# Patient Record
Sex: MALE | Race: WHITE | NOT HISPANIC OR LATINO | Employment: OTHER | URBAN - METROPOLITAN AREA
[De-identification: names, ages, dates, MRNs, and addresses within clinical notes are randomized per-mention and may not be internally consistent; named-entity substitution may affect disease eponyms.]

---

## 2021-12-20 ENCOUNTER — HOSPITAL ENCOUNTER (INPATIENT)
Facility: HOSPITAL | Age: 81
LOS: 1 days | Discharge: ANOTHER HOSPITAL | DRG: 208 | End: 2021-12-21
Attending: EMERGENCY MEDICINE | Admitting: INTERNAL MEDICINE
Payer: COMMERCIAL

## 2021-12-20 ENCOUNTER — APPOINTMENT (OUTPATIENT)
Dept: RADIOLOGY | Facility: HOSPITAL | Age: 81
DRG: 208 | End: 2021-12-20
Attending: EMERGENCY MEDICINE
Payer: COMMERCIAL

## 2021-12-20 ENCOUNTER — APPOINTMENT (OUTPATIENT)
Dept: CT IMAGING | Facility: HOSPITAL | Age: 81
DRG: 208 | End: 2021-12-20
Attending: EMERGENCY MEDICINE
Payer: COMMERCIAL

## 2021-12-20 DIAGNOSIS — J12.82 PNEUMONIA DUE TO 2019 NOVEL CORONAVIRUS: ICD-10-CM

## 2021-12-20 DIAGNOSIS — E87.0 HYPERNATREMIA: ICD-10-CM

## 2021-12-20 DIAGNOSIS — I48.91 ATRIAL FIBRILLATION WITH RVR (H): ICD-10-CM

## 2021-12-20 DIAGNOSIS — U07.1 PNEUMONIA DUE TO 2019 NOVEL CORONAVIRUS: ICD-10-CM

## 2021-12-20 DIAGNOSIS — J96.01 ACUTE RESPIRATORY FAILURE WITH HYPOXIA (H): ICD-10-CM

## 2021-12-20 PROBLEM — R09.02 HYPOXIA: Status: ACTIVE | Noted: 2021-12-20

## 2021-12-20 LAB
ABO/RH(D): NORMAL
ALBUMIN SERPL-MCNC: 2.3 G/DL (ref 3.5–5)
ALBUMIN SERPL-MCNC: 3.1 G/DL (ref 3.5–5)
ALP SERPL-CCNC: 100 U/L (ref 45–120)
ALP SERPL-CCNC: 73 U/L (ref 45–120)
ALT SERPL W P-5'-P-CCNC: 12 U/L (ref 0–45)
ALT SERPL W P-5'-P-CCNC: 18 U/L (ref 0–45)
ANION GAP SERPL CALCULATED.3IONS-SCNC: 12 MMOL/L (ref 5–18)
ANION GAP SERPL CALCULATED.3IONS-SCNC: 17 MMOL/L (ref 5–18)
APTT PPP: 35 SECONDS (ref 22–38)
AST SERPL W P-5'-P-CCNC: 18 U/L (ref 0–40)
AST SERPL W P-5'-P-CCNC: 25 U/L (ref 0–40)
BASE EXCESS BLDA CALC-SCNC: 1.3 MMOL/L
BASOPHILS # BLD AUTO: 0.1 10E3/UL (ref 0–0.2)
BASOPHILS NFR BLD AUTO: 0 %
BILIRUB SERPL-MCNC: 1.9 MG/DL (ref 0–1)
BILIRUB SERPL-MCNC: 2.3 MG/DL (ref 0–1)
BNP SERPL-MCNC: 29 PG/ML (ref 0–84)
BUN SERPL-MCNC: 56 MG/DL (ref 8–28)
BUN SERPL-MCNC: 65 MG/DL (ref 8–28)
C REACTIVE PROTEIN LHE: 11.4 MG/DL (ref 0–0.8)
C REACTIVE PROTEIN LHE: 17.9 MG/DL (ref 0–0.8)
CALCIUM SERPL-MCNC: 7.3 MG/DL (ref 8.5–10.5)
CALCIUM SERPL-MCNC: 9.3 MG/DL (ref 8.5–10.5)
CHLORIDE BLD-SCNC: 115 MMOL/L (ref 98–107)
CHLORIDE BLD-SCNC: 123 MMOL/L (ref 98–107)
CO2 SERPL-SCNC: 25 MMOL/L (ref 22–31)
CO2 SERPL-SCNC: 26 MMOL/L (ref 22–31)
COHGB MFR BLD: >100 % (ref 95–96)
CREAT SERPL-MCNC: 0.92 MG/DL (ref 0.7–1.3)
CREAT SERPL-MCNC: 1.18 MG/DL (ref 0.7–1.3)
D DIMER PPP FEU-MCNC: >20 UG/ML FEU (ref 0–0.5)
D DIMER PPP FEU-MCNC: >20 UG/ML FEU (ref 0–0.5)
EOSINOPHIL # BLD AUTO: 0 10E3/UL (ref 0–0.7)
EOSINOPHIL NFR BLD AUTO: 0 %
ERYTHROCYTE [DISTWIDTH] IN BLOOD BY AUTOMATED COUNT: 12.8 % (ref 10–15)
FIBRINOGEN PPP-MCNC: 330 MG/DL (ref 170–490)
GFR SERPL CREATININE-BSD FRML MDRD: 58 ML/MIN/1.73M2
GFR SERPL CREATININE-BSD FRML MDRD: 79 ML/MIN/1.73M2
GLUCOSE BLD-MCNC: 145 MG/DL (ref 70–125)
GLUCOSE BLD-MCNC: 148 MG/DL (ref 70–125)
HCO3 BLD-SCNC: ABNORMAL MMOL/L
HCT VFR BLD AUTO: 47.6 % (ref 40–53)
HGB BLD-MCNC: 15.9 G/DL (ref 13.3–17.7)
IMM GRANULOCYTES # BLD: 0.2 10E3/UL
IMM GRANULOCYTES NFR BLD: 1 %
INR PPP: 1.41 (ref 0.85–1.15)
INR PPP: 1.64 (ref 0.85–1.15)
LACTATE SERPL-SCNC: 2.7 MMOL/L (ref 0.7–2)
LDH SERPL L TO P-CCNC: 326 U/L (ref 125–220)
LYMPHOCYTES # BLD AUTO: 0.5 10E3/UL (ref 0.8–5.3)
LYMPHOCYTES NFR BLD AUTO: 3 %
MAGNESIUM SERPL-MCNC: 2.5 MG/DL (ref 1.8–2.6)
MAGNESIUM SERPL-MCNC: 3 MG/DL (ref 1.8–2.6)
MCH RBC QN AUTO: 31.4 PG (ref 26.5–33)
MCHC RBC AUTO-ENTMCNC: 33.4 G/DL (ref 31.5–36.5)
MCV RBC AUTO: 94 FL (ref 78–100)
MONOCYTES # BLD AUTO: 1 10E3/UL (ref 0–1.3)
MONOCYTES NFR BLD AUTO: 7 %
NEUTROPHILS # BLD AUTO: 14.3 10E3/UL (ref 1.6–8.3)
NEUTROPHILS NFR BLD AUTO: 89 %
NRBC # BLD AUTO: 0 10E3/UL
NRBC BLD AUTO-RTO: 0 /100
OXYHGB MFR BLD: >98.5 % (ref 95–96)
PCO2 BLD: 44 MM HG (ref 35–45)
PH BLD: 7.39 [PH] (ref 7.37–7.44)
PLAT MORPH BLD: NORMAL
PLATELET # BLD AUTO: 252 10E3/UL (ref 150–450)
PO2 BLD: 295 MM HG (ref 75–85)
POTASSIUM BLD-SCNC: 2.9 MMOL/L (ref 3.5–5)
POTASSIUM BLD-SCNC: 3.1 MMOL/L (ref 3.5–5)
PROCALCITONIN SERPL-MCNC: 0.1 NG/ML (ref 0–0.49)
PROT SERPL-MCNC: 5.2 G/DL (ref 6–8)
PROT SERPL-MCNC: 7.3 G/DL (ref 6–8)
RBC # BLD AUTO: 5.07 10E6/UL (ref 4.4–5.9)
RBC MORPH BLD: NORMAL
SARS-COV-2 RNA RESP QL NAA+PROBE: POSITIVE
SODIUM SERPL-SCNC: 158 MMOL/L (ref 136–145)
SODIUM SERPL-SCNC: 160 MMOL/L (ref 136–145)
SPECIMEN EXPIRATION DATE: NORMAL
TEMPERATURE: 37 DEGREES C
TROPONIN I SERPL-MCNC: 0.02 NG/ML (ref 0–0.29)
TROPONIN I SERPL-MCNC: 0.02 NG/ML (ref 0–0.29)
WBC # BLD AUTO: 15.9 10E3/UL (ref 4–11)

## 2021-12-20 PROCEDURE — 84145 PROCALCITONIN (PCT): CPT | Performed by: INTERNAL MEDICINE

## 2021-12-20 PROCEDURE — 83615 LACTATE (LD) (LDH) ENZYME: CPT | Performed by: INTERNAL MEDICINE

## 2021-12-20 PROCEDURE — 83735 ASSAY OF MAGNESIUM: CPT | Performed by: INTERNAL MEDICINE

## 2021-12-20 PROCEDURE — 36415 COLL VENOUS BLD VENIPUNCTURE: CPT | Performed by: EMERGENCY MEDICINE

## 2021-12-20 PROCEDURE — 82805 BLOOD GASES W/O2 SATURATION: CPT | Performed by: INTERNAL MEDICINE

## 2021-12-20 PROCEDURE — 84450 TRANSFERASE (AST) (SGOT): CPT | Performed by: INTERNAL MEDICINE

## 2021-12-20 PROCEDURE — 85610 PROTHROMBIN TIME: CPT | Performed by: EMERGENCY MEDICINE

## 2021-12-20 PROCEDURE — 87635 SARS-COV-2 COVID-19 AMP PRB: CPT | Performed by: EMERGENCY MEDICINE

## 2021-12-20 PROCEDURE — 85025 COMPLETE CBC W/AUTO DIFF WBC: CPT | Performed by: EMERGENCY MEDICINE

## 2021-12-20 PROCEDURE — 250N000009 HC RX 250: Performed by: INTERNAL MEDICINE

## 2021-12-20 PROCEDURE — 86141 C-REACTIVE PROTEIN HS: CPT | Performed by: INTERNAL MEDICINE

## 2021-12-20 PROCEDURE — XW033H5 INTRODUCTION OF TOCILIZUMAB INTO PERIPHERAL VEIN, PERCUTANEOUS APPROACH, NEW TECHNOLOGY GROUP 5: ICD-10-PCS | Performed by: EMERGENCY MEDICINE

## 2021-12-20 PROCEDURE — 36569 INSJ PICC 5 YR+ W/O IMAGING: CPT

## 2021-12-20 PROCEDURE — 272N000452 HC KIT SHRLOCK 5FR POWER PICC TRIPLE LUMEN

## 2021-12-20 PROCEDURE — 120N000001 HC R&B MED SURG/OB

## 2021-12-20 PROCEDURE — 83605 ASSAY OF LACTIC ACID: CPT | Performed by: EMERGENCY MEDICINE

## 2021-12-20 PROCEDURE — 258N000003 HC RX IP 258 OP 636: Performed by: EMERGENCY MEDICINE

## 2021-12-20 PROCEDURE — 85610 PROTHROMBIN TIME: CPT | Performed by: INTERNAL MEDICINE

## 2021-12-20 PROCEDURE — 85379 FIBRIN DEGRADATION QUANT: CPT | Performed by: INTERNAL MEDICINE

## 2021-12-20 PROCEDURE — 83880 ASSAY OF NATRIURETIC PEPTIDE: CPT | Performed by: EMERGENCY MEDICINE

## 2021-12-20 PROCEDURE — 99291 CRITICAL CARE FIRST HOUR: CPT | Mod: 25

## 2021-12-20 PROCEDURE — 31500 INSERT EMERGENCY AIRWAY: CPT

## 2021-12-20 PROCEDURE — 5A1935Z RESPIRATORY VENTILATION, LESS THAN 24 CONSECUTIVE HOURS: ICD-10-PCS | Performed by: EMERGENCY MEDICINE

## 2021-12-20 PROCEDURE — 96365 THER/PROPH/DIAG IV INF INIT: CPT | Mod: 59

## 2021-12-20 PROCEDURE — 84484 ASSAY OF TROPONIN QUANT: CPT | Performed by: EMERGENCY MEDICINE

## 2021-12-20 PROCEDURE — 93005 ELECTROCARDIOGRAM TRACING: CPT | Performed by: EMERGENCY MEDICINE

## 2021-12-20 PROCEDURE — 96376 TX/PRO/DX INJ SAME DRUG ADON: CPT | Mod: 59

## 2021-12-20 PROCEDURE — 96366 THER/PROPH/DIAG IV INF ADDON: CPT | Mod: 59

## 2021-12-20 PROCEDURE — XW033E5 INTRODUCTION OF REMDESIVIR ANTI-INFECTIVE INTO PERIPHERAL VEIN, PERCUTANEOUS APPROACH, NEW TECHNOLOGY GROUP 5: ICD-10-PCS | Performed by: EMERGENCY MEDICINE

## 2021-12-20 PROCEDURE — 86900 BLOOD TYPING SEROLOGIC ABO: CPT | Performed by: INTERNAL MEDICINE

## 2021-12-20 PROCEDURE — 99291 CRITICAL CARE FIRST HOUR: CPT | Performed by: INTERNAL MEDICINE

## 2021-12-20 PROCEDURE — 36600 WITHDRAWAL OF ARTERIAL BLOOD: CPT

## 2021-12-20 PROCEDURE — 87040 BLOOD CULTURE FOR BACTERIA: CPT | Performed by: INTERNAL MEDICINE

## 2021-12-20 PROCEDURE — 999N000157 HC STATISTIC RCP TIME EA 10 MIN

## 2021-12-20 PROCEDURE — 71045 X-RAY EXAM CHEST 1 VIEW: CPT

## 2021-12-20 PROCEDURE — 250N000011 HC RX IP 250 OP 636: Performed by: INTERNAL MEDICINE

## 2021-12-20 PROCEDURE — 36415 COLL VENOUS BLD VENIPUNCTURE: CPT | Performed by: INTERNAL MEDICINE

## 2021-12-20 PROCEDURE — 99292 CRITICAL CARE ADDL 30 MIN: CPT

## 2021-12-20 PROCEDURE — 258N000003 HC RX IP 258 OP 636: Performed by: INTERNAL MEDICINE

## 2021-12-20 PROCEDURE — 71275 CT ANGIOGRAPHY CHEST: CPT

## 2021-12-20 PROCEDURE — 84484 ASSAY OF TROPONIN QUANT: CPT | Performed by: INTERNAL MEDICINE

## 2021-12-20 PROCEDURE — 250N000009 HC RX 250: Performed by: EMERGENCY MEDICINE

## 2021-12-20 PROCEDURE — 82040 ASSAY OF SERUM ALBUMIN: CPT | Performed by: EMERGENCY MEDICINE

## 2021-12-20 PROCEDURE — 96368 THER/DIAG CONCURRENT INF: CPT | Mod: 59

## 2021-12-20 PROCEDURE — 51702 INSERT TEMP BLADDER CATH: CPT

## 2021-12-20 PROCEDURE — 250N000011 HC RX IP 250 OP 636: Performed by: EMERGENCY MEDICINE

## 2021-12-20 PROCEDURE — 85384 FIBRINOGEN ACTIVITY: CPT | Performed by: INTERNAL MEDICINE

## 2021-12-20 PROCEDURE — 94002 VENT MGMT INPAT INIT DAY: CPT

## 2021-12-20 PROCEDURE — 96367 TX/PROPH/DG ADDL SEQ IV INF: CPT | Mod: 59

## 2021-12-20 PROCEDURE — 85730 THROMBOPLASTIN TIME PARTIAL: CPT | Performed by: INTERNAL MEDICINE

## 2021-12-20 PROCEDURE — 96361 HYDRATE IV INFUSION ADD-ON: CPT | Mod: 59

## 2021-12-20 PROCEDURE — C9803 HOPD COVID-19 SPEC COLLECT: HCPCS

## 2021-12-20 PROCEDURE — 96375 TX/PRO/DX INJ NEW DRUG ADDON: CPT | Mod: 59

## 2021-12-20 PROCEDURE — 84155 ASSAY OF PROTEIN SERUM: CPT | Performed by: INTERNAL MEDICINE

## 2021-12-20 PROCEDURE — 83735 ASSAY OF MAGNESIUM: CPT | Performed by: EMERGENCY MEDICINE

## 2021-12-20 PROCEDURE — 99292 CRITICAL CARE ADDL 30 MIN: CPT | Performed by: INTERNAL MEDICINE

## 2021-12-20 RX ORDER — POTASSIUM CHLORIDE 7.45 MG/ML
10 INJECTION INTRAVENOUS ONCE
Status: COMPLETED | OUTPATIENT
Start: 2021-12-20 | End: 2021-12-21

## 2021-12-20 RX ORDER — DEXAMETHASONE SODIUM PHOSPHATE 4 MG/ML
6 INJECTION, SOLUTION INTRA-ARTICULAR; INTRALESIONAL; INTRAMUSCULAR; INTRAVENOUS; SOFT TISSUE EVERY 24 HOURS
Status: DISCONTINUED | OUTPATIENT
Start: 2021-12-21 | End: 2021-12-21 | Stop reason: HOSPADM

## 2021-12-20 RX ORDER — NOREPINEPHRINE BITARTRATE 0.02 MG/ML
.01-.6 INJECTION, SOLUTION INTRAVENOUS CONTINUOUS
Status: DISCONTINUED | OUTPATIENT
Start: 2021-12-20 | End: 2021-12-21 | Stop reason: HOSPADM

## 2021-12-20 RX ORDER — CHLORHEXIDINE GLUCONATE ORAL RINSE 1.2 MG/ML
15 SOLUTION DENTAL EVERY 12 HOURS
Status: DISCONTINUED | OUTPATIENT
Start: 2021-12-20 | End: 2021-12-21 | Stop reason: HOSPADM

## 2021-12-20 RX ORDER — ALBUTEROL SULFATE 90 UG/1
2 AEROSOL, METERED RESPIRATORY (INHALATION) 4 TIMES DAILY
Status: DISCONTINUED | OUTPATIENT
Start: 2021-12-20 | End: 2021-12-21 | Stop reason: HOSPADM

## 2021-12-20 RX ORDER — PROPOFOL 10 MG/ML
5-75 INJECTION, EMULSION INTRAVENOUS CONTINUOUS
Status: DISCONTINUED | OUTPATIENT
Start: 2021-12-20 | End: 2021-12-21

## 2021-12-20 RX ORDER — PIPERACILLIN SODIUM, TAZOBACTAM SODIUM 3; .375 G/15ML; G/15ML
3.38 INJECTION, POWDER, LYOPHILIZED, FOR SOLUTION INTRAVENOUS EVERY 8 HOURS
Status: DISCONTINUED | OUTPATIENT
Start: 2021-12-21 | End: 2021-12-21 | Stop reason: HOSPADM

## 2021-12-20 RX ORDER — LIDOCAINE 40 MG/G
CREAM TOPICAL
Status: DISCONTINUED | OUTPATIENT
Start: 2021-12-20 | End: 2021-12-21 | Stop reason: HOSPADM

## 2021-12-20 RX ORDER — IOPAMIDOL 755 MG/ML
100 INJECTION, SOLUTION INTRAVASCULAR ONCE
Status: COMPLETED | OUTPATIENT
Start: 2021-12-21 | End: 2021-12-21

## 2021-12-20 RX ORDER — PIPERACILLIN SODIUM, TAZOBACTAM SODIUM 3; .375 G/15ML; G/15ML
3.38 INJECTION, POWDER, LYOPHILIZED, FOR SOLUTION INTRAVENOUS ONCE
Status: COMPLETED | OUTPATIENT
Start: 2021-12-20 | End: 2021-12-20

## 2021-12-20 RX ORDER — FENTANYL CITRATE 50 UG/ML
100 INJECTION, SOLUTION INTRAMUSCULAR; INTRAVENOUS ONCE
Status: COMPLETED | OUTPATIENT
Start: 2021-12-20 | End: 2021-12-20

## 2021-12-20 RX ORDER — DEXAMETHASONE SODIUM PHOSPHATE 10 MG/ML
6 INJECTION, SOLUTION INTRAMUSCULAR; INTRAVENOUS ONCE
Status: COMPLETED | OUTPATIENT
Start: 2021-12-20 | End: 2021-12-20

## 2021-12-20 RX ORDER — DEXTROSE MONOHYDRATE 50 MG/ML
INJECTION, SOLUTION INTRAVENOUS CONTINUOUS
Status: DISCONTINUED | OUTPATIENT
Start: 2021-12-20 | End: 2021-12-21

## 2021-12-20 RX ORDER — METOPROLOL TARTRATE 1 MG/ML
5 INJECTION, SOLUTION INTRAVENOUS ONCE
Status: COMPLETED | OUTPATIENT
Start: 2021-12-20 | End: 2021-12-20

## 2021-12-20 RX ORDER — ETOMIDATE 2 MG/ML
30 INJECTION INTRAVENOUS ONCE
Status: COMPLETED | OUTPATIENT
Start: 2021-12-20 | End: 2021-12-20

## 2021-12-20 RX ORDER — PIPERACILLIN SODIUM, TAZOBACTAM SODIUM 3; .375 G/15ML; G/15ML
3.38 INJECTION, POWDER, LYOPHILIZED, FOR SOLUTION INTRAVENOUS EVERY 8 HOURS
Status: DISCONTINUED | OUTPATIENT
Start: 2021-12-20 | End: 2021-12-20 | Stop reason: DRUGHIGH

## 2021-12-20 RX ADMIN — PIPERACILLIN SODIUM AND TAZOBACTAM SODIUM 3.38 G: 3; .375 INJECTION, POWDER, LYOPHILIZED, FOR SOLUTION INTRAVENOUS at 21:18

## 2021-12-20 RX ADMIN — TOCILIZUMAB 472 MG: 20 INJECTION, SOLUTION, CONCENTRATE INTRAVENOUS at 22:22

## 2021-12-20 RX ADMIN — ETOMIDATE 30 MG: 2 INJECTION INTRAVENOUS at 20:16

## 2021-12-20 RX ADMIN — Medication 50 MCG/HR: at 22:04

## 2021-12-20 RX ADMIN — SODIUM CHLORIDE 1000 ML: 9 INJECTION, SOLUTION INTRAVENOUS at 17:51

## 2021-12-20 RX ADMIN — REMDESIVIR 200 MG: 100 INJECTION, POWDER, LYOPHILIZED, FOR SOLUTION INTRAVENOUS at 21:09

## 2021-12-20 RX ADMIN — ENOXAPARIN SODIUM 30 MG: 30 INJECTION SUBCUTANEOUS at 21:39

## 2021-12-20 RX ADMIN — DEXAMETHASONE SODIUM PHOSPHATE 6 MG: 10 INJECTION, SOLUTION INTRAMUSCULAR; INTRAVENOUS at 21:08

## 2021-12-20 RX ADMIN — MIDAZOLAM HYDROCHLORIDE 5 MG: 1 INJECTION, SOLUTION INTRAMUSCULAR; INTRAVENOUS at 20:46

## 2021-12-20 RX ADMIN — MIDAZOLAM HYDROCHLORIDE 0.5 MG: 1 INJECTION, SOLUTION INTRAMUSCULAR; INTRAVENOUS at 22:09

## 2021-12-20 RX ADMIN — SUCCINYLCHOLINE CHLORIDE 120 MG: 20 INJECTION, SOLUTION INTRAMUSCULAR; INTRAVENOUS at 20:17

## 2021-12-20 RX ADMIN — LIDOCAINE HYDROCHLORIDE 2 ML: 10 INJECTION, SOLUTION EPIDURAL; INFILTRATION; INTRACAUDAL; PERINEURAL at 20:45

## 2021-12-20 RX ADMIN — POTASSIUM CHLORIDE 10 MEQ: 7.46 INJECTION, SOLUTION INTRAVENOUS at 22:52

## 2021-12-20 RX ADMIN — FAMOTIDINE 20 MG: 10 INJECTION INTRAVENOUS at 21:09

## 2021-12-20 RX ADMIN — SODIUM CHLORIDE 1000 ML: 9 INJECTION, SOLUTION INTRAVENOUS at 20:48

## 2021-12-20 RX ADMIN — METOPROLOL TARTRATE 5 MG: 5 INJECTION INTRAVENOUS at 21:08

## 2021-12-20 RX ADMIN — DEXTROSE MONOHYDRATE: 50 INJECTION, SOLUTION INTRAVENOUS at 21:19

## 2021-12-20 RX ADMIN — MIDAZOLAM HYDROCHLORIDE 0.5 MG: 1 INJECTION, SOLUTION INTRAMUSCULAR; INTRAVENOUS at 22:36

## 2021-12-20 RX ADMIN — MIDAZOLAM HYDROCHLORIDE 1 MG: 1 INJECTION, SOLUTION INTRAMUSCULAR; INTRAVENOUS at 21:38

## 2021-12-20 RX ADMIN — MIDAZOLAM HYDROCHLORIDE 0.5 MG: 1 INJECTION, SOLUTION INTRAMUSCULAR; INTRAVENOUS at 22:30

## 2021-12-20 RX ADMIN — FENTANYL CITRATE 100 MCG: 50 INJECTION INTRAMUSCULAR; INTRAVENOUS at 20:45

## 2021-12-20 RX ADMIN — SODIUM CHLORIDE 50 ML: 9 INJECTION, SOLUTION INTRAVENOUS at 22:03

## 2021-12-20 ASSESSMENT — ACTIVITIES OF DAILY LIVING (ADL)
ADLS_ACUITY_SCORE: 12
DEPENDENT_IADLS:: INDEPENDENT
ADLS_ACUITY_SCORE: 12

## 2021-12-20 ASSESSMENT — MIFFLIN-ST. JEOR: SCORE: 1374.43

## 2021-12-20 NOTE — ED TRIAGE NOTES
Patient arrived via [facility] for evaluation of low O2 sats. Patient c/o bodyaches, chills, fever. Per EMS, patient lives with his son who also had possible covid symptoms. Neither were tested. EMS stated patient's O2 sats were 81% on RA. With nonrebreather mask at12L oxygen, patient's sats improve to 96%. EMS reported other VS stable and glucose: 154.

## 2021-12-20 NOTE — ED PROVIDER NOTES
EMERGENCY DEPARTMENT ENCOUNTER      NAME: Ezequiel Randhawa  AGE: 81 year old male  YOB: 1940  MRN: [id number]  EVALUATION DATE & TIME: 12/20/2021  4:52 PM    PCP: No primary care provider on file.    ED PROVIDER: Kalina Caraballo M.D.      Chief Complaint   Patient presents with     Low O2 sats/poss covid     FINAL IMPRESSION:  1. Pneumonia due to 2019 novel coronavirus    2. Hypernatremia    3. Acute respiratory failure with hypoxia (H)    4. Atrial fibrillation with RVR (H)        ED COURSE & MEDICAL DECISION MAKING:    Pertinent Labs & Imaging studies reviewed. (See chart for details)  ED Course as of 12/20/21 2119   Mon Dec 20, 2021   1711 Patient is a 81-year-old male who comes in today with low oxygen saturations and body aches and chills.  He had a poor appetite for a few days and and looks very dry.  He denies any heart or lung problems but because of his difficulty breathing he was difficult to understand and get a good history from him.  He is requiring 12 L on oxygen mask.  He is not caught up on his Covid vaccine.  He has a son at home with similar symptoms.  I am highly suspicious for Covid pneumonia.  He is tachycardic.  He will need some fluids.  Will need blood work and a chest x-ray and a Covid swab and likely admission to the hospital.  I discussed this with him and he is in agreement with the plan.   1915 Patient came back Covid positive so I ordered dexamethasone for him.  I think he is very dehydrated.   1916 Patient's chest x-ray shows pneumonia on the left side.  Certainly this would fit with his picture.  I'm going to go ahead and order CTA imaging to further evaluate and will work on getting him admitted to the hospital.  Lactic acid came back elevated at 2.7.  This seems mostly like Covid with dehydration.  We can recheck the lactate after some fluids and see if it looks better.   1940 I spoke with Dr. Brush with the hospitalist service.  She was in agreement with  admission to the hospital.  She want the patient put on ICU status so I have ordered that.  We do not have an ICU bed so he will board down in the emergency department.   1953 I was called into the patient's room.  He is the satting and now is on BiPAP.  He is likely going to require intubation.  We will need to have a PICC line placed.   2031 Patient ended up requiring intubation.  He was intubated with a 7-1/2 tube and went very smoothly.  He will have a PICC line placed.  He is not needing pressors at this time.  Dr. Brush was able to speak to family and he is a no CPR but otherwise would want resuscitation and pressors and intubation.   2032 Patient's D-dimer is greater than 20.  I am suspecting this is related to the Covid process.  Other labs have been ordered including a fibrinogen.   2040 Patient will be placed on a fentanyl and Versed drip.   2056 Patient flipped into atrial fibrillation with RVR.  I have ordered some metoprolol for him.  He is on a fentanyl and Versed drip.        5:03 PM I met with the patient, obtained history, performed an initial exam, and discussed options and plan for diagnostics and treatment here in the ED. Discussed plan for admission - patient agreeable. PPE worn: N95, eye protection, gloves, hair cover, gown.  7:36 PM Spoke with Dr. Brush, hospitalist, who accepts patient for admission.  7:43 PM Rechecked patient. Updated on results and plan.  7:58 PM Rechecked patient. Performed intubation.  8:44 PM Rechecked patient.    At the conclusion of the encounter I discussed  the results of all of the tests and the disposition with patient. All questions were answered. The patient acknowledged understanding and was involved in the decision making regarding the overall care plan.      45 minutes of critical care time     MEDICATIONS GIVEN IN THE EMERGENCY:  Medications   remdesivir 200 mg in sodium chloride 0.9 % 250 mL intermittent infusion (200 mg Intravenous New Bag 12/20/21  2109)     Followed by   0.9% sodium chloride BOLUS (has no administration in time range)   remdesivir 100 mg in sodium chloride 0.9 % 250 mL intermittent infusion (has no administration in time range)     And   0.9% sodium chloride BOLUS (has no administration in time range)   lidocaine 1 % 0.1-5 mL (has no administration in time range)   lidocaine (LMX4) cream (has no administration in time range)   sodium chloride (PF) 0.9% PF flush 10-40 mL (has no administration in time range)   tocilizumab (ACTEMRA) 472 mg in sodium chloride 0.9 % 123.6 mL infusion (has no administration in time range)   dexamethasone (DECADRON) injection 6 mg (has no administration in time range)   albuterol (PROVENTIL HFA/VENTOLIN HFA) inhaler (has no administration in time range)   norepinephrine (LEVOPHED) 4 mg in  mL infusion PREMIX (has no administration in time range)   enoxaparin ANTICOAGULANT (LOVENOX) injection 30 mg (has no administration in time range)   famotidine (PEPCID) injection 20 mg (20 mg Intravenous Given 12/20/21 2109)   fentaNYL (SUBLIMAZE) infusion (has no administration in time range)   midazolam (VERSED) injection 1 mg (has no administration in time range)     Followed by   midazolam (VERSED) injection 0.5 mg (has no administration in time range)   dextrose 5% infusion (has no administration in time range)   piperacillin-tazobactam (ZOSYN) 3.375 g vial to attach to  mL bag (3.375 g Intravenous New Bag 12/20/21 2118)     Followed by   piperacillin-tazobactam (ZOSYN) 3.375 g vial to attach to  mL bag (has no administration in time range)   0.9% sodium chloride BOLUS (0 mLs Intravenous Stopped 12/20/21 2048)   0.9% sodium chloride BOLUS (1,000 mLs Intravenous New Bag 12/20/21 2048)   dexamethasone PF (DECADRON) injection 6 mg (6 mg Intravenous Given 12/20/21 2108)   fentaNYL (PF) (SUBLIMAZE) injection 100 mcg (100 mcg Intravenous Given 12/20/21 2045)   midazolam (VERSED) injection 5 mg (5 mg Intravenous  Given 12/20/21 2046)   metoprolol (LOPRESSOR) injection 5 mg (5 mg Intravenous Given 12/20/21 2108)     =================================================================    HPI    Triage Note: Patient arrived via [facility] for evaluation of low O2 sats. Patient c/o bodyaches, chills, fever. Per EMS, patient lives with his son who also had possible covid symptoms. Neither were tested. EMS stated patient's O2 sats were 81% on RA. With nonrebreather mask at12L oxygen, patient's sats improve to 96%. EMS reported other VS stable and glucose: 154.     Patient information was obtained from: Patient and RN    Use of : N/A      Ezequiel Randhawa is a 81 year old male who presents for evaluation of Covid concern. Patient reports 2 days of shortness of breath, body aches, fever, chills, and some chest pain. Reports he has not been able to eat or drink well. Patient reports he lives with his son who has also been sick. Neither are vaccinated against Covid. He denies any known cardiac or pulmonary problems.    Per RN, patient had O2 saturation of 81% at home. Presents with non-rebreather at 12L with improvement of O2 saturation to 96%.      REVIEW OF SYSTEMS   Except as stated in the HPI all other systems reviewed and are negative.    PAST MEDICAL HISTORY:  Patient denies any significant past medical history although history was somewhat limited due to patient's critical condition.    PAST SURGICAL HISTORY:  History reviewed. No pertinent surgical history.    CURRENT MEDICATIONS:      Current Facility-Administered Medications:      remdesivir 200 mg in sodium chloride 0.9 % 250 mL intermittent infusion, 200 mg, Intravenous, Once, Last Rate: 500 mL/hr at 12/20/21 2109, 200 mg at 12/20/21 2109 **FOLLOWED BY** 0.9% sodium chloride BOLUS, 50 mL, Intravenous, Once, Kalina Caraballo MD     [START ON 12/21/2021] remdesivir 100 mg in sodium chloride 0.9 % 250 mL intermittent infusion, 100 mg, Intravenous, Q24H **AND** [START  ON 12/21/2021] 0.9% sodium chloride BOLUS, 50 mL, Intravenous, Q24H, Kalina Caraballo MD     albuterol (PROVENTIL HFA/VENTOLIN HFA) inhaler, 2 puff, Inhalation, 4x Daily, Jesi Brush MD     [START ON 12/21/2021] dexamethasone (DECADRON) injection 6 mg, 6 mg, Intravenous, Q24H, Jesi Brush MD     dextrose 5% infusion, , Intravenous, Continuous, Jesi Brush MD     enoxaparin ANTICOAGULANT (LOVENOX) injection 30 mg, 0.5 mg/kg, Subcutaneous, BID, Jesi Brush MD     famotidine (PEPCID) injection 20 mg, 20 mg, Intravenous, Q12H, Jesi Brush MD, 20 mg at 12/20/21 2109     fentaNYL (SUBLIMAZE) infusion,  mcg/hr, Intravenous, Continuous, Kalina Caraballo MD     lidocaine (LMX4) cream, , Topical, Q1H PRN, Kalina Caraballo MD     lidocaine 1 % 0.1-5 mL, 0.1-5 mL, Other, Q1H PRN, Kalina Caraballo MD     midazolam (VERSED) injection 1 mg, 1 mg, Intravenous, Once within 24 hrs **FOLLOWED BY** midazolam (VERSED) injection 0.5 mg, 0.5 mg, Intravenous, Q4 Min PRN, Kalina Caraballo MD     norepinephrine (LEVOPHED) 4 mg in  mL infusion PREMIX, 0.01-0.6 mcg/kg/min, Intravenous, Continuous, Jesi Brush MD     piperacillin-tazobactam (ZOSYN) 3.375 g vial to attach to  mL bag, 3.375 g, Intravenous, Once, 3.375 g at 12/20/21 2118 **FOLLOWED BY** [START ON 12/21/2021] piperacillin-tazobactam (ZOSYN) 3.375 g vial to attach to  mL bag, 3.375 g, Intravenous, Q8H, Jesi Brush MD     sodium chloride (PF) 0.9% PF flush 10-40 mL, 10-40 mL, Intracatheter, Once PRN, Kalina Caraballo MD     tocilizumab (ACTEMRA) 472 mg in sodium chloride 0.9 % 123.6 mL infusion, 8 mg/kg, Intravenous, Once, Jesi Brush MD  No current outpatient medications on file.    ALLERGIES:  No Known Allergies    FAMILY HISTORY:  History reviewed. No pertinent family history.    SOCIAL HISTORY:   Social History     Socioeconomic History      "Marital status:      Spouse name: Not on file     Number of children: Not on file     Years of education: Not on file     Highest education level: Not on file   Occupational History     Not on file   Tobacco Use     Smoking status: Not on file     Smokeless tobacco: Not on file   Substance and Sexual Activity     Alcohol use: Not on file     Drug use: Not on file     Sexual activity: Not on file   Other Topics Concern     Not on file   Social History Narrative     Not on file     Social Determinants of Health     Financial Resource Strain: Not on file   Food Insecurity: Not on file   Transportation Needs: Not on file   Physical Activity: Not on file   Stress: Not on file   Social Connections: Not on file   Intimate Partner Violence: Not on file   Housing Stability: Not on file       PHYSICAL EXAM    VITAL SIGNS: /68   Pulse 99   Temp 98.7  F (37.1  C) (Axillary)   Resp (!) 37   Ht 1.88 m (6' 2\")   Wt 59 kg (130 lb)   SpO2 94%   BMI 16.69 kg/m     GENERAL: Awake, Alert, answering some questions, Looks frail.  HEENT: Normal cephalic, Atraumatic, bilateral external ears normal, No scleral icterus, Very dry mucous membranes and lips.  NECK: No obvious swelling or abnormality, No stridor  PULMONARY: Decreased breath sounds bilaterally, mild respiratory distress.   CARDIOVASCULAR: Tachycardic. Regular rhythm, Distal pulses present and normal.  ABDOMINAL: Soft, Nondistended, Nontender, No flank tenderness, No palpable masses  BACK: No bruising or tenderness.  EXTREMITIES: Moves all extremities spontaneously, warm, no edema, No major deformities  NEURO: No facial droop, normal motor function, Normal speech   PSYCH: Normal mood and affect  SKIN: No rashes on visualized skin, dry, warm    LAB:  All pertinent labs reviewed and interpreted.  Results for orders placed or performed during the hospital encounter of 12/20/21   XR Chest Port 1 View    Impression    IMPRESSION:     The cardiac silhouette is normal " in size. Vascular pedicle width is normal.    There is hazy airspace opacity involving multiple segments of the left lung at and inferior to the left hilum. The right lung is well-expanded and clear. Overall the distribution is suggestive of infection/inflammation. Follow-up chest radiographs in 4-6   weeks are suggested for reassessment.    No pleural effusion or pneumothorax is present.    NOTE: ABNORMAL REPORT    THE DICTATION ABOVE DESCRIBES AN ABNORMALITY FOR WHICH FOLLOW-UP IS NEEDED.    Result Value Ref Range    INR 1.41 (H) 0.85 - 1.15   Comprehensive metabolic panel   Result Value Ref Range    Sodium 158 (HH) 136 - 145 mmol/L    Potassium 3.1 (L) 3.5 - 5.0 mmol/L    Chloride 115 (H) 98 - 107 mmol/L    Carbon Dioxide (CO2) 26 22 - 31 mmol/L    Anion Gap 17 5 - 18 mmol/L    Urea Nitrogen 65 (H) 8 - 28 mg/dL    Creatinine 1.18 0.70 - 1.30 mg/dL    Calcium 9.3 8.5 - 10.5 mg/dL    Glucose 145 (H) 70 - 125 mg/dL    Alkaline Phosphatase 100 45 - 120 U/L    AST 25 0 - 40 U/L    ALT 18 0 - 45 U/L    Protein Total 7.3 6.0 - 8.0 g/dL    Albumin 3.1 (L) 3.5 - 5.0 g/dL    Bilirubin Total 2.3 (H) 0.0 - 1.0 mg/dL    GFR Estimate 58 (L) >60 mL/min/1.73m2   Lactic acid whole blood   Result Value Ref Range    Lactic Acid 2.7 (H) 0.7 - 2.0 mmol/L   Result Value Ref Range    Troponin I 0.02 0.00 - 0.29 ng/mL   Result Value Ref Range    Magnesium 3.0 (H) 1.8 - 2.6 mg/dL   B-Type Natriuretic Peptide ([facility] Only)   Result Value Ref Range    BNP 29 0 - 84 pg/mL   Symptomatic; Yes; 12/17/2021 COVID-19 Virus (Coronavirus) by PCR Nasopharyngeal    Specimen: Nasopharyngeal; Swab   Result Value Ref Range    SARS CoV2 PCR Positive (A) Negative   CBC with platelets and differential   Result Value Ref Range    WBC Count 15.9 (H) 4.0 - 11.0 10e3/uL    RBC Count 5.07 4.40 - 5.90 10e6/uL    Hemoglobin 15.9 13.3 - 17.7 g/dL    Hematocrit 47.6 40.0 - 53.0 %    MCV 94 78 - 100 fL    MCH 31.4 26.5 - 33.0 pg    MCHC 33.4 31.5 - 36.5 g/dL    RDW  12.8 10.0 - 15.0 %    Platelet Count 252 150 - 450 10e3/uL    % Neutrophils 89 %    % Lymphocytes 3 %    % Monocytes 7 %    % Eosinophils 0 %    % Basophils 0 %    % Immature Granulocytes 1 %    NRBCs per 100 WBC 0 <1 /100    Absolute Neutrophils 14.3 (H) 1.6 - 8.3 10e3/uL    Absolute Lymphocytes 0.5 (L) 0.8 - 5.3 10e3/uL    Absolute Monocytes 1.0 0.0 - 1.3 10e3/uL    Absolute Eosinophils 0.0 0.0 - 0.7 10e3/uL    Absolute Basophils 0.1 0.0 - 0.2 10e3/uL    Absolute Immature Granulocytes 0.2 <=0.4 10e3/uL    Absolute NRBCs 0.0 10e3/uL   RBC and Platelet Morphology   Result Value Ref Range    Platelet Assessment  Automated Count Confirmed. Platelet morphology is normal.     Automated Count Confirmed. Platelet morphology is normal.    RBC Morphology Confirmed RBC Indices    D dimer quantitative   Result Value Ref Range    D-Dimer Quantitative >20.00 (HH) 0.00 - 0.50 ug/mL FEU   CRP inflammation   Result Value Ref Range    CRP 17.9 (H) 0.0-<0.8 mg/dL       RADIOLOGY:  XR Chest Port 1 View   Final Result   IMPRESSION:       The cardiac silhouette is normal in size. Vascular pedicle width is normal.      There is hazy airspace opacity involving multiple segments of the left lung at and inferior to the left hilum. The right lung is well-expanded and clear. Overall the distribution is suggestive of infection/inflammation. Follow-up chest radiographs in 4-6    weeks are suggested for reassessment.      No pleural effusion or pneumothorax is present.      NOTE: ABNORMAL REPORT      THE DICTATION ABOVE DESCRIBES AN ABNORMALITY FOR WHICH FOLLOW-UP IS NEEDED.       CT Chest Pulmonary Embolism w Contrast    (Results Pending)   US Lower Extremity Venous Duplex Bilateral    (Results Pending)     EKG  Date and time: December 20, 2021 at 1711  Rate: 109 bpm  Rhythm: Sinus tach per  VT interval: 152 ms  QRS interval: 80 ms  QT/QTc: 340/457 ms  ST changes or T wave changes: No acute ST or T wave abnormality  Change from prior ECG: No  prior to compare to  I have independently reviewed and interpreted this EKG.     Date and time: December 20, 2021 at 2051  Rate: 151 bpm  Rhythm: Atrial fibrillation with RVR  QRS interval: 80 ms  QT/QTc: 272/431 ms  ST changes or T wave changes: No acute ST or T wave abnormality  Change from prior ECG: Atrial fibrillation with RVR is new from previous EKG.  I have independently reviewed and interpreted this EKG.       PROCEDURES:   PROCEDURE: Rapid Sequence Intubation   INDICATIONS: Respiratory Failure   PROCEDURE PROVIDER: Dr Kalina Caraballo   CONSENT: Risks, benefits and alternatives were discussed with and Verbal consent was obtained from Patient.   PROCEDURE SPECIFIC CHECKLIST COMPLETED: Yes   TIME OUT: Universal protocol was followed. TIME OUT conducted just prior to starting procedure confirmed patient identity, site/side, procedure, patient position, and availability of correct equipment. Yes   MEDICATIONS: Etomidate, 30 mg, IV   TUBE DETAILS: 7.5 tube, at 25 cm at the teeth   EQUIPMENT USED: Mac 3 (curved)   POST-INTUBATION ASSESSMENT/NOTE: Difficulty of intubation:  Easy, straightforward and Slightly difficult    Post-intubation pulmonary exam:  equal and absent over the epigastrium    ET Tube placement was confirmed with:  auscultation with good, equal bilateral breath sounds, absence of breath sounds over the epigastrium, fog in the tube, colorimetric CO2 detector (good color change) and pulse oximeter readings stable or improving    Lowest oxygen saturation was 90%    Monitoring consisted of:  heart rate, cardiac monitor, continuous pulse oximeter, frequent blood pressure checks, IV access, constant attendance by RN until patient is recovered and constant attendance by MD until patient is stable     COMPLICATIONS: Patient tolerated procedure well, without complication     Critical Care     Performed by: Dr Kalina Caraballo  Authorized by: Dr Kalina Caraballo  Total critical care time: 45 minutes  Critical  care was necessary to treat or prevent imminent or life-threatening deterioration of the following conditions:  Acute hypoxic respiratory failure related to Covid pneumonia, atrial fibrillation with RVR  Critical care was time spent personally by me on the following activities: development of treatment plan with patient or surrogate, discussions with consultants, examination of patient, evaluation of patient's response to treatment, obtaining history from patient or surrogate, ordering and performing treatments and interventions, ordering and review of laboratory studies, ordering and review of radiographic studies, re-evaluation of patient's condition and monitoring for potential decompensation.  Critical care time was exclusive of separately billable procedures and treating other patients.      I, Amador Ribeiro, am serving as a scribe to document services personally performed by Dr. Caraballo based on my observation and the provider's statements to me. I, Kalina Caraballo MD attest that Amador Ribeiro is acting in a scribe capacity, has observed my performance of the services and has documented them in accordance with my direction.    Kalina Caraballo M.D.  Emergency Medicine  [facility] EMERGENCY DEPARTMENT  [address] [place] [id number]  [phone]  Dept: [phone]     Kalina Caraballo MD  12/20/21 2126       Kalina Caraballo MD  12/20/21 213

## 2021-12-21 ENCOUNTER — APPOINTMENT (OUTPATIENT)
Dept: CARDIOLOGY | Facility: HOSPITAL | Age: 81
DRG: 208 | End: 2021-12-21
Attending: INTERNAL MEDICINE
Payer: COMMERCIAL

## 2021-12-21 ENCOUNTER — HOSPITAL ENCOUNTER (INPATIENT)
Facility: CLINIC | Age: 81
LOS: 17 days | Discharge: NURSING, REHAB OR LONG-TERM CARE | DRG: 208 | End: 2022-01-07
Attending: INTERNAL MEDICINE | Admitting: INTERNAL MEDICINE
Payer: COMMERCIAL

## 2021-12-21 ENCOUNTER — APPOINTMENT (OUTPATIENT)
Dept: GENERAL RADIOLOGY | Facility: CLINIC | Age: 81
DRG: 208 | End: 2021-12-21
Attending: INTERNAL MEDICINE
Payer: COMMERCIAL

## 2021-12-21 VITALS
WEIGHT: 130 LBS | DIASTOLIC BLOOD PRESSURE: 55 MMHG | TEMPERATURE: 98.6 F | SYSTOLIC BLOOD PRESSURE: 116 MMHG | RESPIRATION RATE: 20 BRPM | BODY MASS INDEX: 16.68 KG/M2 | HEIGHT: 74 IN | HEART RATE: 58 BPM | OXYGEN SATURATION: 99 %

## 2021-12-21 DIAGNOSIS — J12.82 PNEUMONIA DUE TO COVID-19 VIRUS: Primary | ICD-10-CM

## 2021-12-21 DIAGNOSIS — U07.1 PNEUMONIA DUE TO COVID-19 VIRUS: Primary | ICD-10-CM

## 2021-12-21 LAB
ABO/RH(D): NORMAL
ABO/RH(D): NORMAL
ALBUMIN SERPL-MCNC: 2 G/DL (ref 3.4–5)
ALBUMIN SERPL-MCNC: 2.1 G/DL (ref 3.5–5)
ALBUMIN SERPL-MCNC: 2.2 G/DL (ref 3.5–5)
ALP SERPL-CCNC: 71 U/L (ref 45–120)
ALP SERPL-CCNC: 73 U/L (ref 40–150)
ALP SERPL-CCNC: 74 U/L (ref 45–120)
ALT SERPL W P-5'-P-CCNC: 13 U/L (ref 0–45)
ALT SERPL W P-5'-P-CCNC: 14 U/L (ref 0–45)
ALT SERPL W P-5'-P-CCNC: 17 U/L (ref 0–70)
ANION GAP SERPL CALCULATED.3IONS-SCNC: 5 MMOL/L (ref 3–14)
ANION GAP SERPL CALCULATED.3IONS-SCNC: 8 MMOL/L (ref 5–18)
APTT PPP: 33 SECONDS (ref 22–38)
APTT PPP: 38 SECONDS (ref 22–38)
AST SERPL W P-5'-P-CCNC: 17 U/L (ref 0–40)
AST SERPL W P-5'-P-CCNC: 20 U/L (ref 0–45)
AST SERPL W P-5'-P-CCNC: 22 U/L (ref 0–40)
ATRIAL RATE - MUSE: 109 BPM
BASE EXCESS BLDA CALC-SCNC: 0.5 MMOL/L (ref -9–1.8)
BASE EXCESS BLDV CALC-SCNC: 0.9 MMOL/L (ref -7.7–1.9)
BASE EXCESS BLDV CALC-SCNC: 3.6 MMOL/L
BASOPHILS # BLD AUTO: 0 10E3/UL (ref 0–0.2)
BASOPHILS # BLD AUTO: 0 10E3/UL (ref 0–0.2)
BASOPHILS NFR BLD AUTO: 0 %
BASOPHILS NFR BLD AUTO: 0 %
BILIRUB DIRECT SERPL-MCNC: 0.3 MG/DL
BILIRUB DIRECT SERPL-MCNC: 0.5 MG/DL
BILIRUB SERPL-MCNC: 1 MG/DL (ref 0.2–1.3)
BILIRUB SERPL-MCNC: 1 MG/DL (ref 0–1)
BILIRUB SERPL-MCNC: 1.1 MG/DL (ref 0–1)
BUN SERPL-MCNC: 43 MG/DL (ref 8–28)
BUN SERPL-MCNC: 44 MG/DL (ref 7–30)
BURR CELLS BLD QL SMEAR: ABNORMAL
C REACTIVE PROTEIN LHE: 10.2 MG/DL (ref 0–0.8)
CALCIUM SERPL-MCNC: 7.3 MG/DL (ref 8.5–10.5)
CALCIUM SERPL-MCNC: 7.7 MG/DL (ref 8.5–10.1)
CHLORIDE BLD-SCNC: 118 MMOL/L (ref 98–107)
CHLORIDE BLD-SCNC: 122 MMOL/L (ref 94–109)
CO2 SERPL-SCNC: 24 MMOL/L (ref 22–31)
CO2 SERPL-SCNC: 27 MMOL/L (ref 20–32)
CREAT SERPL-MCNC: 0.79 MG/DL (ref 0.7–1.3)
CREAT SERPL-MCNC: 0.84 MG/DL (ref 0.66–1.25)
CRP SERPL-MCNC: 85 MG/L (ref 0–8)
D DIMER PPP FEU-MCNC: >20 UG/ML FEU (ref 0–0.5)
D DIMER PPP FEU-MCNC: >20 UG/ML FEU (ref 0–0.5)
DAT, ANTI-IGG, C3: NORMAL
DIASTOLIC BLOOD PRESSURE - MUSE: 63 MMHG
EOSINOPHIL # BLD AUTO: 0 10E3/UL (ref 0–0.7)
EOSINOPHIL # BLD AUTO: 0 10E3/UL (ref 0–0.7)
EOSINOPHIL NFR BLD AUTO: 0 %
EOSINOPHIL NFR BLD AUTO: 0 %
ERYTHROCYTE [DISTWIDTH] IN BLOOD BY AUTOMATED COUNT: 13.1 % (ref 10–15)
ERYTHROCYTE [DISTWIDTH] IN BLOOD BY AUTOMATED COUNT: 13.1 % (ref 10–15)
ERYTHROCYTE [DISTWIDTH] IN BLOOD BY AUTOMATED COUNT: 13.2 % (ref 10–15)
ERYTHROCYTE [DISTWIDTH] IN BLOOD BY AUTOMATED COUNT: 13.3 % (ref 10–15)
FIBRINOGEN PPP-MCNC: 266 MG/DL (ref 170–490)
FIBRINOGEN PPP-MCNC: 290 MG/DL (ref 170–490)
GFR SERPL CREATININE-BSD FRML MDRD: 85 ML/MIN/1.73M2
GFR SERPL CREATININE-BSD FRML MDRD: 89 ML/MIN/1.73M2
GLUCOSE BLD-MCNC: 154 MG/DL (ref 70–99)
GLUCOSE BLD-MCNC: 295 MG/DL (ref 70–125)
GLUCOSE BLDC GLUCOMTR-MCNC: 103 MG/DL (ref 70–99)
GLUCOSE BLDC GLUCOMTR-MCNC: 130 MG/DL (ref 70–99)
GLUCOSE BLDC GLUCOMTR-MCNC: 138 MG/DL (ref 70–99)
GLUCOSE BLDC GLUCOMTR-MCNC: 147 MG/DL (ref 70–99)
GLUCOSE BLDC GLUCOMTR-MCNC: 156 MG/DL (ref 70–99)
GLUCOSE BLDC GLUCOMTR-MCNC: 174 MG/DL (ref 70–99)
GLUCOSE BLDC GLUCOMTR-MCNC: 77 MG/DL (ref 70–99)
GLUCOSE BLDC GLUCOMTR-MCNC: 78 MG/DL (ref 70–99)
GLUCOSE BLDC GLUCOMTR-MCNC: 81 MG/DL (ref 70–99)
GLUCOSE BLDC GLUCOMTR-MCNC: 84 MG/DL (ref 70–99)
GLUCOSE BLDC GLUCOMTR-MCNC: 95 MG/DL (ref 70–99)
HAPTOGLOB SERPL-MCNC: 109 MG/DL (ref 33–171)
HBA1C MFR BLD: 5.5 %
HCO3 BLD-SCNC: 26 MMOL/L (ref 21–28)
HCO3 BLDV-SCNC: 26 MMOL/L (ref 24–30)
HCO3 BLDV-SCNC: 27 MMOL/L (ref 21–28)
HCT VFR BLD AUTO: 34.4 % (ref 40–53)
HCT VFR BLD AUTO: 38 % (ref 40–53)
HCT VFR BLD AUTO: 38.3 % (ref 40–53)
HCT VFR BLD AUTO: 39.6 % (ref 40–53)
HGB BLD-MCNC: 10.9 G/DL (ref 13.3–17.7)
HGB BLD-MCNC: 12.3 G/DL (ref 13.3–17.7)
HGB BLD-MCNC: 12.3 G/DL (ref 13.3–17.7)
HGB BLD-MCNC: 12.8 G/DL (ref 13.3–17.7)
IMM GRANULOCYTES # BLD: 0.1 10E3/UL
IMM GRANULOCYTES # BLD: 0.2 10E3/UL
IMM GRANULOCYTES NFR BLD: 1 %
IMM GRANULOCYTES NFR BLD: 1 %
INR PPP: 1.44 (ref 0.85–1.15)
INTERPRETATION ECG - MUSE: NORMAL
LACTATE SERPL-SCNC: 1.5 MMOL/L (ref 0.7–2)
LDH SERPL L TO P-CCNC: 287 U/L (ref 85–227)
LDH SERPL L TO P-CCNC: 302 U/L (ref 125–220)
LVEF ECHO: NORMAL
LYMPHOCYTES # BLD AUTO: 0.4 10E3/UL (ref 0.8–5.3)
LYMPHOCYTES # BLD AUTO: 0.8 10E3/UL (ref 0.8–5.3)
LYMPHOCYTES NFR BLD AUTO: 3 %
LYMPHOCYTES NFR BLD AUTO: 4 %
MCH RBC QN AUTO: 31.5 PG (ref 26.5–33)
MCH RBC QN AUTO: 31.6 PG (ref 26.5–33)
MCH RBC QN AUTO: 31.6 PG (ref 26.5–33)
MCH RBC QN AUTO: 31.7 PG (ref 26.5–33)
MCHC RBC AUTO-ENTMCNC: 31.7 G/DL (ref 31.5–36.5)
MCHC RBC AUTO-ENTMCNC: 32.1 G/DL (ref 31.5–36.5)
MCHC RBC AUTO-ENTMCNC: 32.3 G/DL (ref 31.5–36.5)
MCHC RBC AUTO-ENTMCNC: 32.4 G/DL (ref 31.5–36.5)
MCV RBC AUTO: 100 FL (ref 78–100)
MCV RBC AUTO: 98 FL (ref 78–100)
MONOCYTES # BLD AUTO: 0.6 10E3/UL (ref 0–1.3)
MONOCYTES # BLD AUTO: 0.9 10E3/UL (ref 0–1.3)
MONOCYTES NFR BLD AUTO: 5 %
MONOCYTES NFR BLD AUTO: 5 %
MRSA DNA SPEC QL NAA+PROBE: NEGATIVE
NEUTROPHILS # BLD AUTO: 10.8 10E3/UL (ref 1.6–8.3)
NEUTROPHILS # BLD AUTO: 15.5 10E3/UL (ref 1.6–8.3)
NEUTROPHILS NFR BLD AUTO: 90 %
NEUTROPHILS NFR BLD AUTO: 91 %
NRBC # BLD AUTO: 0 10E3/UL
NRBC # BLD AUTO: 0 10E3/UL
NRBC BLD AUTO-RTO: 0 /100
NRBC BLD AUTO-RTO: 0 /100
O2/TOTAL GAS SETTING VFR VENT: 40 %
O2/TOTAL GAS SETTING VFR VENT: 55 %
OXYHGB MFR BLD: 96 % (ref 92–100)
OXYHGB MFR BLDV: 51.7 % (ref 70–75)
OXYHGB MFR BLDV: 65 % (ref 70–75)
P AXIS - MUSE: 86 DEGREES
PATH REPORT.COMMENTS IMP SPEC: NORMAL
PATH REPORT.COMMENTS IMP SPEC: NORMAL
PATH REPORT.FINAL DX SPEC: NORMAL
PATH REPORT.MICROSCOPIC SPEC OTHER STN: NORMAL
PATH REPORT.RELEVANT HX SPEC: NORMAL
PCO2 BLD: 42 MM HG (ref 35–45)
PCO2 BLDV: 48 MM HG (ref 40–50)
PCO2 BLDV: 57 MM HG (ref 35–50)
PH BLD: 7.4 [PH] (ref 7.35–7.45)
PH BLDV: 7.32 [PH] (ref 7.35–7.45)
PH BLDV: 7.36 [PH] (ref 7.32–7.43)
PLAT MORPH BLD: ABNORMAL
PLATELET # BLD AUTO: 126 10E3/UL (ref 150–450)
PLATELET # BLD AUTO: 139 10E3/UL (ref 150–450)
PLATELET # BLD AUTO: 149 10E3/UL (ref 150–450)
PLATELET # BLD AUTO: 188 10E3/UL (ref 150–450)
PO2 BLD: 87 MM HG (ref 80–105)
PO2 BLDV: 31 MM HG (ref 25–47)
PO2 BLDV: 35 MM HG (ref 25–47)
POTASSIUM BLD-SCNC: 3.1 MMOL/L (ref 3.5–5)
POTASSIUM BLD-SCNC: 3.2 MMOL/L (ref 3.5–5)
POTASSIUM BLD-SCNC: 3.6 MMOL/L (ref 3.4–5.3)
POTASSIUM BLD-SCNC: 3.6 MMOL/L (ref 3.4–5.3)
POTASSIUM BLD-SCNC: 3.8 MMOL/L (ref 3.5–5)
PR INTERVAL - MUSE: 152 MS
PROCALCITONIN SERPL-MCNC: 0.09 NG/ML (ref 0–0.49)
PROT SERPL-MCNC: 5 G/DL (ref 6–8)
PROT SERPL-MCNC: 5.5 G/DL (ref 6.8–8.8)
PROT SERPL-MCNC: 5.7 G/DL (ref 6–8)
QRS DURATION - MUSE: 80 MS
QT - MUSE: 340 MS
QTC - MUSE: 457 MS
R AXIS - MUSE: -89 DEGREES
RADIOLOGIST FLAGS: ABNORMAL
RBC # BLD AUTO: 3.44 10E6/UL (ref 4.4–5.9)
RBC # BLD AUTO: 3.89 10E6/UL (ref 4.4–5.9)
RBC # BLD AUTO: 3.91 10E6/UL (ref 4.4–5.9)
RBC # BLD AUTO: 4.05 10E6/UL (ref 4.4–5.9)
RBC MORPH BLD: ABNORMAL
RETICS # AUTO: 0.04 10E6/UL (ref 0.01–0.11)
RETICS # AUTO: 0.05 10E6/UL (ref 0.01–0.11)
RETICS/RBC NFR AUTO: 1.3 % (ref 0.8–2.7)
RETICS/RBC NFR AUTO: 1.3 % (ref 0.8–2.7)
SA TARGET DNA: POSITIVE
SAO2 % BLDV: 52.4 % (ref 70–75)
SODIUM SERPL-SCNC: 150 MMOL/L (ref 136–145)
SODIUM SERPL-SCNC: 150 MMOL/L (ref 136–145)
SODIUM SERPL-SCNC: 153 MMOL/L (ref 133–144)
SODIUM SERPL-SCNC: 154 MMOL/L (ref 133–144)
SODIUM SERPL-SCNC: 154 MMOL/L (ref 136–145)
SPECIMEN EXPIRATION DATE: NORMAL
SYSTOLIC BLOOD PRESSURE - MUSE: 109 MMHG
T AXIS - MUSE: 75 DEGREES
TROPONIN I SERPL HS-MCNC: 12 NG/L
UFH PPP CHRO-ACNC: 0.31 IU/ML
UFH PPP CHRO-ACNC: 1.01 IU/ML
UFH PPP CHRO-ACNC: <0.1 IU/ML
VENTRICULAR RATE- MUSE: 109 BPM
WBC # BLD AUTO: 10.8 10E3/UL (ref 4–11)
WBC # BLD AUTO: 11.5 10E3/UL (ref 4–11)
WBC # BLD AUTO: 11.8 10E3/UL (ref 4–11)
WBC # BLD AUTO: 17.4 10E3/UL (ref 4–11)

## 2021-12-21 PROCEDURE — 258N000003 HC RX IP 258 OP 636: Performed by: STUDENT IN AN ORGANIZED HEALTH CARE EDUCATION/TRAINING PROGRAM

## 2021-12-21 PROCEDURE — 36592 COLLECT BLOOD FROM PICC: CPT | Performed by: STUDENT IN AN ORGANIZED HEALTH CARE EDUCATION/TRAINING PROGRAM

## 2021-12-21 PROCEDURE — 85379 FIBRIN DEGRADATION QUANT: CPT | Performed by: STUDENT IN AN ORGANIZED HEALTH CARE EDUCATION/TRAINING PROGRAM

## 2021-12-21 PROCEDURE — 85025 COMPLETE CBC W/AUTO DIFF WBC: CPT | Performed by: EMERGENCY MEDICINE

## 2021-12-21 PROCEDURE — 74018 RADEX ABDOMEN 1 VIEW: CPT | Mod: 26 | Performed by: RADIOLOGY

## 2021-12-21 PROCEDURE — 999N000208 ECHOCARDIOGRAM COMPLETE

## 2021-12-21 PROCEDURE — 250N000009 HC RX 250: Performed by: STUDENT IN AN ORGANIZED HEALTH CARE EDUCATION/TRAINING PROGRAM

## 2021-12-21 PROCEDURE — 84155 ASSAY OF PROTEIN SERUM: CPT | Performed by: STUDENT IN AN ORGANIZED HEALTH CARE EDUCATION/TRAINING PROGRAM

## 2021-12-21 PROCEDURE — 250N000011 HC RX IP 250 OP 636: Performed by: EMERGENCY MEDICINE

## 2021-12-21 PROCEDURE — 84145 PROCALCITONIN (PCT): CPT | Performed by: INTERNAL MEDICINE

## 2021-12-21 PROCEDURE — 82803 BLOOD GASES ANY COMBINATION: CPT | Performed by: STUDENT IN AN ORGANIZED HEALTH CARE EDUCATION/TRAINING PROGRAM

## 2021-12-21 PROCEDURE — 85025 COMPLETE CBC W/AUTO DIFF WBC: CPT | Performed by: INTERNAL MEDICINE

## 2021-12-21 PROCEDURE — 99223 1ST HOSP IP/OBS HIGH 75: CPT | Performed by: INTERNAL MEDICINE

## 2021-12-21 PROCEDURE — 85520 HEPARIN ASSAY: CPT | Performed by: INTERNAL MEDICINE

## 2021-12-21 PROCEDURE — 85384 FIBRINOGEN ACTIVITY: CPT | Performed by: STUDENT IN AN ORGANIZED HEALTH CARE EDUCATION/TRAINING PROGRAM

## 2021-12-21 PROCEDURE — 999N000157 HC STATISTIC RCP TIME EA 10 MIN

## 2021-12-21 PROCEDURE — 250N000013 HC RX MED GY IP 250 OP 250 PS 637: Performed by: STUDENT IN AN ORGANIZED HEALTH CARE EDUCATION/TRAINING PROGRAM

## 2021-12-21 PROCEDURE — 84484 ASSAY OF TROPONIN QUANT: CPT | Performed by: STUDENT IN AN ORGANIZED HEALTH CARE EDUCATION/TRAINING PROGRAM

## 2021-12-21 PROCEDURE — 93306 TTE W/DOPPLER COMPLETE: CPT | Mod: 26 | Performed by: INTERNAL MEDICINE

## 2021-12-21 PROCEDURE — 83605 ASSAY OF LACTIC ACID: CPT | Performed by: STUDENT IN AN ORGANIZED HEALTH CARE EDUCATION/TRAINING PROGRAM

## 2021-12-21 PROCEDURE — 94003 VENT MGMT INPAT SUBQ DAY: CPT

## 2021-12-21 PROCEDURE — 36415 COLL VENOUS BLD VENIPUNCTURE: CPT | Performed by: INTERNAL MEDICINE

## 2021-12-21 PROCEDURE — 82805 BLOOD GASES W/O2 SATURATION: CPT | Performed by: INTERNAL MEDICINE

## 2021-12-21 PROCEDURE — 36415 COLL VENOUS BLD VENIPUNCTURE: CPT | Performed by: ANESTHESIOLOGY

## 2021-12-21 PROCEDURE — 82040 ASSAY OF SERUM ALBUMIN: CPT | Performed by: INTERNAL MEDICINE

## 2021-12-21 PROCEDURE — 999N000065 XR ABDOMEN PORT 1 VIEWS

## 2021-12-21 PROCEDURE — 83615 LACTATE (LD) (LDH) ENZYME: CPT | Performed by: INTERNAL MEDICINE

## 2021-12-21 PROCEDURE — 250N000011 HC RX IP 250 OP 636: Performed by: STUDENT IN AN ORGANIZED HEALTH CARE EDUCATION/TRAINING PROGRAM

## 2021-12-21 PROCEDURE — 82805 BLOOD GASES W/O2 SATURATION: CPT | Performed by: STUDENT IN AN ORGANIZED HEALTH CARE EDUCATION/TRAINING PROGRAM

## 2021-12-21 PROCEDURE — 85027 COMPLETE CBC AUTOMATED: CPT | Performed by: STUDENT IN AN ORGANIZED HEALTH CARE EDUCATION/TRAINING PROGRAM

## 2021-12-21 PROCEDURE — 85610 PROTHROMBIN TIME: CPT | Performed by: STUDENT IN AN ORGANIZED HEALTH CARE EDUCATION/TRAINING PROGRAM

## 2021-12-21 PROCEDURE — 96367 TX/PROPH/DG ADDL SEQ IV INF: CPT | Mod: 59

## 2021-12-21 PROCEDURE — 84295 ASSAY OF SERUM SODIUM: CPT | Performed by: INTERNAL MEDICINE

## 2021-12-21 PROCEDURE — 250N000011 HC RX IP 250 OP 636

## 2021-12-21 PROCEDURE — 86141 C-REACTIVE PROTEIN HS: CPT | Performed by: INTERNAL MEDICINE

## 2021-12-21 PROCEDURE — 82247 BILIRUBIN TOTAL: CPT | Performed by: INTERNAL MEDICINE

## 2021-12-21 PROCEDURE — 250N000011 HC RX IP 250 OP 636: Performed by: INTERNAL MEDICINE

## 2021-12-21 PROCEDURE — 85730 THROMBOPLASTIN TIME PARTIAL: CPT | Performed by: STUDENT IN AN ORGANIZED HEALTH CARE EDUCATION/TRAINING PROGRAM

## 2021-12-21 PROCEDURE — 250N000009 HC RX 250: Performed by: EMERGENCY MEDICINE

## 2021-12-21 PROCEDURE — 99233 SBSQ HOSP IP/OBS HIGH 50: CPT | Performed by: INTERNAL MEDICINE

## 2021-12-21 PROCEDURE — 85045 AUTOMATED RETICULOCYTE COUNT: CPT | Performed by: INTERNAL MEDICINE

## 2021-12-21 PROCEDURE — 85027 COMPLETE CBC AUTOMATED: CPT | Performed by: INTERNAL MEDICINE

## 2021-12-21 PROCEDURE — 99291 CRITICAL CARE FIRST HOUR: CPT | Mod: GC | Performed by: INTERNAL MEDICINE

## 2021-12-21 PROCEDURE — 83010 ASSAY OF HAPTOGLOBIN QUANT: CPT | Performed by: INTERNAL MEDICINE

## 2021-12-21 PROCEDURE — 83036 HEMOGLOBIN GLYCOSYLATED A1C: CPT | Performed by: ANESTHESIOLOGY

## 2021-12-21 PROCEDURE — 93010 ELECTROCARDIOGRAM REPORT: CPT | Performed by: INTERNAL MEDICINE

## 2021-12-21 PROCEDURE — 86901 BLOOD TYPING SEROLOGIC RH(D): CPT | Performed by: EMERGENCY MEDICINE

## 2021-12-21 PROCEDURE — 84132 ASSAY OF SERUM POTASSIUM: CPT | Performed by: STUDENT IN AN ORGANIZED HEALTH CARE EDUCATION/TRAINING PROGRAM

## 2021-12-21 PROCEDURE — 96375 TX/PRO/DX INJ NEW DRUG ADDON: CPT | Mod: 59

## 2021-12-21 PROCEDURE — 250N000011 HC RX IP 250 OP 636: Performed by: ANESTHESIOLOGY

## 2021-12-21 PROCEDURE — 85730 THROMBOPLASTIN TIME PARTIAL: CPT | Performed by: EMERGENCY MEDICINE

## 2021-12-21 PROCEDURE — 86140 C-REACTIVE PROTEIN: CPT | Performed by: STUDENT IN AN ORGANIZED HEALTH CARE EDUCATION/TRAINING PROGRAM

## 2021-12-21 PROCEDURE — 86880 COOMBS TEST DIRECT: CPT | Performed by: INTERNAL MEDICINE

## 2021-12-21 PROCEDURE — C9113 INJ PANTOPRAZOLE SODIUM, VIA: HCPCS | Performed by: STUDENT IN AN ORGANIZED HEALTH CARE EDUCATION/TRAINING PROGRAM

## 2021-12-21 PROCEDURE — 96366 THER/PROPH/DIAG IV INF ADDON: CPT | Mod: 59

## 2021-12-21 PROCEDURE — 84450 TRANSFERASE (AST) (SGOT): CPT | Performed by: STUDENT IN AN ORGANIZED HEALTH CARE EDUCATION/TRAINING PROGRAM

## 2021-12-21 PROCEDURE — 84295 ASSAY OF SERUM SODIUM: CPT | Performed by: STUDENT IN AN ORGANIZED HEALTH CARE EDUCATION/TRAINING PROGRAM

## 2021-12-21 PROCEDURE — 5A1945Z RESPIRATORY VENTILATION, 24-96 CONSECUTIVE HOURS: ICD-10-PCS | Performed by: INTERNAL MEDICINE

## 2021-12-21 PROCEDURE — 87641 MR-STAPH DNA AMP PROBE: CPT | Performed by: STUDENT IN AN ORGANIZED HEALTH CARE EDUCATION/TRAINING PROGRAM

## 2021-12-21 PROCEDURE — 200N000002 HC R&B ICU UMMC

## 2021-12-21 PROCEDURE — 86901 BLOOD TYPING SEROLOGIC RH(D): CPT | Performed by: STUDENT IN AN ORGANIZED HEALTH CARE EDUCATION/TRAINING PROGRAM

## 2021-12-21 PROCEDURE — 82248 BILIRUBIN DIRECT: CPT | Performed by: INTERNAL MEDICINE

## 2021-12-21 PROCEDURE — 85384 FIBRINOGEN ACTIVITY: CPT | Performed by: INTERNAL MEDICINE

## 2021-12-21 PROCEDURE — 83615 LACTATE (LD) (LDH) ENZYME: CPT | Performed by: STUDENT IN AN ORGANIZED HEALTH CARE EDUCATION/TRAINING PROGRAM

## 2021-12-21 PROCEDURE — 71045 X-RAY EXAM CHEST 1 VIEW: CPT | Mod: 26 | Performed by: RADIOLOGY

## 2021-12-21 PROCEDURE — 258N000003 HC RX IP 258 OP 636: Performed by: EMERGENCY MEDICINE

## 2021-12-21 PROCEDURE — 85520 HEPARIN ASSAY: CPT | Performed by: EMERGENCY MEDICINE

## 2021-12-21 PROCEDURE — 250N000009 HC RX 250: Performed by: INTERNAL MEDICINE

## 2021-12-21 PROCEDURE — 85379 FIBRIN DEGRADATION QUANT: CPT | Performed by: INTERNAL MEDICINE

## 2021-12-21 PROCEDURE — 96376 TX/PRO/DX INJ SAME DRUG ADON: CPT | Mod: 59

## 2021-12-21 PROCEDURE — 999N000128 HC STATISTIC PERIPHERAL IV START W/O US GUIDANCE

## 2021-12-21 PROCEDURE — 84132 ASSAY OF SERUM POTASSIUM: CPT | Performed by: ANESTHESIOLOGY

## 2021-12-21 PROCEDURE — 94002 VENT MGMT INPAT INIT DAY: CPT

## 2021-12-21 PROCEDURE — 85060 BLOOD SMEAR INTERPRETATION: CPT | Performed by: PATHOLOGY

## 2021-12-21 PROCEDURE — 255N000002 HC RX 255 OP 636: Performed by: EMERGENCY MEDICINE

## 2021-12-21 PROCEDURE — 999N000065 XR CHEST PORT 1 VIEW

## 2021-12-21 PROCEDURE — 82810 BLOOD GASES O2 SAT ONLY: CPT | Performed by: INTERNAL MEDICINE

## 2021-12-21 PROCEDURE — 250N000009 HC RX 250: Performed by: ANESTHESIOLOGY

## 2021-12-21 PROCEDURE — 36415 COLL VENOUS BLD VENIPUNCTURE: CPT | Performed by: EMERGENCY MEDICINE

## 2021-12-21 PROCEDURE — 82565 ASSAY OF CREATININE: CPT | Performed by: INTERNAL MEDICINE

## 2021-12-21 PROCEDURE — 85520 HEPARIN ASSAY: CPT | Performed by: STUDENT IN AN ORGANIZED HEALTH CARE EDUCATION/TRAINING PROGRAM

## 2021-12-21 PROCEDURE — 258N000003 HC RX IP 258 OP 636: Performed by: INTERNAL MEDICINE

## 2021-12-21 PROCEDURE — 3E043XZ INTRODUCTION OF VASOPRESSOR INTO CENTRAL VEIN, PERCUTANEOUS APPROACH: ICD-10-PCS | Performed by: INTERNAL MEDICINE

## 2021-12-21 RX ORDER — ATROPINE SULFATE 0.1 MG/ML
INJECTION INTRAVENOUS
Status: COMPLETED
Start: 2021-12-21 | End: 2021-12-21

## 2021-12-21 RX ORDER — METOPROLOL TARTRATE 1 MG/ML
5 INJECTION, SOLUTION INTRAVENOUS EVERY 6 HOURS PRN
Status: DISCONTINUED | OUTPATIENT
Start: 2021-12-21 | End: 2021-12-21 | Stop reason: HOSPADM

## 2021-12-21 RX ORDER — NALOXONE HYDROCHLORIDE 0.4 MG/ML
0.4 INJECTION, SOLUTION INTRAMUSCULAR; INTRAVENOUS; SUBCUTANEOUS
Status: DISCONTINUED | OUTPATIENT
Start: 2021-12-21 | End: 2022-01-07 | Stop reason: HOSPADM

## 2021-12-21 RX ORDER — NALOXONE HYDROCHLORIDE 0.4 MG/ML
0.2 INJECTION, SOLUTION INTRAMUSCULAR; INTRAVENOUS; SUBCUTANEOUS
Status: DISCONTINUED | OUTPATIENT
Start: 2021-12-21 | End: 2022-01-07 | Stop reason: HOSPADM

## 2021-12-21 RX ORDER — DEXTROSE MONOHYDRATE 25 G/50ML
25-50 INJECTION, SOLUTION INTRAVENOUS
Status: DISCONTINUED | OUTPATIENT
Start: 2021-12-21 | End: 2021-12-21 | Stop reason: HOSPADM

## 2021-12-21 RX ORDER — PROPOFOL 10 MG/ML
5-75 INJECTION, EMULSION INTRAVENOUS CONTINUOUS
Status: DISCONTINUED | OUTPATIENT
Start: 2021-12-21 | End: 2021-12-24

## 2021-12-21 RX ORDER — QUETIAPINE FUMARATE 50 MG/1
50 TABLET, FILM COATED ORAL 3 TIMES DAILY
Status: DISCONTINUED | OUTPATIENT
Start: 2021-12-21 | End: 2021-12-25

## 2021-12-21 RX ORDER — HEPARIN SODIUM 10000 [USP'U]/100ML
0-5000 INJECTION, SOLUTION INTRAVENOUS CONTINUOUS
Status: DISCONTINUED | OUTPATIENT
Start: 2021-12-21 | End: 2021-12-21 | Stop reason: HOSPADM

## 2021-12-21 RX ORDER — NALOXONE HYDROCHLORIDE 0.4 MG/ML
0.2 INJECTION, SOLUTION INTRAMUSCULAR; INTRAVENOUS; SUBCUTANEOUS
Status: DISCONTINUED | OUTPATIENT
Start: 2021-12-21 | End: 2021-12-22

## 2021-12-21 RX ORDER — MIDAZOLAM HCL IN 0.9 % NACL/PF 1 MG/ML
1-8 PLASTIC BAG, INJECTION (ML) INTRAVENOUS CONTINUOUS
Status: DISCONTINUED | OUTPATIENT
Start: 2021-12-21 | End: 2021-12-21

## 2021-12-21 RX ORDER — HEPARIN SODIUM 10000 [USP'U]/100ML
0-5000 INJECTION, SOLUTION INTRAVENOUS CONTINUOUS
Status: DISPENSED | OUTPATIENT
Start: 2021-12-21 | End: 2021-12-22

## 2021-12-21 RX ORDER — DEXTROSE MONOHYDRATE 50 MG/ML
INJECTION, SOLUTION INTRAVENOUS CONTINUOUS
Status: DISCONTINUED | OUTPATIENT
Start: 2021-12-21 | End: 2021-12-21 | Stop reason: HOSPADM

## 2021-12-21 RX ORDER — NOREPINEPHRINE BITARTRATE 0.06 MG/ML
.01-.6 INJECTION, SOLUTION INTRAVENOUS CONTINUOUS
Status: DISCONTINUED | OUTPATIENT
Start: 2021-12-21 | End: 2021-12-25

## 2021-12-21 RX ORDER — POTASSIUM CHLORIDE 7.45 MG/ML
10 INJECTION INTRAVENOUS
Status: COMPLETED | OUTPATIENT
Start: 2021-12-21 | End: 2021-12-21

## 2021-12-21 RX ORDER — NALOXONE HYDROCHLORIDE 0.4 MG/ML
0.4 INJECTION, SOLUTION INTRAMUSCULAR; INTRAVENOUS; SUBCUTANEOUS
Status: DISCONTINUED | OUTPATIENT
Start: 2021-12-21 | End: 2021-12-22

## 2021-12-21 RX ORDER — AMOXICILLIN 250 MG
2 CAPSULE ORAL 2 TIMES DAILY PRN
Status: DISCONTINUED | OUTPATIENT
Start: 2021-12-21 | End: 2022-01-07 | Stop reason: HOSPADM

## 2021-12-21 RX ORDER — DEXAMETHASONE SODIUM PHOSPHATE 4 MG/ML
12 INJECTION, SOLUTION INTRA-ARTICULAR; INTRALESIONAL; INTRAMUSCULAR; INTRAVENOUS; SOFT TISSUE DAILY
Status: DISCONTINUED | OUTPATIENT
Start: 2021-12-21 | End: 2021-12-25

## 2021-12-21 RX ORDER — CEFTRIAXONE 2 G/1
2 INJECTION, POWDER, FOR SOLUTION INTRAMUSCULAR; INTRAVENOUS EVERY 24 HOURS
Status: DISCONTINUED | OUTPATIENT
Start: 2021-12-21 | End: 2021-12-25

## 2021-12-21 RX ORDER — POTASSIUM CHLORIDE 7.45 MG/ML
10 INJECTION INTRAVENOUS ONCE
Status: COMPLETED | OUTPATIENT
Start: 2021-12-21 | End: 2021-12-21

## 2021-12-21 RX ORDER — ALBUTEROL SULFATE 90 UG/1
6 AEROSOL, METERED RESPIRATORY (INHALATION) EVERY 4 HOURS PRN
Status: DISCONTINUED | OUTPATIENT
Start: 2021-12-21 | End: 2021-12-25

## 2021-12-21 RX ORDER — MIDAZOLAM HCL IN 0.9 % NACL/PF 1 MG/ML
1-8 PLASTIC BAG, INJECTION (ML) INTRAVENOUS CONTINUOUS
Status: DISCONTINUED | OUTPATIENT
Start: 2021-12-21 | End: 2021-12-21 | Stop reason: HOSPADM

## 2021-12-21 RX ORDER — NICOTINE POLACRILEX 4 MG
15-30 LOZENGE BUCCAL
Status: DISCONTINUED | OUTPATIENT
Start: 2021-12-21 | End: 2021-12-21 | Stop reason: HOSPADM

## 2021-12-21 RX ORDER — AMOXICILLIN 250 MG
1 CAPSULE ORAL 2 TIMES DAILY PRN
Status: DISCONTINUED | OUTPATIENT
Start: 2021-12-21 | End: 2022-01-07 | Stop reason: HOSPADM

## 2021-12-21 RX ORDER — HALOPERIDOL 5 MG/ML
1-2 INJECTION INTRAMUSCULAR EVERY 6 HOURS PRN
Status: DISCONTINUED | OUTPATIENT
Start: 2021-12-21 | End: 2021-12-22

## 2021-12-21 RX ORDER — DEXTROSE MONOHYDRATE 100 MG/ML
INJECTION, SOLUTION INTRAVENOUS CONTINUOUS PRN
Status: DISCONTINUED | OUTPATIENT
Start: 2021-12-21 | End: 2021-12-21 | Stop reason: HOSPADM

## 2021-12-21 RX ADMIN — HEPARIN SODIUM 1450 UNITS/HR: 1000 INJECTION INTRAVENOUS; SUBCUTANEOUS at 20:15

## 2021-12-21 RX ADMIN — PIPERACILLIN SODIUM AND TAZOBACTAM SODIUM 3.38 G: 3; .375 INJECTION, POWDER, LYOPHILIZED, FOR SOLUTION INTRAVENOUS at 12:35

## 2021-12-21 RX ADMIN — DOCUSATE SODIUM 50 MG AND SENNOSIDES 8.6 MG 1 TABLET: 8.6; 5 TABLET, FILM COATED ORAL at 20:34

## 2021-12-21 RX ADMIN — ATROPINE SULFATE 1 MG: 0.1 INJECTION PARENTERAL at 13:09

## 2021-12-21 RX ADMIN — DEXTROSE MONOHYDRATE: 50 INJECTION, SOLUTION INTRAVENOUS at 11:25

## 2021-12-21 RX ADMIN — CHLORHEXIDINE GLUCONATE 0.12% ORAL RINSE 15 ML: 1.2 LIQUID ORAL at 00:15

## 2021-12-21 RX ADMIN — DILTIAZEM HYDROCHLORIDE 5 MG/HR: 5 INJECTION INTRAVENOUS at 03:39

## 2021-12-21 RX ADMIN — PROPOFOL 10 MCG/KG/MIN: 10 INJECTION, EMULSION INTRAVENOUS at 10:08

## 2021-12-21 RX ADMIN — INSULIN HUMAN 3.5 UNITS/HR: 1 INJECTION, SOLUTION INTRAVENOUS at 08:51

## 2021-12-21 RX ADMIN — HEPARIN SODIUM 1050 UNITS/HR: 1000 INJECTION INTRAVENOUS; SUBCUTANEOUS at 00:45

## 2021-12-21 RX ADMIN — FENTANYL CITRATE 150 MCG/HR: 50 INJECTION INTRAVENOUS at 20:14

## 2021-12-21 RX ADMIN — CHLORHEXIDINE GLUCONATE 0.12% ORAL RINSE 15 ML: 1.2 LIQUID ORAL at 11:35

## 2021-12-21 RX ADMIN — PANTOPRAZOLE SODIUM 40 MG: 40 INJECTION, POWDER, FOR SOLUTION INTRAVENOUS at 10:24

## 2021-12-21 RX ADMIN — POTASSIUM CHLORIDE 10 MEQ: 7.46 INJECTION, SOLUTION INTRAVENOUS at 08:59

## 2021-12-21 RX ADMIN — CEFTRIAXONE SODIUM 2 G: 2 INJECTION, POWDER, FOR SOLUTION INTRAMUSCULAR; INTRAVENOUS at 16:33

## 2021-12-21 RX ADMIN — PERFLUTREN 2 ML: 6.52 INJECTION, SUSPENSION INTRAVENOUS at 09:37

## 2021-12-21 RX ADMIN — DEXAMETHASONE SODIUM PHOSPHATE 12 MG: 4 INJECTION, SOLUTION INTRA-ARTICULAR; INTRALESIONAL; INTRAMUSCULAR; INTRAVENOUS; SOFT TISSUE at 15:03

## 2021-12-21 RX ADMIN — AZITHROMYCIN MONOHYDRATE 500 MG: 500 INJECTION, POWDER, LYOPHILIZED, FOR SOLUTION INTRAVENOUS at 19:52

## 2021-12-21 RX ADMIN — Medication 0.03 MCG/KG/MIN: at 14:53

## 2021-12-21 RX ADMIN — INSULIN HUMAN 0 UNITS/HR: 1 INJECTION, SOLUTION INTRAVENOUS at 10:34

## 2021-12-21 RX ADMIN — IOPAMIDOL 100 ML: 755 INJECTION, SOLUTION INTRAVENOUS at 00:04

## 2021-12-21 RX ADMIN — MIDAZOLAM HYDROCHLORIDE 0.5 MG: 1 INJECTION, SOLUTION INTRAMUSCULAR; INTRAVENOUS at 05:05

## 2021-12-21 RX ADMIN — HEPARIN SODIUM 1050 UNITS/HR: 1000 INJECTION INTRAVENOUS; SUBCUTANEOUS at 15:02

## 2021-12-21 RX ADMIN — POTASSIUM CHLORIDE 10 MEQ: 7.46 INJECTION, SOLUTION INTRAVENOUS at 03:36

## 2021-12-21 RX ADMIN — FENTANYL CITRATE 100 MCG/HR: 50 INJECTION INTRAVENOUS at 14:52

## 2021-12-21 RX ADMIN — QUETIAPINE FUMARATE 50 MG: 50 TABLET ORAL at 20:34

## 2021-12-21 RX ADMIN — FAMOTIDINE 20 MG: 10 INJECTION INTRAVENOUS at 10:31

## 2021-12-21 RX ADMIN — POTASSIUM CHLORIDE 10 MEQ: 7.46 INJECTION, SOLUTION INTRAVENOUS at 09:45

## 2021-12-21 RX ADMIN — PROPOFOL 10 MCG/KG/MIN: 10 INJECTION, EMULSION INTRAVENOUS at 17:50

## 2021-12-21 RX ADMIN — PIPERACILLIN SODIUM AND TAZOBACTAM SODIUM 3.38 G: 3; .375 INJECTION, POWDER, LYOPHILIZED, FOR SOLUTION INTRAVENOUS at 05:38

## 2021-12-21 RX ADMIN — MIDAZOLAM HYDROCHLORIDE 0.5 MG: 1 INJECTION, SOLUTION INTRAMUSCULAR; INTRAVENOUS at 01:30

## 2021-12-21 ASSESSMENT — ACTIVITIES OF DAILY LIVING (ADL)
ADLS_ACUITY_SCORE: 12
ADLS_ACUITY_SCORE: 14
ADLS_ACUITY_SCORE: 21
ADLS_ACUITY_SCORE: 14
ADLS_ACUITY_SCORE: 9
ADLS_ACUITY_SCORE: 12
ADLS_ACUITY_SCORE: 14
ADLS_ACUITY_SCORE: 12
ADLS_ACUITY_SCORE: 14
ADLS_ACUITY_SCORE: 12
ADLS_ACUITY_SCORE: 12
ADLS_ACUITY_SCORE: 21
ADLS_ACUITY_SCORE: 12
ADLS_ACUITY_SCORE: 12
ADLS_ACUITY_SCORE: 21
ADLS_ACUITY_SCORE: 16
ADLS_ACUITY_SCORE: 12
ADLS_ACUITY_SCORE: 9
ADLS_ACUITY_SCORE: 21
ADLS_ACUITY_SCORE: 14
ADLS_ACUITY_SCORE: 12

## 2021-12-21 ASSESSMENT — MIFFLIN-ST. JEOR: SCORE: 1374.43

## 2021-12-21 NOTE — ED NOTES
Dr. Vanessa notified of hr: 43 and MAP less than 65 on transport teams monitor. VVO: give 1 mg Atropine

## 2021-12-21 NOTE — ED PROVIDER NOTES
1946 Pt transferred from room ED 22 to ED 03 due to low oxygenation with non re breather @ 15L  1950 Pt pronated and Bipap placed by RT.   2000 Pt was turned and prepared for intubation. Dr. Caraballo present, 2 RNs, 1 RT and 2 Techs.   2017 30mg Etomidate given   2018 120mg Succcinylcholine given   2020 ET tube inserted by Dr. Caraballo   RT at bedside managing vent settings. VSS.   Family updated by provider.

## 2021-12-21 NOTE — PROGRESS NOTES
Patient was intubated for hypoxic respiratory failure, by MD.  ETT size 7.5 placed 25 cm at the teeth/gums, there were 1 attempts to intubated patient. BS post intubation bilateral and AB.39, 44, 295 100 10. On AC: 18 500 100% 10. FIO2 and PEEP decreased per ABG/MD, and pt is maintaining saturations.    Ventilation Mode: CMV/AC  (Continuous Mandatory Ventilation/ Assist Control)  Rate Set (breaths/minute): 18 breaths/min  Tidal Volume Set (mL): 500 mL  PEEP (cm H2O): 5 cmH2O  Oxygen Concentration (%): 50 %  Resp: 23      Moses Quiroga, RT

## 2021-12-21 NOTE — ED NOTES
Patient was no longer maintaining his O2 sats on oxymask. Writer switched patient to non-rebreather mask at 15L and patient still not maintaining his sats above 80%. RT will place patient on bipap or high flow oxygen. Dr. Caraballo updated.

## 2021-12-21 NOTE — CONSULTS
RENAL CONSULT NOTE    REQUESTING PHYSICIAN: Dr. Brush    REASON FOR CONSULT: Hypernatremia, COVID-19    ASSESSMENT/PLAN:  Hypernatremia - severe hypernatremia, likely in the setting of poor oral intake, dehydration and volume depletion in a patient with COVID.  No other recent labs.  No history available history but nothing that would suggest DI (not on meds). Initial sodium 158, has corrected to 150 overnight, 8 pt correction.   Recs:  - would avoid further correction at this time, change to D5W + NS at 50cc/hr, patient leaving the ED as his noon sodium returned  - continue q4h sodiums at next facility  - can likely correct his sodium fully over the next few days    LORRAINE - seems likely volume depletion related and Cr better this morning    Acute Hypoxic Respiratory Failure - 2/2 COVID, intubated in the ED.  On 5 PEEP, 40% FiO2    COVID-19 - received Tocilizumab x1 and getting Dexamethasone 6mg IV every day plus remdesivir    BLL and EVIN acute segmental/subsegmental PE - hemodynamically stable, TTE with mild decrease RV dysfunction and normal RV size.  On high intensity heparin infusion.      ID - possible aspiration pneumonia, on empiric Zosyn    Afib - in afib but controlled ventricular rate.  Now on heparin.  On IV metoprolol    LV dysfunction - EF 35-40%, normal filling pressures.  Received NS boluses initially but remains euvolemic appearing.  Follow carefully    Hyperglycemia - A1C 5.5 but requiring insulin drip with steriods      HPI: Mr. Randhawa is a 81 year old gentleman with no known PMHx per chart review who presented to the [facility] ED yesterday with cough, SOB, chills, myalgias and poor oral intake.  Initially quite hypoxic and placed on non-rebreather but decompensated and needed bipap and then intubation.  Intubated in the ED.  Not able to find an ICU bed and boarded there overnight.  Also found to have PE on imaging last night and started on high intensity heparin drip.      Called by   Trudi overnight, asked for recommendations for managing severe hypernatremia.  Suggested d5w at 75cc/hr and following sodium q4hr.  Overnight, sodium corrected to 150 and just resulted at 150 again this afternoon.  Had stopped d5W this morning but glucose dropped and now leaving the ED for another facility.  Discussed trying NS with D5W at next facility.      Discussed with bedside RN prior to transfer.    REVIEW OF SYSTEMS: was not able to be obtained because of patient status    PMHx  No known PMHx based on chart review and patient unable to provide.  No current facility-administered medications on file prior to encounter.  No current outpatient medications on file prior to encounter.      No current outpatient medications on file.      ALLERGIES/SENSITIVITIES:  No Known Allergies    SocHx  Known smoker but no other details available    FamHx  Unable to obtain family history because of patient status      PHYSICAL EXAM:  Physical Exam   Temp: 98.6  F (37  C) Temp src: Oral BP: 116/55 Pulse: 58   Resp: 20 SpO2: 99 % O2 Device: Mechanical Ventilator Oxygen Delivery: 12 LPM  Vitals:    12/20/21 1642 12/21/21 0409   Weight: 59 kg (130 lb) 59 kg (130 lb)     Vital Signs with Ranges  Temp:  [97.9  F (36.6  C)-98.7  F (37.1  C)] 98.6  F (37  C)  Pulse:  [] 58  Resp:  [13-41] 20  BP: ()/(50-87) 116/55  FiO2 (%):  [40 %] 40 %  SpO2:  [93 %-100 %] 99 %  No intake/output data recorded.    @TMAXR(24)@    Patient Vitals for the past 72 hrs:   Weight   12/21/21 0409 59 kg (130 lb)   12/20/21 1642 59 kg (130 lb)       General - intubated and sedated  HEENT - ETT in place  Neck - no carotid bruits, no JVD  Respiratory - Lungs CTA coarse, mechanically ventilated  Cardiovascular - AP RRR with no rub or murmur.  No edema  Abdomen - soft, BS present  Extremities - no edema.  Low muscle mass  Integumentary - pale, no rashes  Neurologic - sedated  Psych: sedated  :  Marina in place    Laboratory:     Recent Labs   Lab  12/21/21  1229 12/21/21  0633 12/21/21 0224 12/20/21 1737   WBC 11.8* 11.5* 10.8 15.9*   RBC 3.44* 3.89* 3.91* 5.07   HGB 10.9* 12.3* 12.3* 15.9   HCT 34.4* 38.0* 38.3* 47.6   * 139* 149* 252       Basic Metabolic Panel:  Recent Labs   Lab 12/21/21  1254 12/21/21  1215 12/21/21  1210 12/21/21  1130 12/21/21  1104 12/21/21  1034 12/21/21  1003 12/21/21  0933 12/21/21 0633 12/21/21 0224 12/20/21 2200 12/20/21 1737   NA  --  150*  --   --   --   --   --   --  150* 154* 160* 158*   POTASSIUM  --  3.8  --   --   --   --   --   --  3.2* 3.1* 2.9* 3.1*   CHLORIDE  --   --   --   --   --   --   --   --  118*  --  123* 115*   CO2  --   --   --   --   --   --   --   --  24  --  25 26   BUN  --   --   --   --   --   --   --   --  43*  --  56* 65*   CR  --   --   --   --   --   --   --   --  0.79  --  0.92 1.18   GLC 78  --  84 77 81 95 130*   < > 295*  --  148* 145*   GREGORY  --   --   --   --   --   --   --   --  7.3*  --  7.3* 9.3    < > = values in this interval not displayed.       INR  Recent Labs   Lab 12/20/21 2200 12/20/21 1737   INR 1.64* 1.41*       Recent Labs   Lab Test 12/21/21  1215 12/21/21  0633 12/21/21 0224 12/20/21 2200   POTASSIUM 3.8 3.2*   < > 2.9*   CHLORIDE  --  118*  --  123*   BUN  --  43*  --  56*    < > = values in this interval not displayed.      Recent Labs   Lab Test 12/21/21  1215 12/21/21  0633   ALBUMIN 2.2* 2.1*   BILITOTAL 1.1* 1.0   ALT 14 13   AST 22 17       Personally reviewed today's laboratory studies      Thank you for involving us in the care of this patient. We will continue to follow along with you.      Trery Zaman  [facility]  [phone]

## 2021-12-21 NOTE — SIGNIFICANT EVENT
Patient experienced a roughly 6-second pause and has also had some new bradycardia into the high 50s since initiation of propofol earlier this morning.  Hospitalist discontinued diltiazem.  I discussed this with the telemetry ICU doc over the U and they recommended that we discontinue propofol and start midazolam infusion for sedation in its place and otherwise continue the current regimen.  Orders placed.    Mo Kendrick MD  [facility], PGY-3

## 2021-12-21 NOTE — DISCHARGE SUMMARY
[facility] MEDICINE  DISCHARGE SUMMARY     Primary Care Physician: No Ref-Primary, Physician  Admission Date: 12/20/2021   Discharge Provider: Curt Johns DO,  Discharge Date: 12/21/2021   Diet: None      Code Status: No CPR- Pre-arrest intubation OK   Activity: bed rest        Condition at Discharge: Critical     REASON FOR PRESENTATION(See Admission Note for Details)   Refer to h&p    PRINCIPAL & ACTIVE DISCHARGE DIAGNOSES     Active Problems:    Hypernatremia    Hypoxia    Pneumonia due to 2019 novel coronavirus      PENDING LABS     Unresulted Labs Ordered in the Past 30 Days of this Admission     Date and Time Order Name Status Description    12/21/2021 12:29 PM Bld morphology pathology review In process     12/21/2021 12:22 PM Haptoglobin In process     12/20/2021  8:29 PM Blood Culture Line, venous In process     12/20/2021  8:29 PM Blood Culture Line, Other In process             PROCEDURES ( this hospitalization only)          RECOMMENDATIONS TO OUTPATIENT PROVIDER FOR F/U VISIT           DISPOSITION     [facility]    SUMMARY OF HOSPITAL COURSE:    Refer to Progress note by me from this morning    Discharge Medications with Med changes:     Refer to MAR          Rationale for medication changes:              Consults       SOCIAL WORK IP CONSULT  VASCULAR ACCESS ADULT IP CONSULT  NEPHROLOGY IP CONSULT  NEPHROLOGY IP CONSULT  PHARMACY IP CONSULT  PHARMACY IP CONSULT    Immunizations given this encounter       There is no immunization history on file for this patient.        Anticoagulation Information          SIGNIFICANT IMAGING FINDINGS     Results for orders placed or performed during the hospital encounter of 12/20/21   XR Chest Port 1 View    Impression    IMPRESSION:     The cardiac silhouette is normal in size. Vascular pedicle width is normal.    There is hazy airspace opacity involving multiple segments of the left lung at and inferior to the left hilum. The  right lung is well-expanded and clear. Overall the distribution is suggestive of infection/inflammation. Follow-up chest radiographs in 4-6   weeks are suggested for reassessment.    No pleural effusion or pneumothorax is present.    NOTE: ABNORMAL REPORT    THE DICTATION ABOVE DESCRIBES AN ABNORMALITY FOR WHICH FOLLOW-UP IS NEEDED.    CT Chest Pulmonary Embolism w Contrast   Result Value Ref Range    Radiologist flags Pulmonary embolism (AA)     Impression    IMPRESSION:  1.  Acute segmental and subsegmental pulmonary emboli in the bilateral lower lobes and left upper lobe. No evidence of right heart strain.    2.  Consolidation and groundglass opacities in the bilateral lower lobes and groundglass opacities in the lingula, with associated bronchiectasis and multifocal mucoid impaction, suspicious for aspiration pneumonia.    3.  Moderate emphysema.      [Critical Result: Pulmonary embolism]    Finding was identified on 12/21/2021 12:18 AM.     Dr. Marcano was contacted by me on 12/21/2021 12:27 AM and verbalized understanding of the critical result.    Echocardiogram Complete   Result Value Ref Range    LVEF  35-40%        SIGNIFICANT LABORATORY FINDINGS         Discharge Orders     No discharge procedures on file.    Examination   Physical Exam   Temp:  [97.9  F (36.6  C)-98.7  F (37.1  C)] 97.9  F (36.6  C)  Pulse:  [] 64  Resp:  [13-41] 18  BP: ()/(49-87) 72/52  FiO2 (%):  [40 %-60 %] 40 %  SpO2:  [93 %-100 %] 98 %  Wt Readings from Last 1 Encounters:   12/21/21 58.5 kg (128 lb 15.5 oz)           Curt Johns DO, DO  [employer] [facility]    CC:No Ref-Primary, Physician

## 2021-12-21 NOTE — PROGRESS NOTES
Virtual opinion:  12/20/21    I was called by Dr. Brush from the Department of Medicine at [facility] about the patient (Ezequiel Randhawa, [id number] 12/20/2021) who I'm told has acute hypoxemic respiratory failure 2/2 COVID-19.     Dr. Brush called with the following specific question and concerns;  - acute hypoxemic respiratory failure 2/2 COVID-19 being intubated  - hypernatremia  - leukocytosis    My opinion as follows;  - initial vent settings: AC-VC Vt 330 RR 24 PEEP 12  - check ABG and adjust as needed for oxygenation/ventilation  - continue dexamethasone and remdesivir  - start baricitinib   - IVF resuscitation for Na 158  - start empiric pip-tazo    At the time of our discussion over the phone I was unable to see and personally evaluate the patient.  I was able to access to the patient s medical records/chart and reviewed the chart related to the specific question.     I made Dr. Brush aware that I cannot provide direct medical advice or consultation, but could provide a general opinion for his/her consideration as the in-person treating provider.      My conversation with Dr. Brush is not meant to replace or supersede the clinical judgement of the in-person treating provider.    Eduardo Hung MD  Pulmonary Disease and Critical Care Medicine  [facility]

## 2021-12-21 NOTE — ED NOTES
Respiratory Care Note    Ventilation Mode: CMV/AC  (Continuous Mandatory Ventilation/ Assist Control)  FiO2 (%): 40 %  Rate Set (breaths/minute): 18 breaths/min  Tidal Volume Set (mL): 500 mL  PEEP (cm H2O): 5 cmH2O  Oxygen Concentration (%): 40 %  Resp: 18    Pt Parameters:    PIP: 24  Pplat: 19  Secretions: clear thin      Ren Gallardo, RT

## 2021-12-21 NOTE — ED NOTES
Pt's son, Gregorio called and updated about plan of care and transport to [facility].

## 2021-12-21 NOTE — H&P
MEDICAL ICU H&P  12/21/2021    Date of Hospital Admission: 12/20/21  Date of ICU Admission: 12/21/21  Reason for Critical Care Admission: COVID ARDS  Date of Service (when I saw the patient): 12/21/2021    ASSESSMENT: Ezequiel Randhawa is a 81 year old male with PMH COPD, tobacco use, who was admitted to [facility] ICU on 12/21/21 from [facility] for COVID acute respiratory failure requiring intubation on 12/20/21.     PLAN:    Neuro:  # Pain and sedation  - Fentanyl   - Propofol  - Seroquel 50 mg TID  - Haldol 1-2 mg q6h prn   - RASS goal -3 to -4    Pulmonary:  # COVID Positive 12/20/21  # Acute Hypoxic Respiratory Failure 2/2 COVID 19   - Intubated on 12/20   - Follow ABGs  - Vent CMV 18/500/7/60%     # H/o COPD?   Suspected but no official diagnosis. H/o tobacco use. CT Chest demonstrated moderate emphysema.   - Albuterol PRN    Cardiovascular:  # Afib with RVR, now with regular rate   # LV hypokinesis  # Reduced EF (35-40%)   Echo performed 12/20 showed EF 35-40% with moderate global hypokinesia of the left ventricle, unclear if new without prior echocardiograms for comparison. Suspect 2/2 to CAD. Troponin negative x3. EKG on admission with sinus bradycardia so low suspicion for ACS.   - telemetry monitoring   - will need ischemic workup eventually     #Shock   #Lactic acidosis, resolved   Shock could be in setting of sepsis 2/2 COVID-19 and CAP. Hypovolemic initially at [facility] ED, but on bedside ultrasound here IVC appeared full without collapse, consistent more with volume overload. Could have cardiogenic component given LV dysfunction.   - Levophed, wean as able     GI/Nutrition:  Risk for malnutrition and dehydration   - Bowel regimen   - AXR to assess OG placement  - Dietician consult for initiation of TF     Renal/Fluids/Electrolytes:  # Hypernatremia   Dehydrated for the last week. As high as Na 160 at OSH, now stable at Na 150. Was on D5W gtt for correction.   - trend Na q6h   - free water flushes 150 q2h      Endocrine:  # No h/o of DM  - Hypoglycemic protocol per ICU     ID:  # COVID 19 Positive   - Dex 12 mg (12/20- )  - Remdesivir 12/20   - Toculizumab 12/20    #Potential superimposed pneumonia  CT Chest PE study (12/20) demonstrated consolidation of bilateral lower lobes concerning for aspiration pneumonia. Procalcitonin of 0.09.   - CTX/azithromycin (12/21- )   - Sputum culture  - MRSA nares   - urine strep pneumo, urine legionella   - 12/20 blood culture - pending   - s/p Zosyn 12/20     Hematology:    # Elevated D-Dimer   # Pulmonary Embolism dx 12/20 by CT  - High dose heparin gtt     Musculoskeletal:  PT/OT     Skin:  No acute issues     General Cares/Prophylaxis:    DVT Prophylaxis: Hep gtt   GI Prophylaxis: PPI  Restraints: None   Family Communication: Attempted to call son x 3 times, went straight to voicemail. Son, Gregorio, called back and update was provided.   Code Status: DNR, per H&P from [facility] discussion with son had been ok for pre-arrest intubation, pressors, ICU cares, but no CPR; confirmed with son over the phone.      Lines/tubes/drains:  - PIV  - PICC   - Marina  - OG  - ETT     Disposition:  - Medical ICU     Patient seen and findings/plan discussed with medical ICU staff, Dr. Perlman.     Elinor Seaman    -----------------------------------------------------------------------    HISTORY PRESENTING ILLNESS: Sydnee Nieves is a 81 year old male with history of COPD and tobacco use who presented to [facility] ED for evaluation of one week of cough, dyspnea, and body aches. Patient found to be covid positive. He was hypoxic on arrival to SpO2 81% with rapid decompensation requiring progression from nonrebreathing on 12L to intubation for acute hypoxic respiratory failure. Currently, he is on PEEP 5 and 40% FiO2. For covid treatment, patient has received tocilizumab and remdesivir in addition to dexamethasone 6mg daily. After intubation, patient developed afib with RVR, initially started on  a dilt gtt but later transitioned to IV metoprolol. A TTE performed 12/20 showed EF 35-40% with some hypokinesis of the LV. He had some episodes of bradycardia while on propofol therefore transitioned to fentanyl and versed for sedation. Patient receiving empiric zosyn for potential aspiration pneumonia after CT showed consolidations. A CT showed acute PE and patient initiated on high intensity heparin. Patient is non vaccinated against covid 19 and lives with his son who also had symptoms.     He was transferred to [facility] for ICU level care.     REVIEW OF SYSTEMS: Unable to obtain due to patient condition     PAST MEDICAL HISTORY:   Suspected COPD  Tobacco use disorder - quit months ago     SURGICAL HISTORY:  Past Surgical History:   Procedure Laterality Date     PICC TRIPLE LUMEN PLACEMENT  12/20/2021          SOCIAL HISTORY:  Social History     Socioeconomic History     Marital status:      Spouse name: Not on file     Number of children: Not on file     Years of education: Not on file     Highest education level: Not on file   Occupational History     Not on file   Tobacco Use     Smoking status: Not on file     Smokeless tobacco: Not on file   Substance and Sexual Activity     Alcohol use: Not on file     Drug use: Not on file     Sexual activity: Not on file   Other Topics Concern     Not on file   Social History Narrative     Not on file     Social Determinants of Health     Financial Resource Strain: Not on file   Food Insecurity: Not on file   Transportation Needs: Not on file   Physical Activity: Not on file   Stress: Not on file   Social Connections: Not on file   Intimate Partner Violence: Not on file   Housing Stability: Not on file     FAMILY HISTORY:   No family history on file.  ALLERGIES:   No Known Allergies  MEDICATIONS:  [COMPLETED] 0.9% sodium chloride BOLUS  [COMPLETED] 0.9% sodium chloride BOLUS  [COMPLETED] remdesivir 200 mg in sodium chloride 0.9 % 250 mL intermittent infusion    Followed by  [COMPLETED] 0.9% sodium chloride BOLUS  [COMPLETED] atropine 1 MG/10ML injection  [COMPLETED] dexamethasone PF (DECADRON) injection 6 mg  [COMPLETED] etomidate (AMIDATE) injection 30 mg  [COMPLETED] fentaNYL (PF) (SUBLIMAZE) injection 100 mcg  [COMPLETED] heparin ANTICOAGULANT Loading dose for HIGH INTENSITY TREATMENT * Give BEFORE starting heparin infusion  [COMPLETED] iopamidol (ISOVUE-370) solution 100 mL  [COMPLETED] metoprolol (LOPRESSOR) injection 5 mg  [COMPLETED] midazolam (VERSED) injection 1 mg  [COMPLETED] midazolam (VERSED) injection 5 mg  [COMPLETED] perflutren lipid microsphere (DEFINITY) injection SUSP 2 mL  [COMPLETED] piperacillin-tazobactam (ZOSYN) 3.375 g vial to attach to  mL bag  [COMPLETED] potassium chloride 10 mEq in 100 mL sterile water intermittent infusion (premix)  [COMPLETED] potassium chloride 10 mEq in 100 mL sterile water intermittent infusion (premix)  [COMPLETED] potassium chloride 10 mEq in 100 mL sterile water intermittent infusion (premix)  [COMPLETED] succinylcholine (ANECTINE) injection 120 mg  [COMPLETED] tocilizumab (ACTEMRA) 472 mg in sodium chloride 0.9 % 123.6 mL infusion    No current outpatient medications on file prior to encounter.      PHYSICAL EXAMINATION:  Temp:  [97.9  F (36.6  C)-98.6  F (37  C)] 97.9  F (36.6  C)  Pulse:  [] 46  Resp:  [13-29] 18  BP: ()/(47-87) 96/61  FiO2 (%):  [40 %-60 %] 40 %  SpO2:  [93 %-100 %] 98 %  General: Sedated, cachetic appearings  HEENT: ETT in place   Neuro: Not following commands or responding to voice   Pulm/Resp: Coarse bilateral lung sounds, no wheezes  CV: RRR, no m/r/g   Abdomen: Soft, non-distended, non-tender. Hypoactive bowel sounds.   : Marina catheter in place, draining dark urine.   Incisions/Skin: Severely flaky skin across his feet with unkempt toenails     LABS: Reviewed.   Arterial Blood Gases   Recent Labs   Lab 12/21/21  1539 12/20/21  2101   PH 7.40 7.39   PCO2 42 44   PO2 87 295*    HCO3 26  --      Complete Blood Count   Recent Labs   Lab 12/21/21  1519 12/21/21  1229 12/21/21  0633 12/21/21  0224   WBC 17.4* 11.8* 11.5* 10.8   HGB 12.8* 10.9* 12.3* 12.3*    126* 139* 149*     Basic Metabolic Panel  Recent Labs   Lab 12/21/21  1809 12/21/21  1519 12/21/21  1432 12/21/21  1254 12/21/21  1215 12/21/21  0933 12/21/21  0633 12/21/21  0224 12/20/21 2200 12/20/21  1737   NA  --  154*  --   --  150*  --  150* 154* 160* 158*   POTASSIUM  --  3.6  --   --  3.8  --  3.2* 3.1* 2.9* 3.1*   CHLORIDE  --  122*  --   --   --   --  118*  --  123* 115*   CO2  --  27  --   --   --   --  24  --  25 26   BUN  --  44*  --   --   --   --  43*  --  56* 65*   CR  --  0.84  --   --   --   --  0.79  --  0.92 1.18   * 154* 103* 78  --    < > 295*  --  148* 145*    < > = values in this interval not displayed.     Liver Function Tests  Recent Labs   Lab 12/21/21  1519 12/21/21  1215 12/21/21  0633 12/20/21 2200 12/20/21  1737   AST 20 22 17 18 25   ALT 17 14 13 12 18   ALKPHOS 73 71 74 73 100   BILITOTAL 1.0 1.1* 1.0 1.9* 2.3*   ALBUMIN 2.0* 2.2* 2.1* 2.3* 3.1*   INR 1.44*  --   --  1.64* 1.41*     Coagulation Profile  Recent Labs   Lab 12/21/21  1519 12/21/21  0037 12/20/21 2200 12/20/21  1737   INR 1.44*  --  1.64* 1.41*   PTT 33 38 35  --        IMAGING:  Recent Results (from the past 24 hour(s))   CT Chest Pulmonary Embolism w Contrast   Result Value    Radiologist flags Pulmonary embolism (AA)    Narrative    EXAM: CT CHEST PULMONARY EMBOLISM W CONTRAST  LOCATION: [facility]  DATE/TIME: 12/20/2021 11:51 PM    INDICATION: Shortness of breath  COMPARISON: None.  TECHNIQUE: CT chest pulmonary angiogram during arterial phase injection of IV contrast. Multiplanar reformats and MIP reconstructions were performed. Dose reduction techniques were used.   CONTRAST: ISOVUE 370 100ML    FINDINGS:  ANGIOGRAM CHEST: There are acute segmental and subsegmental pulmonary emboli in the  bilateral lower lobes left upper lobe. Thoracic aorta is negative for dissection. No CT evidence of right heart strain.    LUNGS AND PLEURA: Moderate upper lung predominant emphysema. There are consolidative and groundglass opacities in the bilateral lower lobes, left greater than right, and groundglass opacities in the lingula. No pleural effusions. Mild bronchiectasis in   the bilateral lower lobes and multifocal mucoid impaction.    MEDIASTINUM/AXILLAE: No lymphadenopathy. No pericardial effusion. Right PICC with tip in the low SVC. Endotracheal tube tip 5.8 cm above the garret.    CORONARY ARTERY CALCIFICATION: Cannot evaluate.    UPPER ABDOMEN: Normal.    MUSCULOSKELETAL: No concerning bone lesions.      Impression    IMPRESSION:  1.  Acute segmental and subsegmental pulmonary emboli in the bilateral lower lobes and left upper lobe. No evidence of right heart strain.    2.  Consolidation and groundglass opacities in the bilateral lower lobes and groundglass opacities in the lingula, with associated bronchiectasis and multifocal mucoid impaction, suspicious for aspiration pneumonia.    3.  Moderate emphysema.      [Critical Result: Pulmonary embolism]    Finding was identified on 2021 12:18 AM.     Dr. Marcano was contacted by me on 2021 12:27 AM and verbalized understanding of the critical result.    Echocardiogram Complete   Result Value    LVEF  35-40%    Narrative    [id number]  [id number]  [id number]  [id number]^EMILIA^[address] [address]     Name: JULIO ARCE  MRN: [id number]  : 1940  Study Date: 2021 08:48 AM  Age: 81 yrs  Gender: Male  Patient Location: [facility]  Reason For Study: Atrial Fibrillation  Ordering Physician: POP NIETO  Performed By: STACY     BSA: 1.8 m2  Height: 74 in  Weight: 130 lb  HR: 80  BP: 122/59 mmHg  ______________________________________________________________________________  Procedure  Complete  Portable Echo Adult. Definity (NDC #77186-181) given intravenously. 2  ml  lot # [id number].  ______________________________________________________________________________  Interpretation Summary     There is moderate global hypokinesia of the left ventricle.  The visual ejection fraction is 35-40%.  Normal atrila size.  No significant valve disease.  ______________________________________________________________________________  Left Ventricle  The left ventricle is normal in size. There is normal left ventricular wall  thickness. Diastolic Doppler findings (E/E' ratio and/or other parameters)  suggest left ventricular filling pressures are normal. The visual ejection  fraction is 35-40%. There is moderate global hypokinesia of the left  ventricle.     Right Ventricle  The right ventricle is normal size. Mildly decreased right ventricular  systolic function.     Atria  Normal left atrial size. Right atrial size is normal. Intact atrial septum.     Mitral Valve  Mitral valve leaflets appear normal. There is trace mitral regurgitation.     Tricuspid Valve  Tricuspid valve leaflets appear normal. There is trace tricuspid  regurgitation. Right ventricular systolic pressure could not be approximated  due to inadequate tricuspid regurgitation.     Aortic Valve  The aortic valve is trileaflet with aortic valve sclerosis. No aortic  regurgitation is present.     Pulmonic Valve  Normal pulmonic valve. There is trace pulmonic valvular regurgitation.     Vessels  The aorta root is normal. Normal size ascending aorta. IVC diameter >2.1 cm  collapsing <50% with sniff suggests a high RA pressure estimated at 15 mmHg or  greater.     Pericardium  There is no pericardial effusion.     ______________________________________________________________________________  MMode/2D Measurements & Calculations  IVSd: 0.81 cm  LVIDd: 3.8 cm  LVIDs: 3.3 cm  LVPWd: 0.84 cm  FS: 13.2 %     LV mass(C)d: 88.8 grams  LV mass(C)dI: 49.0 grams/m2  Ao root  diam: 3.6 cm  LA dimension: 2.2 cm  asc Aorta Diam: 3.7 cm  LA/Ao: 0.62  LVOT diam: 2.0 cm  LVOT area: 3.2 cm2  RWT: 0.45     Doppler Measurements & Calculations  MV E max tae: 53.2 cm/sec  MV dec slope: 385.3 cm/sec2  MV dec time: 0.15 sec  Ao V2 max: 102.0 cm/sec  Ao max P.0 mmHg  Ao V2 mean: 69.9 cm/sec  Ao mean P.1 mmHg  Ao V2 VTI: 17.0 cm  ULIS(I,D): 1.9 cm2  LUIS(V,D): 2.1 cm2  LV V1 max P.8 mmHg  LV V1 max: 67.5 cm/sec  LV V1 VTI: 10.0 cm  SV(LVOT): 31.8 ml  SI(LVOT): 17.6 ml/m2  PA V2 max: 65.2 cm/sec  PA max P.7 mmHg  PA acc time: 0.11 sec  TR max tae: 171.4 cm/sec  TR max P.8 mmHg  AV Tae Ratio (DI): 0.66  LUIS Index (cm2/m2): 1.0     E/E' av.9  Lateral E/e': 7.6  Medial E/e': 6.3     ______________________________________________________________________________  Report approved by: Yasmani Boss 2021 09:57 AM         XR Chest Port 1 View    Narrative    EXAM: XR CHEST PORT 1 VIEW  2021 3:31 PM     HISTORY:  73 yo male with COVID pneumonia, transferred after  intubation       Additional HISTORY: History of COPD and A. fib with RVR    COMPARISON:  None    TECHNIQUE: Single view of the chest    FINDINGS:   Portable AP view of the chest performed at 45 degrees. The  endotracheal tube tip is within the mid thoracic trachea and  terminates 6.5 cm from the garret. The gastric tube tip projects over  the GE junction with the sidehole projecting over the lower thoracic  esophagus. Right upper extremity PICC line tip terminates in the mid  SVC.    Trachea is midline. Normal-appearing cardiomediastinal silhouette. No  pleural effusion or pneumothorax. Emphysematous changes of the lung  parenchyma. Patchy interstitial and airspace opacities, left greater  than right. Visualized upper abdomen is unremarkable. No acute osseous  abnormalities.      Impression    IMPRESSION:   1. Patchy interstitial and airspace opacities, left greater than  right, appearance compatible with  COVID  pneumonia.  2. Gastric tube tip and sidehole project over the GE junction and  lower thoracic esophagus, respectively. Recommend advancement at least  10 cm.  3. Emphysematous changes of the lung parenchyma.     I have personally reviewed the examination and initial interpretation  and I agree with the findings.    RASHAAD HILL MD         SYSTEM ID:  [id number]   XR Abdomen Port 1 View    Narrative    Exam: XR ABDOMEN PORT 1 VIEWS, 12/21/2021 3:31 PM    Indication: 80 yo male, intubated, assess ET tube placement    Comparison: None    Findings:   Supine AP radiograph of the abdomen. Enteric tube is subdiaphragmatic  with tip projecting over the gastric lumen and sidehole just distal to  the gastroesophageal junction. Left basilar atelectasis. No portal  venous gas. No acute osseous abnormalities.    Impression    Impression: Enteric tube is subdiaphragmatic with tip projecting over  the gastric lumen and sidehole near the level of the diaphragmatic  hiatus, consider advancement by approximately 5 cm.    I have personally reviewed the examination and initial interpretation  and I agree with the findings.    YNES HUNT MD         SYSTEM ID:  [id number]   XR Abdomen Port 1 View    Narrative    Exam: XR ABDOMEN PORT 1 VIEWS, 12/21/2021 6:11 PM    Indication: OG tube repositioned, COVID patient    Comparison: Abdominal x-ray 12/21/2021, chest x-ray 12/21/2021    Findings:   Portable semiupright AP radiograph of the abdomen. The tip of the  NG/OG tube projects over the stomach and the sidehole projects near  the GE junction, only slightly advanced compared to prior. The  visualized bowel gas pattern is nonobstructive. No visualized  pneumatosis or portal venous gas. Left basilar opacities are better  evaluated on same-day chest x-ray. Partially visualized catheter tip  projects over the mid SVC.      Impression    Impression:   1. The tip of the NG/OG tube projects over the stomach and the  sidehole projects near  the GE junction, only slightly advanced  compared to prior. Recommend continued advancing.  2. The visualized bowel gas pattern is nonobstructive.    I have personally reviewed the examination and initial interpretation  and I agree with the findings.    EMILIO SCOTT MD         SYSTEM ID:  [id number]

## 2021-12-21 NOTE — ED PROVIDER NOTES
"ED SIGNOUT  Date/Time:12/21/2021 5:49 AM    Patient signed out to me by my colleague, Dr. Marcano.  Please see their note for complete history and physical. Plan to follow up on bed status.    The creation of this record is based on the scribe s observations of the work being performed by Dr. eH and the provider s statements to them. It was created on their behalf by Sean Broderick a trained medical scribe. This document has been checked and approved by the attending provider.      REMAINING ED WORKUP:    Vitals:  /57   Pulse 93   Temp 98.7  F (37.1  C) (Axillary)   Resp 18   Ht 1.88 m (6' 2\")   Wt 59 kg (130 lb)   SpO2 99%   BMI 16.69 kg/m        Pertinent labs results reviewed   Results for orders placed or performed during the hospital encounter of 12/20/21   XR Chest Port 1 View    Impression    IMPRESSION:     The cardiac silhouette is normal in size. Vascular pedicle width is normal.    There is hazy airspace opacity involving multiple segments of the left lung at and inferior to the left hilum. The right lung is well-expanded and clear. Overall the distribution is suggestive of infection/inflammation. Follow-up chest radiographs in 4-6   weeks are suggested for reassessment.    No pleural effusion or pneumothorax is present.    NOTE: ABNORMAL REPORT    THE DICTATION ABOVE DESCRIBES AN ABNORMALITY FOR WHICH FOLLOW-UP IS NEEDED.    CT Chest Pulmonary Embolism w Contrast   Result Value Ref Range    Radiologist flags Pulmonary embolism (AA)     Impression    IMPRESSION:  1.  Acute segmental and subsegmental pulmonary emboli in the bilateral lower lobes and left upper lobe. No evidence of right heart strain.    2.  Consolidation and groundglass opacities in the bilateral lower lobes and groundglass opacities in the lingula, with associated bronchiectasis and multifocal mucoid impaction, suspicious for aspiration pneumonia.    3.  Moderate emphysema.      [Critical Result: Pulmonary " embolism]    Finding was identified on 12/21/2021 12:18 AM.     Dr. Marcano was contacted by me on 12/21/2021 12:27 AM and verbalized understanding of the critical result.    Result Value Ref Range    INR 1.41 (H) 0.85 - 1.15   Comprehensive metabolic panel   Result Value Ref Range    Sodium 158 (HH) 136 - 145 mmol/L    Potassium 3.1 (L) 3.5 - 5.0 mmol/L    Chloride 115 (H) 98 - 107 mmol/L    Carbon Dioxide (CO2) 26 22 - 31 mmol/L    Anion Gap 17 5 - 18 mmol/L    Urea Nitrogen 65 (H) 8 - 28 mg/dL    Creatinine 1.18 0.70 - 1.30 mg/dL    Calcium 9.3 8.5 - 10.5 mg/dL    Glucose 145 (H) 70 - 125 mg/dL    Alkaline Phosphatase 100 45 - 120 U/L    AST 25 0 - 40 U/L    ALT 18 0 - 45 U/L    Protein Total 7.3 6.0 - 8.0 g/dL    Albumin 3.1 (L) 3.5 - 5.0 g/dL    Bilirubin Total 2.3 (H) 0.0 - 1.0 mg/dL    GFR Estimate 58 (L) >60 mL/min/1.73m2   Lactic acid whole blood   Result Value Ref Range    Lactic Acid 2.7 (H) 0.7 - 2.0 mmol/L   Result Value Ref Range    Troponin I 0.02 0.00 - 0.29 ng/mL   Result Value Ref Range    Magnesium 3.0 (H) 1.8 - 2.6 mg/dL   B-Type Natriuretic Peptide ([facility] Only)   Result Value Ref Range    BNP 29 0 - 84 pg/mL   Symptomatic; Yes; 12/17/2021 COVID-19 Virus (Coronavirus) by PCR Nasopharyngeal    Specimen: Nasopharyngeal; Swab   Result Value Ref Range    SARS CoV2 PCR Positive (A) Negative   CBC with platelets and differential   Result Value Ref Range    WBC Count 15.9 (H) 4.0 - 11.0 10e3/uL    RBC Count 5.07 4.40 - 5.90 10e6/uL    Hemoglobin 15.9 13.3 - 17.7 g/dL    Hematocrit 47.6 40.0 - 53.0 %    MCV 94 78 - 100 fL    MCH 31.4 26.5 - 33.0 pg    MCHC 33.4 31.5 - 36.5 g/dL    RDW 12.8 10.0 - 15.0 %    Platelet Count 252 150 - 450 10e3/uL    % Neutrophils 89 %    % Lymphocytes 3 %    % Monocytes 7 %    % Eosinophils 0 %    % Basophils 0 %    % Immature Granulocytes 1 %    NRBCs per 100 WBC 0 <1 /100    Absolute Neutrophils 14.3 (H) 1.6 - 8.3 10e3/uL    Absolute Lymphocytes 0.5 (L) 0.8 - 5.3 10e3/uL     Absolute Monocytes 1.0 0.0 - 1.3 10e3/uL    Absolute Eosinophils 0.0 0.0 - 0.7 10e3/uL    Absolute Basophils 0.1 0.0 - 0.2 10e3/uL    Absolute Immature Granulocytes 0.2 <=0.4 10e3/uL    Absolute NRBCs 0.0 10e3/uL   RBC and Platelet Morphology   Result Value Ref Range    Platelet Assessment  Automated Count Confirmed. Platelet morphology is normal.     Automated Count Confirmed. Platelet morphology is normal.    RBC Morphology Confirmed RBC Indices    D dimer quantitative   Result Value Ref Range    D-Dimer Quantitative >20.00 (HH) 0.00 - 0.50 ug/mL FEU   Result Value Ref Range    Procalcitonin 0.10 0.00 - 0.49 ng/mL   CRP inflammation   Result Value Ref Range    CRP 17.9 (H) 0.0-<0.8 mg/dL   Comprehensive metabolic panel   Result Value Ref Range    Sodium 160 (HH) 136 - 145 mmol/L    Potassium 2.9 (L) 3.5 - 5.0 mmol/L    Chloride 123 (H) 98 - 107 mmol/L    Carbon Dioxide (CO2) 25 22 - 31 mmol/L    Anion Gap 12 5 - 18 mmol/L    Urea Nitrogen 56 (H) 8 - 28 mg/dL    Creatinine 0.92 0.70 - 1.30 mg/dL    Calcium 7.3 (L) 8.5 - 10.5 mg/dL    Glucose 148 (H) 70 - 125 mg/dL    Alkaline Phosphatase 73 45 - 120 U/L    AST 18 0 - 40 U/L    ALT 12 0 - 45 U/L    Protein Total 5.2 (L) 6.0 - 8.0 g/dL    Albumin 2.3 (L) 3.5 - 5.0 g/dL    Bilirubin Total 1.9 (H) 0.0 - 1.0 mg/dL    GFR Estimate 79 >60 mL/min/1.73m2   Result Value Ref Range    Lactate Dehydrogenase 326 (H) 125 - 220 U/L   Result Value Ref Range    Troponin I 0.02 0.00 - 0.29 ng/mL   Result Value Ref Range    INR 1.64 (H) 0.85 - 1.15   Partial thromboplastin time   Result Value Ref Range    aPTT 35 22 - 38 Seconds   Fibrinogen activity   Result Value Ref Range    Fibrinogen Activity 330 170 - 490 mg/dL   D dimer quantitative   Result Value Ref Range    D-Dimer Quantitative >20.00 (HH) 0.00 - 0.50 ug/mL FEU   CRP inflammation   Result Value Ref Range    CRP 11.4 (H) 0.0-<0.8 mg/dL   Result Value Ref Range    Magnesium 2.5 1.8 - 2.6 mg/dL   Blood gas arterial   Result  Value Ref Range    pH Arterial 7.39 7.37 - 7.44    pCO2 Arterial 44 35 - 45 mm Hg    pO2 Arterial 295 (H) 75 - 85 mm Hg    Bicarbonate Arterial      O2 Sat, Arterial >100.0 (H) 95.0 - 96.0 %    Oxyhemoglobin >98.5 (H) 95.0 - 96.0 %    Base Excess/Deficit (+/-) 1.3   mmol/L    Sample Stabilized Temperature 37.0 degrees C   Result Value Ref Range    Potassium 3.1 (L) 3.5 - 5.0 mmol/L   Result Value Ref Range    aPTT 38 22 - 38 Seconds   CBC with platelets   Result Value Ref Range    WBC Count 10.8 4.0 - 11.0 10e3/uL    RBC Count 3.91 (L) 4.40 - 5.90 10e6/uL    Hemoglobin 12.3 (L) 13.3 - 17.7 g/dL    Hematocrit 38.3 (L) 40.0 - 53.0 %    MCV 98 78 - 100 fL    MCH 31.5 26.5 - 33.0 pg    MCHC 32.1 31.5 - 36.5 g/dL    RDW 13.1 10.0 - 15.0 %    Platelet Count 149 (L) 150 - 450 10e3/uL   Result Value Ref Range    Sodium 154 (HH) 136 - 145 mmol/L   ABO and Rh   Result Value Ref Range    ABO/RH(D) A POS     SPECIMEN EXPIRATION DATE [id number]    ABO and Rh   Result Value Ref Range    ABO/RH(D) A POS     SPECIMEN EXPIRATION DATE [id number]        Pertinent imaging reviewed   Please see official radiology report.  CT Chest Pulmonary Embolism w Contrast   Final Result   Abnormal   IMPRESSION:   1.  Acute segmental and subsegmental pulmonary emboli in the bilateral lower lobes and left upper lobe. No evidence of right heart strain.      2.  Consolidation and groundglass opacities in the bilateral lower lobes and groundglass opacities in the lingula, with associated bronchiectasis and multifocal mucoid impaction, suspicious for aspiration pneumonia.      3.  Moderate emphysema.         [Critical Result: Pulmonary embolism]      Finding was identified on 12/21/2021 12:18 AM.       Dr. Marcano was contacted by me on 12/21/2021 12:27 AM and verbalized understanding of the critical result.       XR Chest Port 1 View   Final Result   IMPRESSION:       The cardiac silhouette is normal in size. Vascular pedicle width is normal.       There is hazy airspace opacity involving multiple segments of the left lung at and inferior to the left hilum. The right lung is well-expanded and clear. Overall the distribution is suggestive of infection/inflammation. Follow-up chest radiographs in 4-6    weeks are suggested for reassessment.      No pleural effusion or pneumothorax is present.      NOTE: ABNORMAL REPORT      THE DICTATION ABOVE DESCRIBES AN ABNORMALITY FOR WHICH FOLLOW-UP IS NEEDED.       US Lower Extremity Venous Duplex Bilateral    (Results Pending)   Echocardiogram Complete    (Results Pending)        Interventions  Medications   remdesivir 100 mg in sodium chloride 0.9 % 250 mL intermittent infusion (has no administration in time range)     And   0.9% sodium chloride BOLUS (has no administration in time range)   lidocaine 1 % 0.1-5 mL (2 mLs Other Given 12/20/21 2045)   lidocaine (LMX4) cream (has no administration in time range)   sodium chloride (PF) 0.9% PF flush 10-40 mL (has no administration in time range)   dexamethasone (DECADRON) injection 6 mg (has no administration in time range)   albuterol (PROVENTIL HFA/VENTOLIN HFA) inhaler (0 puffs Inhalation Hold 12/20/21 2118)   norepinephrine (LEVOPHED) 4 mg in  mL infusion PREMIX (0 mcg/kg/min × 59 kg Intravenous Hold 12/21/21 0327)   enoxaparin ANTICOAGULANT (LOVENOX) injection 30 mg (30 mg Subcutaneous Given 12/20/21 2139)   famotidine (PEPCID) injection 20 mg (20 mg Intravenous Given 12/20/21 2109)   fentaNYL (SUBLIMAZE) infusion (150 mcg/hr Intravenous Rate/Dose Verify 12/21/21 0344)   midazolam (VERSED) injection 1 mg (1 mg Intravenous Given 12/20/21 2138)     Followed by   midazolam (VERSED) injection 0.5 mg (0.5 mg Intravenous Given 12/21/21 0505)   dextrose 5% infusion ( Intravenous Rate/Dose Verify 12/21/21 0411)   piperacillin-tazobactam (ZOSYN) 3.375 g vial to attach to  mL bag (0 g Intravenous Stopped 12/20/21 2216)     Followed by   piperacillin-tazobactam  (ZOSYN) 3.375 g vial to attach to  mL bag (3.375 g Intravenous Given 12/21/21 0538)   sodium chloride (PF) 0.9% PF flush 10-20 mL (has no administration in time range)   sodium chloride (PF) 0.9% PF flush 10-40 mL (0 mLs Intracatheter Hold 12/20/21 2217)   sodium chloride (PF) 0.9% PF flush 10-40 mL (has no administration in time range)   pantoprazole (PROTONIX) 2 mg/mL suspension 40 mg (has no administration in time range)     Or   pantoprazole (PROTONIX) IV push injection 40 mg (has no administration in time range)   chlorhexidine (PERIDEX) 0.12 % solution 15 mL (15 mLs Mouth/Throat Given 12/21/21 0015)   propofol (DIPRIVAN) infusion (0 mcg/kg/min × 59 kg Intravenous Hold 12/21/21 0328)   heparin infusion 25,000 units in D5W 250 mL ANTICOAGULANT (1,050 Units/hr Intravenous Rate/Dose Verify 12/21/21 0344)   diltiazem (CARDIZEM) 125 mg in sodium chloride 0.9 % 125 mL infusion (10 mg/hr Intravenous Rate/Dose Change 12/21/21 0411)   0.9% sodium chloride BOLUS (0 mLs Intravenous Stopped 12/20/21 2048)   0.9% sodium chloride BOLUS (0 mLs Intravenous Stopped 12/20/21 2119)   dexamethasone PF (DECADRON) injection 6 mg (6 mg Intravenous Given 12/20/21 2108)   remdesivir 200 mg in sodium chloride 0.9 % 250 mL intermittent infusion (0 mg Intravenous Stopped 12/20/21 2215)     Followed by   0.9% sodium chloride BOLUS (0 mLs Intravenous Stopped 12/20/21 2328)   tocilizumab (ACTEMRA) 472 mg in sodium chloride 0.9 % 123.6 mL infusion (0 mg Intravenous Stopped 12/20/21 2329)   fentaNYL (PF) (SUBLIMAZE) injection 100 mcg (100 mcg Intravenous Given 12/20/21 2045)   midazolam (VERSED) injection 5 mg (5 mg Intravenous Given 12/20/21 2046)   metoprolol (LOPRESSOR) injection 5 mg (5 mg Intravenous Given 12/20/21 2108)   potassium chloride 10 mEq in 100 mL sterile water intermittent infusion (premix) (0 mEq Intravenous Stopped 12/21/21 0038)   etomidate (AMIDATE) injection 30 mg (30 mg Intravenous Given 12/20/21 2016)    succinylcholine (ANECTINE) injection 120 mg (120 mg Intravenous Given 12/20/21 2017)   iopamidol (ISOVUE-370) solution 100 mL (100 mLs Intravenous Given 12/21/21 0004)   heparin ANTICOAGULANT Loading dose for HIGH INTENSITY TREATMENT * Give BEFORE starting heparin infusion (4,700 Units Intravenous Given 12/21/21 0044)   potassium chloride 10 mEq in 100 mL sterile water intermittent infusion (premix) (0 mEq Intravenous Stopped 12/21/21 0434)        ED Course/MDM:  5:30 AM Signout accepted from Dr. Marcano.  Prior records were reviewed.  Diagnostics from this visit are reviewed.    ED Course as of 12/21/21 0550   Mon Dec 20, 2021   1711 Patient is a 81-year-old male who comes in today with low oxygen saturations and body aches and chills.  He had a poor appetite for a few days and and looks very dry.  He denies any heart or lung problems but because of his difficulty breathing he was difficult to understand and get a good history from him.  He is requiring 12 L on oxygen mask.  He is not caught up on his Covid vaccine.  He has a son at home with similar symptoms.  I am highly suspicious for Covid pneumonia.  He is tachycardic.  He will need some fluids.  Will need blood work and a chest x-ray and a Covid swab and likely admission to the hospital.  I discussed this with him and he is in agreement with the plan.   1915 Patient came back Covid positive so I ordered dexamethasone for him.  I think he is very dehydrated.   1916 Patient's chest x-ray shows pneumonia on the left side.  Certainly this would fit with his picture.  I'm going to go ahead and order CTA imaging to further evaluate and will work on getting him admitted to the hospital.  Lactic acid came back elevated at 2.7.  This seems mostly like Covid with dehydration.  We can recheck the lactate after some fluids and see if it looks better.   1940 I spoke with Dr. Brush with the hospitalist service.  She was in agreement with admission to the hospital.  She  want the patient put on ICU status so I have ordered that.  We do not have an ICU bed so he will board down in the emergency department.   1953 I was called into the patient's room.  He is the satting and now is on BiPAP.  He is likely going to require intubation.  We will need to have a PICC line placed.   2031 Patient ended up requiring intubation.  He was intubated with a 7-1/2 tube and went very smoothly.  He will have a PICC line placed.  He is not needing pressors at this time.  Dr. Brush was able to speak to family and he is a no CPR but otherwise would want resuscitation and pressors and intubation.   2032 Patient's D-dimer is greater than 20.  I am suspecting this is related to the Covid process.  Other labs have been ordered including a fibrinogen.   2040 Patient will be placed on a fentanyl and Versed drip.   2056 Patient flipped into atrial fibrillation with RVR.  I have ordered some metoprolol for him.  He is on a fentanyl and Versed drip.       A bed became available at [facility]. I discussed the patient and current treatment plan with the accepting intensivist at the [facility], Dr Lam. He was accepted for transfer to the [facility]. I attempted to contact the patient's son but unable to get through. Left a message to call back to discuss transfer of patient and room location.    1. Pneumonia due to 2019 novel coronavirus    2. Hypernatremia    3. Acute respiratory failure with hypoxia (H)    4. Atrial fibrillation with RVR (H)          Dr. Antonia He  [facility] Emergency Department   December 21, 2021            Antonia He MD  12/21/21 0719

## 2021-12-21 NOTE — LETTER
Health Information Management Services               Recipient:  Damián Garay        Sender:  VANESSA Cook, [facility]. Phone: [phone], Pager: [phone]        Date: January 6, 2022  Patient Name:  Ezequiel Randhawa  Routing Message:    Please assess WM for TCU, ideally at [facility], or any other facility as close to [place] as possible. If there are nursing questions please call [phone] to speak with his bedside nurse.         The documents accompanying this notice contain confidential information belonging to the sender.  This information is intended only for the use of the individual or entity named above.  The authorized recipient of this information is prohibited from disclosing this information to any other party and is required to destroy the information after its stated need has been fulfilled, unless otherwise required by state law.      If you are not the intended recipient, you are hereby notified that any disclosure, copy, distribution or action taken in reliance on the contents of these documents is strictly prohibited.  If you have received this document in error, please return it by fax to [phone] with a note on the cover sheet explaining why you are returning it (e.g. not your patient, not your provider, etc.).  If you need further assistance, please call [facility] Centralized Transcription at [phone].  Documents may also be returned by mail to [place], , [facility] [address] [place], [place] [address].

## 2021-12-21 NOTE — ED NOTES
Tele tracing shows 7 sec. Pause,  12 ekg performed, Dr. Johns text paged for further lab/other orders.

## 2021-12-21 NOTE — LETTER
Health Information Management Services               Recipient:  Michel johnson Genny        Sender:  VANESSA Cook, [facility]. Phone: [phone], Pager: [phone]        Date: January 6, 2022  Patient Name:  Ezequiel Randhawa  Routing Message:    Please assess WM for TCU at [facility] ideally, otherwise as close to [place] as possible. If there are nursing questions please call [phone] to speak with his bedside nurse.         The documents accompanying this notice contain confidential information belonging to the sender.  This information is intended only for the use of the individual or entity named above.  The authorized recipient of this information is prohibited from disclosing this information to any other party and is required to destroy the information after its stated need has been fulfilled, unless otherwise required by state law.      If you are not the intended recipient, you are hereby notified that any disclosure, copy, distribution or action taken in reliance on the contents of these documents is strictly prohibited.  If you have received this document in error, please return it by fax to [phone] with a note on the cover sheet explaining why you are returning it (e.g. not your patient, not your provider, etc.).  If you need further assistance, please call [facility] Centralized Transcription at [phone].  Documents may also be returned by mail to [employer], , [facility] [address] [place], [place] [address].

## 2021-12-21 NOTE — CONSULTS
"Care Management Initial Consult    General Information  Assessment completed with: Children, Gregorio son via phone  Type of CM/SW Visit: Initial Assessment    Primary Care Provider verified and updated as needed: Yes   Readmission within the last 30 days: no previous admission in last 30 days      Reason for Consult: discharge planning  Advance Care Planning: Advance Care Planning Reviewed: other (comment) (\"does not have one\")          Communication Assessment  Patient's communication style: spoken language (English or Bilingual)             Cognitive  Cognitive/Neuro/Behavioral: WDL                      Living Environment:   People in home: child(ar), adult (son)  Gregorio  Current living Arrangements: house (\"a few steps to get inside and then 1 level house inside\")      Able to return to prior arrangements: yes       Family/Social Support:  Care provided by: self  Provides care for: no one     Children (\"son\")          Description of Support System: Supportive,Involved    Support Assessment: Adequate family and caregiver support,Adequate social supports,Patient communicates needs well met    Current Resources:   Patient receiving home care services: No     Community Resources: None  Equipment currently used at home: walker, rolling  Supplies currently used at home:  (\"no home oximeter\")    Employment/Financial:  Employment Status: retired     Employment/ Comments: \"no  benefits\"  Financial Concerns:     Referral to Financial Counselor: No       Lifestyle & Psychosocial Needs:  Social Determinants of Health     Tobacco Use: Not on file   Alcohol Use: Not on file   Financial Resource Strain: Not on file   Food Insecurity: Not on file   Transportation Needs: Not on file   Physical Activity: Not on file   Stress: Not on file   Social Connections: Not on file   Intimate Partner Violence: Not on file   Depression: Not on file   Housing Stability: Not on file       Functional Status:  Prior to admission patient " "needed assistance:   Dependent ADLs:: Ambulation-walker  Dependent IADLs:: Independent  Assesssment of Functional Status: Not at baseline with ADL Functioning,Not at baseline with mobility,Not at  functional baseline (\"because of shortness of breath\")    Mental Health Status:          Chemical Dependency Status:                Values/Beliefs:  Spiritual, Cultural Beliefs, Religion Practices, Values that affect care:                 Additional Information:  Ezequiel lives in a house with his adult son Gregorio. He is independent with ADLs at baseline and still drives.    Unknown discharge needs at this time. If home at discharge he may need an oximeter and oxygen.    Son to transport at discharge.    Son states, \"I have also been sick, but I am starting to do better\".    Gregorio son [phone].    Bibi Ramires RN      "

## 2021-12-21 NOTE — H&P
[facility]    Hospitalist History and Physical--Admit ICU       Date of Admission:  12/20/2021  Assessment & Plan   Ezequiel Randhawa is a 81 year old male with suspected copd, long time smoker quit months ago only, who was admitted to [facility] on 12/20/2021 for evaluation of  cough and dyspnea and found to be COVID-19 positive. He decompensated quickly in ER and went from 12 liters -->BiPAP -->intubation in ER    COVID-19 Diagnosis Details:  Onset of COVID symptoms 1 week   Date of positive test results 12/20/21   Research study No     # Confirmed COVID-19 infection    # Acute Hypoxic Respiratory Failure secondary to COVID-19 infection  # Viral Pneumonia secondary to COVID-19 infection  - Initial chest x-ray showed multifocal airspace disease. Oxygen needs have been much worse.   - Admit to medical floor with continuous pulse oximetry, COVID-19 special precautions, minimized lab draws, and care consolidation  - Oxygen: continue current support with was on 12 liters at first in ER, then bipap and now ER MD intubating him; titrate to keep SpO2 between 90-96%  - Labs: standard [facility] admission labs ordered (CBC with diff, CMP, retic count, LDH, troponin, BNP, CK, INR/PT, PTT, D-dimer, fibrinogen, antithrombin, ferritin, CRP, IL-6) done on admission and reviewed by me. Will trend daily until patient reaches clinical stability.  - Imaging: chest CT with contrast ordered  - Breathing treatments: albuterol inhaler four times a day scheduled and PRN; avoid nebulizers in favor of MDIs   - IV fluids: ordered at a rate of 100 mL/hr; hydrate cautiously to avoid worsening respiratory status with volume overload.  - Antibiotics: indicated due to concern of bacterial superinfection; zosyn ordered   - Treatments: Dexamethasone 6 mg x 10 days or until hospital discharge, started on 12/20/21, Remdesivir x 5 days or until hospital discharge, started on 12/20/21 and Toculizumab started  on 12/20/21 after ID consult--per ICU -tele not ID  - Research study protocol: no  - DVT Prophylaxis: At high risk of thrombotic complications due to COVID-19 disease (last DDimer displayed below).          - LOW INTENSITY dosing: Lovenox 0.5 mg/kg two times a day  - Discharge Anticoagulation: At discharge consider one of the following for 30 days and until the patient has returned to normal mobility:        - Apixaban (Eliquis) 2.5 mg two times a day        - Rivaroxaban (Xarelto) 10 mg once daily    No results found for: DD      2.Acute hypoxic resp failure  -severe covid  -failed NC, BiPAP now getting intubated  -Vent settings per ER MD and tele ICU-- consult appreciated  -check ABG  -picc     3.Possible PNA  -check procal  -check blood cx  -cover Zosyn iv    4.Severe hypernatremia--severe hypovolemia  - on admit, has had NS boluses  -extreme dehydration, not taking much po days  -need to correct NA slowly  -Will check NA q 4 hours  -Renal consult  -IVF D5 W now 75 ml/hour, check NA q 4 hours, and goal over next 24 hours is 150  -renal appreciated and call renal if NA tonight gets <150    5.High dimer>20  -will try to get doppler legs  -CTA ordered but resp issues now  -Lovenox for now 0.5 mg /kg q 12 hours    6.GI prevent- pepcid    7.severe malnutrition  -suspect prior to covid    8.long time smoker  -just quit months ago  -suspect copd    9.Code-I called son and had long discussion with him on his likely poor prognosis  -son wants vent, pressors, icu cares, but No CPR    I once again told son very poor prognosis -talked to him on the phone tonight--son Gregorio lives with him    I greatly appreciate the coordination of care with the multiple ER MD's, tele ICU MD, renal MD, and nursing staff      Addendum at 2221  -After intubation, he had Afib RVR--got metoprolol by ER MD  -Will order echo  -I called son again at 2220 to update about him being vent now , afib, and still poor prognosis--Gregorio did thank  me for the update      Please not that I admitted Mr. Varela with ICU orders, ordered pressors , coordinated with tele ICU, renal, ER MD's, as well as RN in ER. I updated son multiple times. The total time to care for  was 90 minutes       Disposition Plan   Expected discharge: > 7 days, recommended to unclear once off vent and stable O2.       The patient's care was discussed with the Bedside Nurse, Patient, Patient's Family and tele ICU MD, renal Consultant.    Jesi Brush MD  [facility]  Securely message with the Vocera Web Console (learn more here)  Text page via [facility] Paging/Directory    ______________________________________________________________________    Chief Complaint   sob    Info--I had to call son to get info    History of Present Illness     Ezequiel Randhawa is a 81 year old male with suspected copd, long time smoker quit months ago only, who was admitted to [facility] on 12/20/2021 for evaluation of  cough and dyspnea and found to be COVID-19 positive. He decompensated quickly in ER and went from 12 liters -->BiPAP -->intubation in ER MD getting ready    I did see the patient in the ER and the ER team had to place him on BiPAP. He was awake but did not speak many words  I called son for hx    Per son, Gregorio who lives with him, he started with cough and chills a week ago. He is unvaccinated.Son too had symptoms but son did not get tested.  Tobi did not eat as much or drink as much    Per son Tobi stopped smoking a few months ago  Per son Tobi does not take meds on a regular bases              Review of Systems    Review of systems not obtained due to patient factors - resp failure , BiPAP and ER getting ready for intubation    Past Medical History    Smoker many years    Past Surgical History   S/p Foot surgery  S/p eye surgery    Social History   I have reviewed this patient's social history and updated it with pertinent information  if needed.  Social History     Tobacco Use     Smoking status: None     Smokeless tobacco: None   Substance Use Topics     Alcohol use: None     Drug use: None   stopped smoking months ago, did smoke for years    Family History   Son cad  Child  pancreatic cancer    Prior to Admission Medications   None     Allergies   No Known Allergies    Physical Exam   Vital Signs: Temp: 98.7  F (37.1  C) Temp src: Axillary BP: 126/68 Pulse: 99   Resp: (!) 37 SpO2: 94 % O2 Device: Oxymask Oxygen Delivery: 12 LPM  Weight: 130 lbs 0 oz    Constitutional: severe distress, appears older than stated age, toxic and cyanotic  Eyes: sclera clear  ENT: extreme temporal wasting, normocepalic, without obvious abnormality  Respiratory: tachypneic, Severe respiratory distress, decreased air exchange, Moderate retraction and diminished breath sounds throughout lungs  Cardiovascular: Normal apical impulse, regular rate and rhythm, normal S1 and S2, no S3 or S4, and no murmur noted  GI: hypoactive bowel sounds, soft, non-distended and non-tender  Skin: no bruising or bleeding  Musculoskeletal: no lower extremity pitting edema present  Neurologic: Mental Status Exam:  Level of Alertness:   somnolent  Neuropsychiatric: General: poor eye contact    Data   Data reviewed today: I reviewed all medications, new labs and imaging results over the last 24 hours. I personally reviewed no images or EKG's today.    Results for orders placed or performed during the hospital encounter of 21   XR Chest Port 1 View     Status: None    Narrative    EXAM: XR CHEST PORT 1 VIEW  LOCATION: [facility]  DATE/TIME: 2021 5:10 PM    INDICATION: Shortness of breath  COMPARISON: None.      Impression    IMPRESSION:     The cardiac silhouette is normal in size. Vascular pedicle width is normal.    There is hazy airspace opacity involving multiple segments of the left lung at and inferior to the left hilum. The right lung is  well-expanded and clear. Overall the distribution is suggestive of infection/inflammation. Follow-up chest radiographs in 4-6   weeks are suggested for reassessment.    No pleural effusion or pneumothorax is present.    NOTE: ABNORMAL REPORT    THE DICTATION ABOVE DESCRIBES AN ABNORMALITY FOR WHICH FOLLOW-UP IS NEEDED.    INR     Status: Abnormal   Result Value Ref Range    INR 1.41 (H) 0.85 - 1.15   Comprehensive metabolic panel     Status: Abnormal   Result Value Ref Range    Sodium 158 (HH) 136 - 145 mmol/L    Potassium 3.1 (L) 3.5 - 5.0 mmol/L    Chloride 115 (H) 98 - 107 mmol/L    Carbon Dioxide (CO2) 26 22 - 31 mmol/L    Anion Gap 17 5 - 18 mmol/L    Urea Nitrogen 65 (H) 8 - 28 mg/dL    Creatinine 1.18 0.70 - 1.30 mg/dL    Calcium 9.3 8.5 - 10.5 mg/dL    Glucose 145 (H) 70 - 125 mg/dL    Alkaline Phosphatase 100 45 - 120 U/L    AST 25 0 - 40 U/L    ALT 18 0 - 45 U/L    Protein Total 7.3 6.0 - 8.0 g/dL    Albumin 3.1 (L) 3.5 - 5.0 g/dL    Bilirubin Total 2.3 (H) 0.0 - 1.0 mg/dL    GFR Estimate 58 (L) >60 mL/min/1.73m2   Lactic acid whole blood     Status: Abnormal   Result Value Ref Range    Lactic Acid 2.7 (H) 0.7 - 2.0 mmol/L   Troponin I     Status: Normal   Result Value Ref Range    Troponin I 0.02 0.00 - 0.29 ng/mL   Magnesium     Status: Abnormal   Result Value Ref Range    Magnesium 3.0 (H) 1.8 - 2.6 mg/dL   B-Type Natriuretic Peptide ([facility] Only)     Status: Normal   Result Value Ref Range    BNP 29 0 - 84 pg/mL   Symptomatic; Yes; 12/17/2021 COVID-19 Virus (Coronavirus) by PCR Nasopharyngeal     Status: Abnormal    Specimen: Nasopharyngeal; Swab   Result Value Ref Range    SARS CoV2 PCR Positive (A) Negative    Narrative    Testing was performed using the rosy  SARS-CoV-2 & Influenza A/B Assay on the rosy  Tati  System.  This test should be ordered for the detection of SARS-COV-2 in individuals who meet SARS-CoV-2 clinical and/or epidemiological criteria. Test performance is unknown in asymptomatic  patients.  This test is for in vitro diagnostic use under the FDA EUA for laboratories certified under CLIA to perform moderate and/or high complexity testing. This test has not been FDA cleared or approved.  A negative test does not rule out the presence of PCR inhibitors in the specimen or target RNA in concentration below the limit of detection for the assay. The possibility of a false negative should be considered if the patient's recent exposure or clinical presentation suggests COVID-19.  [facility] are certified under the Clinical Laboratory Improvement Amendments of 1988 (CLIA-88) as qualified to perform moderate and/or high complexity laboratory testing.   CBC with platelets and differential     Status: Abnormal   Result Value Ref Range    WBC Count 15.9 (H) 4.0 - 11.0 10e3/uL    RBC Count 5.07 4.40 - 5.90 10e6/uL    Hemoglobin 15.9 13.3 - 17.7 g/dL    Hematocrit 47.6 40.0 - 53.0 %    MCV 94 78 - 100 fL    MCH 31.4 26.5 - 33.0 pg    MCHC 33.4 31.5 - 36.5 g/dL    RDW 12.8 10.0 - 15.0 %    Platelet Count 252 150 - 450 10e3/uL    % Neutrophils 89 %    % Lymphocytes 3 %    % Monocytes 7 %    % Eosinophils 0 %    % Basophils 0 %    % Immature Granulocytes 1 %    NRBCs per 100 WBC 0 <1 /100    Absolute Neutrophils 14.3 (H) 1.6 - 8.3 10e3/uL    Absolute Lymphocytes 0.5 (L) 0.8 - 5.3 10e3/uL    Absolute Monocytes 1.0 0.0 - 1.3 10e3/uL    Absolute Eosinophils 0.0 0.0 - 0.7 10e3/uL    Absolute Basophils 0.1 0.0 - 0.2 10e3/uL    Absolute Immature Granulocytes 0.2 <=0.4 10e3/uL    Absolute NRBCs 0.0 10e3/uL   RBC and Platelet Morphology     Status: None   Result Value Ref Range    Platelet Assessment  Automated Count Confirmed. Platelet morphology is normal.     Automated Count Confirmed. Platelet morphology is normal.    RBC Morphology Confirmed RBC Indices    D dimer quantitative     Status: Abnormal   Result Value Ref Range    D-Dimer Quantitative >20.00 (HH) 0.00 - 0.50 ug/mL FEU    Narrative     "This D-dimer assay is intended for use in conjunction with a clinical pretest probability assessment model to exclude pulmonary embolism (PE) and deep venous thrombosis (DVT) in outpatients suspected of PE or DVT. The cut-off value is 0.50 ug/mL FEU.   CRP inflammation     Status: Abnormal   Result Value Ref Range    CRP 17.9 (H) 0.0-<0.8 mg/dL   Social Work/ Care Management IP Consult     Status: None ()    Narrative    Bibi Ramires RN     12/20/2021  7:58 PM  Care Management Initial Consult    General Information  Assessment completed with: Children, Gregorio son via phone  Type of CM/SW Visit: Initial Assessment    Primary Care Provider verified and updated as needed: Yes   Readmission within the last 30 days: no previous admission in   last 30 days      Reason for Consult: discharge planning  Advance Care Planning: Advance Care Planning Reviewed: other   (comment) (\"does not have one\")          Communication Assessment  Patient's communication style: spoken language (English or   Bilingual)             Cognitive  Cognitive/Neuro/Behavioral: WDL                      Living Environment:   People in home: child(ar), adult (son)  Gregorio  Current living Arrangements: house (\"a few steps to get inside   and then 1 level house inside\")      Able to return to prior arrangements: yes       Family/Social Support:  Care provided by: self  Provides care for: no one     Children (\"son\")          Description of Support System: Supportive,Involved    Support Assessment: Adequate family and caregiver   support,Adequate social supports,Patient communicates needs well   met    Current Resources:   Patient receiving home care services: No     Community Resources: None  Equipment currently used at home: walker, rolling  Supplies currently used at home:  (\"no home oximeter\")    Employment/Financial:  Employment Status: retired     Employment/ Comments: \"no  benefits\"  Financial Concerns:     Referral to Financial " "Counselor: No       Lifestyle & Psychosocial Needs:  Social Determinants of Health     Tobacco Use: Not on file   Alcohol Use: Not on file   Financial Resource Strain: Not on file   Food Insecurity: Not on file   Transportation Needs: Not on file   Physical Activity: Not on file   Stress: Not on file   Social Connections: Not on file   Intimate Partner Violence: Not on file   Depression: Not on file   Housing Stability: Not on file       Functional Status:  Prior to admission patient needed assistance:   Dependent ADLs:: Ambulation-walker  Dependent IADLs:: Independent  Assesssment of Functional Status: Not at baseline with ADL   Functioning,Not at baseline with mobility,Not at  functional   baseline (\"because of shortness of breath\")    Mental Health Status:          Chemical Dependency Status:                Values/Beliefs:  Spiritual, Cultural Beliefs, Jain Practices, Values that   affect care:                 Additional Information:  Ezequiel lives in a house with his adult son Gregorio. He is   independent with ADLs at baseline and still drives.    Unknown discharge needs at this time. If home at discharge he may   need an oximeter and oxygen.    Son to transport at discharge.    Son states, \"I have also been sick, but I am starting to do   better\".    Gregorio son [phone].    Bibi Ramires RN       CBC with platelets differential     Status: Abnormal    Narrative    The following orders were created for panel order CBC with platelets differential.  Procedure                               Abnormality         Status                     ---------                               -----------         ------                     CBC with platelets and d...[[id number]]  Abnormal            Final result               RBC and Platelet Morphology[[id number]]                      Final result                 Please view results for these tests on the individual orders.   CBC with Platelets & Differential *Canceled*     " Status: None ()    Narrative    The following orders were created for panel order CBC with Platelets & Differential.  Procedure                               Abnormality         Status                     ---------                               -----------         ------                       Please view results for these tests on the individual orders.     XR Chest Port 1 View    Result Date: 12/20/2021  EXAM: XR CHEST PORT 1 VIEW LOCATION: [facility] DATE/TIME: 12/20/2021 5:10 PM INDICATION: Shortness of breath COMPARISON: None.     IMPRESSION: The cardiac silhouette is normal in size. Vascular pedicle width is normal. There is hazy airspace opacity involving multiple segments of the left lung at and inferior to the left hilum. The right lung is well-expanded and clear. Overall the distribution is suggestive of infection/inflammation. Follow-up chest radiographs in 4-6  weeks are suggested for reassessment. No pleural effusion or pneumothorax is present. NOTE: ABNORMAL REPORT THE DICTATION ABOVE DESCRIBES AN ABNORMALITY FOR WHICH FOLLOW-UP IS NEEDED.

## 2021-12-21 NOTE — ED PROVIDER NOTES
"ED SIGNOUT  Date/Time:12/20/2021 9:57 PM    Patient signed out to me by my colleague, Dr. Caraballo.  Please see their note for complete history and physical. Patient is an ICU boarder in the ED. Plan to follow up on labs and imaging.      The creation of this record is based on the scribe s observations of the work being performed by Mo Marcano and the provider s statements to them. It was created on their behalf by Bianca Toney, a trained medical scribe. This document has been checked and approved by the attending provider.      REMAINING ED WORKUP:    Vitals:  /59   Pulse 101   Temp 98.7  F (37.1  C) (Axillary)   Resp 18   Ht 1.88 m (6' 2\")   Wt 59 kg (130 lb)   SpO2 99%   BMI 16.69 kg/m      Pertinent labs results reviewed   Results for orders placed or performed during the hospital encounter of 12/20/21   XR Chest Port 1 View    Impression    IMPRESSION:     The cardiac silhouette is normal in size. Vascular pedicle width is normal.    There is hazy airspace opacity involving multiple segments of the left lung at and inferior to the left hilum. The right lung is well-expanded and clear. Overall the distribution is suggestive of infection/inflammation. Follow-up chest radiographs in 4-6   weeks are suggested for reassessment.    No pleural effusion or pneumothorax is present.    NOTE: ABNORMAL REPORT    THE DICTATION ABOVE DESCRIBES AN ABNORMALITY FOR WHICH FOLLOW-UP IS NEEDED.    CT Chest Pulmonary Embolism w Contrast   Result Value Ref Range    Radiologist flags Pulmonary embolism (AA)     Impression    IMPRESSION:  1.  Acute segmental and subsegmental pulmonary emboli in the bilateral lower lobes and left upper lobe. No evidence of right heart strain.    2.  Consolidation and groundglass opacities in the bilateral lower lobes and groundglass opacities in the lingula, with associated bronchiectasis and multifocal mucoid impaction, suspicious for aspiration pneumonia.    3.  Moderate " emphysema.      [Critical Result: Pulmonary embolism]    Finding was identified on 12/21/2021 12:18 AM.     Dr. Marcano was contacted by me on 12/21/2021 12:27 AM and verbalized understanding of the critical result.    Result Value Ref Range    INR 1.41 (H) 0.85 - 1.15   Comprehensive metabolic panel   Result Value Ref Range    Sodium 158 (HH) 136 - 145 mmol/L    Potassium 3.1 (L) 3.5 - 5.0 mmol/L    Chloride 115 (H) 98 - 107 mmol/L    Carbon Dioxide (CO2) 26 22 - 31 mmol/L    Anion Gap 17 5 - 18 mmol/L    Urea Nitrogen 65 (H) 8 - 28 mg/dL    Creatinine 1.18 0.70 - 1.30 mg/dL    Calcium 9.3 8.5 - 10.5 mg/dL    Glucose 145 (H) 70 - 125 mg/dL    Alkaline Phosphatase 100 45 - 120 U/L    AST 25 0 - 40 U/L    ALT 18 0 - 45 U/L    Protein Total 7.3 6.0 - 8.0 g/dL    Albumin 3.1 (L) 3.5 - 5.0 g/dL    Bilirubin Total 2.3 (H) 0.0 - 1.0 mg/dL    GFR Estimate 58 (L) >60 mL/min/1.73m2   Lactic acid whole blood   Result Value Ref Range    Lactic Acid 2.7 (H) 0.7 - 2.0 mmol/L   Result Value Ref Range    Troponin I 0.02 0.00 - 0.29 ng/mL   Result Value Ref Range    Magnesium 3.0 (H) 1.8 - 2.6 mg/dL   B-Type Natriuretic Peptide ([facility] Only)   Result Value Ref Range    BNP 29 0 - 84 pg/mL   Symptomatic; Yes; 12/17/2021 COVID-19 Virus (Coronavirus) by PCR Nasopharyngeal    Specimen: Nasopharyngeal; Swab   Result Value Ref Range    SARS CoV2 PCR Positive (A) Negative   CBC with platelets and differential   Result Value Ref Range    WBC Count 15.9 (H) 4.0 - 11.0 10e3/uL    RBC Count 5.07 4.40 - 5.90 10e6/uL    Hemoglobin 15.9 13.3 - 17.7 g/dL    Hematocrit 47.6 40.0 - 53.0 %    MCV 94 78 - 100 fL    MCH 31.4 26.5 - 33.0 pg    MCHC 33.4 31.5 - 36.5 g/dL    RDW 12.8 10.0 - 15.0 %    Platelet Count 252 150 - 450 10e3/uL    % Neutrophils 89 %    % Lymphocytes 3 %    % Monocytes 7 %    % Eosinophils 0 %    % Basophils 0 %    % Immature Granulocytes 1 %    NRBCs per 100 WBC 0 <1 /100    Absolute Neutrophils 14.3 (H) 1.6 - 8.3 10e3/uL     Absolute Lymphocytes 0.5 (L) 0.8 - 5.3 10e3/uL    Absolute Monocytes 1.0 0.0 - 1.3 10e3/uL    Absolute Eosinophils 0.0 0.0 - 0.7 10e3/uL    Absolute Basophils 0.1 0.0 - 0.2 10e3/uL    Absolute Immature Granulocytes 0.2 <=0.4 10e3/uL    Absolute NRBCs 0.0 10e3/uL   RBC and Platelet Morphology   Result Value Ref Range    Platelet Assessment  Automated Count Confirmed. Platelet morphology is normal.     Automated Count Confirmed. Platelet morphology is normal.    RBC Morphology Confirmed RBC Indices    D dimer quantitative   Result Value Ref Range    D-Dimer Quantitative >20.00 (HH) 0.00 - 0.50 ug/mL FEU   Result Value Ref Range    Procalcitonin 0.10 0.00 - 0.49 ng/mL   CRP inflammation   Result Value Ref Range    CRP 17.9 (H) 0.0-<0.8 mg/dL   Comprehensive metabolic panel   Result Value Ref Range    Sodium 160 (HH) 136 - 145 mmol/L    Potassium 2.9 (L) 3.5 - 5.0 mmol/L    Chloride 123 (H) 98 - 107 mmol/L    Carbon Dioxide (CO2) 25 22 - 31 mmol/L    Anion Gap 12 5 - 18 mmol/L    Urea Nitrogen 56 (H) 8 - 28 mg/dL    Creatinine 0.92 0.70 - 1.30 mg/dL    Calcium 7.3 (L) 8.5 - 10.5 mg/dL    Glucose 148 (H) 70 - 125 mg/dL    Alkaline Phosphatase 73 45 - 120 U/L    AST 18 0 - 40 U/L    ALT 12 0 - 45 U/L    Protein Total 5.2 (L) 6.0 - 8.0 g/dL    Albumin 2.3 (L) 3.5 - 5.0 g/dL    Bilirubin Total 1.9 (H) 0.0 - 1.0 mg/dL    GFR Estimate 79 >60 mL/min/1.73m2   Result Value Ref Range    Lactate Dehydrogenase 326 (H) 125 - 220 U/L   Result Value Ref Range    Troponin I 0.02 0.00 - 0.29 ng/mL   Result Value Ref Range    INR 1.64 (H) 0.85 - 1.15   Partial thromboplastin time   Result Value Ref Range    aPTT 35 22 - 38 Seconds   Fibrinogen activity   Result Value Ref Range    Fibrinogen Activity 330 170 - 490 mg/dL   D dimer quantitative   Result Value Ref Range    D-Dimer Quantitative >20.00 (HH) 0.00 - 0.50 ug/mL FEU   CRP inflammation   Result Value Ref Range    CRP 11.4 (H) 0.0-<0.8 mg/dL   Result Value Ref Range    Magnesium 2.5  1.8 - 2.6 mg/dL   Blood gas arterial   Result Value Ref Range    pH Arterial 7.39 7.37 - 7.44    pCO2 Arterial 44 35 - 45 mm Hg    pO2 Arterial 295 (H) 75 - 85 mm Hg    Bicarbonate Arterial      O2 Sat, Arterial >100.0 (H) 95.0 - 96.0 %    Oxyhemoglobin >98.5 (H) 95.0 - 96.0 %    Base Excess/Deficit (+/-) 1.3   mmol/L    Sample Stabilized Temperature 37.0 degrees C   Result Value Ref Range    Potassium 3.1 (L) 3.5 - 5.0 mmol/L   Result Value Ref Range    aPTT 38 22 - 38 Seconds   CBC with platelets   Result Value Ref Range    WBC Count 10.8 4.0 - 11.0 10e3/uL    RBC Count 3.91 (L) 4.40 - 5.90 10e6/uL    Hemoglobin 12.3 (L) 13.3 - 17.7 g/dL    Hematocrit 38.3 (L) 40.0 - 53.0 %    MCV 98 78 - 100 fL    MCH 31.5 26.5 - 33.0 pg    MCHC 32.1 31.5 - 36.5 g/dL    RDW 13.1 10.0 - 15.0 %    Platelet Count 149 (L) 150 - 450 10e3/uL   Result Value Ref Range    Sodium 154 (HH) 136 - 145 mmol/L   ABO and Rh   Result Value Ref Range    ABO/RH(D) A POS     SPECIMEN EXPIRATION DATE [id number]    ABO and Rh   Result Value Ref Range    ABO/RH(D) A POS     SPECIMEN EXPIRATION DATE [id number]        Pertinent imaging reviewed   Please see official radiology report.  CT Chest Pulmonary Embolism w Contrast   Final Result   Abnormal   IMPRESSION:   1.  Acute segmental and subsegmental pulmonary emboli in the bilateral lower lobes and left upper lobe. No evidence of right heart strain.      2.  Consolidation and groundglass opacities in the bilateral lower lobes and groundglass opacities in the lingula, with associated bronchiectasis and multifocal mucoid impaction, suspicious for aspiration pneumonia.      3.  Moderate emphysema.         [Critical Result: Pulmonary embolism]      Finding was identified on 12/21/2021 12:18 AM.       Dr. Marcano was contacted by me on 12/21/2021 12:27 AM and verbalized understanding of the critical result.       XR Chest Port 1 View   Final Result   IMPRESSION:       The cardiac silhouette is normal in  size. Vascular pedicle width is normal.      There is hazy airspace opacity involving multiple segments of the left lung at and inferior to the left hilum. The right lung is well-expanded and clear. Overall the distribution is suggestive of infection/inflammation. Follow-up chest radiographs in 4-6    weeks are suggested for reassessment.      No pleural effusion or pneumothorax is present.      NOTE: ABNORMAL REPORT      THE DICTATION ABOVE DESCRIBES AN ABNORMALITY FOR WHICH FOLLOW-UP IS NEEDED.       US Lower Extremity Venous Duplex Bilateral    (Results Pending)   Echocardiogram Complete    (Results Pending)        Interventions  Medications   remdesivir 100 mg in sodium chloride 0.9 % 250 mL intermittent infusion (has no administration in time range)     And   0.9% sodium chloride BOLUS (has no administration in time range)   lidocaine 1 % 0.1-5 mL (2 mLs Other Given 12/20/21 2045)   lidocaine (LMX4) cream (has no administration in time range)   sodium chloride (PF) 0.9% PF flush 10-40 mL (has no administration in time range)   dexamethasone (DECADRON) injection 6 mg (has no administration in time range)   albuterol (PROVENTIL HFA/VENTOLIN HFA) inhaler (0 puffs Inhalation Hold 12/20/21 2118)   norepinephrine (LEVOPHED) 4 mg in  mL infusion PREMIX (0 mcg/kg/min × 59 kg Intravenous Hold 12/21/21 0327)   enoxaparin ANTICOAGULANT (LOVENOX) injection 30 mg (30 mg Subcutaneous Given 12/20/21 2139)   famotidine (PEPCID) injection 20 mg (20 mg Intravenous Given 12/20/21 2109)   fentaNYL (SUBLIMAZE) infusion (150 mcg/hr Intravenous Rate/Dose Verify 12/21/21 0344)   midazolam (VERSED) injection 1 mg (1 mg Intravenous Given 12/20/21 2138)     Followed by   midazolam (VERSED) injection 0.5 mg (0.5 mg Intravenous Given 12/21/21 0130)   dextrose 5% infusion ( Intravenous Rate/Dose Verify 12/21/21 0411)   piperacillin-tazobactam (ZOSYN) 3.375 g vial to attach to  mL bag (0 g Intravenous Stopped 12/20/21 2216)      Followed by   piperacillin-tazobactam (ZOSYN) 3.375 g vial to attach to  mL bag (has no administration in time range)   sodium chloride (PF) 0.9% PF flush 10-20 mL (has no administration in time range)   sodium chloride (PF) 0.9% PF flush 10-40 mL (0 mLs Intracatheter Hold 12/20/21 2217)   sodium chloride (PF) 0.9% PF flush 10-40 mL (has no administration in time range)   pantoprazole (PROTONIX) 2 mg/mL suspension 40 mg (has no administration in time range)     Or   pantoprazole (PROTONIX) IV push injection 40 mg (has no administration in time range)   chlorhexidine (PERIDEX) 0.12 % solution 15 mL (15 mLs Mouth/Throat Given 12/21/21 0015)   propofol (DIPRIVAN) infusion (0 mcg/kg/min × 59 kg Intravenous Hold 12/21/21 0328)   heparin infusion 25,000 units in D5W 250 mL ANTICOAGULANT (1,050 Units/hr Intravenous Rate/Dose Verify 12/21/21 0344)   diltiazem (CARDIZEM) 125 mg in sodium chloride 0.9 % 125 mL infusion (10 mg/hr Intravenous Rate/Dose Change 12/21/21 0411)   0.9% sodium chloride BOLUS (0 mLs Intravenous Stopped 12/20/21 2048)   0.9% sodium chloride BOLUS (0 mLs Intravenous Stopped 12/20/21 2119)   dexamethasone PF (DECADRON) injection 6 mg (6 mg Intravenous Given 12/20/21 2108)   remdesivir 200 mg in sodium chloride 0.9 % 250 mL intermittent infusion (0 mg Intravenous Stopped 12/20/21 2215)     Followed by   0.9% sodium chloride BOLUS (0 mLs Intravenous Stopped 12/20/21 2328)   tocilizumab (ACTEMRA) 472 mg in sodium chloride 0.9 % 123.6 mL infusion (0 mg Intravenous Stopped 12/20/21 2329)   fentaNYL (PF) (SUBLIMAZE) injection 100 mcg (100 mcg Intravenous Given 12/20/21 2045)   midazolam (VERSED) injection 5 mg (5 mg Intravenous Given 12/20/21 2046)   metoprolol (LOPRESSOR) injection 5 mg (5 mg Intravenous Given 12/20/21 2108)   potassium chloride 10 mEq in 100 mL sterile water intermittent infusion (premix) (0 mEq Intravenous Stopped 12/21/21 0038)   etomidate (AMIDATE) injection 30 mg (30 mg Intravenous  Given 12/20/21 2016)   succinylcholine (ANECTINE) injection 120 mg (120 mg Intravenous Given 12/20/21 2017)   iopamidol (ISOVUE-370) solution 100 mL (100 mLs Intravenous Given 12/21/21 0004)   heparin ANTICOAGULANT Loading dose for HIGH INTENSITY TREATMENT * Give BEFORE starting heparin infusion (4,700 Units Intravenous Given 12/21/21 0044)   potassium chloride 10 mEq in 100 mL sterile water intermittent infusion (premix) (0 mEq Intravenous Stopped 12/21/21 0434)        ED Course/MDM:  9:57 PM Signout accepted from Dr. Caraballo.  Prior records were reviewed.  Diagnostics from this visit are reviewed.  10:58 PM I checked on the patient.   11:02 PM Discussed labs with nursing. Will keep weaning vent as possible.   12:27 AM Radiologist called to report CTA results.   12:32 AM I spoke with hospitalist, Dr. Cano. They would like the patient on a heparin drip for his pulmonary emboli.   3:30 AM patient's repeat sodium down to 154 and improving with the D5W.  Additionally potassium improved but still low at 3.1 will continue repletion with another 10 mEq of potassium IV.  Patient is well still in atrial fibrillation and heart rate in the 130s to 140s but blood pressure stable we will start diltiazem drip to better control heart rate.  5:00 AM Patient signed out to overnight MD, Dr. He for ultimate disposition once ICU bed is found.      Patient intubated and ventilated for Covid pneumonia.  Patient also having severe hypernatremia and concern for aspiration pneumonia.  Zosyn was given and pending CT scan for elevated D-dimer and concern for pulmonary embolism.  Patient has been seen by hospitalist service and ICU order started along with help from teleintensivist.  Plan for ICU admission however at this time is boarding in the ER and SOC looking for an ICU bed.  At this time we will continue care in the ER and continue D5W for hypernatremia and recheck every 4 hours.  As well potassium will be rechecked and plan for  repletion as needed.  Follow-up CTA revealed multiple small pulmonary embolism along with extensive Covid pneumonia.  Possible aspiration as well in the lower quadrants and patient already on broad-spectrum antibiotics.  Will start patient on heparin after discussion with hospitalist and continue ICU care here in the ER.  We will plan for recheck of sodium and potassium as well patient started on diltiazem drip for better rate control of atrial fibrillation.  With diltiazem drip heart rate improved.  Blood pressure was maintained and will continue monitoring for repeat labs of sodium and potassium in 4 hours and possible discontinuation of D5W if sodium is under 150.  As well further potassium repletion may be needed.  Patient was signed out pending placement and continued management while boarding in the ER.  ED Course as of 12/21/21 0451   Mon Dec 20, 2021   1711 Patient is a 81-year-old male who comes in today with low oxygen saturations and body aches and chills.  He had a poor appetite for a few days and and looks very dry.  He denies any heart or lung problems but because of his difficulty breathing he was difficult to understand and get a good history from him.  He is requiring 12 L on oxygen mask.  He is not caught up on his Covid vaccine.  He has a son at home with similar symptoms.  I am highly suspicious for Covid pneumonia.  He is tachycardic.  He will need some fluids.  Will need blood work and a chest x-ray and a Covid swab and likely admission to the hospital.  I discussed this with him and he is in agreement with the plan.   1915 Patient came back Covid positive so I ordered dexamethasone for him.  I think he is very dehydrated.   1916 Patient's chest x-ray shows pneumonia on the left side.  Certainly this would fit with his picture.  I'm going to go ahead and order CTA imaging to further evaluate and will work on getting him admitted to the hospital.  Lactic acid came back elevated at 2.7.  This  seems mostly like Covid with dehydration.  We can recheck the lactate after some fluids and see if it looks better.   1940 I spoke with Dr. Brush with the hospitalist service.  She was in agreement with admission to the hospital.  She want the patient put on ICU status so I have ordered that.  We do not have an ICU bed so he will board down in the emergency department.   1953 I was called into the patient's room.  He is the satting and now is on BiPAP.  He is likely going to require intubation.  We will need to have a PICC line placed.   2031 Patient ended up requiring intubation.  He was intubated with a 7-1/2 tube and went very smoothly.  He will have a PICC line placed.  He is not needing pressors at this time.  Dr. Brush was able to speak to family and he is a no CPR but otherwise would want resuscitation and pressors and intubation.   2032 Patient's D-dimer is greater than 20.  I am suspecting this is related to the Covid process.  Other labs have been ordered including a fibrinogen.   2040 Patient will be placed on a fentanyl and Versed drip.   2056 Patient flipped into atrial fibrillation with RVR.  I have ordered some metoprolol for him.  He is on a fentanyl and Versed drip.       1. Pneumonia due to 2019 novel coronavirus    2. Hypernatremia    3. Acute respiratory failure with hypoxia (H)    4. Atrial fibrillation with RVR (H)      Dr. Mo Marcano  [facility] Emergency Department          Mo Marcano MD  12/21/21 1144

## 2021-12-21 NOTE — ED NOTES
Pt remains sedated with Fentanyl gtt currently at 150mcg/hr. Vent setting managed by RT. O2 sats %  Have not had to initiate Propofol gtt yet. SBP stable, Levophed gtt also on hold. Pt had been in Afib RVR, rates sustaining 130's. MD updated. Initiated Diltiazem gtt, currently at 10mg/hr with HR now 80-90's. Heparin bolus and gtt started for PE, gtt currently at 1050 units/hr.  D5 infusing at 75ml/hr. Na serums every 4 Hrs. K replaced x2.

## 2021-12-21 NOTE — PROGRESS NOTES
A/P    81 y.o. male with limited PMHx available in Epic.  H/O tobacco abuse.  Patient lives with son.  Presented to [facility] ED on 12/20/21 via EMS due to cough, SOB, chills, myalgias and poor oral intake for 1 week.  Patient noted to have O2 sat of 81% at home.  Upon arrival in ED was on a non-rebreather at 12lpm and sPO2 96%.  Decompensated quickly requiring BiPAP followed by intubation and mechanical ventilation. Active issues:    NEURO:  -sedated and ventilated  -Fentanyl gtt 150mcg/hr. Propofol gtt 10mcg/kg/min.    PULM:  1. Acute hypoxic respiratory failure due to COVID-19 viral pneumonia:  Unvaccinated.  Sx onset ~ 1 week ago.  PCR positive 12/20/21.  CT Chest (12/20) showed BLL & EVIN acute segmental/subsegmental PE, consolidation and GGO BLL, lingula, bronchiectasis and multifocal mucoid impaction, moderate emphysema.  CRP 17.9->11.4-->10.2. D-Dimer >20 x3. BC x2 (12/20) NGTD.  -Intubated 12/20/21.  Current vent settings VC-AC FiO2 40%--LK20-bisc 5.   --goal sPO2 88-96%  -s/p Tocilizumab x1  -Dexamethasone 6mg IV every day  -Remdesivir  -special precautions  -albuterol MDI  -check portable CXR to verify ETT/OGT placement    2. BLL & EVIN acute segmental/subsegmental PE:  Hemodynamically stable.  -TTE mild decrease RV systolic function w/ normal RV size.  -Venous duplex US ordered and not yet performed  -Heparin high intensity continuous infusion per protocol.  -supplemental O2  -discontinue Lovenox 30mg subcu q12 while on therapeutic IV heparin continuous infusion    3. Possible acute aspiration pneumonia:  CT chest showed consolidation and GGO BLL, lingula, with associated  bronchiectasis and multifocal mucoid impaction suggestive of aspiration.  WBC 15.9->10.8->11.5.  Procalcitonin normal.   -Empiric Zosyn  -HOB 30 degrees    4. Radiographic moderate emphysema, long-standing tobacco abuse: no formal PFT's to confirm COPD.  No current active bronchospasm  -on IV Dexamethasone  -albuterol  MDI    CV:  1. Atrial fibrillation of unkown duration:  Controlled ventricular rate.  Hemodynamically stable.  TTE (12/20/21) showed EF 35-40%, normal LV filling pressures, moderate global hypokinesis LV, normal atrial size, no significant valve disease  -discontinue Diltiazem continuous infusion in the setting of LV dysfunction  -may need slight higher heart rate to compensate for current illness and maintain C.O.  -IV Metoprolol 5mg q6h prn for now  -IV Heparin continuous infusion as per Pulm section.    2.  LV dysfunction:  Hypovolemic on presentation.  Troponin x 2 negative.  TTE (12/20/21) showed EF 35-40%, normal LV filling pressures, moderate global hypokinesis LV, normal atrial size, no significant valve disease.  BNP normal.  TTE showed IVC diameter >2.1cm collapsing <50% with sniff suggestive of RAP ~15mmHg.    -monitor volume status very closely.  Received NS 1L x 2 boluses by ED provider for Na of 160 on admission down to 150 this morning. Currently, receiving D5W at 50ml/hr x 4hrs.      RENAL:  1.  LORRAINE:  2/2 pre-renal/hypovolemia etiology. BUN/Cr on admission 65/1.18.  Improved to 43/0.79, respectively.  -maintain burris for strict I/O    2. Acute severe hypernatremia: 2/2 volume depletion on admission.  Na 160 on admission-->150 this morning.  S/p 2L NS IV bolus, and currently on D5W at 50ml/hr x4hrs (slow bolus given LV dysfunction and ~RAP of 15mmHg).    -serial sodium  -strict I/O    3.  Acute hypokalemia.  Mg 3.  -monitor and replace per protocol.    GI:  -PPI IV every day  -OGT.  XRAY pending for verification of correct placement prior to use.  -NPO    HEME:    1.  BLL & EVIN acute segmental/subsegmental PE:  -Heparin infusion     2. Normocytic anemia, mild acute thrombocytopenia, Hyperbilirubinemia:  No evidence of ABL anemia  -check hemolysis labs  -cbc daily    3. Leukocytosis: improving.  Reactive to acute infection and steroids.  -cbc daily    ID:  1. Acute hypoxic respiratory failure due to  COVID-19 viral pneumonia:  -refer to pulm section  -BC's x2 (12/20) NGTD    2. Possible aspiration pneumonia:  -zosyn  -refer to pulm section    ENDO:  1. Hyperglycemia: 2/2 acute illness and steroids. A1C 5.5%  -currently on Insulin gtt at 3.5ml/hr per ICU protocol.  -accuchecks per ICU protocol    LINES:  -Right triple lumen power PICC (12/20)  -Left PIV (12/20)  -ETT, OGT (12/20)  -Marina (12/20)    Drips:  -Fentanyl 150mcg/hr  -Propofol 10mcg/kg/min, 3.5ml/h  -Insulin gtt 3.5ml/h  -Heparin infusion high intensity per protocol -currently at 850ml/hr  -D5W 50ml/hr x 4 hrs    Code status: Pre-arrest intubation OK. No CPR.  -recommend palliative care consult    Patient planning to transfer to [facility] ICU soon.          S:  Afebrile currently.  Events overnight noted.    O:  Temp: 98.6  F (37  C) Temp src: Oral BP: 134/59 Pulse: 85   Resp: 18 SpO2: 95 % O2 Device: Mechanical Ventilator Oxygen Delivery: 12 LPM  gen sedated  cv irreg, irreg, normal rate, no edema, no JVD  Lungs brackles bases, no wheezing, intubated  abd bs+, soft and ND  Neuro sedated    Lab/imaging reviewed. Tele reviewed a. Fib rate 80-90's.

## 2021-12-21 NOTE — PROCEDURES
"PICC Line Insertion Procedure Note  Pt. Name: Ezequiel Randhawa  MRN:        [id number]    Procedure: Insertion of a  triple Lumen  5 fr  Bard SOLO (valved) Power PICC, Lot number [id number]    Indications: Medications    Contraindications : none    Procedure Details   Patient identified with 2 identifiers and \"Time Out\" conducted.  .     Central line insertion bundle followed: hand hygeine performed prior to procedure, site cleansed with cholraprep, hat, mask, sterile gloves,sterile gown worn, patient draped with maximum barrier head to toe drape, sterile field maintained.    The vein was assessed and found to be compressible and of adequate size. 2 ml 1% Lidocaine administered sq to the insertion site. A 5 Fr PICC was inserted into the brachial vein of the right arm with ultrasound guidance. 1 attempt(s) required to access vein.   Catheter threaded without difficulty. Good blood return noted.    Modified Seldinger Technique used for insertion.    The 8 sharps that are included in the PICC insertion kit were accounted for and disposed of in the sharps container prior to breakdown of the sterile field.    Catheter secured with Statlock, biopatch and Tegaderm dressing applied.    Findings:  Total catheter length  38 cm, with 1 cm exposed. Mid upper arm circumference is 24 cm. Catheter was flushed with 30 cc NS. Patient  tolerated procedure well.    Tip placement verified by 3CG Tip Positioning System. Tip placement in the SVC.    CLABSI prevention brochure left at bedside.    Patient's primary RN notified PICC is ready for use.    Comments:            "

## 2021-12-22 ENCOUNTER — APPOINTMENT (OUTPATIENT)
Dept: CARDIOLOGY | Facility: CLINIC | Age: 81
DRG: 208 | End: 2021-12-22
Attending: NURSE PRACTITIONER
Payer: COMMERCIAL

## 2021-12-22 ENCOUNTER — APPOINTMENT (OUTPATIENT)
Dept: GENERAL RADIOLOGY | Facility: CLINIC | Age: 81
DRG: 208 | End: 2021-12-22
Attending: INTERNAL MEDICINE
Payer: COMMERCIAL

## 2021-12-22 ENCOUNTER — APPOINTMENT (OUTPATIENT)
Dept: GENERAL RADIOLOGY | Facility: CLINIC | Age: 81
DRG: 208 | End: 2021-12-22
Attending: NURSE PRACTITIONER
Payer: COMMERCIAL

## 2021-12-22 LAB
ANION GAP SERPL CALCULATED.3IONS-SCNC: 7 MMOL/L (ref 3–14)
ATRIAL RATE - MUSE: 59 BPM
BASE EXCESS BLDA CALC-SCNC: 1.2 MMOL/L (ref -9–1.8)
BASE EXCESS BLDV CALC-SCNC: 0.4 MMOL/L (ref -7.7–1.9)
BASE EXCESS BLDV CALC-SCNC: 2.6 MMOL/L (ref -7.7–1.9)
BUN SERPL-MCNC: 42 MG/DL (ref 7–30)
CA-I BLD-MCNC: 4.4 MG/DL (ref 4.4–5.2)
CALCIUM SERPL-MCNC: 7.5 MG/DL (ref 8.5–10.1)
CHLORIDE BLD-SCNC: 118 MMOL/L (ref 94–109)
CO2 SERPL-SCNC: 22 MMOL/L (ref 20–32)
CREAT SERPL-MCNC: 0.87 MG/DL (ref 0.66–1.25)
CRP SERPL-MCNC: 53 MG/L (ref 0–8)
D DIMER PPP FEU-MCNC: 14.09 UG/ML FEU (ref 0–0.5)
DIASTOLIC BLOOD PRESSURE - MUSE: 57 MMHG
ERYTHROCYTE [DISTWIDTH] IN BLOOD BY AUTOMATED COUNT: 13.2 % (ref 10–15)
FIBRINOGEN PPP-MCNC: 223 MG/DL (ref 170–490)
GFR SERPL CREATININE-BSD FRML MDRD: 88 ML/MIN/1.73M2
GLUCOSE BLD-MCNC: 184 MG/DL (ref 70–99)
GLUCOSE BLDC GLUCOMTR-MCNC: 141 MG/DL (ref 70–99)
GLUCOSE BLDC GLUCOMTR-MCNC: 146 MG/DL (ref 70–99)
GLUCOSE BLDC GLUCOMTR-MCNC: 171 MG/DL (ref 70–99)
GLUCOSE BLDC GLUCOMTR-MCNC: 192 MG/DL (ref 70–99)
HCO3 BLD-SCNC: 25 MMOL/L (ref 21–28)
HCO3 BLDV-SCNC: 25 MMOL/L (ref 21–28)
HCO3 BLDV-SCNC: 27 MMOL/L (ref 21–28)
HCT VFR BLD AUTO: 36.6 % (ref 40–53)
HGB BLD-MCNC: 12 G/DL (ref 13.3–17.7)
INTERPRETATION ECG - MUSE: NORMAL
L PNEUMO1 AG UR QL IA: NEGATIVE
LVEF ECHO: NORMAL
MAGNESIUM SERPL-MCNC: 2.6 MG/DL (ref 1.6–2.3)
MCH RBC QN AUTO: 31.7 PG (ref 26.5–33)
MCHC RBC AUTO-ENTMCNC: 32.8 G/DL (ref 31.5–36.5)
MCV RBC AUTO: 97 FL (ref 78–100)
O2/TOTAL GAS SETTING VFR VENT: 30 %
O2/TOTAL GAS SETTING VFR VENT: 30 %
O2/TOTAL GAS SETTING VFR VENT: 40 %
OXYHGB MFR BLDV: 74 % (ref 70–75)
OXYHGB MFR BLDV: 76 % (ref 70–75)
P AXIS - MUSE: 74 DEGREES
PCO2 BLD: 37 MM HG (ref 35–45)
PCO2 BLDV: 39 MM HG (ref 40–50)
PCO2 BLDV: 41 MM HG (ref 40–50)
PH BLD: 7.44 [PH] (ref 7.35–7.45)
PH BLDV: 7.41 [PH] (ref 7.32–7.43)
PH BLDV: 7.43 [PH] (ref 7.32–7.43)
PHOSPHATE SERPL-MCNC: 2.8 MG/DL (ref 2.5–4.5)
PHOSPHATE SERPL-MCNC: 2.8 MG/DL (ref 2.5–4.5)
PLATELET # BLD AUTO: 152 10E3/UL (ref 150–450)
PO2 BLD: 66 MM HG (ref 80–105)
PO2 BLDV: 38 MM HG (ref 25–47)
PO2 BLDV: 40 MM HG (ref 25–47)
POTASSIUM BLD-SCNC: 3.5 MMOL/L (ref 3.4–5.3)
POTASSIUM BLD-SCNC: 3.6 MMOL/L (ref 3.4–5.3)
PR INTERVAL - MUSE: 138 MS
QRS DURATION - MUSE: 74 MS
QT - MUSE: 418 MS
QTC - MUSE: 413 MS
R AXIS - MUSE: 89 DEGREES
RBC # BLD AUTO: 3.79 10E6/UL (ref 4.4–5.9)
S PNEUM AG SPEC QL: NEGATIVE
SODIUM SERPL-SCNC: 147 MMOL/L (ref 133–144)
SODIUM SERPL-SCNC: 148 MMOL/L (ref 133–144)
SODIUM SERPL-SCNC: 150 MMOL/L (ref 133–144)
SYSTOLIC BLOOD PRESSURE - MUSE: 128 MMHG
T AXIS - MUSE: 79 DEGREES
UFH PPP CHRO-ACNC: 0.44 IU/ML
VENTRICULAR RATE- MUSE: 59 BPM
WBC # BLD AUTO: 16.6 10E3/UL (ref 4–11)

## 2021-12-22 PROCEDURE — 84295 ASSAY OF SERUM SODIUM: CPT | Performed by: NURSE PRACTITIONER

## 2021-12-22 PROCEDURE — 93325 DOPPLER ECHO COLOR FLOW MAPG: CPT

## 2021-12-22 PROCEDURE — 250N000013 HC RX MED GY IP 250 OP 250 PS 637: Performed by: STUDENT IN AN ORGANIZED HEALTH CARE EDUCATION/TRAINING PROGRAM

## 2021-12-22 PROCEDURE — 250N000013 HC RX MED GY IP 250 OP 250 PS 637: Performed by: NURSE PRACTITIONER

## 2021-12-22 PROCEDURE — 85384 FIBRINOGEN ACTIVITY: CPT | Performed by: INTERNAL MEDICINE

## 2021-12-22 PROCEDURE — 250N000011 HC RX IP 250 OP 636: Performed by: INTERNAL MEDICINE

## 2021-12-22 PROCEDURE — C9113 INJ PANTOPRAZOLE SODIUM, VIA: HCPCS | Performed by: STUDENT IN AN ORGANIZED HEALTH CARE EDUCATION/TRAINING PROGRAM

## 2021-12-22 PROCEDURE — 87899 AGENT NOS ASSAY W/OPTIC: CPT | Performed by: STUDENT IN AN ORGANIZED HEALTH CARE EDUCATION/TRAINING PROGRAM

## 2021-12-22 PROCEDURE — 85027 COMPLETE CBC AUTOMATED: CPT | Performed by: INTERNAL MEDICINE

## 2021-12-22 PROCEDURE — 250N000009 HC RX 250: Performed by: NURSE PRACTITIONER

## 2021-12-22 PROCEDURE — 36592 COLLECT BLOOD FROM PICC: CPT | Performed by: INTERNAL MEDICINE

## 2021-12-22 PROCEDURE — 250N000011 HC RX IP 250 OP 636: Performed by: STUDENT IN AN ORGANIZED HEALTH CARE EDUCATION/TRAINING PROGRAM

## 2021-12-22 PROCEDURE — 93325 DOPPLER ECHO COLOR FLOW MAPG: CPT | Mod: 26 | Performed by: INTERNAL MEDICINE

## 2021-12-22 PROCEDURE — 99291 CRITICAL CARE FIRST HOUR: CPT | Mod: GC | Performed by: INTERNAL MEDICINE

## 2021-12-22 PROCEDURE — 82803 BLOOD GASES ANY COMBINATION: CPT | Performed by: STUDENT IN AN ORGANIZED HEALTH CARE EDUCATION/TRAINING PROGRAM

## 2021-12-22 PROCEDURE — 74018 RADEX ABDOMEN 1 VIEW: CPT | Mod: 26 | Performed by: RADIOLOGY

## 2021-12-22 PROCEDURE — 200N000002 HC R&B ICU UMMC

## 2021-12-22 PROCEDURE — 999N000157 HC STATISTIC RCP TIME EA 10 MIN

## 2021-12-22 PROCEDURE — 250N000011 HC RX IP 250 OP 636: Performed by: NURSE PRACTITIONER

## 2021-12-22 PROCEDURE — 84100 ASSAY OF PHOSPHORUS: CPT | Performed by: STUDENT IN AN ORGANIZED HEALTH CARE EDUCATION/TRAINING PROGRAM

## 2021-12-22 PROCEDURE — 93321 DOPPLER ECHO F-UP/LMTD STD: CPT | Mod: 26 | Performed by: INTERNAL MEDICINE

## 2021-12-22 PROCEDURE — 93308 TTE F-UP OR LMTD: CPT

## 2021-12-22 PROCEDURE — 999N000065 XR ABDOMEN PORT 1 VIEWS

## 2021-12-22 PROCEDURE — 80048 BASIC METABOLIC PNL TOTAL CA: CPT | Performed by: INTERNAL MEDICINE

## 2021-12-22 PROCEDURE — 84132 ASSAY OF SERUM POTASSIUM: CPT | Performed by: STUDENT IN AN ORGANIZED HEALTH CARE EDUCATION/TRAINING PROGRAM

## 2021-12-22 PROCEDURE — 94003 VENT MGMT INPAT SUBQ DAY: CPT

## 2021-12-22 PROCEDURE — 82805 BLOOD GASES W/O2 SATURATION: CPT | Performed by: INTERNAL MEDICINE

## 2021-12-22 PROCEDURE — 258N000003 HC RX IP 258 OP 636: Performed by: NURSE PRACTITIONER

## 2021-12-22 PROCEDURE — 82330 ASSAY OF CALCIUM: CPT | Performed by: INTERNAL MEDICINE

## 2021-12-22 PROCEDURE — 83735 ASSAY OF MAGNESIUM: CPT | Performed by: STUDENT IN AN ORGANIZED HEALTH CARE EDUCATION/TRAINING PROGRAM

## 2021-12-22 PROCEDURE — 74018 RADEX ABDOMEN 1 VIEW: CPT

## 2021-12-22 PROCEDURE — 250N000012 HC RX MED GY IP 250 OP 636 PS 637: Performed by: NURSE PRACTITIONER

## 2021-12-22 PROCEDURE — 85379 FIBRIN DEGRADATION QUANT: CPT | Performed by: INTERNAL MEDICINE

## 2021-12-22 PROCEDURE — 86140 C-REACTIVE PROTEIN: CPT | Performed by: INTERNAL MEDICINE

## 2021-12-22 PROCEDURE — 85520 HEPARIN ASSAY: CPT | Performed by: INTERNAL MEDICINE

## 2021-12-22 PROCEDURE — 84295 ASSAY OF SERUM SODIUM: CPT | Performed by: STUDENT IN AN ORGANIZED HEALTH CARE EDUCATION/TRAINING PROGRAM

## 2021-12-22 PROCEDURE — 93308 TTE F-UP OR LMTD: CPT | Mod: 26 | Performed by: INTERNAL MEDICINE

## 2021-12-22 RX ORDER — AMINO AC/PROTEIN HYDR/WHEY PRO 10G-100/30
2 LIQUID (ML) ORAL 2 TIMES DAILY
Status: DISCONTINUED | OUTPATIENT
Start: 2021-12-22 | End: 2021-12-28

## 2021-12-22 RX ORDER — DEXTROSE MONOHYDRATE 25 G/50ML
25-50 INJECTION, SOLUTION INTRAVENOUS
Status: DISCONTINUED | OUTPATIENT
Start: 2021-12-22 | End: 2022-01-07 | Stop reason: HOSPADM

## 2021-12-22 RX ORDER — DEXMEDETOMIDINE HYDROCHLORIDE 4 UG/ML
.1-1.2 INJECTION, SOLUTION INTRAVENOUS CONTINUOUS
Status: DISCONTINUED | OUTPATIENT
Start: 2021-12-22 | End: 2021-12-23

## 2021-12-22 RX ORDER — POTASSIUM CHLORIDE 7.45 MG/ML
10 INJECTION INTRAVENOUS ONCE
Status: COMPLETED | OUTPATIENT
Start: 2021-12-22 | End: 2021-12-22

## 2021-12-22 RX ORDER — FUROSEMIDE 10 MG/ML
10 INJECTION INTRAMUSCULAR; INTRAVENOUS ONCE
Status: COMPLETED | OUTPATIENT
Start: 2021-12-22 | End: 2021-12-22

## 2021-12-22 RX ORDER — AMOXICILLIN 250 MG
2 CAPSULE ORAL 2 TIMES DAILY
Status: DISCONTINUED | OUTPATIENT
Start: 2021-12-22 | End: 2021-12-28

## 2021-12-22 RX ORDER — NICOTINE POLACRILEX 4 MG
15-30 LOZENGE BUCCAL
Status: DISCONTINUED | OUTPATIENT
Start: 2021-12-22 | End: 2022-01-07 | Stop reason: HOSPADM

## 2021-12-22 RX ORDER — DEXTROSE MONOHYDRATE 100 MG/ML
INJECTION, SOLUTION INTRAVENOUS CONTINUOUS PRN
Status: DISCONTINUED | OUTPATIENT
Start: 2021-12-22 | End: 2022-01-07 | Stop reason: HOSPADM

## 2021-12-22 RX ORDER — POLYETHYLENE GLYCOL 3350 17 G/17G
17 POWDER, FOR SOLUTION ORAL 2 TIMES DAILY
Status: DISCONTINUED | OUTPATIENT
Start: 2021-12-22 | End: 2021-12-28

## 2021-12-22 RX ORDER — GUAIFENESIN 600 MG/1
15 TABLET, EXTENDED RELEASE ORAL DAILY
Status: DISCONTINUED | OUTPATIENT
Start: 2021-12-22 | End: 2021-12-28

## 2021-12-22 RX ORDER — BISACODYL 10 MG
10 SUPPOSITORY, RECTAL RECTAL DAILY PRN
Status: DISCONTINUED | OUTPATIENT
Start: 2021-12-22 | End: 2022-01-07 | Stop reason: HOSPADM

## 2021-12-22 RX ADMIN — Medication 50 MCG: at 19:49

## 2021-12-22 RX ADMIN — POTASSIUM CHLORIDE 10 MEQ: 7.46 INJECTION, SOLUTION INTRAVENOUS at 10:39

## 2021-12-22 RX ADMIN — PROPOFOL 25 MCG/KG/MIN: 10 INJECTION, EMULSION INTRAVENOUS at 09:39

## 2021-12-22 RX ADMIN — MULTIVIT AND MINERALS-FERROUS GLUCONATE 9 MG IRON/15 ML ORAL LIQUID 15 ML: at 10:39

## 2021-12-22 RX ADMIN — DEXMEDETOMIDINE HYDROCHLORIDE 1 MCG/KG/HR: 400 INJECTION, SOLUTION INTRAVENOUS at 22:22

## 2021-12-22 RX ADMIN — DOCUSATE SODIUM 50 MG AND SENNOSIDES 8.6 MG 1 TABLET: 8.6; 5 TABLET, FILM COATED ORAL at 19:34

## 2021-12-22 RX ADMIN — FENTANYL CITRATE 100 MCG/HR: 50 INJECTION INTRAVENOUS at 11:32

## 2021-12-22 RX ADMIN — INSULIN ASPART 2 UNITS: 100 INJECTION, SOLUTION INTRAVENOUS; SUBCUTANEOUS at 23:53

## 2021-12-22 RX ADMIN — QUETIAPINE FUMARATE 50 MG: 50 TABLET ORAL at 19:34

## 2021-12-22 RX ADMIN — DOCUSATE SODIUM 50 MG AND SENNOSIDES 8.6 MG 2 TABLET: 8.6; 5 TABLET, FILM COATED ORAL at 14:54

## 2021-12-22 RX ADMIN — CEFTRIAXONE SODIUM 2 G: 2 INJECTION, POWDER, FOR SOLUTION INTRAMUSCULAR; INTRAVENOUS at 16:16

## 2021-12-22 RX ADMIN — Medication 2 PACKET: at 14:54

## 2021-12-22 RX ADMIN — AZITHROMYCIN MONOHYDRATE 500 MG: 500 INJECTION, POWDER, LYOPHILIZED, FOR SOLUTION INTRAVENOUS at 19:34

## 2021-12-22 RX ADMIN — PANTOPRAZOLE SODIUM 40 MG: 40 INJECTION, POWDER, FOR SOLUTION INTRAVENOUS at 08:11

## 2021-12-22 RX ADMIN — POLYETHYLENE GLYCOL 3350 17 G: 17 POWDER, FOR SOLUTION ORAL at 19:34

## 2021-12-22 RX ADMIN — DEXAMETHASONE SODIUM PHOSPHATE 12 MG: 4 INJECTION, SOLUTION INTRA-ARTICULAR; INTRALESIONAL; INTRAMUSCULAR; INTRAVENOUS; SOFT TISSUE at 14:54

## 2021-12-22 RX ADMIN — ENOXAPARIN SODIUM 60 MG: 60 INJECTION SUBCUTANEOUS at 12:11

## 2021-12-22 RX ADMIN — Medication 25 MCG: at 20:52

## 2021-12-22 RX ADMIN — INSULIN ASPART 1 UNITS: 100 INJECTION, SOLUTION INTRAVENOUS; SUBCUTANEOUS at 19:34

## 2021-12-22 RX ADMIN — INSULIN ASPART 1 UNITS: 100 INJECTION, SOLUTION INTRAVENOUS; SUBCUTANEOUS at 16:16

## 2021-12-22 RX ADMIN — Medication 2 PACKET: at 19:34

## 2021-12-22 RX ADMIN — QUETIAPINE FUMARATE 50 MG: 50 TABLET ORAL at 08:11

## 2021-12-22 RX ADMIN — ENOXAPARIN SODIUM 60 MG: 60 INJECTION SUBCUTANEOUS at 23:52

## 2021-12-22 RX ADMIN — SODIUM CHLORIDE, POTASSIUM CHLORIDE, SODIUM LACTATE AND CALCIUM CHLORIDE 500 ML: 600; 310; 30; 20 INJECTION, SOLUTION INTRAVENOUS at 12:10

## 2021-12-22 RX ADMIN — DEXMEDETOMIDINE HYDROCHLORIDE 0.5 MCG/KG/HR: 400 INJECTION, SOLUTION INTRAVENOUS at 12:27

## 2021-12-22 RX ADMIN — QUETIAPINE FUMARATE 50 MG: 50 TABLET ORAL at 14:54

## 2021-12-22 RX ADMIN — POLYETHYLENE GLYCOL 3350 17 G: 17 POWDER, FOR SOLUTION ORAL at 14:54

## 2021-12-22 RX ADMIN — THIAMINE HCL TAB 100 MG 100 MG: 100 TAB at 10:39

## 2021-12-22 RX ADMIN — FUROSEMIDE 10 MG: 10 INJECTION, SOLUTION INTRAVENOUS at 15:12

## 2021-12-22 ASSESSMENT — ACTIVITIES OF DAILY LIVING (ADL)
ADLS_ACUITY_SCORE: 21
ADLS_ACUITY_SCORE: 21
ADLS_ACUITY_SCORE: 23
ADLS_ACUITY_SCORE: 21
ADLS_ACUITY_SCORE: 21
ADLS_ACUITY_SCORE: 23
ADLS_ACUITY_SCORE: 23
ADLS_ACUITY_SCORE: 21
ADLS_ACUITY_SCORE: 23
ADLS_ACUITY_SCORE: 21
ADLS_ACUITY_SCORE: 21
ADLS_ACUITY_SCORE: 23
ADLS_ACUITY_SCORE: 23
ADLS_ACUITY_SCORE: 21
ADLS_ACUITY_SCORE: 23
ADLS_ACUITY_SCORE: 21

## 2021-12-22 ASSESSMENT — MIFFLIN-ST. JEOR: SCORE: 1388.75

## 2021-12-22 NOTE — ED PROVIDER NOTES
[facility]  Had episode of sinus arrest.  Nursing obtain EKG 12/21/2021 at noon.    Sinus bradycardia rate 59.  No notable ST or T wave abnormalities.  QRS 74 .  Sinus rhythm has replaced A. fib from previous EKG on 12/20/2021.    MD Otf Denton Zabrina N, MD  12/22/21 0966

## 2021-12-22 NOTE — PLAN OF CARE
Problem: Adult Inpatient Plan of Care  Goal: Readiness for Transition of Care  Outcome: No Change     ICU End of Shift Summary. See flowsheets for vital signs and detailed assessment.    Changes this shift: Prop 10 mcg/kg/min, Fentanyl 150 mcg/hr ~ RASS -3. Arousable to voice. Doesn't follow commands. Agitated, withdraws and resists cares.  Afebrile. Initially sinus khurram; maintaining HR 44-45 for 1.5x hours; rates improved with wakening patient for cares.  Additionally CO/CI improved overnight~ currently 4.5/2.7 respectively. Levo gtt @ 0.08 mcg/kg/min meeting MAP goal >65. CMV 40/500/18/7. No secretions/Unable to catch sputum sample. OG to [facility] only~ decreased 100mL/2hrs 2/2 Na+ 147 from 160 (32 hours ago). BGLs normoglycemic. No BM. Pt very dry/ Bladder scan @ 00:00 for 140mL. Did not void until 02:00. Legionella resulted negative. Bilateral feet appear to be molting~ markedly dusky, flaky/peeling, with poor circulation/DP pulses. Feet wrapped with moisturizer, kerlix and socks. HepXa therapeutic; gtt infusing @ 1450u/hr.     Plan: Wean vent as able. Trend Na+.

## 2021-12-22 NOTE — PROGRESS NOTES
Admitted/transferred from: [facility] ED  Reason for admission/transfer: requires ICU care, xfer d/t bed availability  Patient status upon admission/transfer: Intubated, sedated with fentanyl gtt during xfer. Heparin gtt infusing as well.  Interventions: Settled into room, mitts applied, sedation and pressor ordered based on patient status. Labs drawn.  Plan: ABG to be done to assess need for further interventions. Await lab results to guide POC. Titrate pressors and FiO2 as patient status requires.  2 RN skin assessment: completed by Sravani ESQUIVEL RN and Amy LANCASTER RN  Result of skin assessment and interventions/actions: No areas of injury found- blanchable redness to elbows and coccyx. Mepilex applied to coccyx and offloaded with pillows. Skin extremely dry and flaky.  Height, weight, drug calc weight: Done  Patient belongings (see Flowsheet - Adult Profile for details): Two patient belongings bags arrived with patient  MDRO education (if applicable): NA

## 2021-12-22 NOTE — PROGRESS NOTES
MEDICAL ICU PROGRESS NOTE  12/22/2021      Date of Service (when I saw the patient): 12/22/2021    ASSESSMENT:  Ezequiel Randhawa is a 81 year old male with PMH COPD, tobacco use, who was admitted to [facility] ICU on 12/21/21 from [facility] for COVID acute respiratory failure requiring intubation on 12/20/21.     CHANGES and MAJOR THINGS TODAY:   - Weaned sedation, RASS goal 0 to -1  - weaned norepinephrine   - Decreased ventilatory support to Fi of 30%, PEEP 5  - PST in afternoon  - add scheduled bowel regimen  - add precedex considering possible near extubation  - add sliding scale insulin   - replace OG, initiate tube feeds   - PRN albuterol  - LR 500ml for oliguria  - add medium sliding scale insulin  - Stop heparin -> lovenox   - stop heparin  - repeat echo        PLAN:      Neuro:  # Pain and sedation  - Fentanyl   - Propofol  - Seroquel 50 mg TID  - RASS goal 0 to -1  - add precedex in setting of possible upcoming extubation  PRN fentanyl     Pulmonary:  # COVID Positive 12/20/21  # Acute Hypoxic Respiratory Failure 2/2 COVID 19   - Intubated on 12/20   - Follow ABGs  - Vent CMV 18/500/8/40%   -Decrease PEEP to 5, Fi 30%     # H/o COPD?   Suspected but no official diagnosis. H/o tobacco use. CT Chest demonstrated moderate emphysema.   - Albuterol PRN     Cardiovascular:  # Afib with RVR, now with regular rate   # LV hypokinesis  # Reduced EF (35-40%)   Echo performed 12/20 showed EF 35-40% with moderate global hypokinesia of the left ventricle, unclear if new without prior echocardiograms for comparison. Suspect 2/2 to CAD. Troponin negative x3. EKG on admission with sinus bradycardia so low suspicion for ACS. Possibly in the setting of acute illness and tachycardia  - telemetry monitoring   - will need ischemic workup eventually    - will get repeat TTE bc unknown what his rhythm was when his echo showed decreased EF.   - No clinical signs of hypervolemia, but pending the results of updated echo, will be  restrictive with fluids     #Shock   #Lactic acidosis, resolved   Shock could be in setting of sepsis 2/2 COVID-19 and CAP. Hypovolemic initially at [facility] ED, but on bedside ultrasound here IVC appeared full without collapse, consistent more with volume overload. Could have cardiogenic component given LV dysfunction.   - Levophed, weaned off today      GI/Nutrition:  Risk for malnutrition and dehydration   Moderate malnutrition in setting of acute illness  - Bowel regimen   - CXR to assess OG placement  - Dietician consult for initiation of TF  - Advance tube feeds as tolerated      Renal/Fluids/Electrolytes:  # Hypernatremia   Dehydrated for the last week. As high as Na 160 at OSH, now stable at Na 150. Was on D5W gtt for correction.   - trend Na q6h - Decreased to BID with sodium normalizing  - free water flushes 150 q2h - Decreased to 30 q 4 given 300 ml of free water deficit    # Oliguria  Cr 0.87, no baseline cr identified.  Patient with burris catheter with minimal urine output. Urine yellow, clear. Will give trial bolus of LR. If no improvement in output, will try IV furosemide 10mg. Consider additional fluids vs renal US if no improvement and pending TTE.        Endocrine:  # No h/o of DM  # Risk of steroid-induced hyperglycemia  - Hypoglycemic protocol per ICU   - Medium dose SSI     ID:  # COVID 19 Positive   - Dex 12 mg (12/20- )  - Remdesivir 12/20   - Toculizumab 12/20     #Community acquired vs aspiration pneumonia  CT Chest PE study (12/20) demonstrated consolidation of bilateral lower lobes concerning for aspiration pneumonia. Procalcitonin of 0.09.   - CTX/azithromycin (12/21- )   - Sputum culture  - MRSA nares   - urine strep pneumo, urine legionella negative  - 12/20 blood culture - pending   - s/p Zosyn 12/20      Hematology:    # Elevated D-Dimer   # Pulmonary Embolism dx 12/20 by CT  - High dose heparin gtt switched to lovenox 1 mg/kg BID     Musculoskeletal:  PT/OT      Skin:  No acute  issues      General Cares/Prophylaxis:    DVT Prophylaxis: Therapeutic lovenox 1 mg/kg BID  GI Prophylaxis: PPI  Restraints: None   Family Communication: Attempted to call son x 2 times, went straight to voicemail.   Code Status: DNR, per H&P from [place]'s discussion with son had been ok for pre-arrest intubation, pressors, ICU cares, but no CPR; confirmed with son over the phone.       Lines/tubes/drains:  - PIV  - PICC   - Marina  - OG  - ETT      Disposition:  - Medical ICU      Patient seen and findings/plan discussed with medical ICU staff, Dr. Mccloud.   Leslie Goldberg   Anesthesia, CA-1    ====================================  INTERVAL HISTORY:   Patient was admitted overnight. No acute events.    OBJECTIVE:   1. VITAL SIGNS:   Temp:  [97.9  F (36.6  C)-98.5  F (36.9  C)] 98.2  F (36.8  C)  Pulse:  [] 60  Resp:  [13-23] 18  BP: ()/(47-72) 105/69  FiO2 (%):  [40 %-60 %] 40 %  SpO2:  [92 %-100 %] 99 %  Ventilation Mode: CMV/AC  (Continuous Mandatory Ventilation/ Assist Control)  FiO2 (%): 40 %  Rate Set (breaths/minute): 18 breaths/min  Tidal Volume Set (mL): 500 mL  PEEP (cm H2O): 8 cmH2O  Oxygen Concentration (%): 40 %  Resp: 18    2. INTAKE/ OUTPUT:   I/O last 3 completed shifts:  In: 1697.74 [I.V.:847.74; NG/GT:850]  Out: 865 [Urine:865]    3. PHYSICAL EXAMINATION:  General: Sedated, cachetic appearing  HEENT: ETT in place   Neuro: opens eyes to voice. Nods head   Pulm/Resp: Coarse bilateral lung sounds, no wheezes  CV: RRR, no m/r/g, no peripheral edema  Abdomen: Soft, non-distended, non-tender. Hypoactive bowel sounds.   : Marina catheter in place, draining clear yellow urine.   Incisions/Skin: Severely flaky skin across his feet with unkempt toenails     4. LABS:   Arterial Blood Gases   Recent Labs   Lab 12/21/21  1539 12/20/21  2101   PH 7.40 7.39   PCO2 42 44   PO2 87 295*   HCO3 26  --      Complete Blood Count   Recent Labs   Lab 12/22/21  0404 12/21/21  1519 12/21/21  1229  21  0633   WBC 16.6* 17.4* 11.8* 11.5*   HGB 12.0* 12.8* 10.9* 12.3*    188 126* 139*     Basic Metabolic Panel  Recent Labs   Lab 21  0404 21  2349 21  2221 21  1809 21  1807 21  1519 21  1254 21  1215 21  0933 21  0633 21  0224 21   *  --  153*  --   --   --  154*  --  150*  --  150*   < > 160*   POTASSIUM 3.5  --   --   --   --  3.6 3.6  --  3.8  --  3.2*   < > 2.9*   CHLORIDE 118*  --   --   --   --   --  122*  --   --   --  118*  --  123*   CO2 22  --   --   --   --   --  27  --   --   --  24  --  25   BUN 42*  --   --   --   --   --  44*  --   --   --  43*  --  56*   CR 0.87  --   --   --   --   --  0.84  --   --   --  0.79  --  0.92   * 174*  --  156* 138*  --  154*   < >  --    < > 295*  --  148*    < > = values in this interval not displayed.     Liver Function Tests  Recent Labs   Lab 21  1519 21  1215 21  0633 21  1737   AST 20 22 17 18 25   ALT 17 14 13 12 18   ALKPHOS 73 71 74 73 100   BILITOTAL 1.0 1.1* 1.0 1.9* 2.3*   ALBUMIN 2.0* 2.2* 2.1* 2.3* 3.1*   INR 1.44*  --   --  1.64* 1.41*     Coagulation Profile  Recent Labs   Lab 21  1519 21  0037 21  1737   INR 1.44*  --  1.64* 1.41*   PTT 33 38 35  --        5. RADIOLOGY:   Recent Results (from the past 24 hour(s))   Echocardiogram Complete   Result Value    LVEF  35-40%    Narrative    [id number]  [id number]  [id number]  [id number]^EMILIA^POP[place] [address]     Name: JULIO ARCE  MRN: [id number]  : 1940  Study Date: 2021 08:48 AM  Age: 81 yrs  Gender: Male  Patient Location: [facility]  Reason For Study: Atrial Fibrillation  Ordering Physician: POP NIETO  Performed By: KEN     BSA: 1.8 m2  Height: 74 in  Weight: 130 lb  HR: 80  BP: 122/59  mmHg  ______________________________________________________________________________  Procedure  Complete Portable Echo Adult. Definity (NDC #72512-675) given intravenously. 2  ml  lot # [id number].  ______________________________________________________________________________  Interpretation Summary     There is moderate global hypokinesia of the left ventricle.  The visual ejection fraction is 35-40%.  Normal atrila size.  No significant valve disease.  ______________________________________________________________________________  Left Ventricle  The left ventricle is normal in size. There is normal left ventricular wall  thickness. Diastolic Doppler findings (E/E' ratio and/or other parameters)  suggest left ventricular filling pressures are normal. The visual ejection  fraction is 35-40%. There is moderate global hypokinesia of the left  ventricle.     Right Ventricle  The right ventricle is normal size. Mildly decreased right ventricular  systolic function.     Atria  Normal left atrial size. Right atrial size is normal. Intact atrial septum.     Mitral Valve  Mitral valve leaflets appear normal. There is trace mitral regurgitation.     Tricuspid Valve  Tricuspid valve leaflets appear normal. There is trace tricuspid  regurgitation. Right ventricular systolic pressure could not be approximated  due to inadequate tricuspid regurgitation.     Aortic Valve  The aortic valve is trileaflet with aortic valve sclerosis. No aortic  regurgitation is present.     Pulmonic Valve  Normal pulmonic valve. There is trace pulmonic valvular regurgitation.     Vessels  The aorta root is normal. Normal size ascending aorta. IVC diameter >2.1 cm  collapsing <50% with sniff suggests a high RA pressure estimated at 15 mmHg or  greater.     Pericardium  There is no pericardial effusion.     ______________________________________________________________________________  MMode/2D Measurements & Calculations  IVSd: 0.81 cm  LVIDd: 3.8  cm  LVIDs: 3.3 cm  LVPWd: 0.84 cm  FS: 13.2 %     LV mass(C)d: 88.8 grams  LV mass(C)dI: 49.0 grams/m2  Ao root diam: 3.6 cm  LA dimension: 2.2 cm  asc Aorta Diam: 3.7 cm  LA/Ao: 0.62  LVOT diam: 2.0 cm  LVOT area: 3.2 cm2  RWT: 0.45     Doppler Measurements & Calculations  MV E max tae: 53.2 cm/sec  MV dec slope: 385.3 cm/sec2  MV dec time: 0.15 sec  Ao V2 max: 102.0 cm/sec  Ao max P.0 mmHg  Ao V2 mean: 69.9 cm/sec  Ao mean P.1 mmHg  Ao V2 VTI: 17.0 cm  LUIS(I,D): 1.9 cm2  LUIS(V,D): 2.1 cm2  LV V1 max P.8 mmHg  LV V1 max: 67.5 cm/sec  LV V1 VTI: 10.0 cm  SV(LVOT): 31.8 ml  SI(LVOT): 17.6 ml/m2  PA V2 max: 65.2 cm/sec  PA max P.7 mmHg  PA acc time: 0.11 sec  TR max tae: 171.4 cm/sec  TR max P.8 mmHg  AV Tae Ratio (DI): 0.66  LUIS Index (cm2/m2): 1.0     E/E' av.9  Lateral E/e': 7.6  Medial E/e': 6.3     ______________________________________________________________________________  Report approved by: Yasmani Boss 2021 09:57 AM         XR Chest Port 1 View    Narrative    EXAM: XR CHEST PORT 1 VIEW  2021 3:31 PM     HISTORY:  73 yo male with COVID pneumonia, transferred after  intubation       Additional HISTORY: History of COPD and A. fib with RVR    COMPARISON:  None    TECHNIQUE: Single view of the chest    FINDINGS:   Portable AP view of the chest performed at 45 degrees. The  endotracheal tube tip is within the mid thoracic trachea and  terminates 6.5 cm from the garret. The gastric tube tip projects over  the GE junction with the sidehole projecting over the lower thoracic  esophagus. Right upper extremity PICC line tip terminates in the mid  SVC.    Trachea is midline. Normal-appearing cardiomediastinal silhouette. No  pleural effusion or pneumothorax. Emphysematous changes of the lung  parenchyma. Patchy interstitial and airspace opacities, left greater  than right. Visualized upper abdomen is unremarkable. No acute osseous  abnormalities.      Impression     IMPRESSION:   1. Patchy interstitial and airspace opacities, left greater than  right, appearance compatible with  COVID pneumonia.  2. Gastric tube tip and sidehole project over the GE junction and  lower thoracic esophagus, respectively. Recommend advancement at least  10 cm.  3. Emphysematous changes of the lung parenchyma.     I have personally reviewed the examination and initial interpretation  and I agree with the findings.    RASHAAD HILL MD         SYSTEM ID:  [id number]   XR Abdomen Port 1 View    Narrative    Exam: XR ABDOMEN PORT 1 VIEWS, 12/21/2021 3:31 PM    Indication: 82 yo male, intubated, assess ET tube placement    Comparison: None    Findings:   Supine AP radiograph of the abdomen. Enteric tube is subdiaphragmatic  with tip projecting over the gastric lumen and sidehole just distal to  the gastroesophageal junction. Left basilar atelectasis. No portal  venous gas. No acute osseous abnormalities.    Impression    Impression: Enteric tube is subdiaphragmatic with tip projecting over  the gastric lumen and sidehole near the level of the diaphragmatic  hiatus, consider advancement by approximately 5 cm.    I have personally reviewed the examination and initial interpretation  and I agree with the findings.    YNES HUNT MD         SYSTEM ID:  [id number]   XR Abdomen Port 1 View    Narrative    Exam: XR ABDOMEN PORT 1 VIEWS, 12/21/2021 6:11 PM    Indication: OG tube repositioned, COVID patient    Comparison: Abdominal x-ray 12/21/2021, chest x-ray 12/21/2021    Findings:   Portable semiupright AP radiograph of the abdomen. The tip of the  NG/OG tube projects over the stomach and the sidehole projects near  the GE junction, only slightly advanced compared to prior. The  visualized bowel gas pattern is nonobstructive. No visualized  pneumatosis or portal venous gas. Left basilar opacities are better  evaluated on same-day chest x-ray. Partially visualized catheter tip  projects over the mid  SVC.      Impression    Impression:   1. The tip of the NG/OG tube projects over the stomach and the  sidehole projects near the GE junction, only slightly advanced  compared to prior. Recommend continued advancing.  2. The visualized bowel gas pattern is nonobstructive.    I have personally reviewed the examination and initial interpretation  and I agree with the findings.    EMILIO SCOTT MD         SYSTEM ID:  [id number]

## 2021-12-22 NOTE — PLAN OF CARE
Admitted/transferred from: [facility] ED  Reason for admission/transfer: Closer monitoring  2 RN skin assessment: completed by Braxton  Result of skin assessment and interventions/actions: Intact. Sacral mepilex placed.   Height, weight, drug calc weight: Done  Patient belongings (see Flowsheet)  MDRO education added to care plan: Yes  ?

## 2021-12-22 NOTE — PROGRESS NOTES
"CLINICAL NUTRITION SERVICES - ASSESSMENT NOTE     Nutrition Prescription    Recommendations:  Verify OGT sidehole positioning before starting TF.    Malnutrition Status:    At least moderate malnutrition in the context of acute on chronic illness    Interventions by Registered Dietitian (RD):  - Osmolite 1.5 @ 10 ml/hr via OGT. Pending electrolytes, adv by 10 ml q8h to eventual goal rate of Osmolite 1.5 @ 50 ml/hr   - Prosource (2 pkts BID) via OGT to meet high protein needs.   - Certavite daily and thiamine 100 mg x3 days for high refeeding risk.    GOAL TF provisions: Osmolite 1.5 Yuriy @ 50ml/hr (1200ml/day) + Prosource (2 pkts BID) will provide: 1960 kcals (33 kcal/kg) 119 g protein (2 g/kg), 914 ml free H20, 244 g CHO, and 0 g fiber daily. This meets 100% estimated nutrition needs.     Future Recommendations:  RD to adjust TF pending propofol provisions        REASON FOR ASSESSMENT  Ezequiel Randhawa is a 81 year old male assessed by dietitian for Provider Order - RD to Assess and Order TF. Pt also has malnutrition screening tool score >2 for unsure weight loss PTA. Pertinent PMH includes COPD and tobacco use (quit months ago).     NUTRITION HISTORY  Unable to obtain nutrition hx from patient. Based on chart review, pt admitted to OSH with one week hx of cough, dyspnea and body aches. Presumably not eating at baseline during this time period. Pt has been NPO since admit (<24 hrs)     CURRENT NUTRITION ORDERS  Diet: NPO (intubated)  FWF: 150 ml q2h    LABS  Labs reviewed   Na 147, K+ 3.5, Cr 0.87, euglycemia     MEDICATIONS  Medications reviewed  Propofol      ANTHROPOMETRICS  Height: 188 cm (6' 2\")  Most Recent Weight: 60.4 kg (133 lb 2.5 oz)    IBW: 86.4 kg  BMI: Underweight BMI <18.5  Weight History: Admitted at 58.5 kg on 12/21 -- will use for dosing weight below.   Wt Readings from Last 10 Encounters:   12/22/21 60.4 kg (133 lb 2.5 oz)   12/21/21 59 kg (130 lb)       Dosing Weight: 59 kg (actual on 12/21) "     ASSESSED NUTRITION NEEDS  Estimated Energy Needs: 1800 - 2050 kcals/day (30 - 35 kcals/kg)  Justification: Underweight, will aim for low-end range while vented   Estimated Protein Needs: 85 - 120+ grams protein/day (1.5 - 2 grams of pro/kg)  Justification: Increased needs, repletion   Estimated Fluid Needs: (1 mL/kcal)   Justification: Maintenance    PHYSICAL FINDINGS  See malnutrition section below.  OGT tip projects over stomach, sidehole projects over GE jxn on AXR (12/21).     MALNUTRITION  % Intake: Unable to assess, lack of hx. Presumably, <75% needs over the past week per chart review (moderate)   % Weight Loss: Unable to assess, lack of hx.   Subcutaneous Fat Loss: Unable to assess, COVID+. Presumably, at least mild loss given underweight BMI.   Muscle Loss: Unable to assess, COVID+.  Presumably, at least mild loss given underweight BMI.   Fluid Accumulation/Edema: None noted per chart   Malnutrition Diagnosis: At least moderate malnutrition in the context of acute on chronic illness    NUTRITION DIAGNOSIS  Inadequate protein-energy intake related to NPO (intubated) without enteral nutrition support as evidenced by currently meeting 0% estimated nutrition needs.       INTERVENTIONS  See interventions at top of progress note.     Goals  Total avg nutritional intake to meet a minimum of 30 kcal/kg and 1.5 g PRO/kg daily (per dosing wt 59 kg).     Monitoring/Evaluation  Progress toward goals will be monitored and evaluated per protocol.  Mishel Elder, RD, LD   [facility] Pager: [phone]

## 2021-12-22 NOTE — PHARMACY-ADMISSION MEDICATION HISTORY
Admission Medication History Completed by Pharmacy    See [facility] Admission Navigator for allergy information, preferred outpatient pharmacy, prior to admission medications and immunization status.     Medication History Sources:     Dispense history (no fills found in last year)    Chart Review (seems to have minimal health care exposure)    Changes made to PTA medication list (reason):    Added: None    Deleted: None    Changed: None    Additional Information:    Does not appear patient takes any medications on a prescription basis based on fill history and chart review. Per son, patient does not take any medications regularly    Due to patient's intubated status, unable to confirm whether patient takes any over-the-counter medications.    Prior to Admission medications    Not on File       Date completed: 12/22/21    Medication history completed by: Christina Perkins RPH

## 2021-12-23 LAB
ANION GAP SERPL CALCULATED.3IONS-SCNC: 3 MMOL/L (ref 3–14)
ANION GAP SERPL CALCULATED.3IONS-SCNC: 4 MMOL/L (ref 3–14)
ATRIAL RATE - MUSE: 65 BPM
BASE EXCESS BLDV CALC-SCNC: 4.6 MMOL/L (ref -7.7–1.9)
BUN SERPL-MCNC: 44 MG/DL (ref 7–30)
BUN SERPL-MCNC: 45 MG/DL (ref 7–30)
CALCIUM SERPL-MCNC: 7.2 MG/DL (ref 8.5–10.1)
CALCIUM SERPL-MCNC: 7.9 MG/DL (ref 8.5–10.1)
CHLORIDE BLD-SCNC: 118 MMOL/L (ref 94–109)
CHLORIDE BLD-SCNC: 119 MMOL/L (ref 94–109)
CO2 SERPL-SCNC: 26 MMOL/L (ref 20–32)
CO2 SERPL-SCNC: 27 MMOL/L (ref 20–32)
CREAT SERPL-MCNC: 0.67 MG/DL (ref 0.66–1.25)
CREAT SERPL-MCNC: 0.77 MG/DL (ref 0.66–1.25)
CRP SERPL-MCNC: 24 MG/L (ref 0–8)
D DIMER PPP FEU-MCNC: 6.8 UG/ML FEU (ref 0–0.5)
DIASTOLIC BLOOD PRESSURE - MUSE: NORMAL MMHG
ERYTHROCYTE [DISTWIDTH] IN BLOOD BY AUTOMATED COUNT: 12.9 % (ref 10–15)
FIBRINOGEN PPP-MCNC: 198 MG/DL (ref 170–490)
GFR SERPL CREATININE-BSD FRML MDRD: >90 ML/MIN/1.73M2
GFR SERPL CREATININE-BSD FRML MDRD: >90 ML/MIN/1.73M2
GLUCOSE BLD-MCNC: 176 MG/DL (ref 70–99)
GLUCOSE BLD-MCNC: 208 MG/DL (ref 70–99)
GLUCOSE BLDC GLUCOMTR-MCNC: 149 MG/DL (ref 70–99)
GLUCOSE BLDC GLUCOMTR-MCNC: 155 MG/DL (ref 70–99)
GLUCOSE BLDC GLUCOMTR-MCNC: 157 MG/DL (ref 70–99)
GLUCOSE BLDC GLUCOMTR-MCNC: 158 MG/DL (ref 70–99)
GLUCOSE BLDC GLUCOMTR-MCNC: 190 MG/DL (ref 70–99)
GLUCOSE BLDC GLUCOMTR-MCNC: 201 MG/DL (ref 70–99)
HCO3 BLDV-SCNC: 28 MMOL/L (ref 21–28)
HCT VFR BLD AUTO: 38.1 % (ref 40–53)
HGB BLD-MCNC: 12.8 G/DL (ref 13.3–17.7)
INTERPRETATION ECG - MUSE: NORMAL
MAGNESIUM SERPL-MCNC: 2.7 MG/DL (ref 1.6–2.3)
MCH RBC QN AUTO: 32.1 PG (ref 26.5–33)
MCHC RBC AUTO-ENTMCNC: 33.6 G/DL (ref 31.5–36.5)
MCV RBC AUTO: 96 FL (ref 78–100)
NT-PROBNP SERPL-MCNC: 453 PG/ML (ref 0–1800)
O2/TOTAL GAS SETTING VFR VENT: 40 %
OXYHGB MFR BLDV: 64 % (ref 70–75)
P AXIS - MUSE: 86 DEGREES
PCO2 BLDV: 38 MM HG (ref 40–50)
PH BLDV: 7.48 [PH] (ref 7.32–7.43)
PHOSPHATE SERPL-MCNC: 3.2 MG/DL (ref 2.5–4.5)
PLATELET # BLD AUTO: 128 10E3/UL (ref 150–450)
PO2 BLDV: 33 MM HG (ref 25–47)
POTASSIUM BLD-SCNC: 3.6 MMOL/L (ref 3.4–5.3)
POTASSIUM BLD-SCNC: 3.6 MMOL/L (ref 3.4–5.3)
PR INTERVAL - MUSE: 124 MS
QRS DURATION - MUSE: 78 MS
QT - MUSE: 452 MS
QTC - MUSE: 470 MS
R AXIS - MUSE: -14 DEGREES
RBC # BLD AUTO: 3.99 10E6/UL (ref 4.4–5.9)
SODIUM SERPL-SCNC: 147 MMOL/L (ref 133–144)
SODIUM SERPL-SCNC: 150 MMOL/L (ref 133–144)
SYSTOLIC BLOOD PRESSURE - MUSE: NORMAL MMHG
T AXIS - MUSE: 46 DEGREES
UFH PPP CHRO-ACNC: 0.54 IU/ML
VENTRICULAR RATE- MUSE: 65 BPM
WBC # BLD AUTO: 11.8 10E3/UL (ref 4–11)

## 2021-12-23 PROCEDURE — 82310 ASSAY OF CALCIUM: CPT | Performed by: INTERNAL MEDICINE

## 2021-12-23 PROCEDURE — 258N000003 HC RX IP 258 OP 636: Performed by: NURSE PRACTITIONER

## 2021-12-23 PROCEDURE — 82805 BLOOD GASES W/O2 SATURATION: CPT | Performed by: INTERNAL MEDICINE

## 2021-12-23 PROCEDURE — 999N000157 HC STATISTIC RCP TIME EA 10 MIN

## 2021-12-23 PROCEDURE — 250N000013 HC RX MED GY IP 250 OP 250 PS 637: Performed by: INTERNAL MEDICINE

## 2021-12-23 PROCEDURE — 99291 CRITICAL CARE FIRST HOUR: CPT | Mod: GC | Performed by: SURGERY

## 2021-12-23 PROCEDURE — 86022 PLATELET ANTIBODIES: CPT | Performed by: STUDENT IN AN ORGANIZED HEALTH CARE EDUCATION/TRAINING PROGRAM

## 2021-12-23 PROCEDURE — 84100 ASSAY OF PHOSPHORUS: CPT | Performed by: INTERNAL MEDICINE

## 2021-12-23 PROCEDURE — 250N000011 HC RX IP 250 OP 636: Performed by: NURSE PRACTITIONER

## 2021-12-23 PROCEDURE — 93010 ELECTROCARDIOGRAM REPORT: CPT | Performed by: INTERNAL MEDICINE

## 2021-12-23 PROCEDURE — 250N000013 HC RX MED GY IP 250 OP 250 PS 637: Performed by: STUDENT IN AN ORGANIZED HEALTH CARE EDUCATION/TRAINING PROGRAM

## 2021-12-23 PROCEDURE — 86140 C-REACTIVE PROTEIN: CPT | Performed by: INTERNAL MEDICINE

## 2021-12-23 PROCEDURE — 85520 HEPARIN ASSAY: CPT | Performed by: INTERNAL MEDICINE

## 2021-12-23 PROCEDURE — 250N000009 HC RX 250: Performed by: NURSE PRACTITIONER

## 2021-12-23 PROCEDURE — 93005 ELECTROCARDIOGRAM TRACING: CPT

## 2021-12-23 PROCEDURE — 200N000002 HC R&B ICU UMMC

## 2021-12-23 PROCEDURE — 80048 BASIC METABOLIC PNL TOTAL CA: CPT | Performed by: NURSE PRACTITIONER

## 2021-12-23 PROCEDURE — 94003 VENT MGMT INPAT SUBQ DAY: CPT

## 2021-12-23 PROCEDURE — 85379 FIBRIN DEGRADATION QUANT: CPT | Performed by: INTERNAL MEDICINE

## 2021-12-23 PROCEDURE — 250N000011 HC RX IP 250 OP 636: Performed by: INTERNAL MEDICINE

## 2021-12-23 PROCEDURE — 250N000011 HC RX IP 250 OP 636: Performed by: STUDENT IN AN ORGANIZED HEALTH CARE EDUCATION/TRAINING PROGRAM

## 2021-12-23 PROCEDURE — 36592 COLLECT BLOOD FROM PICC: CPT | Performed by: INTERNAL MEDICINE

## 2021-12-23 PROCEDURE — 83880 ASSAY OF NATRIURETIC PEPTIDE: CPT | Performed by: NURSE PRACTITIONER

## 2021-12-23 PROCEDURE — 83735 ASSAY OF MAGNESIUM: CPT | Performed by: INTERNAL MEDICINE

## 2021-12-23 PROCEDURE — 85384 FIBRINOGEN ACTIVITY: CPT | Performed by: INTERNAL MEDICINE

## 2021-12-23 PROCEDURE — 99221 1ST HOSP IP/OBS SF/LOW 40: CPT | Mod: GC | Performed by: STUDENT IN AN ORGANIZED HEALTH CARE EDUCATION/TRAINING PROGRAM

## 2021-12-23 PROCEDURE — 250N000013 HC RX MED GY IP 250 OP 250 PS 637: Performed by: NURSE PRACTITIONER

## 2021-12-23 PROCEDURE — 85027 COMPLETE CBC AUTOMATED: CPT | Performed by: INTERNAL MEDICINE

## 2021-12-23 PROCEDURE — C9113 INJ PANTOPRAZOLE SODIUM, VIA: HCPCS | Performed by: STUDENT IN AN ORGANIZED HEALTH CARE EDUCATION/TRAINING PROGRAM

## 2021-12-23 RX ORDER — POTASSIUM CHLORIDE 20MEQ/15ML
10 LIQUID (ML) ORAL ONCE
Status: COMPLETED | OUTPATIENT
Start: 2021-12-23 | End: 2021-12-23

## 2021-12-23 RX ORDER — OXYCODONE HYDROCHLORIDE 5 MG/1
5 TABLET ORAL EVERY 4 HOURS PRN
Status: DISCONTINUED | OUTPATIENT
Start: 2021-12-23 | End: 2021-12-25

## 2021-12-23 RX ORDER — POTASSIUM CHLORIDE 7.45 MG/ML
10 INJECTION INTRAVENOUS ONCE
Status: COMPLETED | OUTPATIENT
Start: 2021-12-23 | End: 2021-12-23

## 2021-12-23 RX ORDER — FUROSEMIDE 10 MG/ML
20 INJECTION INTRAMUSCULAR; INTRAVENOUS ONCE
Status: COMPLETED | OUTPATIENT
Start: 2021-12-23 | End: 2021-12-23

## 2021-12-23 RX ADMIN — DEXMEDETOMIDINE HYDROCHLORIDE 1.2 MCG/KG/HR: 400 INJECTION, SOLUTION INTRAVENOUS at 04:58

## 2021-12-23 RX ADMIN — INSULIN ASPART 2 UNITS: 100 INJECTION, SOLUTION INTRAVENOUS; SUBCUTANEOUS at 20:19

## 2021-12-23 RX ADMIN — POTASSIUM CHLORIDE 10 MEQ: 40 SOLUTION ORAL at 06:44

## 2021-12-23 RX ADMIN — QUETIAPINE FUMARATE 50 MG: 50 TABLET ORAL at 08:44

## 2021-12-23 RX ADMIN — THIAMINE HCL TAB 100 MG 100 MG: 100 TAB at 08:44

## 2021-12-23 RX ADMIN — INSULIN ASPART 2 UNITS: 100 INJECTION, SOLUTION INTRAVENOUS; SUBCUTANEOUS at 16:12

## 2021-12-23 RX ADMIN — POLYETHYLENE GLYCOL 3350 17 G: 17 POWDER, FOR SOLUTION ORAL at 08:44

## 2021-12-23 RX ADMIN — POTASSIUM CHLORIDE 10 MEQ: 7.46 INJECTION, SOLUTION INTRAVENOUS at 18:06

## 2021-12-23 RX ADMIN — FENTANYL CITRATE 25 MCG/HR: 50 INJECTION INTRAVENOUS at 21:20

## 2021-12-23 RX ADMIN — INSULIN ASPART 1 UNITS: 100 INJECTION, SOLUTION INTRAVENOUS; SUBCUTANEOUS at 04:14

## 2021-12-23 RX ADMIN — DEXAMETHASONE SODIUM PHOSPHATE 12 MG: 4 INJECTION, SOLUTION INTRA-ARTICULAR; INTRALESIONAL; INTRAMUSCULAR; INTRAVENOUS; SOFT TISSUE at 14:10

## 2021-12-23 RX ADMIN — PANTOPRAZOLE SODIUM 40 MG: 40 INJECTION, POWDER, FOR SOLUTION INTRAVENOUS at 08:44

## 2021-12-23 RX ADMIN — Medication 2 PACKET: at 20:20

## 2021-12-23 RX ADMIN — AZITHROMYCIN MONOHYDRATE 500 MG: 500 INJECTION, POWDER, LYOPHILIZED, FOR SOLUTION INTRAVENOUS at 20:20

## 2021-12-23 RX ADMIN — Medication 2 PACKET: at 08:45

## 2021-12-23 RX ADMIN — CEFTRIAXONE SODIUM 2 G: 2 INJECTION, POWDER, FOR SOLUTION INTRAMUSCULAR; INTRAVENOUS at 16:06

## 2021-12-23 RX ADMIN — INSULIN ASPART 1 UNITS: 100 INJECTION, SOLUTION INTRAVENOUS; SUBCUTANEOUS at 08:44

## 2021-12-23 RX ADMIN — QUETIAPINE FUMARATE 50 MG: 50 TABLET ORAL at 14:10

## 2021-12-23 RX ADMIN — FUROSEMIDE 20 MG: 10 INJECTION, SOLUTION INTRAVENOUS at 17:04

## 2021-12-23 RX ADMIN — INSULIN ASPART 1 UNITS: 100 INJECTION, SOLUTION INTRAVENOUS; SUBCUTANEOUS at 23:38

## 2021-12-23 RX ADMIN — PROPOFOL 20 MCG/KG/MIN: 10 INJECTION, EMULSION INTRAVENOUS at 16:11

## 2021-12-23 RX ADMIN — PROPOFOL 30 MCG/KG/MIN: 10 INJECTION, EMULSION INTRAVENOUS at 23:04

## 2021-12-23 RX ADMIN — QUETIAPINE FUMARATE 50 MG: 50 TABLET ORAL at 20:20

## 2021-12-23 RX ADMIN — Medication 25 MCG: at 20:30

## 2021-12-23 RX ADMIN — INSULIN ASPART 1 UNITS: 100 INJECTION, SOLUTION INTRAVENOUS; SUBCUTANEOUS at 13:01

## 2021-12-23 RX ADMIN — MULTIVIT AND MINERALS-FERROUS GLUCONATE 9 MG IRON/15 ML ORAL LIQUID 15 ML: at 08:44

## 2021-12-23 RX ADMIN — DOCUSATE SODIUM 50 MG AND SENNOSIDES 8.6 MG 2 TABLET: 8.6; 5 TABLET, FILM COATED ORAL at 08:44

## 2021-12-23 ASSESSMENT — ACTIVITIES OF DAILY LIVING (ADL)
ADLS_ACUITY_SCORE: 11
ADLS_ACUITY_SCORE: 11
ADLS_ACUITY_SCORE: 23
ADLS_ACUITY_SCORE: 23
ADLS_ACUITY_SCORE: 11
ADLS_ACUITY_SCORE: 21
ADLS_ACUITY_SCORE: 11
ADLS_ACUITY_SCORE: 21
ADLS_ACUITY_SCORE: 21
ADLS_ACUITY_SCORE: 23
ADLS_ACUITY_SCORE: 11
ADLS_ACUITY_SCORE: 21
ADLS_ACUITY_SCORE: 13
ADLS_ACUITY_SCORE: 23
ADLS_ACUITY_SCORE: 23
ADLS_ACUITY_SCORE: 11
ADLS_ACUITY_SCORE: 23
ADLS_ACUITY_SCORE: 11
ADLS_ACUITY_SCORE: 11
ADLS_ACUITY_SCORE: 23
ADLS_ACUITY_SCORE: 11
ADLS_ACUITY_SCORE: 23
ADLS_ACUITY_SCORE: 23
ADLS_ACUITY_SCORE: 15
DEPENDENT_IADLS:: INDEPENDENT

## 2021-12-23 NOTE — PROGRESS NOTES
Tried patient on cpap/ps trial of 7/+5 for the third time today. Rt coordinated with the RN once sedation meds were weaned off.  patient went apneic and had a low RR 4-5 with high spont tidal volumes, with a low minute ventilation. Will continue to monitor.

## 2021-12-23 NOTE — PLAN OF CARE
Problem: Adult Inpatient Plan of Care  Goal: Optimal Comfort and Wellbeing  Outcome: No Change     ICU End of Shift Summary. See flowsheets for vital signs and detailed assessment.    Changes this shift: Titrated sedation all night 2/2 labile/escalating RASS scores (+3), specifically with any cares. Otherwise RASS -3 at rest. Arousable to voice. Doesn't follow commands. Agitated, withdraws and resists cares.  Majority of NOC, pt on 1.2 Precedex , 10 Prop, 75 Fentanyl. However did have fleeting episode of profound bradycardia in mid-30s around 05:00. Consequently, Precedex gtt turned off and Fentanyl gtt titrated to 100, Propofol 20.  Otherwise afebrile.  No secretions. Titrating TF, now running 30mL/hr, with FWF 200mL/4 hours 2/2 Na+ 150. No BM. Voided 450mL devorah UOP overnight.     Plan: Wean vent as able. Trend Na+.

## 2021-12-23 NOTE — CONSULTS
Cardiology Inpatient Consultation  December 23, 2021    Reason for Consult:  A cardiology consult was requested by the MICU service to provide clinical guidance regarding HFrEF.    Assessment and Recommendation:  Mr Randhawa is an unvaccinated pt with COVID 19 ARDS. He was noted have an EF of 35-40% with no known prior cardiac history. Currently not in ACS based on negative troponin and EKG without ischemic changes. Furthermore, his EF has actually slightly improved which is not seen in depressed EF from an ischemic insult. His HFrEFcan be due to critical illness with COVID and will need to recheck his EF once he recovers from COVID to see if his EF normalizes or remains low. If it remains low, at that point we can work him up for ischemic/non-ischemic causes of cardiomyopathy which can be done on a non-urgent basis with a coronary CTA to delineate coronary anatomy. Agree with volume assessment of primary team that pt is not volume overloaded and diuresis is not necessarily required at this point. Regarding pAfib, pt is currently in NSR. His CHADSVASC is atleast 3 for age and heart failure. However he is on anticoagulation for PE and will be on it for at least 3 months by which time we will have a good idea about his Afib burden hopefully since he is on tele currently. Meanwhile, if pt goes back into afib with RVR with rates sustained >130s, can consider IV Metoprolol if pt is not on pressors. If pt is requiring pressors and is in sustained RVR >130s which is thought to be contributing to his decreased blood pressure, can consider Amiodarone. Please avoid diltiazem since pt has a low EF and this can exacerbate heart failure.    #HFrEF Unknown Cause  #pAfib  -No acute work up for HFrEF required at this point. Possibility that this is all related to acute critical illness. Recommend repeating TTE once pt has recovered from COVID. If EF is still low, will consider coronary CTA to assess coronary anatomy  -Holding  off on GDMT currently due to put being critically ill on and norepinephrine. Can initiate beta blocker or ACEI/ARB once pt has recovered  -Currently in NSR. Recommend avoiding diltiazem as you are doing. If pt goes into RVR with sustained rates >130, can either try Metoprolol pushes IV and start PO metoprolol tartrate IF pt is not on pressors and maintaining his blood pressure. If he is on pressors, can consider amiodarone.   -Currently anticoagulated for PE     Plan of care to be discussed with Dr. Cornell.      Thank you for consulting the cardiovascular services at the [facility]. Please do not hesitate to call us with any questions.     Rhett Beckwith MD  Cardiology Fellow  Pager: [phone]        HPI:   Ezequiel Randhawa is a 81 year old male with a PMH of COPD and tobacco use disorder who was admitted to [facility] on 12/20 with COVID-19 ARDS requiring intubation and ICU admission. He is not vaccinated. He was treated with Tocilizumab, Remdesivir and dexamethasone. He had Afib with RVR and was treated with a diltiazem drip which was changed to IV Metoprolol when he was found to have an EF of 35-40%. He also experienced a 6-second pause with bradycardia in the 50s when propofol was initiated. Propofol was switched to midazolam.    At the time of interview, the patient denies chest pain, dyspnea at rest or with exertion, orthopnea, PND, palpitations, lightheadedness, or syncope.     Review of Systems:    Complete review of systems was performed and negative except per HPI.    PMH:  No past medical history on file.  Active Problems:  Patient Active Problem List    Diagnosis Date Noted     Pneumonia due to COVID-19 virus 12/21/2021     Priority: Medium     Hypernatremia 12/20/2021     Priority: Medium     Hypoxia 12/20/2021     Priority: Medium     Pneumonia due to 2019 novel coronavirus 12/20/2021     Priority: Medium     Social History:  Social History     Tobacco Use     Smoking  status: Not on file     Smokeless tobacco: Not on file   Substance Use Topics     Alcohol use: Not on file     Drug use: Not on file     Family History:  No family history on file.    Medications:    azithromycin  500 mg Intravenous Q24H     cefTRIAXone  2 g Intravenous Q24H     dexamethasone  12 mg Intravenous Daily     enoxaparin ANTICOAGULANT  60 mg Subcutaneous Q12H     insulin aspart  1-6 Units Subcutaneous Q4H     multivitamins w/minerals  15 mL Per Feeding Tube Daily     pantoprazole  40 mg Per Feeding Tube QAM AC    Or     pantoprazole (PROTONIX) IV  40 mg Intravenous QAM AC     polyethylene glycol  17 g Oral BID     protein modular  2 packet Per Feeding Tube BID 09 12     QUEtiapine  50 mg Oral TID     senna-docusate  2 tablet Oral BID     thiamine  100 mg Oral Daily         dexmedetomidine Stopped (12/23/21 0538)     dextrose       fentaNYL 100 mcg/hr (12/23/21 0700)     norepinephrine Stopped (12/23/21 0836)     propofol (DIPRIVAN) infusion 20 mcg/kg/min (12/23/21 0700)       Physical Exam:  Temp:  [98.2  F (36.8  C)-98.5  F (36.9  C)] 98.2  F (36.8  C)  Pulse:  [37-78] 47  Resp:  [18] 18  BP: ()/(46-78) 91/58  FiO2 (%):  [30 %-40 %] 40 %  SpO2:  [94 %-100 %] 99 %    Intake/Output Summary (Last 24 hours) at 12/23/2021 0842  Last data filed at 12/23/2021 0800  Gross per 24 hour   Intake 2999.75 ml   Output 1290 ml   Net 1709.75 ml     GEN: Intubated, sedated  HEENT: no icterus  CV: RRR, normal s1/s2, no murmurs/rubs/s3/s4, no heave. JVP up to mid neck in setting of ventilated pt.   CHEST: Mechanical breath sounds bilaterally  ABD: soft, non-tender, normal active bowel sounds  EXTR:No clubbing, cyanosis or edema.   NEURO: Sedated      Diagnostics:  All labs and imaging were reviewed, of note:    CMP  Recent Labs   Lab 12/23/21  0408 12/23/21  0407 12/22/21  2351 12/22/21  1932 12/22/21  1615 12/22/21  1614 12/22/21  0936 12/22/21  0917 12/22/21  0404 12/21/21  1809 12/21/21  1807 12/21/21  1519  12/21/21  1254 12/21/21  1215 12/21/21  0933 12/21/21  0633 12/21/21  0224 12/20/21  2200 12/20/21  1737   *  --   --   --   --  150*  --  148* 147*   < >  --  154*  --  150*  --  150*   < > 160* 158*   POTASSIUM 3.6  --   --   --   --   --   --  3.6 3.5  --  3.6 3.6  --  3.8  --  3.2*   < > 2.9* 3.1*   CHLORIDE 119*  --   --   --   --   --   --   --  118*  --   --  122*  --   --   --  118*  --  123* 115*   CO2 27  --   --   --   --   --   --   --  22  --   --  27  --   --   --  24  --  25 26   ANIONGAP 4  --   --   --   --   --   --   --  7  --   --  5  --   --   --  8  --  12 17   * 158* 192* 146*   < >  --    < >  --  184*   < >  --  154*   < >  --    < > 295*  --  148* 145*   BUN 44*  --   --   --   --   --   --   --  42*  --   --  44*  --   --   --  43*  --  56* 65*   CR 0.77  --   --   --   --   --   --   --  0.87  --   --  0.84  --   --   --  0.79  --  0.92 1.18   GFRESTIMATED >90  --   --   --   --   --   --   --  88  --   --  89  --   --   --  85  --  79 58*   GREGORY 7.9*  --   --   --   --   --   --   --  7.5*  --   --  7.7*  --   --   --  7.3*  --  7.3* 9.3   MAG 2.7*  --   --   --   --   --   --  2.6*  --   --   --   --   --   --   --   --   --  2.5 3.0*   PHOS 3.2  --   --   --   --   --   --  2.8 2.8  --   --   --   --   --   --   --   --   --   --    PROTTOTAL  --   --   --   --   --   --   --   --   --   --   --  5.5*  --  5.7*  --  5.0*  --  5.2* 7.3   ALBUMIN  --   --   --   --   --   --   --   --   --   --   --  2.0*  --  2.2*  --  2.1*  --  2.3* 3.1*   BILITOTAL  --   --   --   --   --   --   --   --   --   --   --  1.0  --  1.1*  --  1.0  --  1.9* 2.3*   ALKPHOS  --   --   --   --   --   --   --   --   --   --   --  73  --  71  --  74  --  73 100   AST  --   --   --   --   --   --   --   --   --   --   --  20  --  22  --  17  --  18 25   ALT  --   --   --   --   --   --   --   --   --   --   --  17  --  14  --  13  --  12 18    < > = values in this interval not displayed.      CBC  Recent Labs   Lab 12/23/21  0408 12/22/21  0404 12/21/21  1519 12/21/21  1229   WBC 11.8* 16.6* 17.4* 11.8*   RBC 3.99* 3.79* 4.05* 3.44*   HGB 12.8* 12.0* 12.8* 10.9*   HCT 38.1* 36.6* 39.6* 34.4*   MCV 96 97 98 100   MCH 32.1 31.7 31.6 31.7   MCHC 33.6 32.8 32.3 31.7   RDW 12.9 13.2 13.2 13.3   * 152 188 126*     INR  Recent Labs   Lab 12/21/21  1519 12/20/21  2200 12/20/21  1737   INR 1.44* 1.64* 1.41*     Arterial Blood Gas  Recent Labs   Lab 12/23/21  0409 12/22/21  2119 12/22/21  0918 12/22/21  0404 12/21/21  2221 12/21/21  1539 12/20/21  2101   PH  --  7.44  --   --   --  7.40 7.39   PCO2  --  37  --   --   --  42 44   PO2  --  66*  --   --   --  87 295*   HCO3  --  25  --   --   --  26  --    O2PER 40 30 30 40   < > 55  --     < > = values in this interval not displayed.       No results found for: TROPI, TROPONIN, TROPR, TROPN    EKG:    Transthoracic echocardiogram:     Nuclear stress test:
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
## 2021-12-23 NOTE — PLAN OF CARE
Major Shift Events: Titrated sedation to try PST. RASS -1/-2, failed PST - immediately apneic. Prop restarted, RASS -3. CMV 30%/18/500/5. Levo off this afternoon, MAP > 65. Still easily agitated with cares. TF started at 1500 @ 10. Increase by 10 q8h to goal of 50 mL/hr. 150 mL q4h FWF. Bowel regimen started, no BM. Low UOP, 500mL bolus LR w no response. 10mg IV lasix w adequate response. Bladder scanned and flushed burris.   Plan: Titrate sedation and PST when able. Continue w current POC.  For vital signs and complete assessments, please see documentation flowsheets.

## 2021-12-23 NOTE — PROGRESS NOTES
MEDICAL ICU PROGRESS NOTE  12/23/2021      Date of Service (when I saw the patient): 12/23/2021    ASSESSMENT:  Ezequiel Randhawa is a 81 year old male with PMH COPD, tobacco use, who was admitted to [facility] ICU on 12/21/21 from [facility] for COVID acute respiratory failure requiring intubation on 12/20/21.     CHANGES and MAJOR THINGS TODAY:   - Continued sedation wean, RASS goal 0 to -1  - Requiring boluses of sedation with cares  - weaned norepinephrine - resumed with bolused sedation  - maintained ventilatory support to Fi of 30%, PEEP 5  - PST x3 - initially apneic, may have higher baseline CO2 vs residual sedation/opioid respiratory depression.       PLAN:      Neuro:  # Pain and sedation  - Fentanyl - wean preferentially   - Propofol  - Seroquel 50 mg TID  - RASS goal 0 to -1  - add precedex in setting of possible upcoming extubation  PRN fentanyl     Pulmonary:  # COVID Positive 12/20/21  # Acute Hypoxic Respiratory Failure 2/2 COVID 19   - Intubated on 12/20   - Follow ABGs  - Vent CMV 18/500/5/40%   - Plateau pressure 18-20     # H/o COPD?   Suspected but no official diagnosis. H/o tobacco use. CT Chest demonstrated moderate emphysema.   - Albuterol PRN     Cardiovascular:  # Afib with RVR, now with regular rate   # LV hypokinesis  # Presumed new onset heart failure with Reduced EF (45-50%)   Echo performed 12/20 showed EF 35-40% with moderate global hypokinesia of the left ventricle, unclear if new without prior echocardiograms for comparison. Suspect 2/2 to stress cardiomyopathy. Troponin negative x3. EKG on admission with sinus bradycardia so low suspicion for ACS. Possibly in the setting of acute illness and tachycardia.  - telemetry monitoring   - will need ischemic workup eventually    - will get repeat TTE in sinus with small improvement in EF.   - Cards consult, recs appreciated   - recommend repeating echo when recovered from acute illness. If EF remains reduced, re-consult Cardiology.      #Shock    #Lactic acidosis, resolved   Shock could be in setting of sepsis 2/2 COVID-19 and CAP. Hypovolemic initially at [facility] ED, but on bedside ultrasound here IVC appeared full without collapse, consistent more with volume overload. Could have cardiogenic component given LV dysfunction.   - Levophed PRN     GI/Nutrition:  Risk for malnutrition and dehydration   Moderate malnutrition in setting of acute illness  - Bowel regimen   - CXR to assess OG placement  - Dietician consult for initiation of TF  - Advance tube feeds as tolerated      Renal/Fluids/Electrolytes:  # Hypernatremia, improving   Dehydrated for the last week. As high as Na 160 at OSH, now stable at Na 150. Was on D5W gtt for correction.   - trend Na q6h - Decreased to BID with sodium normalizing  - free water flushes increased to 200 q 4 hour    # Oliguria  Cr 0.72, no baseline cr identified.  Patient with burris catheter with minimal urine output. Urine yellow, clear. Not responsive to fluid bolus. Responsive to lasix challenge. Consider additional diuresis vs renal US if no improvement     Endocrine:  # No h/o of DM  # Risk of steroid-induced hyperglycemia  - Hypoglycemic protocol per ICU   - Medium dose SSI     ID:  # COVID 19 Positive   - Dex 12 mg (12/20- )  - Remdesivir 12/20   - Toculizumab 12/20     #Community acquired vs aspiration pneumonia  CT Chest PE study (12/20) demonstrated consolidation of bilateral lower lobes concerning for aspiration pneumonia. Procalcitonin of 0.09.   - CTX x1 week/azithromycin x3 days  (12/21- )   - Sputum culture  - MRSA nares   - urine strep pneumo, urine legionella negative  - 12/20 blood culture - pending   - s/p Zosyn 12/20      Hematology:    # Elevated D-Dimer   # Pulmonary Embolism dx 12/20 by CT  # Concern for HIT   # Moderate thrombocytopenia  - High dose heparin gtt switched to lovenox 1 mg/kg BID   - platelet counts decreasing >50% since admission. 4Ts HIT score 5 points - intermediate risk for HIT.  Will get HIT screen with reflex send out test. Resume argatroban if screen is positive. Otherwise, resume enoxaparin.     Musculoskeletal:  PT/OT      Skin:  No acute issues      General Cares/Prophylaxis:    DVT Prophylaxis: Therapeutic lovenox 1 mg/kg BID  GI Prophylaxis: PPI  Restraints: None   Family Communication: Updated son on 12/23  Code Status: DNR, per H&P from Jose's discussion with son had been ok for pre-arrest intubation, pressors, ICU cares, but no CPR; confirmed with son over the phone.       Lines/tubes/drains:  - PIV  - PICC   - Marina  - OG  - ETT      Disposition:  - Medical ICU      Patient seen and findings/plan discussed with medical ICU staff, Dr. Argueta.   Leslie Goldberg   Anesthesia, CA-1    ====================================  INTERVAL HISTORY:   Patient agitated with cares per RN. Otherwise, no acute events overnight.     OBJECTIVE:   1. VITAL SIGNS:   Temp:  [97.4  F (36.3  C)-98.5  F (36.9  C)] 98.2  F (36.8  C)  Pulse:  [37-78] 47  Resp:  [18] 18  BP: ()/(46-78) 91/58  FiO2 (%):  [30 %-40 %] 40 %  SpO2:  [94 %-100 %] 99 %  Ventilation Mode: CMV/AC  (Continuous Mandatory Ventilation/ Assist Control)  FiO2 (%): 40 %  Rate Set (breaths/minute): 18 breaths/min  Tidal Volume Set (mL): 500 mL  PEEP (cm H2O): 5 cmH2O  Oxygen Concentration (%): 40 %  Resp: 18    2. INTAKE/ OUTPUT:   I/O last 3 completed shifts:  In: 2993.9 [I.V.:908.9; NG/GT:1335; IV Piggyback:500]  Out: 1240 [Urine:1240]    3. PHYSICAL EXAMINATION:  General: Sedated, cachetic appearing  HEENT: ETT in place   Neuro: opens eyes to voice. Nods head   Pulm/Resp: Coarse bilateral lung sounds, no wheezes  CV: RRR, no m/r/g, no peripheral edema  Abdomen: Soft, non-distended, non-tender. Hypoactive bowel sounds.   : Marina catheter in place, draining clear yellow urine.   Incisions/Skin: Severely flaky skin    4. LABS:   Arterial Blood Gases   Recent Labs   Lab 12/22/21  2119 12/21/21  1539 12/20/21  2101   PH 7.44 7.40  7.39   PCO2 37 42 44   PO2 66* 87 295*   HCO3 25 26  --      Complete Blood Count   Recent Labs   Lab 12/23/21  0408 12/22/21  0404 12/21/21  1519 12/21/21  1229   WBC 11.8* 16.6* 17.4* 11.8*   HGB 12.8* 12.0* 12.8* 10.9*   * 152 188 126*     Basic Metabolic Panel  Recent Labs   Lab 12/23/21  0408 12/23/21  0407 12/22/21  2351 12/22/21  1932 12/22/21  1615 12/22/21  1614 12/22/21  0936 12/22/21  0917 12/22/21  0404 12/21/21  1809 12/21/21  1807 12/21/21  1519 12/21/21  0933 12/21/21  0633   *  --   --   --   --  150*  --  148* 147*   < >  --  154*   < > 150*   POTASSIUM 3.6  --   --   --   --   --   --  3.6 3.5  --  3.6 3.6   < > 3.2*   CHLORIDE 119*  --   --   --   --   --   --   --  118*  --   --  122*  --  118*   CO2 27  --   --   --   --   --   --   --  22  --   --  27  --  24   BUN 44*  --   --   --   --   --   --   --  42*  --   --  44*  --  43*   CR 0.77  --   --   --   --   --   --   --  0.87  --   --  0.84  --  0.79   * 158* 192* 146*   < >  --    < >  --  184*   < >  --  154*   < > 295*    < > = values in this interval not displayed.     Liver Function Tests  Recent Labs   Lab 12/21/21  1519 12/21/21  1215 12/21/21  0633 12/20/21  2200 12/20/21  1737   AST 20 22 17 18 25   ALT 17 14 13 12 18   ALKPHOS 73 71 74 73 100   BILITOTAL 1.0 1.1* 1.0 1.9* 2.3*   ALBUMIN 2.0* 2.2* 2.1* 2.3* 3.1*   INR 1.44*  --   --  1.64* 1.41*     Coagulation Profile  Recent Labs   Lab 12/21/21  1519 12/21/21  0037 12/20/21  2200 12/20/21  1737   INR 1.44*  --  1.64* 1.41*   PTT 33 38 35  --        5. RADIOLOGY:   Recent Results (from the past 24 hour(s))   XR Abdomen Port 1 View    Narrative    EXAMINATION:  XR ABDOMEN PORT 1 VIEWS, XR ABDOMEN PORT 1 VIEWS  12/22/2021 1:13 PM     COMPARISON: Chest radiograph 12/ 21/21..    HISTORY: OG repositioned.    TECHNIQUE: Frontal view of the abdomen-2 radiographs.    FINDINGS: Enteric tube side-port projects over the stomach. No  abnormally dilated loops of bowel. No  pneumatosis or portal venous  gas. Mixed interstitial and airspace opacities in the left lung  unchanged from prior chest radiograph.      Impression    IMPRESSION: Enteric tube tip and side-port are in the stomach.    ALONDRA ZAVALA MD         SYSTEM ID:  [id number]   XR Abdomen Port 1 View    Narrative    EXAMINATION:  XR ABDOMEN PORT 1 VIEWS, XR ABDOMEN PORT 1 VIEWS  2021 1:13 PM     COMPARISON: Chest radiograph ..    HISTORY: OG repositioned.    TECHNIQUE: Frontal view of the abdomen-2 radiographs.    FINDINGS: Enteric tube side-port projects over the stomach. No  abnormally dilated loops of bowel. No pneumatosis or portal venous  gas. Mixed interstitial and airspace opacities in the left lung  unchanged from prior chest radiograph.      Impression    IMPRESSION: Enteric tube tip and side-port are in the stomach.    ALONDRA ZAVALA MD         SYSTEM ID:  [id number]   Echo Limited   Result Value    LVEF  45-50%    Narrative    [id number]  [id number]  [id number]  [id number]^OLGA^SIERRA     [facility],[place]  Echocardiography Laboratory  [address] [place] [id number]     Name: JULIO ARCE  MRN: [id number]  : 1940  Study Date: 2021 02:00 PM  Age: 81 yrs  Gender: Male  [place] [facility] [place] Study: Heart Failure  Ordering Physician: OLGA DE LA ROSA  Referring Physician: POP NIETO  Performed By: Christina Leach     BSA: 1.8 m2  Height: 74 in  Weight: 133 lb  ______________________________________________________________________________  Procedure  Limited Portable Echo Adult.  ______________________________________________________________________________  Interpretation Summary  Left ventricular size is normal.  The visual ejection fraction is 45-50%.  Right ventricular function, chamber size, wall motion, and thickness are  normal.  No pericardial effusion is  present.  ______________________________________________________________________________  Left Ventricle  Left ventricular size is normal. The visual ejection fraction is 45-50%.     Right Ventricle  Right ventricular function, chamber size, wall motion, and thickness are  normal.     Pericardium  No pericardial effusion is present.     ______________________________________________________________________________  Report approved by: Yasmani Wilson 12/22/2021 03:36 PM     ______________________________________________________________________________

## 2021-12-24 ENCOUNTER — APPOINTMENT (OUTPATIENT)
Dept: GENERAL RADIOLOGY | Facility: CLINIC | Age: 81
DRG: 208 | End: 2021-12-24
Attending: INTERNAL MEDICINE
Payer: COMMERCIAL

## 2021-12-24 LAB
ANION GAP SERPL CALCULATED.3IONS-SCNC: 4 MMOL/L (ref 3–14)
ANION GAP SERPL CALCULATED.3IONS-SCNC: 6 MMOL/L (ref 3–14)
BASE EXCESS BLDV CALC-SCNC: 5.7 MMOL/L (ref -7.7–1.9)
BASE EXCESS BLDV CALC-SCNC: 8.2 MMOL/L (ref -7.7–1.9)
BUN SERPL-MCNC: 28 MG/DL (ref 7–30)
BUN SERPL-MCNC: 36 MG/DL (ref 7–30)
CALCIUM SERPL-MCNC: 6.4 MG/DL (ref 8.5–10.1)
CALCIUM SERPL-MCNC: 7.5 MG/DL (ref 8.5–10.1)
CHLORIDE BLD-SCNC: 113 MMOL/L (ref 94–109)
CHLORIDE BLD-SCNC: 116 MMOL/L (ref 94–109)
CO2 SERPL-SCNC: 26 MMOL/L (ref 20–32)
CO2 SERPL-SCNC: 28 MMOL/L (ref 20–32)
CREAT SERPL-MCNC: 0.53 MG/DL (ref 0.66–1.25)
CREAT SERPL-MCNC: 0.61 MG/DL (ref 0.66–1.25)
CRP SERPL-MCNC: 12 MG/L (ref 0–8)
D DIMER PPP FEU-MCNC: 3.2 UG/ML FEU (ref 0–0.5)
ERYTHROCYTE [DISTWIDTH] IN BLOOD BY AUTOMATED COUNT: 12.9 % (ref 10–15)
FIBRINOGEN PPP-MCNC: 167 MG/DL (ref 170–490)
GFR SERPL CREATININE-BSD FRML MDRD: >90 ML/MIN/1.73M2
GFR SERPL CREATININE-BSD FRML MDRD: >90 ML/MIN/1.73M2
GLUCOSE BLD-MCNC: 120 MG/DL (ref 70–99)
GLUCOSE BLD-MCNC: 208 MG/DL (ref 70–99)
GLUCOSE BLDC GLUCOMTR-MCNC: 111 MG/DL (ref 70–99)
GLUCOSE BLDC GLUCOMTR-MCNC: 147 MG/DL (ref 70–99)
GLUCOSE BLDC GLUCOMTR-MCNC: 149 MG/DL (ref 70–99)
GLUCOSE BLDC GLUCOMTR-MCNC: 173 MG/DL (ref 70–99)
GLUCOSE BLDC GLUCOMTR-MCNC: 177 MG/DL (ref 70–99)
HCO3 BLDV-SCNC: 31 MMOL/L (ref 21–28)
HCO3 BLDV-SCNC: 33 MMOL/L (ref 21–28)
HCT VFR BLD AUTO: 34.5 % (ref 40–53)
HGB BLD-MCNC: 11.9 G/DL (ref 13.3–17.7)
HOLD SPECIMEN HIT: NORMAL
MCH RBC QN AUTO: 32.9 PG (ref 26.5–33)
MCHC RBC AUTO-ENTMCNC: 34.5 G/DL (ref 31.5–36.5)
MCV RBC AUTO: 95 FL (ref 78–100)
O2/TOTAL GAS SETTING VFR VENT: 30 %
O2/TOTAL GAS SETTING VFR VENT: 40 %
OXYHGB MFR BLDV: 69 % (ref 70–75)
PCO2 BLDV: 46 MM HG (ref 40–50)
PCO2 BLDV: 47 MM HG (ref 40–50)
PF4 HEPARIN CMPLX AB SER QL: NEGATIVE
PH BLDV: 7.43 [PH] (ref 7.32–7.43)
PH BLDV: 7.46 [PH] (ref 7.32–7.43)
PHOSPHATE SERPL-MCNC: 2.8 MG/DL (ref 2.5–4.5)
PLATELET # BLD AUTO: 135 10E3/UL (ref 150–450)
PO2 BLDV: 36 MM HG (ref 25–47)
PO2 BLDV: 38 MM HG (ref 25–47)
POTASSIUM BLD-SCNC: 3.2 MMOL/L (ref 3.4–5.3)
POTASSIUM BLD-SCNC: 3.7 MMOL/L (ref 3.4–5.3)
POTASSIUM BLD-SCNC: 3.8 MMOL/L (ref 3.4–5.3)
POTASSIUM BLD-SCNC: 4.6 MMOL/L (ref 3.4–5.3)
POTASSIUM BLD-SCNC: 5.8 MMOL/L (ref 3.4–5.3)
RBC # BLD AUTO: 3.62 10E6/UL (ref 4.4–5.9)
SODIUM SERPL-SCNC: 145 MMOL/L (ref 133–144)
SODIUM SERPL-SCNC: 148 MMOL/L (ref 133–144)
WBC # BLD AUTO: 14.2 10E3/UL (ref 4–11)

## 2021-12-24 PROCEDURE — 82803 BLOOD GASES ANY COMBINATION: CPT | Performed by: STUDENT IN AN ORGANIZED HEALTH CARE EDUCATION/TRAINING PROGRAM

## 2021-12-24 PROCEDURE — 99291 CRITICAL CARE FIRST HOUR: CPT | Mod: GC | Performed by: SURGERY

## 2021-12-24 PROCEDURE — 74018 RADEX ABDOMEN 1 VIEW: CPT

## 2021-12-24 PROCEDURE — C9113 INJ PANTOPRAZOLE SODIUM, VIA: HCPCS | Performed by: STUDENT IN AN ORGANIZED HEALTH CARE EDUCATION/TRAINING PROGRAM

## 2021-12-24 PROCEDURE — 86140 C-REACTIVE PROTEIN: CPT | Performed by: INTERNAL MEDICINE

## 2021-12-24 PROCEDURE — 36592 COLLECT BLOOD FROM PICC: CPT | Performed by: INTERNAL MEDICINE

## 2021-12-24 PROCEDURE — 250N000011 HC RX IP 250 OP 636: Performed by: STUDENT IN AN ORGANIZED HEALTH CARE EDUCATION/TRAINING PROGRAM

## 2021-12-24 PROCEDURE — 85379 FIBRIN DEGRADATION QUANT: CPT | Performed by: INTERNAL MEDICINE

## 2021-12-24 PROCEDURE — 94003 VENT MGMT INPAT SUBQ DAY: CPT

## 2021-12-24 PROCEDURE — 74018 RADEX ABDOMEN 1 VIEW: CPT | Mod: 26 | Performed by: STUDENT IN AN ORGANIZED HEALTH CARE EDUCATION/TRAINING PROGRAM

## 2021-12-24 PROCEDURE — 250N000009 HC RX 250: Performed by: STUDENT IN AN ORGANIZED HEALTH CARE EDUCATION/TRAINING PROGRAM

## 2021-12-24 PROCEDURE — 250N000011 HC RX IP 250 OP 636: Performed by: NURSE PRACTITIONER

## 2021-12-24 PROCEDURE — 85027 COMPLETE CBC AUTOMATED: CPT | Performed by: INTERNAL MEDICINE

## 2021-12-24 PROCEDURE — 84132 ASSAY OF SERUM POTASSIUM: CPT | Performed by: INTERNAL MEDICINE

## 2021-12-24 PROCEDURE — 999N000065 XR ABDOMEN PORT 1 VIEWS

## 2021-12-24 PROCEDURE — 200N000002 HC R&B ICU UMMC

## 2021-12-24 PROCEDURE — 250N000011 HC RX IP 250 OP 636: Performed by: INTERNAL MEDICINE

## 2021-12-24 PROCEDURE — 82805 BLOOD GASES W/O2 SATURATION: CPT | Performed by: INTERNAL MEDICINE

## 2021-12-24 PROCEDURE — 85384 FIBRINOGEN ACTIVITY: CPT | Performed by: INTERNAL MEDICINE

## 2021-12-24 PROCEDURE — 84132 ASSAY OF SERUM POTASSIUM: CPT | Performed by: STUDENT IN AN ORGANIZED HEALTH CARE EDUCATION/TRAINING PROGRAM

## 2021-12-24 PROCEDURE — 250N000013 HC RX MED GY IP 250 OP 250 PS 637: Performed by: NURSE PRACTITIONER

## 2021-12-24 PROCEDURE — 250N000013 HC RX MED GY IP 250 OP 250 PS 637: Performed by: STUDENT IN AN ORGANIZED HEALTH CARE EDUCATION/TRAINING PROGRAM

## 2021-12-24 PROCEDURE — 999N000157 HC STATISTIC RCP TIME EA 10 MIN

## 2021-12-24 PROCEDURE — 82310 ASSAY OF CALCIUM: CPT | Performed by: STUDENT IN AN ORGANIZED HEALTH CARE EDUCATION/TRAINING PROGRAM

## 2021-12-24 PROCEDURE — 84100 ASSAY OF PHOSPHORUS: CPT | Performed by: STUDENT IN AN ORGANIZED HEALTH CARE EDUCATION/TRAINING PROGRAM

## 2021-12-24 RX ORDER — FUROSEMIDE 10 MG/ML
20 INJECTION INTRAMUSCULAR; INTRAVENOUS EVERY 6 HOURS
Status: COMPLETED | OUTPATIENT
Start: 2021-12-24 | End: 2021-12-24

## 2021-12-24 RX ORDER — FENTANYL CITRATE 50 UG/ML
25-50 INJECTION, SOLUTION INTRAMUSCULAR; INTRAVENOUS
Status: DISCONTINUED | OUTPATIENT
Start: 2021-12-24 | End: 2021-12-24

## 2021-12-24 RX ORDER — FENTANYL CITRATE 50 UG/ML
25 INJECTION, SOLUTION INTRAMUSCULAR; INTRAVENOUS
Status: DISCONTINUED | OUTPATIENT
Start: 2021-12-24 | End: 2021-12-25

## 2021-12-24 RX ORDER — POTASSIUM CHLORIDE 7.45 MG/ML
10 INJECTION INTRAVENOUS ONCE
Status: COMPLETED | OUTPATIENT
Start: 2021-12-24 | End: 2021-12-24

## 2021-12-24 RX ORDER — DEXMEDETOMIDINE HYDROCHLORIDE 4 UG/ML
.1-1.2 INJECTION, SOLUTION INTRAVENOUS CONTINUOUS
Status: DISCONTINUED | OUTPATIENT
Start: 2021-12-24 | End: 2021-12-24

## 2021-12-24 RX ORDER — POTASSIUM CHLORIDE 29.8 MG/ML
20 INJECTION INTRAVENOUS ONCE
Status: COMPLETED | OUTPATIENT
Start: 2021-12-24 | End: 2021-12-24

## 2021-12-24 RX ADMIN — FUROSEMIDE 20 MG: 10 INJECTION, SOLUTION INTRAVENOUS at 07:56

## 2021-12-24 RX ADMIN — Medication 0.01 MCG/KG/MIN: at 16:17

## 2021-12-24 RX ADMIN — THIAMINE HCL TAB 100 MG 100 MG: 100 TAB at 07:56

## 2021-12-24 RX ADMIN — ENOXAPARIN SODIUM 60 MG: 60 INJECTION SUBCUTANEOUS at 20:18

## 2021-12-24 RX ADMIN — ENOXAPARIN SODIUM 60 MG: 60 INJECTION SUBCUTANEOUS at 07:55

## 2021-12-24 RX ADMIN — INSULIN ASPART 1 UNITS: 100 INJECTION, SOLUTION INTRAVENOUS; SUBCUTANEOUS at 07:56

## 2021-12-24 RX ADMIN — INSULIN ASPART 1 UNITS: 100 INJECTION, SOLUTION INTRAVENOUS; SUBCUTANEOUS at 20:16

## 2021-12-24 RX ADMIN — CEFTRIAXONE SODIUM 2 G: 2 INJECTION, POWDER, FOR SOLUTION INTRAMUSCULAR; INTRAVENOUS at 15:51

## 2021-12-24 RX ADMIN — PANTOPRAZOLE SODIUM 40 MG: 40 INJECTION, POWDER, FOR SOLUTION INTRAVENOUS at 07:55

## 2021-12-24 RX ADMIN — Medication 2 PACKET: at 07:57

## 2021-12-24 RX ADMIN — INSULIN ASPART 1 UNITS: 100 INJECTION, SOLUTION INTRAVENOUS; SUBCUTANEOUS at 11:30

## 2021-12-24 RX ADMIN — QUETIAPINE FUMARATE 50 MG: 50 TABLET ORAL at 20:57

## 2021-12-24 RX ADMIN — QUETIAPINE FUMARATE 50 MG: 50 TABLET ORAL at 07:55

## 2021-12-24 RX ADMIN — MULTIVIT AND MINERALS-FERROUS GLUCONATE 9 MG IRON/15 ML ORAL LIQUID 15 ML: at 07:56

## 2021-12-24 RX ADMIN — DEXAMETHASONE SODIUM PHOSPHATE 12 MG: 4 INJECTION, SOLUTION INTRA-ARTICULAR; INTRALESIONAL; INTRAMUSCULAR; INTRAVENOUS; SOFT TISSUE at 14:22

## 2021-12-24 RX ADMIN — DOCUSATE SODIUM 50 MG AND SENNOSIDES 8.6 MG 2 TABLET: 8.6; 5 TABLET, FILM COATED ORAL at 20:57

## 2021-12-24 RX ADMIN — POTASSIUM CHLORIDE 10 MEQ: 7.46 INJECTION, SOLUTION INTRAVENOUS at 05:38

## 2021-12-24 RX ADMIN — INSULIN ASPART 1 UNITS: 100 INJECTION, SOLUTION INTRAVENOUS; SUBCUTANEOUS at 04:17

## 2021-12-24 RX ADMIN — POTASSIUM CHLORIDE 20 MEQ: 29.8 INJECTION, SOLUTION INTRAVENOUS at 17:33

## 2021-12-24 RX ADMIN — FUROSEMIDE 20 MG: 10 INJECTION, SOLUTION INTRAVENOUS at 14:24

## 2021-12-24 ASSESSMENT — ACTIVITIES OF DAILY LIVING (ADL)
ADLS_ACUITY_SCORE: 15
ADLS_ACUITY_SCORE: 19
ADLS_ACUITY_SCORE: 15
ADLS_ACUITY_SCORE: 19
ADLS_ACUITY_SCORE: 15
ADLS_ACUITY_SCORE: 19
ADLS_ACUITY_SCORE: 15
ADLS_ACUITY_SCORE: 19
ADLS_ACUITY_SCORE: 15
ADLS_ACUITY_SCORE: 19
ADLS_ACUITY_SCORE: 15
ADLS_ACUITY_SCORE: 19
ADLS_ACUITY_SCORE: 19
ADLS_ACUITY_SCORE: 15
ADLS_ACUITY_SCORE: 19
ADLS_ACUITY_SCORE: 19
ADLS_ACUITY_SCORE: 15
ADLS_ACUITY_SCORE: 15
ADLS_ACUITY_SCORE: 19
ADLS_ACUITY_SCORE: 15

## 2021-12-24 ASSESSMENT — MIFFLIN-ST. JEOR: SCORE: 1409.75

## 2021-12-24 NOTE — PROGRESS NOTES
MEDICAL ICU PROGRESS NOTE  12/24/2021      Date of Service (when I saw the patient): 12/24/2021    ASSESSMENT:  Ezequiel Randhawa is a 81 year old male with PMH COPD, tobacco use, who was admitted to [facility] ICU on 12/21/21 from [facility] for COVID acute respiratory failure requiring intubation on 12/20/21.     CHANGES and MAJOR THINGS TODAY:   - Remains on a small dose of norepinephrine this morning   - maintained ventilatory support to Fi of 30%, PEEP 5  - Sedation off, precedex added for deonte-extubation  -  PST this AM - probable extubation later this morning  - Lovenox restarted this morning    PLAN:      Neuro:  # Pain and sedation  - Fentanyl off  - Propofol off  - Seroquel 50 mg TID  - RASS goal 0 to -1  - add precedex in setting of deonte-extubation  PRN fentanyl/oxy     Pulmonary:  # COVID Positive 12/20/21  # Acute Hypoxic Respiratory Failure 2/2 COVID 19   # Pulmonary embolism 12/20  - Intubated on 12/20   - Follow ABGs  - Vent CMV 18/500/5/30%   - Plateau pressure 18-20   - probable extubation to NC/simple face mask this morning when more awake and following pressure support trial.     # H/o COPD?   Suspected but no official diagnosis. H/o tobacco use. CT Chest demonstrated moderate emphysema.   - Albuterol PRN     Cardiovascular:  # Afib with RVR, now with regular rate   # LV hypokinesis  # Presumed new onset heart failure with Reduced EF (45-50%)   Echo performed 12/20 showed EF 35-40% with moderate global hypokinesia of the left ventricle, unclear if new without prior echocardiograms for comparison. Suspect 2/2 to stress cardiomyopathy. Troponin negative x3. EKG on admission with sinus bradycardia so low suspicion for ACS. Possibly in the setting of acute illness and tachycardia.  - telemetry monitoring   - will need ischemic workup eventually    - repeat TTE in sinus with small improvement in EF.   - Cards consult, recs appreciated   - recommend repeating echo when recovered from acute illness. If EF  remains reduced, re-consult Cardiology.      #Shock   #Lactic acidosis, resolved   Shock could be in setting of sepsis 2/2 COVID-19 and CAP. Hypovolemic initially at [facility] ED, but on bedside ultrasound here IVC appeared full without collapse, consistent more with volume overload. Could have cardiogenic component given LV dysfunction.   - Levophed PRN     GI/Nutrition:  Risk for malnutrition and dehydration   Moderate malnutrition in setting of acute illness  - Bowel regimen   - CXR to assess OG placement - okay to use  - Dietician consult for initiation of TF  - Advance tube feeds as tolerated      Renal/Fluids/Electrolytes:  # Hypernatremia, improving   Dehydrated for the last week. As high as Na 160 at OSH, now stable at Na 150. Was on D5W gtt for correction.   - trend Na q6h - Decreased to BID with sodium normalizing  - free water flushes increased to 200 q 4 hour    # Oliguria, resolving  Cr 0.61, no baseline cr identified.  Patient with burris catheter with minimal urine output. Urine yellow, clear. Not responsive to fluid bolus. Responsive to lasix challenge.   -Consider renal US if no improvement  - Had 2.4L urine on 12/23, will give 20 lasix q8 times 2 today to diurese to goal net neutral to -500 given increased wt since admission (62.5kg from 58.5kg)     Endocrine:  # No h/o of DM  # Risk of steroid-induced hyperglycemia  - Hypoglycemic protocol per ICU   - Medium dose SSI     ID:  # COVID 19 Positive   - Dex 12 mg x10 days (12/20- )  - Remdesivir 12/20   - Toculizumab 12/20     #Community acquired vs aspiration pneumonia  CT Chest PE study (12/20) demonstrated consolidation of bilateral lower lobes concerning for aspiration pneumonia. Procalcitonin of 0.09. CRP downtrending  - CTX x1 week/azithromycin x3 days  (12/21- )   - Sputum culture  - MRSA nares   - urine strep pneumo, urine legionella negative  - 12/20 blood culture - NGTD   - s/p Zosyn 12/20      Hematology:    # Elevated D-Dimer   # Pulmonary  Embolism dx 12/20 by CT  # Concern for HIT   # Moderate thrombocytopenia  - High dose heparin gtt switched to lovenox 1 mg/kg BID   - platelet counts decreasing >50% since admission. 4Ts HIT score 5 points - intermediate risk for HIT. Will get HIT screen with reflex send out test. Start on argatroban if screen is positive.   -12/24 resumed enoxaparin given low suspicion of HIT given that his PE occurred prior to starting heparin. Will followup HIT screen and continue to monitor platelet counts    Musculoskeletal:  PT/OT      Skin:  No acute issues      General Cares/Prophylaxis:    DVT Prophylaxis: Therapeutic lovenox 1 mg/kg BID  GI Prophylaxis: PPI  Restraints: None   Family Communication: Daily updates to Gregorio tinsley   Code Status: DNR, per H&P from [place]'s discussion with son had been ok for pre-arrest intubation, pressors, ICU cares, but no CPR; confirmed with son over the phone.       Lines/tubes/drains:  - PIV  - PICC   - Marina  - OG  - ETT      Disposition:  - Medical ICU      Patient seen and findings/plan discussed with medical ICU staff, Dr. Argueta.   Leslie Goldberg   Anesthesia, CA-1    ====================================  INTERVAL HISTORY:   Patient agitated with cares per RN, responsive to propofol boluses. Otherwise, no acute events overnight.     OBJECTIVE:   1. VITAL SIGNS:   Temp:  [97.5  F (36.4  C)-99  F (37.2  C)] 97.5  F (36.4  C)  Pulse:  [] 86  Resp:  [18-20] 20  BP: ()/() 105/64  FiO2 (%):  [30 %] 30 %  SpO2:  [92 %-98 %] 96 %  Ventilation Mode: CMV/AC  (Continuous Mandatory Ventilation/ Assist Control)  FiO2 (%): 30 %  Rate Set (breaths/minute): 18 breaths/min  Tidal Volume Set (mL): 500 mL  PEEP (cm H2O): 5 cmH2O  Oxygen Concentration (%): 30 %  Resp: 20    2. INTAKE/ OUTPUT:   I/O last 3 completed shifts:  In: 3642.33 [I.V.:1019.33; NG/GT:1673]  Out: 2730 [Urine:2730]    3. PHYSICAL EXAMINATION:  General: Sedated, cachetic appearing  HEENT: ETT in place   Neuro: opens  eyes to voice. Follows commands. Moves all extremities purposefully.     Pulm/Resp: Coarse bilateral lung sounds, no wheezes  CV: RRR, no m/r/g, no peripheral edema  Abdomen: Soft, non-distended, non-tender.    : Marina catheter in place, draining clear yellow urine.   Incisions/Skin: Severely flaky skin    4. LABS:   Arterial Blood Gases   Recent Labs   Lab 12/22/21  2119 12/21/21  1539 12/20/21  2101   PH 7.44 7.40 7.39   PCO2 37 42 44   PO2 66* 87 295*   HCO3 25 26  --      Complete Blood Count   Recent Labs   Lab 12/24/21  0423 12/23/21  0408 12/22/21  0404 12/21/21  1519   WBC 14.2* 11.8* 16.6* 17.4*   HGB 11.9* 12.8* 12.0* 12.8*   * 128* 152 188     Basic Metabolic Panel  Recent Labs   Lab 12/24/21  0753 12/24/21  0423 12/24/21  0358 12/23/21  2338 12/23/21  1604 12/23/21  1603 12/23/21  0843 12/23/21  0408 12/22/21  1615 12/22/21  1614 12/22/21  0936 12/22/21  0917 12/22/21  0404   NA  --  145*  --   --   --  147*  --  150*  --  150*  --  148* 147*   POTASSIUM  --  3.8  3.7  --   --   --  3.6  --  3.6  --   --   --  3.6 3.5   CHLORIDE  --  113*  --   --   --  118*  --  119*  --   --   --   --  118*   CO2  --  26  --   --   --  26  --  27  --   --   --   --  22   BUN  --  36*  --   --   --  45*  --  44*  --   --   --   --  42*   CR  --  0.61*  --   --   --  0.67  --  0.77  --   --   --   --  0.87   * 208* 177* 149*   < > 208*   < > 176*   < >  --    < >  --  184*    < > = values in this interval not displayed.     Liver Function Tests  Recent Labs   Lab 12/21/21  1519 12/21/21  1215 12/21/21  0633 12/20/21  2200 12/20/21  1737   AST 20 22 17 18 25   ALT 17 14 13 12 18   ALKPHOS 73 71 74 73 100   BILITOTAL 1.0 1.1* 1.0 1.9* 2.3*   ALBUMIN 2.0* 2.2* 2.1* 2.3* 3.1*   INR 1.44*  --   --  1.64* 1.41*     Coagulation Profile  Recent Labs   Lab 12/21/21  1519 12/21/21  0037 12/20/21 2200 12/20/21  1737   INR 1.44*  --  1.64* 1.41*   PTT 33 38 35  --        5. RADIOLOGY:   No results found for this or  any previous visit (from the past 24 hour(s)).

## 2021-12-24 NOTE — PROGRESS NOTES
Pt extubated following PS trial 7/5  30%, passed cuff leak test  And VBG  7.46/47/36/33.  Placed on 4L. LS coarse but pt has adequate cough and is able to move secretions. RT will continue to monitor.

## 2021-12-24 NOTE — PLAN OF CARE
ICU End of Shift Summary. See flowsheets for vital signs and detailed assessment.    Changes this shift: Patient extubated at 1230, currently on 3L NC. Aox1, calm, follows commands. NSR, afebrile. Weaning down levophed, requiring 0.01-0.04 throughout shift, currently paused. IV lasix 20mg x2 today w/ good response, BMP sent to check K+. NG tube placed this evening, pending abdominal x-ray. Speech consult placed. WOC consult placed for wound/abscess on upper left lip. PT/OT following.       Plan: Wean off levophed and oxygen as tolerated. PT/OT, speech, and WOC to follow.       Problem: Adult Inpatient Plan of Care  Goal: Absence of Hospital-Acquired Illness or Injury  Intervention: Identify and Manage Fall Risk  Recent Flowsheet Documentation  Taken 12/24/2021 1600 by Ladan Solis RN  Safety Promotion/Fall Prevention:   fall prevention program maintained   increase visualization of patient   safety round/check completed  Taken 12/24/2021 1200 by Ladan Solis RN  Safety Promotion/Fall Prevention:   fall prevention program maintained   increase visualization of patient   safety round/check completed  Taken 12/24/2021 0800 by Ladan Solis RN  Safety Promotion/Fall Prevention:   fall prevention program maintained   increase visualization of patient   safety round/check completed

## 2021-12-24 NOTE — PLAN OF CARE
ICU End of Shift Summary. See flowsheets for vital signs and detailed assessment.    Changes this shift: Patient remains intubated and sedated. Fent at 25. Prop at 30. Follows commands intermittently. RASS -2. Opens eyes to voice/touch. Withdraws from pain. Very rigid with cares. No secretions from ETT, but moderate subglottal secretions. Sinus rhythm in 60s-80s. Levo running at 0.04. Tube feed running at goal of 50 with  q2h. Replaced potassium this morning. Multiple watery BM, rectal pouch placed. Marina with adequate urine output overall, no output from [id number]. Blanchable redness on coccyx, mepilex in place.     Plan: Recheck K tomorrow morning. Continue to monitor and update MICU with any changes.

## 2021-12-24 NOTE — PLAN OF CARE
Major Shift Events:  Attempted weaning sedation. Maintained calm w RASS -1 on just propofol. Became agitated with afternoon cares and fentanyl restarted. Levo for MAP > 65. SB/SR 40s-80s. Fentanyl causes patient to become more khurram, when only on prop, HR in 80s consistently. PST x3 today. Failed immediately on the first try. Second and third try patient was able to take in proper volumes, but RR was < 8 for the entire trial. Back on CMV 30%/18/500/5. 20mg IV Lasix x1 w adequate response. 2 watery bowel movements this afternoon. TF at 40 mL/hr, increase at 2300 to goal of 50 mL/hr. 100mL q2h FWF. 10 mEq IV K replaced this afternoon.  Patient is more cooperative w cares if things are explained to him, but occasionally still combative.   Plan: Monitor vitals, wean sedation and retry PST tomorrow. Continue w current POC.  For vital signs and complete assessments, please see documentation flowsheets.

## 2021-12-24 NOTE — PROGRESS NOTES
Extubated at 1215 to 4L oxymask. AOx1 currenlty, following commands, lethargic but opening eyes spontaneously. Suction at bedside. OG tube removed w/ extubation. TF off. Consulted w/ team and no need for D10 drip at this time. Plan to follow up about enteral access this afternoon and plan to put speech consult in. Providers updated.

## 2021-12-25 ENCOUNTER — APPOINTMENT (OUTPATIENT)
Dept: SPEECH THERAPY | Facility: CLINIC | Age: 81
DRG: 208 | End: 2021-12-25
Attending: INTERNAL MEDICINE
Payer: COMMERCIAL

## 2021-12-25 LAB
ALBUMIN SERPL-MCNC: 2.2 G/DL (ref 3.4–5)
ALP SERPL-CCNC: 71 U/L (ref 40–150)
ALT SERPL W P-5'-P-CCNC: 38 U/L (ref 0–70)
ANION GAP SERPL CALCULATED.3IONS-SCNC: 2 MMOL/L (ref 3–14)
AST SERPL W P-5'-P-CCNC: 28 U/L (ref 0–45)
BILIRUB SERPL-MCNC: 0.8 MG/DL (ref 0.2–1.3)
BUN SERPL-MCNC: 30 MG/DL (ref 7–30)
CALCIUM SERPL-MCNC: 8.2 MG/DL (ref 8.5–10.1)
CHLORIDE BLD-SCNC: 109 MMOL/L (ref 94–109)
CO2 SERPL-SCNC: 32 MMOL/L (ref 20–32)
CREAT SERPL-MCNC: 0.55 MG/DL (ref 0.66–1.25)
CRP SERPL-MCNC: 8.1 MG/L (ref 0–8)
D DIMER PPP FEU-MCNC: 8.35 UG/ML FEU (ref 0–0.5)
ERYTHROCYTE [DISTWIDTH] IN BLOOD BY AUTOMATED COUNT: 12.7 % (ref 10–15)
FIBRINOGEN PPP-MCNC: 173 MG/DL (ref 170–490)
GFR SERPL CREATININE-BSD FRML MDRD: >90 ML/MIN/1.73M2
GLUCOSE BLD-MCNC: 161 MG/DL (ref 70–99)
GLUCOSE BLDC GLUCOMTR-MCNC: 134 MG/DL (ref 70–99)
GLUCOSE BLDC GLUCOMTR-MCNC: 139 MG/DL (ref 70–99)
GLUCOSE BLDC GLUCOMTR-MCNC: 173 MG/DL (ref 70–99)
GLUCOSE BLDC GLUCOMTR-MCNC: 202 MG/DL (ref 70–99)
GLUCOSE BLDC GLUCOMTR-MCNC: 210 MG/DL (ref 70–99)
GLUCOSE BLDC GLUCOMTR-MCNC: 220 MG/DL (ref 70–99)
HCT VFR BLD AUTO: 37.4 % (ref 40–53)
HGB BLD-MCNC: 12.4 G/DL (ref 13.3–17.7)
MCH RBC QN AUTO: 32 PG (ref 26.5–33)
MCHC RBC AUTO-ENTMCNC: 33.2 G/DL (ref 31.5–36.5)
MCV RBC AUTO: 97 FL (ref 78–100)
PLATELET # BLD AUTO: 104 10E3/UL (ref 150–450)
POTASSIUM BLD-SCNC: 4.2 MMOL/L (ref 3.4–5.3)
POTASSIUM BLD-SCNC: 4.4 MMOL/L (ref 3.4–5.3)
PROT SERPL-MCNC: 5.5 G/DL (ref 6.8–8.8)
RBC # BLD AUTO: 3.87 10E6/UL (ref 4.4–5.9)
SODIUM SERPL-SCNC: 143 MMOL/L (ref 133–144)
WBC # BLD AUTO: 11.5 10E3/UL (ref 4–11)

## 2021-12-25 PROCEDURE — 250N000009 HC RX 250: Performed by: STUDENT IN AN ORGANIZED HEALTH CARE EDUCATION/TRAINING PROGRAM

## 2021-12-25 PROCEDURE — 92526 ORAL FUNCTION THERAPY: CPT | Mod: GN

## 2021-12-25 PROCEDURE — 250N000011 HC RX IP 250 OP 636: Performed by: STUDENT IN AN ORGANIZED HEALTH CARE EDUCATION/TRAINING PROGRAM

## 2021-12-25 PROCEDURE — 84132 ASSAY OF SERUM POTASSIUM: CPT | Performed by: INTERNAL MEDICINE

## 2021-12-25 PROCEDURE — C9113 INJ PANTOPRAZOLE SODIUM, VIA: HCPCS | Performed by: STUDENT IN AN ORGANIZED HEALTH CARE EDUCATION/TRAINING PROGRAM

## 2021-12-25 PROCEDURE — 250N000013 HC RX MED GY IP 250 OP 250 PS 637: Performed by: STUDENT IN AN ORGANIZED HEALTH CARE EDUCATION/TRAINING PROGRAM

## 2021-12-25 PROCEDURE — 120N000002 HC R&B MED SURG/OB UMMC

## 2021-12-25 PROCEDURE — 84132 ASSAY OF SERUM POTASSIUM: CPT | Performed by: STUDENT IN AN ORGANIZED HEALTH CARE EDUCATION/TRAINING PROGRAM

## 2021-12-25 PROCEDURE — 250N000013 HC RX MED GY IP 250 OP 250 PS 637: Performed by: INTERNAL MEDICINE

## 2021-12-25 PROCEDURE — 85379 FIBRIN DEGRADATION QUANT: CPT | Performed by: INTERNAL MEDICINE

## 2021-12-25 PROCEDURE — 99233 SBSQ HOSP IP/OBS HIGH 50: CPT | Performed by: INTERNAL MEDICINE

## 2021-12-25 PROCEDURE — 85384 FIBRINOGEN ACTIVITY: CPT | Performed by: INTERNAL MEDICINE

## 2021-12-25 PROCEDURE — 99233 SBSQ HOSP IP/OBS HIGH 50: CPT | Mod: GC | Performed by: INTERNAL MEDICINE

## 2021-12-25 PROCEDURE — 85027 COMPLETE CBC AUTOMATED: CPT | Performed by: INTERNAL MEDICINE

## 2021-12-25 PROCEDURE — 86140 C-REACTIVE PROTEIN: CPT | Performed by: INTERNAL MEDICINE

## 2021-12-25 RX ORDER — QUETIAPINE FUMARATE 25 MG/1
25 TABLET, FILM COATED ORAL 2 TIMES DAILY PRN
Status: DISCONTINUED | OUTPATIENT
Start: 2021-12-25 | End: 2022-01-07 | Stop reason: HOSPADM

## 2021-12-25 RX ORDER — IPRATROPIUM BROMIDE AND ALBUTEROL SULFATE 2.5; .5 MG/3ML; MG/3ML
3 SOLUTION RESPIRATORY (INHALATION) EVERY 4 HOURS PRN
Status: DISCONTINUED | OUTPATIENT
Start: 2021-12-25 | End: 2021-12-25

## 2021-12-25 RX ORDER — ALBUTEROL SULFATE 90 UG/1
2 AEROSOL, METERED RESPIRATORY (INHALATION) EVERY 6 HOURS PRN
Status: DISCONTINUED | OUTPATIENT
Start: 2021-12-25 | End: 2022-01-07 | Stop reason: HOSPADM

## 2021-12-25 RX ORDER — DEXAMETHASONE SODIUM PHOSPHATE 4 MG/ML
6 INJECTION, SOLUTION INTRA-ARTICULAR; INTRALESIONAL; INTRAMUSCULAR; INTRAVENOUS; SOFT TISSUE DAILY
Status: DISCONTINUED | OUTPATIENT
Start: 2021-12-25 | End: 2021-12-29

## 2021-12-25 RX ORDER — CEFTRIAXONE 2 G/1
2 INJECTION, POWDER, FOR SOLUTION INTRAMUSCULAR; INTRAVENOUS EVERY 24 HOURS
Status: COMPLETED | OUTPATIENT
Start: 2021-12-25 | End: 2021-12-25

## 2021-12-25 RX ORDER — QUETIAPINE FUMARATE 50 MG/1
50 TABLET, FILM COATED ORAL EVERY EVENING
Status: DISCONTINUED | OUTPATIENT
Start: 2021-12-25 | End: 2022-01-04

## 2021-12-25 RX ADMIN — Medication 2 PACKET: at 08:22

## 2021-12-25 RX ADMIN — INSULIN ASPART 2 UNITS: 100 INJECTION, SOLUTION INTRAVENOUS; SUBCUTANEOUS at 20:48

## 2021-12-25 RX ADMIN — IPRATROPIUM BROMIDE AND ALBUTEROL 1 PUFF: 20; 100 SPRAY, METERED RESPIRATORY (INHALATION) at 15:53

## 2021-12-25 RX ADMIN — INSULIN ASPART 1 UNITS: 100 INJECTION, SOLUTION INTRAVENOUS; SUBCUTANEOUS at 15:53

## 2021-12-25 RX ADMIN — MULTIVIT AND MINERALS-FERROUS GLUCONATE 9 MG IRON/15 ML ORAL LIQUID 15 ML: at 08:21

## 2021-12-25 RX ADMIN — INSULIN ASPART 2 UNITS: 100 INJECTION, SOLUTION INTRAVENOUS; SUBCUTANEOUS at 04:32

## 2021-12-25 RX ADMIN — PANTOPRAZOLE SODIUM 40 MG: 40 INJECTION, POWDER, FOR SOLUTION INTRAVENOUS at 08:21

## 2021-12-25 RX ADMIN — QUETIAPINE FUMARATE 50 MG: 50 TABLET ORAL at 20:47

## 2021-12-25 RX ADMIN — DEXAMETHASONE SODIUM PHOSPHATE 6 MG: 4 INJECTION, SOLUTION INTRA-ARTICULAR; INTRALESIONAL; INTRAMUSCULAR; INTRAVENOUS; SOFT TISSUE at 14:18

## 2021-12-25 RX ADMIN — QUETIAPINE FUMARATE 50 MG: 50 TABLET ORAL at 08:21

## 2021-12-25 RX ADMIN — INSULIN ASPART 2 UNITS: 100 INJECTION, SOLUTION INTRAVENOUS; SUBCUTANEOUS at 08:21

## 2021-12-25 RX ADMIN — ENOXAPARIN SODIUM 60 MG: 60 INJECTION SUBCUTANEOUS at 08:21

## 2021-12-25 RX ADMIN — CEFTRIAXONE SODIUM 2 G: 2 INJECTION, POWDER, FOR SOLUTION INTRAMUSCULAR; INTRAVENOUS at 15:53

## 2021-12-25 RX ADMIN — IPRATROPIUM BROMIDE AND ALBUTEROL 1 PUFF: 20; 100 SPRAY, METERED RESPIRATORY (INHALATION) at 20:48

## 2021-12-25 RX ADMIN — RIVAROXABAN 15 MG: 15 TABLET, FILM COATED ORAL at 18:27

## 2021-12-25 RX ADMIN — Medication 2 PACKET: at 20:48

## 2021-12-25 ASSESSMENT — ACTIVITIES OF DAILY LIVING (ADL)
ADLS_ACUITY_SCORE: 19

## 2021-12-25 ASSESSMENT — MIFFLIN-ST. JEOR: SCORE: 1414.75

## 2021-12-25 NOTE — PLAN OF CARE
ICU End of Shift Summary. See flowsheets for vital signs and detailed assessment.    Changes this shift:     Neuro: A/Ox1 to self. Follows commands  Cardiac:  sinus  Resp: 2Lnc  GI/: Rectal pouch in place, Marina in place, see I/Os.           Problem: Adult Inpatient Plan of Care  Goal: Plan of Care Review  Outcome: No Change

## 2021-12-25 NOTE — PROGRESS NOTES
Transferred to:  Room 240 at 1700  Status at time of transfer: On 2L nasal cannula, NSR, BP stable, afebrile. AOx1. Condom cath and rectal pouch in place. TKO running through PICC. TF running at 50mL/hr w/ 100mL Q4h water flushes.   Belongings: Sent with patient.   Marina removed? (if no, why?): Yes  Chart and medications: Sent with patient.   Family notified: Voicemail left with patient's son, Gregorio

## 2021-12-25 NOTE — PROGRESS NOTES
12/25/21 1011   General Information   Onset of Illness/Injury or Date of Surgery 12/21/21   Referring Physician Goldberg, Leslie, MD   Behavioral Issues difficulty managing stress   Patient/Family Therapy Goal Statement (SLP) None stated   Pertinent History of Current Problem Pt is a 81 year old male with PMH COPD, tobacco use, who was admitted to [facility] ICU on 12/21/21 from [facility] for COVID acute respiratory failure requiring intubation on 12/20/21. Clinical swallow eval completed per MD order.    General Observations Alert, requires cues to participate   Past History of Dysphagia None per chart review   Type of Evaluation   Type of Evaluation Swallow Evaluation   Oral Motor   Oral Musculature generally intact   Structural Abnormalities none present   Mucosal Quality dry;sticky   Dentition (Oral Motor)   Dentition (Oral Motor) poor condition;significant number of missing teeth   Cough/Swallow/Gag Reflex (Oral Motor)   Comment, Cough/Swallow/Gag Reflex (Oral Motor) weak cough   Vocal Quality/Secretion Management (Oral Motor)   Vocal Quality (Oral Motor) hypophonic;dysphonic   Secretion Management (Oral Motor)   (wet vocal quality)   General Swallowing Observations   Current Diet/Method of Nutritional Intake (General Swallowing Observations, NIS) NPO;nasogastric tube (NG)   Respiratory Support (General Swallowing Observations) nasal cannula  (3-5LPM)   Swallowing Evaluation Clinical swallow evaluation   Clinical Swallow Evaluation   Feeding Assistance dependent   Clinical Swallow Evaluation Textures Trialed thin liquids   Clinical Swallow Eval: Thin Liquid Texture Trial   Mode of Presentation, Thin Liquids spoon;fed by clinician   Volume of Liquid or Food Presented ice chips x2, tsps thin water x2   Oral Phase of Swallow effortful AP movement  (grimacing)   Pharyngeal Phase of Swallow impaired;coughing/choking;repeated swallows  (grimacing)   Diagnostic Statement Pt accepting limited trials, grimacing with each  trial and demonstrating coughing and wet vocal quality. Eval limited by pt participation.   Esophageal Phase of Swallow   Patient reports or presents with symptoms of esophageal dysphagia No   Swallowing Recommendations   Diet Consistency Recommendations NPO;consider alternative means of nutrition   Medication Administration Recommendations, Swallowing (SLP) via NG   Instrumental Assessment Recommendations reassess via non-instrumental clinical swallow evaluation   General Therapy Interventions   Planned Therapy Interventions Dysphagia Treatment   Dysphagia treatment Oropharyngeal exercise training;Modified diet education;Instruction of safe swallow strategies;Compensatory strategies for swallowing   SLP Therapy Assessment/Plan   Criteria for Skilled Therapeutic Interventions Met (SLP Eval) yes;treatment indicated   SLP Diagnosis Moderate-severe dysphagia   Rehab Potential (SLP Eval) fair, will monitor progress closely   Therapy Frequency (SLP Eval) 5 times/wk   Predicted Duration of Therapy Intervention (SLP Eval) 1 week   Comment, Therapy Assessment/Plan (SLP) Clinical swallow eval completed per MD order. Pt presents with moderate-severe dysphagia s/p intubation for COVID. Oral mech exam remarkable for teeth in very poor condition, dry oral mucosa. SLP attempted oral care, with pt did not tolerate well d/t grimacing/gagging. Pt assessed with thin liquids (ice and water). Prolonged AP transit of ice and liquid, overt s/sx of aspiration with all consistencies, and grimacing with every swallow. Pt continued to endorse feeling miserable, limited participation in eval. Recommend NPO with frequent oral care. SLP will follow for PO readiness.    Therapy Plan Review/Discharge Plan (SLP)   Therapy Plan Review (SLP) evaluation/treatment results reviewed;care plan/treatment goals reviewed;risks/benefits reviewed;current/potential barriers reviewed;participants included;patient   Demonstrates Need for Referral to Another  Service (SLP) clinical nutrition services/dietitian   SLP Discharge Planning    SLP Discharge Recommendation (DC Rec) Transitional Care Facility;home with home care speech therapy   SLP Rationale for DC Rec Dysphagia, NPO; should pt d/c today, he will require a feeding tube and intensive SLP   SLP Brief overview of current status  Recommend NPO with frequent oral care. SLP will follow for PO readiness.     Total Evaluation Time   Total Evaluation Time (Minutes) 26

## 2021-12-25 NOTE — PROGRESS NOTES
MEDICAL ICU PROGRESS NOTE  12/25/2021      Date of Service (when I saw the patient): 12/25/2021    ASSESSMENT:  Ezequiel Randhawa is a 81 year old male with PMH COPD, tobacco use, who was admitted to [facility] ICU on 12/21/21 from [facility] for COVID acute respiratory failure requiring intubation on 12/20/21, he was extubated on 12/24 and transferred out of ICU on 12/25.    CHANGES and MAJOR THINGS TODAY:   - Decrease seroquel to 50mg HS, add PRN dosing  - Repeat ECHO in ~1 week  - Transfer to floor    PLAN:    Neuro:  # Acute pain   # Delirium managment  - Scheduled seroquel 50mg HS with 25mg PRN  - PRN oxycodone PO     Pulmonary:  # COVID Positive 12/20/21  # Acute Hypoxic Respiratory Failure 2/2 COVID 19   # Pulmonary embolism 12/20  - Intubated on 12/20; extubated on 12/24  - Doing well on 2-3LNC    # Hx COPD  Suspected but no official diagnosis. H/o tobacco use. CT Chest demonstrated moderate emphysema.      Cardiovascular:  # Afib with RVR, now with regular rate   # LV hypokinesis  # Presumed new onset heart failure with Reduced EF (45-50%)   Echo performed 12/21 showed EF 35-40% with moderate global hypokinesia of the left ventricle, unclear if new without prior echocardiograms for comparison. Suspect 2/2 to stress cardiomyopathy. Troponin negative x3. EKG on admission with sinus bradycardia so low suspicion for ACS. Possibly in the setting of acute illness and tachycardia.  - will need ischemic workup eventually   - repeat TTE on 12/22 in sinus with small improvement in EF.   - Cards consult, recs appreciated   - recommend repeating echo when recovered from acute illness. If EF remains reduced, re-consult Cardiology.       GI/Nutrition:   # Moderate malnutrition in setting of acute illness  - Bowel regimen   - Tolerating tube feeds at goal     Renal/Fluids/Electrolytes:  # Hypernatremia, improving   - Daily BMP  -  Q4H     Endocrine:  # Steroid and stress induced hyperglycemia  - Hypoglycemic protocol per  ICU   - Medium dose SSI     ID:  # COVID 19 Positive   - Dex 12 mg x10 days (12/20- )  - Remdesivir 12/20   - Toculizumab 12/20     # Community acquired vs aspiration pneumonia  CT Chest PE study (12/20) demonstrated consolidation of bilateral lower lobes concerning for aspiration pneumonia. Procalcitonin of 0.09. CRP downtrending  - Ceftriaxone x1 week/azithromycin x3 days  (12/21- )   - Sputum culture - not collected  - MRSA nares negative  - urine strep pneumo, urine legionella negative  - 12/20 blood culture - NGTD   - s/p Zosyn 12/20      Hematology:    # Elevated D-Dimer   # Pulmonary Embolism dx 12/20 by CT  # Concern for HIT   # Moderate thrombocytopenia  - Therapeutic lovenox with 1mg/kg  - HIIT screen negative    Musculoskeletal:  PT/OT      Skin:  No acute issues      General Cares/Prophylaxis:    DVT Prophylaxis: Therapeutic lovenox 1 mg/kg BID  GI Prophylaxis: Not indicated  Restraints: None   Family Communication: Daily updates to Gregorio tinsley   Code Status: DNR, per H&P from [facility] discussion with son had been ok for pre-arrest intubation, pressors, ICU cares, but no CPR; confirmed with son over the phone.       Lines/tubes/drains:  - PIV  - PICC   - Marina  - NG     Disposition:  - Medical ICU      Patient seen and findings/plan discussed with medical ICU staff, Dr. Pichardo.     Chiquis Quiroz  NP-C      Attending note:  Patient seen, examined and discussed with the SANDIP. All data reviewed.  Agree with the assessment and plan as outlined in the above note.  Extubated yesterday, mental status appears to be appropriate.  History of COPD as outpatient.  Will decrease Seroquel, swallow evaluation.  Completing course of antibiotics.     Esha Pichardo MD  [phone]      ====================================  INTERVAL HISTORY:   Patient extubated yesterday afternoon and has been doing well. No acute events overnight.    OBJECTIVE:   1. VITAL SIGNS:   Temp:  [97.5  F (36.4  C)-98.9  F (37.2  C)] 98.3  F (36.8   C)  Pulse:  [] 80  Resp:  [10-24] 16  BP: ()/() 99/59  FiO2 (%):  [30 %-40 %] 40 %  SpO2:  [90 %-100 %] 97 %  Ventilation Mode: CPAP/PS  (Continuous positive airway pressure with Pressure Support) (placed on by )  FiO2 (%): 40 %  Rate Set (breaths/minute): 18 breaths/min  Tidal Volume Set (mL): 500 mL  PEEP (cm H2O): 5 cmH2O  Pressure Support (cm H2O): 7 cmH2O  Oxygen Concentration (%): 30 %  Resp: 16    2. INTAKE/ OUTPUT:   I/O last 3 completed shifts:  In: 1665.77 [I.V.:300.77; NG/GT:715]  Out: 3775 [Urine:3525; Stool:250]    3. PHYSICAL EXAMINATION:  General: Awake, follows commands, answers questions appropriately  HEENT: Nasal cannula and NG in place  Neuro: Somewhat sleepy. Follows commands. Moves all extremitie  Pulm/Resp: clear/diminished bilateral lung sounds, no wheezes  CV: RRR, no m/r/g, no peripheral edema  Abdomen: Soft, non-distended, non-tender.    : Marina catheter in place, draining clear yellow urine.   Incisions/Skin: Dry     4. LABS:   Arterial Blood Gases   Recent Labs   Lab 12/22/21  2119 12/21/21  1539 12/20/21  2101   PH 7.44 7.40 7.39   PCO2 37 42 44   PO2 66* 87 295*   HCO3 25 26  --      Complete Blood Count   Recent Labs   Lab 12/25/21  0430 12/24/21  0423 12/23/21  0408 12/22/21  0404   WBC 11.5* 14.2* 11.8* 16.6*   HGB 12.4* 11.9* 12.8* 12.0*   * 135* 128* 152     Basic Metabolic Panel  Recent Labs   Lab 12/25/21  0430 12/25/21  0429 12/25/21  0028 12/24/21  2154 12/24/21 2014 12/24/21  2009 12/24/21  1622 12/24/21  0753 12/24/21  0423 12/23/21  1604 12/23/21  1603 12/23/21  0843 12/23/21  0408   NA  --   --   --   --   --   --  148*  --  145*  --  147*  --  150*   POTASSIUM 4.2  --   --  4.6  --  5.8* 3.2*  --  3.8  3.7  --  3.6  --  3.6   CHLORIDE  --   --   --   --   --   --  116*  --  113*  --  118*  --  119*   CO2  --   --   --   --   --   --  28  --  26  --  26  --  27   BUN  --   --   --   --   --   --  28  --  36*  --  45*  --  44*   CR  --   --    --   --   --   --  0.53*  --  0.61*  --  0.67  --  0.77   GLC  --  202* 134*  --  173*  --  120*   < > 208*   < > 208*   < > 176*    < > = values in this interval not displayed.     Liver Function Tests  Recent Labs   Lab 12/21/21  1519 12/21/21  1215 12/21/21  0633 12/20/21  2200 12/20/21  1737   AST 20 22 17 18 25   ALT 17 14 13 12 18   ALKPHOS 73 71 74 73 100   BILITOTAL 1.0 1.1* 1.0 1.9* 2.3*   ALBUMIN 2.0* 2.2* 2.1* 2.3* 3.1*   INR 1.44*  --   --  1.64* 1.41*     Coagulation Profile  Recent Labs   Lab 12/21/21  1519 12/21/21  0037 12/20/21  2200 12/20/21  1737   INR 1.44*  --  1.64* 1.41*   PTT 33 38 35  --        5. RADIOLOGY:   Recent Results (from the past 24 hour(s))   XR Abdomen Port 1 View    Narrative    Exam: XR ABDOMEN PORT 1 VIEWS, 12/24/2021 5:11 PM    Indication: For NG placement    Comparison: Radiograph of the abdomen dated 12/22/2021    Findings:   Supine AP radiograph of the abdomen. Enteric tube is subdiaphragmatic  with tip in the proximal stomach and sidehole at the distal esophagus.  Nonobstructive bowel gas pattern. Left basilar opacities, favor  atelectasis. No right pleural effusion. No acute osseous  abnormalities.      Impression    Impression: Enteric tube is diaphragmatic with tip projecting over  proximal stomach and sidehole projecting over distal esophagus.  Recommend advancement.    I have personally reviewed the examination and initial interpretation  and I agree with the findings.    DANIELA FARMER MD         SYSTEM ID:  [id number]   XR Abdomen Port 1 View    Narrative    Exam: XR ABDOMEN PORT 1 VIEWS, 12/24/2021 5:11 PM    Indication: OG advancement    Comparison: Same-day radiograph of the abdomen at 16:52    Findings:     Enteric tube tip projects over the stomach and sidehole projects near  the expected location of the gastroesophageal junction. Nonobstructive  bowel gas pattern. Left basilar opacities, favor atelectasis. No  pleural effusions. No acute osseous  abnormalities.      Impression    Impression: Enteric tube tip projects over the stomach and sidehole  projects near the expected location of the gastroesophageal junction.  Recommend advancement by 5 cm.    I have personally reviewed the examination and initial interpretation  and I agree with the findings.    DANIELA FARMER MD         SYSTEM ID:  [id number]   XR Abdomen Port 1 View    Narrative    Exam: XR ABDOMEN PORT 1 VIEWS, 12/24/2021 6:22 PM    Indication: NG placement, advanced 5cm    Comparison: Abdominal x-ray 12/24/2021, chest x-ray 12/21/2021    Findings:   Portable semiupright AP radiograph of the chest. The tip and sidehole  of the NG/OG tube project over the stomach, advanced compared to  prior. The visualized bowel gas pattern is nonobstructive. Partially  visualized catheter tip projects over the low SVC. Hazy opacities of  the left lung base, better evaluated on chest x-ray from 12/21/2021.  Degenerative changes of the spine.      Impression    Impression: The tip and sidehole of the NG/OG tube project over the  stomach, advanced compared to prior.    I have personally reviewed the examination and initial interpretation  and I agree with the findings.    DANIELA FARMER MD         SYSTEM ID:  [id number]

## 2021-12-25 NOTE — PROGRESS NOTES
[facility]    Progress Note - Khurram 3 Service        Date of Admission:  12/21/2021    Assessment & Plan   Ezequiel Randhawa is a 81 y.o. M w/ history of tobacco use who presented with acute hypoxic respiratory failure due to severe COVID-19 pneumonia (required intubation; extubated 12/24) complicated by bilateral pulmonary emboli, new HFrEF (possibly stress-induced), A-fib w/ RVR (resolved), and severe hypernatremia.    #Severe COVID-19 pneumonia with ARDS, improving  #Acute hypoxic respiratory failure  #Septic shock due to COVID-19 infection  Unvaccinated. COVID-19 positive on 12/20/21; symptoms began 1 week prior (cough, chills). Intubated on 12/20 in the [facility] ED. Remdesivir 12/20 x 1. Tocilizumab 12/20. Extubated and weaned off pressors on 12/25. Procal low on admission. Ur Strep and Legionella negative, MRSA swab negative. BCx negative. SpCx not done. Inflammatory markers now trending down.  - c/t dexamethasone -- decrease from 12 mg to 6 mg qday for 10-day course, ending 12/29  - c/t ceftriaxone 2 g q24h to finish 5-day course, ending 12/25 (empiric coverage for possible CAP)  - wean O2 to keep sats > 88-90% considering likely COPD  - continue COVID precautions/quarantine through ~ 1/9/2022 (21 days from positive test considering severe disease)    ABx:  - ceftriaxone (12/21-current)  - azithromycin (12/21-12/23)  - Zosyn (12/20)    #Bilateral PEs  Seen on 12/20 CT. No evidence of R heart strain. Has been on lovenox 60 mg subcu q12h and considering worsening thrombocytopenia will transition to DOAC.  - stop lovenox  - start rivaroxaban 15 mg BID (through 1/10, then transition to 20 mg at bedtime)  - pharmacy liaison consult to assess for medical insurance coverage of rivaroxaban, apixaban, and/or dabigatran    #HFrEF  #Afib with RVR, resolved (now in normal sinus rhythm)  A-fib w/ RVR; received IV metoprolol x 1 on 12/20, and then was briefly on diltiazem gtt  on 12/21. Echo on 12/21 showed EF 35-40% with moderate global hypokinesia of the left ventricle, and EF improved slightly to 45-50% on 12/22. Trop negative. No baseline for comparison. Ddx includes stress cardiomyopathy, ischemic dz (smoker, very little previous medical care), or tachyarrthymia-induced. Appears euvolemic; was receiving intermittent diuresis in the ICU.  - hold diuresis; re-assess volume status daily and diurese as needed  - repeat TTE ~12/29/21  - will likely need outpt ischemic w/u  - will try to to start BB and ACE-I/ARB this admission if EF remains decreased  - intake/output  - daily wts    #Likely COPD  #Tobacco use  Does not appear to have been diagnosed PTA, but moderate emphysema seen on imaging.  - Combivent QID scheduled  - albuterol 2 puffs q6h PRN    #Steroid-induced hyperglycemia  A1c normal at 5.5%. Likely induced by high steroids, and for this reason will opt to lessen dexamethasone dose now that he is stable for the floor. Possibly also affected by tube feeds.  - Medium dose SSI     #Hypernatremia, improving  Na as high as 160 on admission, and has steadily improved with free water flushes.  - CMP  - free water flushes 100 mL q4h    #Thrombocytopenia  Platelets have been slowly dropping to ~100 at time of floor transfer. HIT screen negative.  - daily CBC  - discontinued lovenox as above  - if Plt continues to drop, would consider repeating HIT screen and switching anticoagulation to argatroban    #Hospital delirium  Drowsy and alert/oriented to self but not time or place. No hx of underlying cognitive impairment.  - Seroquel decreased to 50 mg at bedtime; 25 mg BID PRN additional available (QTc < 500 ms)    #Moderate malnutrition in setting of acute illness  - tube feeds through NG   - nutrition and SLP following (recommending NPO due to severe dysphagia)   - multivitamin  - on electrolyte replacement protocol  - PT/OT re-ordered  - SLP --> TCU    #Constipation  - Miralax 17 g BID  scheduled  - Senna 2 tabs BID scheduled  - Bisacodyl 10 mg qday PRN     Diet: NPO for Medical/Clinical Reasons Except for: No Exceptions  Adult Formula Drip Feeding: Continuous Osmolite 1.5; Orogastric tube; Goal Rate: 50; mL/hr; Medication - Feeding Tube Flush Frequency: At least 15-30 mL water before and after medication administration and with tube clogging; Amount to Send (Nutri...    DVT Prophylaxis: rivaroxaban  Marina Catheter: PRESENT, indication: Strict 1-2 Hour I&O   Lines: PICC  Fluids: none  Central Lines: None  Code Status: No CPR- Pre-arrest intubation OK      The patient's care was discussed with the Attending Physician, Dr. Crandall. The MICU note is the billable note for today.    Rommel Alexandre MD  [address]  Securely message with the Vocera Web Console (learn more here)  Text page via [employer] Paging/Directory    Please see sign in/sign out for up to date coverage information  ______________________________________________________________________    Interval History   Drowsy but awakens and answers questions. Says he has mild pain in the middle of the chest. No abdominal pain. 4-point ROS negative.    Data reviewed today: I reviewed all medications, new labs and imaging results over the last 24 hours.    Physical Exam   Vital Signs: Temp: 98.1  F (36.7  C) Temp src: Oral BP: 107/65 Pulse: 89   Resp: 18 SpO2: 92 % O2 Device: Nasal cannula Oxygen Delivery: 6 LPM (patient up in chair)  Weight: 138 lbs 14.24 oz  General: Drowsy but able to awaken, follows commands, answers questions appropriately  HEENT: Nasal cannula and NG in place  Neuro: Somewhat sleepy. Follows commands. Moves all extremities  Pulm/Resp: diminished bilateral lung sounds, no wheezes  CV: RRR, no m/r/g, no peripheral edema  Abdomen: Soft, non-distended, non-tender.    : Marina catheter in place, draining clear yellow urine.   Incisions/Skin: Dry

## 2021-12-25 NOTE — PLAN OF CARE
Admitted/transferred from:   2 RN full  skin assessment completed by Pamela Gallardo, RN and Antonia COREA RN.  Skin assessment finding: issues found Dry scale skin to BLE, redness blachable to coccyx, rectal pouch in place, triple lumen PICC to right upper am, L PIV and swelling to right upper lip.   Interventions/actions: WOC consult ordered     Will continue to monitor.

## 2021-12-26 LAB
ANION GAP SERPL CALCULATED.3IONS-SCNC: 4 MMOL/L (ref 3–14)
ATRIAL RATE - MUSE: 57 BPM
BACTERIA BLD CULT: NO GROWTH
BACTERIA BLD CULT: NO GROWTH
BUN SERPL-MCNC: 30 MG/DL (ref 7–30)
CALCIUM SERPL-MCNC: 8.5 MG/DL (ref 8.5–10.1)
CHLORIDE BLD-SCNC: 108 MMOL/L (ref 94–109)
CO2 SERPL-SCNC: 29 MMOL/L (ref 20–32)
CREAT SERPL-MCNC: 0.56 MG/DL (ref 0.66–1.25)
DIASTOLIC BLOOD PRESSURE - MUSE: NORMAL MMHG
ERYTHROCYTE [DISTWIDTH] IN BLOOD BY AUTOMATED COUNT: 12.6 % (ref 10–15)
GFR SERPL CREATININE-BSD FRML MDRD: >90 ML/MIN/1.73M2
GLUCOSE BLD-MCNC: 139 MG/DL (ref 70–99)
GLUCOSE BLDC GLUCOMTR-MCNC: 100 MG/DL (ref 70–99)
GLUCOSE BLDC GLUCOMTR-MCNC: 137 MG/DL (ref 70–99)
GLUCOSE BLDC GLUCOMTR-MCNC: 143 MG/DL (ref 70–99)
GLUCOSE BLDC GLUCOMTR-MCNC: 164 MG/DL (ref 70–99)
GLUCOSE BLDC GLUCOMTR-MCNC: 165 MG/DL (ref 70–99)
GLUCOSE BLDC GLUCOMTR-MCNC: 165 MG/DL (ref 70–99)
GLUCOSE BLDC GLUCOMTR-MCNC: 195 MG/DL (ref 70–99)
HCT VFR BLD AUTO: 38 % (ref 40–53)
HGB BLD-MCNC: 12.4 G/DL (ref 13.3–17.7)
INTERPRETATION ECG - MUSE: NORMAL
MAGNESIUM SERPL-MCNC: 2.5 MG/DL (ref 1.6–2.3)
MCH RBC QN AUTO: 31.6 PG (ref 26.5–33)
MCHC RBC AUTO-ENTMCNC: 32.6 G/DL (ref 31.5–36.5)
MCV RBC AUTO: 97 FL (ref 78–100)
P AXIS - MUSE: 95 DEGREES
PHOSPHATE SERPL-MCNC: 2.8 MG/DL (ref 2.5–4.5)
PLATELET # BLD AUTO: 104 10E3/UL (ref 150–450)
POTASSIUM BLD-SCNC: 4.5 MMOL/L (ref 3.4–5.3)
PR INTERVAL - MUSE: 152 MS
QRS DURATION - MUSE: 86 MS
QT - MUSE: 440 MS
QTC - MUSE: 428 MS
R AXIS - MUSE: -62 DEGREES
RBC # BLD AUTO: 3.92 10E6/UL (ref 4.4–5.9)
SODIUM SERPL-SCNC: 141 MMOL/L (ref 133–144)
SYSTOLIC BLOOD PRESSURE - MUSE: NORMAL MMHG
T AXIS - MUSE: 47 DEGREES
VENTRICULAR RATE- MUSE: 57 BPM
WBC # BLD AUTO: 10.5 10E3/UL (ref 4–11)

## 2021-12-26 PROCEDURE — 84100 ASSAY OF PHOSPHORUS: CPT | Performed by: INTERNAL MEDICINE

## 2021-12-26 PROCEDURE — 999N000044 HC STATISTIC CVC DRESSING CHANGE

## 2021-12-26 PROCEDURE — 250N000012 HC RX MED GY IP 250 OP 636 PS 637: Performed by: NURSE PRACTITIONER

## 2021-12-26 PROCEDURE — 36592 COLLECT BLOOD FROM PICC: CPT | Performed by: INTERNAL MEDICINE

## 2021-12-26 PROCEDURE — 999N000157 HC STATISTIC RCP TIME EA 10 MIN

## 2021-12-26 PROCEDURE — 250N000009 HC RX 250: Performed by: STUDENT IN AN ORGANIZED HEALTH CARE EDUCATION/TRAINING PROGRAM

## 2021-12-26 PROCEDURE — 99233 SBSQ HOSP IP/OBS HIGH 50: CPT | Mod: GC | Performed by: INTERNAL MEDICINE

## 2021-12-26 PROCEDURE — 83735 ASSAY OF MAGNESIUM: CPT | Performed by: INTERNAL MEDICINE

## 2021-12-26 PROCEDURE — 80048 BASIC METABOLIC PNL TOTAL CA: CPT | Performed by: STUDENT IN AN ORGANIZED HEALTH CARE EDUCATION/TRAINING PROGRAM

## 2021-12-26 PROCEDURE — 120N000002 HC R&B MED SURG/OB UMMC

## 2021-12-26 PROCEDURE — 250N000013 HC RX MED GY IP 250 OP 250 PS 637: Performed by: STUDENT IN AN ORGANIZED HEALTH CARE EDUCATION/TRAINING PROGRAM

## 2021-12-26 PROCEDURE — 94640 AIRWAY INHALATION TREATMENT: CPT

## 2021-12-26 PROCEDURE — 250N000011 HC RX IP 250 OP 636: Performed by: STUDENT IN AN ORGANIZED HEALTH CARE EDUCATION/TRAINING PROGRAM

## 2021-12-26 PROCEDURE — 85027 COMPLETE CBC AUTOMATED: CPT | Performed by: STUDENT IN AN ORGANIZED HEALTH CARE EDUCATION/TRAINING PROGRAM

## 2021-12-26 PROCEDURE — 94640 AIRWAY INHALATION TREATMENT: CPT | Mod: 76

## 2021-12-26 PROCEDURE — 36592 COLLECT BLOOD FROM PICC: CPT | Performed by: STUDENT IN AN ORGANIZED HEALTH CARE EDUCATION/TRAINING PROGRAM

## 2021-12-26 RX ORDER — IPRATROPIUM BROMIDE AND ALBUTEROL SULFATE 2.5; .5 MG/3ML; MG/3ML
3 SOLUTION RESPIRATORY (INHALATION)
Status: DISCONTINUED | OUTPATIENT
Start: 2021-12-26 | End: 2021-12-29

## 2021-12-26 RX ADMIN — INSULIN ASPART 1 UNITS: 100 INJECTION, SOLUTION INTRAVENOUS; SUBCUTANEOUS at 01:21

## 2021-12-26 RX ADMIN — RIVAROXABAN 15 MG: 15 TABLET, FILM COATED ORAL at 08:47

## 2021-12-26 RX ADMIN — IPRATROPIUM BROMIDE AND ALBUTEROL SULFATE 3 ML: 2.5; .5 SOLUTION RESPIRATORY (INHALATION) at 16:32

## 2021-12-26 RX ADMIN — DEXAMETHASONE SODIUM PHOSPHATE 6 MG: 4 INJECTION, SOLUTION INTRA-ARTICULAR; INTRALESIONAL; INTRAMUSCULAR; INTRAVENOUS; SOFT TISSUE at 14:20

## 2021-12-26 RX ADMIN — INSULIN ASPART 1 UNITS: 100 INJECTION, SOLUTION INTRAVENOUS; SUBCUTANEOUS at 21:24

## 2021-12-26 RX ADMIN — MULTIVIT AND MINERALS-FERROUS GLUCONATE 9 MG IRON/15 ML ORAL LIQUID 15 ML: at 08:47

## 2021-12-26 RX ADMIN — Medication 2 PACKET: at 11:09

## 2021-12-26 RX ADMIN — IPRATROPIUM BROMIDE AND ALBUTEROL SULFATE 3 ML: 2.5; .5 SOLUTION RESPIRATORY (INHALATION) at 20:19

## 2021-12-26 RX ADMIN — RIVAROXABAN 15 MG: 15 TABLET, FILM COATED ORAL at 17:10

## 2021-12-26 RX ADMIN — INSULIN ASPART 1 UNITS: 100 INJECTION, SOLUTION INTRAVENOUS; SUBCUTANEOUS at 03:48

## 2021-12-26 ASSESSMENT — ACTIVITIES OF DAILY LIVING (ADL)
ADLS_ACUITY_SCORE: 19
ADLS_ACUITY_SCORE: 19
ADLS_ACUITY_SCORE: 21
ADLS_ACUITY_SCORE: 19
ADLS_ACUITY_SCORE: 21
ADLS_ACUITY_SCORE: 19
ADLS_ACUITY_SCORE: 21
ADLS_ACUITY_SCORE: 19
ADLS_ACUITY_SCORE: 19
ADLS_ACUITY_SCORE: 21
ADLS_ACUITY_SCORE: 21
ADLS_ACUITY_SCORE: 19
ADLS_ACUITY_SCORE: 21
ADLS_ACUITY_SCORE: 21
ADLS_ACUITY_SCORE: 19
ADLS_ACUITY_SCORE: 19
ADLS_ACUITY_SCORE: 21
ADLS_ACUITY_SCORE: 19

## 2021-12-26 NOTE — PROGRESS NOTES
[facility]    Progress Note - Khurram 3 Service        Date of Admission:  12/21/2021    Assessment & Plan   Ezequiel Randhawa is a 81 y.o. M w/ history of tobacco use who presented with acute hypoxic respiratory failure due to severe COVID-19 pneumonia (required intubation; extubated 12/24) complicated by bilateral pulmonary emboli, new HFrEF (possibly stress-induced), A-fib w/ RVR (resolved), and severe hypernatremia.    Today's changes:  - decreased FWFs to 120 mL q6h  - re-placed Marina    #Severe COVID-19 pneumonia with ARDS, improving  #Acute hypoxic respiratory failure  #Septic shock due to COVID-19 infection  Unvaccinated. COVID-19 positive on 12/20/21; symptoms began 1 week prior (cough, chills). Intubated on 12/20 in the [facility] ED. Remdesivir 12/20 x 1. Tocilizumab 12/20. Extubated and weaned off pressors on 12/25. Procal low on admission. Ur Strep and Legionella negative, MRSA swab negative. BCx negative. SpCx not done. Inflammatory markers now trending down.  - c/t dexamethasone -- decrease from 12 mg to 6 mg qday for 10-day course, ending 12/29  - wean O2 to keep sats > 90% considering likely COPD  - continue COVID precautions/quarantine through ~ 1/9/2022 (21 days from positive test considering severe disease)    ABx:  - ceftriaxone (12/21-12/25)  - azithromycin (12/21-12/23)  - Zosyn (12/20)    #Bilateral PEs  Seen on 12/20 CT. No evidence of R heart strain. Transitioned from Lovenox to rivaroxaban on 12/25.  - rivaroxaban 15 mg BID (through 1/10, then transition to 20 mg at bedtime)  - pharmacy liaison consult to assess for medical insurance coverage of rivaroxaban, apixaban, and/or dabigatran    #HFrEF  #Afib with RVR, resolved (now in normal sinus rhythm)  A-fib w/ RVR; received IV metoprolol x 1 on 12/20, and then was briefly on diltiazem gtt on 12/21. Echo on 12/21 showed EF 35-40% with moderate global hypokinesia of the left ventricle, and EF improved  slightly to 45-50% on 12/22. Trop negative. No baseline for comparison. Ddx includes stress cardiomyopathy, ischemic dz (smoker, very little previous medical care), or tachyarrthymia-induced. Appears euvolemic; was receiving intermittent diuresis in the ICU.  - hold diuresis; re-assess volume status daily and diurese as needed  - repeat TTE ~12/29/21  - will likely need outpt ischemic w/u  - will try to to start BB and ACE-I/ARB this admission if EF remains decreased and hemodynamics tolerate it  - intake/output   - daily wts    #Likely COPD  #Tobacco use  Does not appear to have been diagnosed PTA, but moderate emphysema seen on imaging. Unable to follow commands for inhaler use.  - switch Combivent to Duonebs QID scheduled  - albuterol 2 puffs q6h PRN    #Steroid-induced hyperglycemia  A1c normal at 5.5%. Likely induced by high steroids, and for this reason will opt to lessen dexamethasone dose now that he is stable for the floor. Possibly also affected by tube feeds.  - Medium dose SSI     #Hypernatremia, improving  Na as high as 160 on admission, and has steadily improved with free water flushes.  - trend electrolytes  - decrease free water flushes from 100 mL q4h to 120 mL q6h for RN convenience and to avoid overcorrection of sodium    #Thrombocytopenia  Platelets have been slowly dropping to ~100 at time of floor transfer and now stable in that range. Possibly 2/2 consumptive process/thrombosis attributable to hyper-inflammatory state from COVID-19. HIT screen negative.  - daily CBC    #Acute toxic-metabolic encephlopathy  #Hospital delirium  Drowsy and alert/oriented to self but not time or place. No hx of underlying cognitive impairment. Likely due to intubation, infection, and hospital delirium. Certainly hypernatremia could have been playing a role initially but this has now corrected.  - Seroquel decreased to 50 mg at bedtime; 25 mg BID PRN additional available (QTc < 500 ms)    #Moderate malnutrition in  setting of acute illness  - tube feeds through NG   - nutrition and SLP following (recommending NPO due to severe dysphagia)   - multivitamin  - on electrolyte replacement protocol  - PT/OT re-ordered  - SLP --> TCU    #Constipation  - Miralax 17 g BID scheduled  - Senna 2 tabs BID scheduled  - Bisacodyl 10 mg qday PRN     Diet: NPO for Medical/Clinical Reasons Except for: No Exceptions  Adult Formula Drip Feeding: Continuous Osmolite 1.5; Orogastric tube; Goal Rate: 50; mL/hr; Medication - Feeding Tube Flush Frequency: At least 15-30 mL water before and after medication administration and with tube clogging; Amount to Send (Nutri...    DVT Prophylaxis: rivaroxaban  Marina Catheter: Not present   Lines: PICC  Fluids: none  Central Lines: None  Code Status: No CPR- Pre-arrest intubation OK      The patient's care was discussed with the Attending Physician, Dr. Crandall. The MICU note is the billable note for today.    Rommel Alexandre MD  [address]  Securely message with the Vocera Web Console (learn more here)  Text page via [facility] Paging/Directory    Please see sign in/sign out for up to date coverage information  ______________________________________________________________________    Interval History   NAEO. Disoriented this morning and has difficulty discussing any symptoms.    Data reviewed today: I reviewed all medications, new labs and imaging results over the last 24 hours.    Physical Exam   Vital Signs: Temp: 99.3  F (37.4  C) Temp src: Axillary BP: 118/63 Pulse: 82   Resp: 16 SpO2: 96 % O2 Device: Oxymask Oxygen Delivery: 6 LPM  Weight: 138 lbs 14.24 oz  General: Drowsy but able to awaken, follows commands, answers questions appropriately  HEENT: Nasal cannula and NG in place  Neuro: Somewhat sleepy. Follows commands. Moves all extremities  Pulm/Resp: diminished bilateral lung sounds, no wheezes  CV: RRR, no m/r/g, no peripheral  edema  Abdomen: Soft, non-distended, non-tender.  Psych: disoriented, low affect  Skin: no rashes on exposed surfaces   2.859

## 2021-12-26 NOTE — PROVIDER NOTIFICATION
7B rm 40 dipika shaffer. FYI pt has not voided since burris removal. bladder scan 673mL. denies discomfort. no orders for catheterization parameters, do you want me straight cath? marky #[id number]

## 2021-12-26 NOTE — PLAN OF CARE
"Temp: 99.3  F (37.4  C) Temp src: Axillary BP: 118/63 Pulse: 82   Resp: 16 SpO2: 96 % O2 Device: Oxymask Oxygen Delivery: 6 LPM     NEURO: alert and disoriented to time, does not fully understand situation. Withdrawn. Voice is unclear at times.   RESPIRATORY: LS dim and coarse. Overnight sats dropped to 78% on 3LNC. Increased to 6LNC and switched to oxymask; sats 93-97%. Says breathing is \"so-so\". Intermittent accessory mm use. Weak occasionally productive cough, yonker at bedside. Suctioned x1. Continuous pulse ox.  CARDIAC: VSS, denies chest pain.   GI/: rectal pouch draining liquid stool. Unable to void overnight w/ condom cath. Bladder scan at 0400 was >600mL, notified team and intermittently catheterized w/ 600mL out.   DIET: strict NPO. TF @ 50ml/hr and supplements.   PAIN/NAUSEA: denies. Has hiccups intermittently.   INCISION/DRAINS/SKIN: no new deficits.    IV ACCESS: R PICC 3 lumen, TKO. L PIV SL.   ACTIVITY: bedrest, very weak. Sleeps, awakens easily. Check and turn Q2H.   LAB: reviewed.   CHANGES: increased oxygen needs to 6L.catheterized d/t inability to void.   PLAN: continue w/ POC.      "

## 2021-12-26 NOTE — PLAN OF CARE
Activity: bedrest, bed alarm, turn/reposition  Neuros:disoriented, difficult to understand, oriented to self  Cardiac:denies chest pain  Respiratory:6L oxymask  GI/:rectal pouch, burris catheter placed  Diet:NPO, tube feeds at 50  Skin:dry  Lines/Drains: PICC triple lumen, red lumen tko, PIV

## 2021-12-27 ENCOUNTER — APPOINTMENT (OUTPATIENT)
Dept: OCCUPATIONAL THERAPY | Facility: CLINIC | Age: 81
DRG: 208 | End: 2021-12-27
Attending: INTERNAL MEDICINE
Payer: COMMERCIAL

## 2021-12-27 ENCOUNTER — DOCUMENTATION ONLY (OUTPATIENT)
Dept: RADIOLOGY | Facility: CLINIC | Age: 81
End: 2021-12-27
Payer: COMMERCIAL

## 2021-12-27 ENCOUNTER — APPOINTMENT (OUTPATIENT)
Dept: SPEECH THERAPY | Facility: CLINIC | Age: 81
DRG: 208 | End: 2021-12-27
Attending: INTERNAL MEDICINE
Payer: COMMERCIAL

## 2021-12-27 LAB
ANION GAP SERPL CALCULATED.3IONS-SCNC: 5 MMOL/L (ref 3–14)
BUN SERPL-MCNC: 32 MG/DL (ref 7–30)
CALCIUM SERPL-MCNC: 8.4 MG/DL (ref 8.5–10.1)
CHLORIDE BLD-SCNC: 106 MMOL/L (ref 94–109)
CO2 SERPL-SCNC: 27 MMOL/L (ref 20–32)
CREAT SERPL-MCNC: 0.55 MG/DL (ref 0.66–1.25)
ERYTHROCYTE [DISTWIDTH] IN BLOOD BY AUTOMATED COUNT: 12.6 % (ref 10–15)
GFR SERPL CREATININE-BSD FRML MDRD: >90 ML/MIN/1.73M2
GLUCOSE BLD-MCNC: 86 MG/DL (ref 70–99)
GLUCOSE BLDC GLUCOMTR-MCNC: 103 MG/DL (ref 70–99)
GLUCOSE BLDC GLUCOMTR-MCNC: 159 MG/DL (ref 70–99)
GLUCOSE BLDC GLUCOMTR-MCNC: 86 MG/DL (ref 70–99)
GLUCOSE BLDC GLUCOMTR-MCNC: 88 MG/DL (ref 70–99)
GLUCOSE BLDC GLUCOMTR-MCNC: 90 MG/DL (ref 70–99)
GLUCOSE BLDC GLUCOMTR-MCNC: 98 MG/DL (ref 70–99)
HCT VFR BLD AUTO: 37.3 % (ref 40–53)
HGB BLD-MCNC: 12.4 G/DL (ref 13.3–17.7)
MAGNESIUM SERPL-MCNC: 2.4 MG/DL (ref 1.6–2.3)
MCH RBC QN AUTO: 32.2 PG (ref 26.5–33)
MCHC RBC AUTO-ENTMCNC: 33.2 G/DL (ref 31.5–36.5)
MCV RBC AUTO: 97 FL (ref 78–100)
PHOSPHATE SERPL-MCNC: 3.5 MG/DL (ref 2.5–4.5)
PLATELET # BLD AUTO: 114 10E3/UL (ref 150–450)
POTASSIUM BLD-SCNC: 4.5 MMOL/L (ref 3.4–5.3)
RBC # BLD AUTO: 3.85 10E6/UL (ref 4.4–5.9)
SODIUM SERPL-SCNC: 138 MMOL/L (ref 133–144)
WBC # BLD AUTO: 13.8 10E3/UL (ref 4–11)

## 2021-12-27 PROCEDURE — G0463 HOSPITAL OUTPT CLINIC VISIT: HCPCS

## 2021-12-27 PROCEDURE — 250N000009 HC RX 250: Performed by: STUDENT IN AN ORGANIZED HEALTH CARE EDUCATION/TRAINING PROGRAM

## 2021-12-27 PROCEDURE — 36592 COLLECT BLOOD FROM PICC: CPT | Performed by: STUDENT IN AN ORGANIZED HEALTH CARE EDUCATION/TRAINING PROGRAM

## 2021-12-27 PROCEDURE — 99207 PR CDG-MDM COMPONENT: MEETS MODERATE - DOWN CODED: CPT | Performed by: INTERNAL MEDICINE

## 2021-12-27 PROCEDURE — 85027 COMPLETE CBC AUTOMATED: CPT | Performed by: STUDENT IN AN ORGANIZED HEALTH CARE EDUCATION/TRAINING PROGRAM

## 2021-12-27 PROCEDURE — 84100 ASSAY OF PHOSPHORUS: CPT | Performed by: INTERNAL MEDICINE

## 2021-12-27 PROCEDURE — 94640 AIRWAY INHALATION TREATMENT: CPT | Mod: 76

## 2021-12-27 PROCEDURE — 82310 ASSAY OF CALCIUM: CPT | Performed by: STUDENT IN AN ORGANIZED HEALTH CARE EDUCATION/TRAINING PROGRAM

## 2021-12-27 PROCEDURE — 97535 SELF CARE MNGMENT TRAINING: CPT | Mod: GO | Performed by: OCCUPATIONAL THERAPIST

## 2021-12-27 PROCEDURE — 250N000011 HC RX IP 250 OP 636

## 2021-12-27 PROCEDURE — 250N000013 HC RX MED GY IP 250 OP 250 PS 637: Performed by: INTERNAL MEDICINE

## 2021-12-27 PROCEDURE — 83735 ASSAY OF MAGNESIUM: CPT | Performed by: INTERNAL MEDICINE

## 2021-12-27 PROCEDURE — 97165 OT EVAL LOW COMPLEX 30 MIN: CPT | Mod: GO | Performed by: OCCUPATIONAL THERAPIST

## 2021-12-27 PROCEDURE — 99232 SBSQ HOSP IP/OBS MODERATE 35: CPT | Performed by: INTERNAL MEDICINE

## 2021-12-27 PROCEDURE — 94640 AIRWAY INHALATION TREATMENT: CPT

## 2021-12-27 PROCEDURE — 250N000011 HC RX IP 250 OP 636: Performed by: STUDENT IN AN ORGANIZED HEALTH CARE EDUCATION/TRAINING PROGRAM

## 2021-12-27 PROCEDURE — 120N000002 HC R&B MED SURG/OB UMMC

## 2021-12-27 PROCEDURE — 92526 ORAL FUNCTION THERAPY: CPT | Mod: GN

## 2021-12-27 PROCEDURE — 999N000157 HC STATISTIC RCP TIME EA 10 MIN

## 2021-12-27 PROCEDURE — 250N000013 HC RX MED GY IP 250 OP 250 PS 637

## 2021-12-27 RX ORDER — DEXTROSE, SODIUM CHLORIDE, SODIUM LACTATE, POTASSIUM CHLORIDE, AND CALCIUM CHLORIDE 5; .6; .31; .03; .02 G/100ML; G/100ML; G/100ML; G/100ML; G/100ML
INJECTION, SOLUTION INTRAVENOUS CONTINUOUS
Status: DISCONTINUED | OUTPATIENT
Start: 2021-12-27 | End: 2021-12-28

## 2021-12-27 RX ORDER — LIDOCAINE HYDROCHLORIDE 20 MG/ML
5 SOLUTION OROPHARYNGEAL ONCE
Status: DISCONTINUED | OUTPATIENT
Start: 2021-12-27 | End: 2022-01-04

## 2021-12-27 RX ORDER — OLANZAPINE 5 MG/1
5 TABLET, ORALLY DISINTEGRATING ORAL
Status: COMPLETED | OUTPATIENT
Start: 2021-12-27 | End: 2021-12-27

## 2021-12-27 RX ADMIN — SODIUM CHLORIDE, SODIUM LACTATE, POTASSIUM CHLORIDE, CALCIUM CHLORIDE AND DEXTROSE MONOHYDRATE: 5; 600; 310; 30; 20 INJECTION, SOLUTION INTRAVENOUS at 16:33

## 2021-12-27 RX ADMIN — INSULIN ASPART 1 UNITS: 100 INJECTION, SOLUTION INTRAVENOUS; SUBCUTANEOUS at 20:26

## 2021-12-27 RX ADMIN — OLANZAPINE 5 MG: 5 TABLET, ORALLY DISINTEGRATING ORAL at 13:53

## 2021-12-27 RX ADMIN — IPRATROPIUM BROMIDE AND ALBUTEROL SULFATE 3 ML: 2.5; .5 SOLUTION RESPIRATORY (INHALATION) at 09:30

## 2021-12-27 RX ADMIN — DEXAMETHASONE SODIUM PHOSPHATE 6 MG: 4 INJECTION, SOLUTION INTRA-ARTICULAR; INTRALESIONAL; INTRAMUSCULAR; INTRAVENOUS; SOFT TISSUE at 13:53

## 2021-12-27 RX ADMIN — QUETIAPINE FUMARATE 25 MG: 50 TABLET ORAL at 06:31

## 2021-12-27 RX ADMIN — IPRATROPIUM BROMIDE AND ALBUTEROL SULFATE 3 ML: 2.5; .5 SOLUTION RESPIRATORY (INHALATION) at 16:43

## 2021-12-27 ASSESSMENT — ACTIVITIES OF DAILY LIVING (ADL)
ADLS_ACUITY_SCORE: 19
ADLS_ACUITY_SCORE: 21
ADLS_ACUITY_SCORE: 19
ADLS_ACUITY_SCORE: 21
ADLS_ACUITY_SCORE: 19

## 2021-12-27 ASSESSMENT — MIFFLIN-ST. JEOR: SCORE: 1386.75

## 2021-12-27 NOTE — PROGRESS NOTES
[facility]    Progress Note - Khurram 3 Service        Date of Admission:  12/21/2021    Assessment & Plan   Ezequiel Randhawa is a 81 y.o. M w/ history of tobacco use who presented with acute hypoxic respiratory failure due to severe COVID-19 pneumonia (required intubation; extubated 12/24) complicated by bilateral pulmonary emboli, new HFrEF (possibly stress-induced), A-fib w/ RVR (resolved), and severe hypernatremia.    Today's changes:  - replacing NG tube - plan to convert to post-pyloric feeding tube as tube was being used for medicine and nutrition and not for decompression.      #Severe COVID-19 pneumonia with ARDS, improving  #Acute hypoxic respiratory failure  #Septic shock due to COVID-19 infection  Unvaccinated. COVID-19 positive on 12/20/21; symptoms began 1 week prior (cough, chills). Intubated on 12/20 in the [facility] ED. Remdesivir 12/20 x 1. Tocilizumab 12/20. Extubated and weaned off pressors on 12/25. Procal low on admission. Ur Strep and Legionella negative, MRSA swab negative. BCx negative. SpCx not done. Inflammatory markers now trending down.  - c/t dexamethasone -- decrease from 12 mg to 6 mg qday for 10-day course, ending 12/29  - wean O2 to keep sats > 90% considering likely COPD  - continue COVID precautions/quarantine through ~ 1/9/2022 (21 days from positive test considering severe disease)    ABx:  - ceftriaxone (12/21-12/25)  - azithromycin (12/21-12/23)  - Zosyn (12/20)    #Bilateral PEs  Seen on 12/20 CT. No evidence of R heart strain. Transitioned from Lovenox to rivaroxaban on 12/25.  - rivaroxaban 15 mg BID (through 1/10, then transition to 20 mg at bedtime)  - pharmacy liaison consult to assess for medical insurance coverage of rivaroxaban, apixaban, and/or dabigatran    #HFrEF  #Afib with RVR, resolved (now in normal sinus rhythm)  A-fib w/ RVR; received IV metoprolol x 1 on 12/20, and then was briefly on diltiazem gtt on 12/21. Echo  on 12/21 showed EF 35-40% with moderate global hypokinesia of the left ventricle, and EF improved slightly to 45-50% on 12/22. Trop negative. No baseline for comparison. Ddx includes stress cardiomyopathy, ischemic dz (smoker, very little previous medical care), or tachyarrthymia-induced. Appears euvolemic; was receiving intermittent diuresis in the ICU.  - hold diuresis; re-assess volume status daily and diurese as needed  - repeat TTE ~12/29/21  - will likely need outpt ischemic w/u  - will try to to start BB and ACE-I/ARB this admission if EF remains decreased and hemodynamics tolerate it  - intake/output   - daily wts    #Likely COPD  #Tobacco use  Does not appear to have been diagnosed PTA, but moderate emphysema seen on imaging. Unable to follow commands for inhaler use.  - switch Combivent to Duonebs QID scheduled  - albuterol 2 puffs q6h PRN    #Steroid-induced hyperglycemia  A1c normal at 5.5%. Likely induced by high steroids, and for this reason will opt to lessen dexamethasone dose now that he is stable for the floor. Possibly also affected by tube feeds.  - Medium dose SSI     #Hypernatremia, improving  Na as high as 160 on admission, and has steadily improved with free water flushes.  - trend electrolytes  - keep free water flushes at 120 mL q6h today, though he will have missed at least 2 doses due to absence of tube.    #Thrombocytopenia  Platelets have been slowly dropping to ~100 at time of floor transfer and now stable in that range. Possibly 2/2 consumptive process/thrombosis attributable to hyper-inflammatory state from COVID-19. HIT screen negative.  - daily CBC    #Acute toxic-metabolic encephlopathy  #Hospital delirium  Drowsy and alert/oriented to self but not time or place. No hx of underlying cognitive impairment. Likely due to intubation, infection, and hospital delirium. Certainly hypernatremia could have been playing a role initially but this has now corrected.  - Seroquel decreased to  "50 mg at bedtime; 25 mg BID PRN additional available (QTc < 500 ms)    #Moderate malnutrition in setting of acute illness  - tube feeds through NG - NG pulled 12/26 evening to be replaced with feeding tube.   - nutrition and SLP following (recommending NPO due to severe dysphagia)   - multivitamin  - on electrolyte replacement protocol  - PT/OT re-ordered  - SLP --> TCU    #Constipation  - Miralax 17 g BID scheduled  - Senna 2 tabs BID scheduled  - Bisacodyl 10 mg qday PRN     Diet: NPO for Medical/Clinical Reasons Except for: No Exceptions  Adult Formula Drip Feeding: Continuous Osmolite 1.5; Orogastric tube; Goal Rate: 50; mL/hr; Medication - Feeding Tube Flush Frequency: At least 15-30 mL water before and after medication administration and with tube clogging; Amount to Send (Nutri...    DVT Prophylaxis: rivaroxaban  Marina Catheter: PRESENT, indication: Strict 1-2 Hour I&O, Strict 1-2 Hour I&O   Lines: PICC  Fluids: none  Central Lines: None  Code Status: No CPR- Pre-arrest intubation OK      The patient's care was discussed with the Bedside Nurse and Patient     Cale Crandall MD  [address]  Securely message with the [employer] Web Console (learn more here)  Text page via [employer] Paging/Directory    Please see sign in/sign out for up to date coverage information  ______________________________________________________________________    Interval History   Awake and sitting up in bed. Oriented to self and place and partially to situation (\"I couldn't breathe and I thought I was going to die before I came to the hospital\"). Denies chest pain. Denies current shortness of breath. Denies abdominal pain.    Data reviewed today: I reviewed all medications, new labs and imaging results over the last 24 hours.    Physical Exam   Vital Signs: Temp: 97.8  F (36.6  C) Temp src: Axillary BP: 111/62 Pulse: 83   Resp: 16 SpO2: 97 % O2 Device: Oxymask Oxygen Delivery: 2 " LPM  Weight: 132 lbs 11.47 oz  General: Awake, follows commands, answers majority of questions appropriately  HEENT: Nasal cannula and NG in place  Neuro: Follows commands. Moves all extremities  Pulm/Resp: diminished bilateral lung sounds, no wheezes, end expiratory fine crackles  CV: RRR, no m/r/g, no peripheral edema  Abdomen: Soft, non-distended, non-tender.  Psych: disoriented, low affect  Skin: no rashes on exposed surfaces

## 2021-12-27 NOTE — PROGRESS NOTES
"   12/27/21 1431   Quick Adds   Type of Visit Initial Occupational Therapy Evaluation   Living Environment   People in home child(ar), adult  (son)   Current Living Arrangements house   Home Accessibility stairs to enter home   Number of Stairs, Main Entrance 3   Stair Railings, Main Entrance railings safe and in good condition   Transportation Anticipated family or friend will provide   Living Environment Comments per pt. he lives with his adult son, has a tub/shower and has everything on one level    Self-Care   Usual Activity Tolerance good   Current Activity Tolerance poor   Equipment Currently Used at Home walker, rolling   Activity/Exercise/Self-Care Comment per pt. he was indep. with BADLs, son A with \"most of the cleaning, cooking,etc.\" Per pt. both he and his son drive   Instrumental Activities of Daily Living (IADL)   IADL Comments Per pt. his son A with most IADLs   Disability/Function   Hearing Difficulty or Deaf no   Patient's preferred means of communication verbal   Wear Glasses or Blind no   Concentrating, Remembering or Making Decisions Difficulty no   Difficulty Communicating no   Walking or Climbing Stairs Difficulty   (uses walker at baseline)   Dressing/Bathing Difficulty no   Toileting issues no   Doing Errands Independently Difficulty (such as shopping) no   Fall history within last six months no   Change in Functional Status Since Onset of Current Illness/Injury yes   General Information   Onset of Illness/Injury or Date of Surgery 12/21/21   Referring Physician Rommel Alexandre MD   Additional Occupational Profile Info/Pertinent History of Current Problem 81 y.o. M w/ history of tobacco use who presented with acute hypoxic respiratory failure due to severe COVID-19 pneumonia (required intubation; extubated 12/24) complicated by bilateral pulmonary emboli, new HFrEF (possibly stress-induced), A-fib w/ RVR (resolved), and severe hypernatremia.   Existing Precautions/Restrictions " fall;oxygen therapy device and L/min  (RA- @ time of eval)   Cognitive Status Examination   Orientation Status other (see comments)  (pt. oriented to self in general to place/hospital)   Follows Commands WFL   Cognitive Status Comments continue to assess/monitor prn   Pain Assessment   Patient Currently in Pain No   Range of Motion Comprehensive   Comment, General Range of Motion BUE AROM WFL   Strength Comprehensive (MMT)   Comment, General Manual Muscle Testing (MMT) Assessment BUE grossly 4-/5   Clinical Impression   Criteria for Skilled Therapeutic Interventions Met (OT) yes   OT Diagnosis impaired ADls/mobility   OT Problem List-Impairments impacting ADL activity tolerance impaired;balance;cognition;strength   ADL comments/analysis below baseline with ADls/mobility   Assessment of Occupational Performance 3-5 Performance Deficits   Identified Performance Deficits dressing,toileting, bathing, home mgmt.   Planned Therapy Interventions (OT) ADL retraining;cognition;strengthening;transfer training;home program guidelines;progressive activity/exercise;risk factor education   Clinical Decision Making Complexity (OT) low complexity   Therapy Frequency (OT) 5x/week   Predicted Duration of Therapy ~ 2 weeks   Anticipated Equipment Needs Upon Discharge (OT)   (TBD)   Risk & Benefits of therapy have been explained evaluation/treatment results reviewed;care plan/treatment goals reviewed;risks/benefits reviewed;current/potential barriers reviewed;participants voiced agreement with care plan;participants included;patient   OT Discharge Planning    OT Discharge Recommendation (DC Rec) Transitional Care Facility;Home with assist;home with home care occupational therapy   OT Rationale for DC Rec pt. currently requiring A with all ADLs/mobility, if pt. d/c'd home at this time he would require 24/7 A with ADls and mobility and A with all IADLs.   OT Brief overview of current status  Pt. min/moda with bed mob., unable to assess  OOB at time of session 2/2 pt. going to proc.    Total Evaluation Time (Minutes)   Total Evaluation Time (Minutes) 10

## 2021-12-27 NOTE — PROGRESS NOTES
"Care Management Follow Up    Length of Stay (days): 6    Expected Discharge Date: 01/03/2022     Concerns to be Addressed: basic needs,home safety     Patient plan of care discussed at interdisciplinary rounds: Yes    Anticipated Discharge Disposition: Transitional Care,Home Care     Anticipated Discharge Services: None  Anticipated Discharge DME: None    Patient/family educated on Medicare website which has current facility and service quality ratings: yes  Education Provided on the Discharge Plan:  Plan TBD  Patient/Family in Agreement with the Plan: unable to assess    Referrals Placed by CM/SW:  None at this time  Private pay costs discussed: Not applicable    Additional Information:  \"Ezequiel Randhawa is a 81 y.o. M w/ history of tobacco use who presented with acute hypoxic respiratory failure due to severe COVID-19 pneumonia (required intubation; extubated 12/24) complicated by bilateral pulmonary emboli, new HFrEF (possibly stress-induced), A-fib w/ RVR (resolved), and severe hypernatremia.\"    Per chart review pt is getting an NG replaced today (per unit rounds pt pulled this out), pt has been confused (only oriented to self this am per RN documentation), needing Seroquel, and on oxygen.     Also per chart review, there was concern based on pt's presentation to the hospital that a VA report is needed. JONA spoke with ICU RNCC Deepa and she reported that bedside nursing was concerned as pt appeared very dirty, did not smell well, has many missing teeth, and appeared very malnourished. Deepa reported she tried to reach pt's son Gregorio many times, left voicemails and he has not yet returned any of her calls to gather additional information. Per ICU JONA Pickering no VA report was made as there was not enough information to make a report but there was discussion at the time that if bedside nursing was concerned nursing staff could make the report based on their assessment. There is no record in pt's chart of a VA report " having been filed.     SW attempted to contact pt's son Gregorio ([phone]) to gather additional information and assess if a VA report is needed. The phone went straight to , message was left requesting a call back, waiting to hear back.       VANESSA Cook, [address]   [phone] (pager) [phone]  12/27/2021

## 2021-12-27 NOTE — PROGRESS NOTES
CLINICAL NUTRITION SERVICES - BRIEF NOTE     Nutrition Prescription    RECOMMENDATIONS FOR MDs/PROVIDERS TO ORDER:  -none at present    Recommendations already ordered by Registered Dietitian (RD):  - none at present.     Future/Additional Recommendations:  - Await nutrition POC with pt still refusing NJ placement this PM     Pt pulled out FT over weekend per care rounds.  RD consulted to place pp NFT w/ cortrak and order TF.  Contacted MD re: need for radiology to place FT as pt was not in ICU and RDs/ RNs unable to place ppFT outside of ICU.     EVALUATION OF THE PROGRESS TOWARD GOALS   Diet: NPO  Nutrition Support: TF on hold but order still active in case pt agrees to FT placement.        D5 LR @ 100 ml/hr.     NEW FINDINGS   Labs noted.   Noted SLP still recommended NPO today.     INTERVENTIONS  None at present.     Monitoring/Evaluation  Progress toward goals will be monitored and evaluated per protocol.     Andreia Velasquez RD, LD   7B (M-F) Pager: [phone]  RD Weekend Pager: [phone]

## 2021-12-27 NOTE — PROVIDER NOTIFICATION
Pageasuncion Redmond Hand at [phone]. Pt very combative with NG tube insertion attempts. Staff unable to place tube at the bedside d/t agitation.     MD aware and will update medicine day team.

## 2021-12-27 NOTE — PLAN OF CARE
"/62 (BP Location: Left arm)   Pulse 83   Temp 97.8  F (36.6  C) (Axillary)   Resp 16   Ht 1.88 m (6' 2\")   Wt 60.2 kg (132 lb 11.5 oz)   SpO2 97%   BMI 17.04 kg/m       Cares [id number]    Neuros: oriented to self, hard of hearing  Cardiac:denies chest pain  Respiratory: 2L oxymask sat in the 90's  Activity: bedrest, bed alarm,   GI/: no BM this shift, burris catheter  in place  Diet:NPO, no NGT. Blood sugar monitored BS 98,90  Skin: not assessed during shift. But discoloration and dryness noticed on john feet  Lines/Drains: PICC triple lumen, redressed on shift because patient pulled dressing   PRN: seroquel 25 mg X1  is given for agitation.    Notes: patient tried x 2 to pulled out picc line. He removed his peripheral line and did not want to keep his oxymask on. Seroquel given to calm patient. He is resting. Report given to day nurse        "

## 2021-12-27 NOTE — PLAN OF CARE
[id number] Alert but confused. Oriented to self and time only. Disoriented to place and situation. Slow to respond at times. Upon initial assessment of pt, NG tube was dislodged; MD notified. NG tube insertion attempted, but pt combative with insertion; providers made aware. Tube feeds on hold at this time. BG q4h, sliding scale insulin given per MAR. Strict NPO. Marina in place with adequate urine output. BM smear x1. Denies pain. Not out of bed, repositioned q2h. VSS on 2 L O2 oxymask. Continuous pulse ox in place. Bed alarm on for safety.

## 2021-12-27 NOTE — PROGRESS NOTES
Patient was transported to Radiology for NJ tube placement.  After long discussion about risks and benefits MD began to insert NJ into patient's left nare.  Patient reported procedure was too uncomfortable and refused further intervention. After long discussion patient insisted on refusing intervention.

## 2021-12-27 NOTE — PROGRESS NOTES
"Care from 15:00 - 19:00     BP 97/48 (BP Location: Left arm)   Pulse 89   Temp 99.6  F (37.6  C) (Axillary)   Resp 17   Ht 1.88 m (6' 2\")   Wt 63 kg (138 lb 14.2 oz)   SpO2 93%   BMI 17.83 kg/m      Pt continues on oxymask at 2 LPM O2 delivery. Pt alert, oriented only to self. TF running at goal rate with no issues except persistent loose stools. Rectal pouch removed as it was not working - pt had very large episode of stool incontinence, completely liquid. Marina intact, excellent urine output.   "

## 2021-12-27 NOTE — CONSULTS
Discharge Pharmacy Test Claim    Eliquis and xarelto are both covered through patient's [facility] Medicare Part D with a copay of $270. Patient has not met their yearly deductible of $225. Once the deductible is met, copay reduces to $45/mo. Deductible resets in January. [facility] has a one-time use 30-day free trial voucher available for either eliquis or xarelto.      Ladan Swann  [facility] Liaison  Ph: [phone] Pager: [phone]

## 2021-12-27 NOTE — PROGRESS NOTES
"Ajk-sv-fbccn progress note. Care from: 07:00 - 19:00    /58 (BP Location: Left arm)   Pulse 95   Temp 98.5  F (36.9  C) (Axillary)   Resp 18   Ht 1.88 m (6' 2\")   Wt 60.2 kg (132 lb 11.5 oz)   SpO2 95%   BMI 17.04 kg/m       /53 (BP Location: Left arm)   Pulse 87   Temp 97.8  F (36.6  C) (Axillary)   Resp 16   Ht 1.88 m (6' 2\")   Wt 60.2 kg (132 lb 11.5 oz)   SpO2 96%   BMI 17.04 kg/m        Vitals: VSS     Neuro: Status waxes and wanes, though pt is generally more alert, oriented and conversational than yesterday  o Pain: Denies   o Mood/Behavior: Calm, cooperative with care; refused NG placement overnight and continued to refuse NJ placement by XR this afternoon    Activity: Bed activity only as pt is quite deconditioned. Turning and repositioning every two hours. Bed alarm on for safety. Pt does use the call light but occasionally sets off bed alarm    Cardiovascular: WDL     Respiratory: Able to wean from 3 LPM via Oxymask to room air. SpO2 stable at 92-93%    GI & Nutrition: Speech re-evaluated patient today due to improved mental status, however recommendations remain strict NPO due to marked signs of dysphagia. Planning for NJ tube placement by XR this afternoon.   o Nausea: Denies   o Bowel Movement: Diarrhea x 1 today (incontinent)     : Marina catheter intact with good UOP     Skin, Wounds & LDAs: Skin mostly intact ex scattered bruising. No mepilex to coccyx due to frequent diarrhea. Turning and repositioning q2 hours. Feet are hamzah and scaly with some drainage noted between toes. Providers notified.     Notable Labs: All lytes WNL today. BG stable.     Events & Plan Updates: NJ placement this afternoon was not successful as patient resisted insertion.  D5LR started for hydration while we determine what to do for a nutrition plan.   "

## 2021-12-27 NOTE — PROVIDER NOTIFICATION
Paged Nyla Hand at [phone]. Upon entry into pt's room, NG was not in place. Currently no order for NG tube. Writer requesting order for NG tube insertion to replace if needed.     MD placed order for NG tube for tube feeds.

## 2021-12-28 ENCOUNTER — APPOINTMENT (OUTPATIENT)
Dept: SPEECH THERAPY | Facility: CLINIC | Age: 81
DRG: 208 | End: 2021-12-28
Attending: INTERNAL MEDICINE
Payer: COMMERCIAL

## 2021-12-28 ENCOUNTER — APPOINTMENT (OUTPATIENT)
Dept: PHYSICAL THERAPY | Facility: CLINIC | Age: 81
DRG: 208 | End: 2021-12-28
Attending: INTERNAL MEDICINE
Payer: COMMERCIAL

## 2021-12-28 LAB
ANION GAP SERPL CALCULATED.3IONS-SCNC: 3 MMOL/L (ref 3–14)
BUN SERPL-MCNC: 24 MG/DL (ref 7–30)
CALCIUM SERPL-MCNC: 8.2 MG/DL (ref 8.5–10.1)
CHLORIDE BLD-SCNC: 105 MMOL/L (ref 94–109)
CO2 SERPL-SCNC: 27 MMOL/L (ref 20–32)
CREAT SERPL-MCNC: 0.56 MG/DL (ref 0.66–1.25)
ERYTHROCYTE [DISTWIDTH] IN BLOOD BY AUTOMATED COUNT: 12.6 % (ref 10–15)
GFR SERPL CREATININE-BSD FRML MDRD: >90 ML/MIN/1.73M2
GLUCOSE BLD-MCNC: 94 MG/DL (ref 70–99)
GLUCOSE BLDC GLUCOMTR-MCNC: 126 MG/DL (ref 70–99)
GLUCOSE BLDC GLUCOMTR-MCNC: 162 MG/DL (ref 70–99)
GLUCOSE BLDC GLUCOMTR-MCNC: 81 MG/DL (ref 70–99)
GLUCOSE BLDC GLUCOMTR-MCNC: 87 MG/DL (ref 70–99)
GLUCOSE BLDC GLUCOMTR-MCNC: 91 MG/DL (ref 70–99)
GLUCOSE BLDC GLUCOMTR-MCNC: 99 MG/DL (ref 70–99)
HCT VFR BLD AUTO: 35.8 % (ref 40–53)
HGB BLD-MCNC: 12.1 G/DL (ref 13.3–17.7)
HOLD SPECIMEN: NORMAL
MAGNESIUM SERPL-MCNC: 2.3 MG/DL (ref 1.6–2.3)
MCH RBC QN AUTO: 32.1 PG (ref 26.5–33)
MCHC RBC AUTO-ENTMCNC: 33.8 G/DL (ref 31.5–36.5)
MCV RBC AUTO: 95 FL (ref 78–100)
PHOSPHATE SERPL-MCNC: 2.7 MG/DL (ref 2.5–4.5)
PHOSPHATE SERPL-MCNC: 2.7 MG/DL (ref 2.5–4.5)
PLATELET # BLD AUTO: 123 10E3/UL (ref 150–450)
POTASSIUM BLD-SCNC: 4 MMOL/L (ref 3.4–5.3)
RBC # BLD AUTO: 3.77 10E6/UL (ref 4.4–5.9)
SODIUM SERPL-SCNC: 135 MMOL/L (ref 133–144)
WBC # BLD AUTO: 14.4 10E3/UL (ref 4–11)

## 2021-12-28 PROCEDURE — 250N000013 HC RX MED GY IP 250 OP 250 PS 637: Performed by: STUDENT IN AN ORGANIZED HEALTH CARE EDUCATION/TRAINING PROGRAM

## 2021-12-28 PROCEDURE — 250N000013 HC RX MED GY IP 250 OP 250 PS 637: Performed by: INTERNAL MEDICINE

## 2021-12-28 PROCEDURE — 120N000002 HC R&B MED SURG/OB UMMC

## 2021-12-28 PROCEDURE — 83735 ASSAY OF MAGNESIUM: CPT | Performed by: INTERNAL MEDICINE

## 2021-12-28 PROCEDURE — 80048 BASIC METABOLIC PNL TOTAL CA: CPT | Performed by: STUDENT IN AN ORGANIZED HEALTH CARE EDUCATION/TRAINING PROGRAM

## 2021-12-28 PROCEDURE — 250N000009 HC RX 250: Performed by: STUDENT IN AN ORGANIZED HEALTH CARE EDUCATION/TRAINING PROGRAM

## 2021-12-28 PROCEDURE — 84100 ASSAY OF PHOSPHORUS: CPT | Performed by: INTERNAL MEDICINE

## 2021-12-28 PROCEDURE — 97530 THERAPEUTIC ACTIVITIES: CPT | Mod: GP

## 2021-12-28 PROCEDURE — 36592 COLLECT BLOOD FROM PICC: CPT | Performed by: STUDENT IN AN ORGANIZED HEALTH CARE EDUCATION/TRAINING PROGRAM

## 2021-12-28 PROCEDURE — 999N000157 HC STATISTIC RCP TIME EA 10 MIN

## 2021-12-28 PROCEDURE — 99232 SBSQ HOSP IP/OBS MODERATE 35: CPT | Mod: GC | Performed by: STUDENT IN AN ORGANIZED HEALTH CARE EDUCATION/TRAINING PROGRAM

## 2021-12-28 PROCEDURE — 84100 ASSAY OF PHOSPHORUS: CPT | Performed by: STUDENT IN AN ORGANIZED HEALTH CARE EDUCATION/TRAINING PROGRAM

## 2021-12-28 PROCEDURE — 97162 PT EVAL MOD COMPLEX 30 MIN: CPT | Mod: GP

## 2021-12-28 PROCEDURE — 94640 AIRWAY INHALATION TREATMENT: CPT

## 2021-12-28 PROCEDURE — 250N000011 HC RX IP 250 OP 636: Performed by: STUDENT IN AN ORGANIZED HEALTH CARE EDUCATION/TRAINING PROGRAM

## 2021-12-28 PROCEDURE — 258N000003 HC RX IP 258 OP 636: Performed by: STUDENT IN AN ORGANIZED HEALTH CARE EDUCATION/TRAINING PROGRAM

## 2021-12-28 PROCEDURE — 85027 COMPLETE CBC AUTOMATED: CPT | Performed by: STUDENT IN AN ORGANIZED HEALTH CARE EDUCATION/TRAINING PROGRAM

## 2021-12-28 PROCEDURE — 92526 ORAL FUNCTION THERAPY: CPT | Mod: GN

## 2021-12-28 RX ORDER — LISINOPRIL 2.5 MG/1
2.5 TABLET ORAL DAILY
Status: DISCONTINUED | OUTPATIENT
Start: 2021-12-28 | End: 2021-12-31

## 2021-12-28 RX ORDER — AMOXICILLIN 250 MG
2 CAPSULE ORAL
Status: DISCONTINUED | OUTPATIENT
Start: 2021-12-28 | End: 2022-01-07 | Stop reason: HOSPADM

## 2021-12-28 RX ORDER — POLYETHYLENE GLYCOL 3350 17 G/17G
17 POWDER, FOR SOLUTION ORAL 2 TIMES DAILY PRN
Status: DISCONTINUED | OUTPATIENT
Start: 2021-12-28 | End: 2022-01-07 | Stop reason: HOSPADM

## 2021-12-28 RX ORDER — METOPROLOL TARTRATE 50 MG
50-100 TABLET ORAL
Status: DISCONTINUED | OUTPATIENT
Start: 2021-12-28 | End: 2022-01-04

## 2021-12-28 RX ORDER — MULTIPLE VITAMINS W/ MINERALS TAB 9MG-400MCG
1 TAB ORAL DAILY
Status: DISCONTINUED | OUTPATIENT
Start: 2021-12-28 | End: 2022-01-07 | Stop reason: HOSPADM

## 2021-12-28 RX ADMIN — DEXAMETHASONE SODIUM PHOSPHATE 6 MG: 4 INJECTION, SOLUTION INTRA-ARTICULAR; INTRALESIONAL; INTRAMUSCULAR; INTRAVENOUS; SOFT TISSUE at 13:54

## 2021-12-28 RX ADMIN — SODIUM PHOSPHATE, MONOBASIC, MONOHYDRATE AND SODIUM PHOSPHATE, DIBASIC, ANHYDROUS 9 MMOL: 276; 142 INJECTION, SOLUTION INTRAVENOUS at 18:45

## 2021-12-28 RX ADMIN — Medication 1 TABLET: at 12:00

## 2021-12-28 RX ADMIN — RIVAROXABAN 15 MG: 15 TABLET, FILM COATED ORAL at 18:42

## 2021-12-28 RX ADMIN — QUETIAPINE FUMARATE 50 MG: 50 TABLET ORAL at 21:12

## 2021-12-28 RX ADMIN — RIVAROXABAN 15 MG: 15 TABLET, FILM COATED ORAL at 12:03

## 2021-12-28 RX ADMIN — LISINOPRIL 2.5 MG: 2.5 TABLET ORAL at 18:42

## 2021-12-28 RX ADMIN — IPRATROPIUM BROMIDE AND ALBUTEROL SULFATE 3 ML: 2.5; .5 SOLUTION RESPIRATORY (INHALATION) at 12:21

## 2021-12-28 ASSESSMENT — ACTIVITIES OF DAILY LIVING (ADL)
ADLS_ACUITY_SCORE: 19
ADLS_ACUITY_SCORE: 17
ADLS_ACUITY_SCORE: 19
ADLS_ACUITY_SCORE: 18
ADLS_ACUITY_SCORE: 19
ADLS_ACUITY_SCORE: 18
ADLS_ACUITY_SCORE: 19
ADLS_ACUITY_SCORE: 18
ADLS_ACUITY_SCORE: 19
ADLS_ACUITY_SCORE: 18
ADLS_ACUITY_SCORE: 19
ADLS_ACUITY_SCORE: 18

## 2021-12-28 NOTE — PLAN OF CARE
Activity: bed alarm  Neuros:oriented  to self  Cardiac:denies chest pain  Respiratory:room air  GI/:incontinent of stool, burris catheter  Diet:full liquid diet mildly thick 1:1 supervision, pills crushed in applesauce, blood glucose  Skin:erythema scrotum  Lines/Drains:PICC line triple lumen RUE

## 2021-12-28 NOTE — PROGRESS NOTES
CLINICAL NUTRITION SERVICES - REASSESSMENT NOTE   Nutrition Prescription    Malnutrition Status:    At least moderate malnutrition in the context of acute on chronic illness    Recommendations already ordered by Registered Dietitian (RD):  -Ordered Vital Cuisine Chocolate BID (2pm & HS) for pt to try (this supplement is naturally mildly thick) - 520 kcal, 22 g protein each    -Switching Certavite to Thera-vit-M given now on diet     -Discontinued TF orders given no enteral access and pt refusing FT placement    Future/Additional Recommendations:  -Monitor PO adequacy and diet advancement per SLP.    -If EN again becomes nutrition POC, rec:   Osmolite 1.5 Yuriy @ 50ml/hr (1200ml/day) + Prosource (2 pkts BID) to provide: 1960 kcals (33 kcal/kg) 119 g protein (2 g/kg), 914 ml free H20, 244 g CHO, and 0 g fiber daily. This meets 100% estimated nutrition needs.      EVALUATION OF THE PROGRESS TOWARD GOALS   Diet: Full liquid mildly thick (level 2) with 1:1 supervision per SLP today (previously NPO)    Oral Intake: NPO until just recently.     Enteral Access: None - refusing FT placement    Nutrition support: Osmolite 1.5 Yuriy @ 50ml/hr (1200ml/day) + Prosource (2 pkts BID) will provide: 1960 kcals (33 kcal/kg) 119 g protein (2 g/kg), 914 ml free H20, 244 g CHO, and 0 g fiber daily.   -->TF off since 12/26 d/t loss of enteral access. Had reached goal rate on 12/23 @ 2300.    NEW FINDINGS   -Wt trends: BMI 17.04 kg/m2 based on most recent wt. Will continue dosing wt of 59 kg.  12/27/21 0720 60.2 kg (132 lb 11.5 oz)   12/25/21 0400 63 kg (138 lb 14.2 oz)   12/24/21 0400 62.5 kg (137 lb 12.6 oz)   12/22/21 0200 60.4 kg (133 lb 2.5 oz)   12/21/21 1430 58.5 kg (128 lb 15.5 oz)      -Labs: Reviewed, notable for: Cr 0.56 (L, indicative of low LBM), K+ 4.0 (WNL), Mg++ 2.3 (WNL), Phos 2.7 (WNL)    -GI: Last BM today.    -Neuro: Encephalopathy, hospital delirium    -Respiratory: COVID+. Extubated 12/24.    -Skin: Per most recent WOC  "note on 12/27: \"Currently no Pressure Injury on superior lip.  Possible mucosal pressure injury from ETT that has resolved\"     -Meds & Vitamin/Mineral Supplementation: Reviewed, notable for: Low dose sliding scale insulin    MALNUTRITION  % Intake: Decreased intake does not meet criteria (previously on TF since 12/26)  % Weight Loss: Unable to assess d/t lack of hx - wt overall increased from admit (suspect fluid-related)  Subcutaneous Fat Loss: Unable to assess, COVID+ (suspect at least generalized mild loss given underweight BMI)   Muscle Loss: Unable to assess, COVID+ (suspect at least generalized mild loss given underweight BMI)   Fluid Accumulation/Edema: None noted per chart  Malnutrition Diagnosis: At least moderate malnutrition in the context of acute on chronic illness    Previous Goals   Total avg nutritional intake to meet a minimum of 30 kcal/kg and 1.5 g PRO/kg daily (per dosing wt 59 kg).  Evaluation: Not met    Previous Nutrition Diagnosis  Inadequate protein-energy intake related to NPO (intubated) without enteral nutrition support as evidenced by currently meeting 0% estimated nutrition needs.    Evaluation: Improving    CURRENT NUTRITION DIAGNOSIS  Inadequate protein-energy intake related to previous NPO status and premature/accidental FT removal as evidenced by no nutrition x2 days since FT removed, diet just advanced to full liquid mildly thick per SLP, and BMI 17 kg/m2.    INTERVENTIONS  Implementation  -Collaboration with other providers: Rounds. Pt will need FT if unable to advance diet.  -Enteral Nutrition - Recs if needed  -Medical food supplement therapy: Ordered the only mildly thick nutrition drink we serve for pt to try (Vital Cuisine). It's also the highest kcal product, and given pt's BMI and suspect hypermetabolism 2/2 COVID infection, he needs high kcal items.  -Multivitamin/mineral supplement therapy    Goals  Patient to consume % of nutritionally adequate meal trays TID, or " the equivalent with supplements/snacks.    Monitoring/Evaluation  Progress toward goals will be monitored and evaluated per protocol.     Luiza Bolanos RD, LD  Pager: [phone]

## 2021-12-28 NOTE — CONSULTS
[facility] Nurse Inpatient Pressure Injury Assessment   Reason for consultation: Evaluate and treat suspected lip pressure injury      ASSESSMENT  Currently no Pressure Injury on superior lip.  Possible mucosal pressure injury from ETT that has resolved.      TREATMENT PLAN  Cares per protocol.  [facility] Nurse follow-up plan:signing off  Nursing to notify the Provider(s) and re-consult the [facility] Nurse if wound(s) deteriorates or new skin concern.    Patient History  According to provider note(s): 81 y.o. M w/ history of tobacco use who presented with acute hypoxic respiratory failure due to severe COVID-19 pneumonia (required intubation; extubated 12/24) complicated by bilateral pulmonary emboli, new HFrEF (possibly stress-induced), A-fib w/ RVR (resolved), and severe hypernatremia    Objective Data    Current Diet/ Nutrition:  Orders Placed This Encounter      NPO for Medical/Clinical Reasons Except for: No Exceptions      Output:   I/O last 3 completed shifts:  In: 100   Out: 1000 [Urine:1000]    Risk Assessment:   Sensory Perception: 3-->slightly limited  Moisture: 3-->occasionally moist  Activity: 2-->chairfast  Mobility: 2-->very limited  Nutrition: 2-->probably inadequate  Friction and Shear: 2-->potential problem  Rafael Score: 14      Labs:   Recent Labs   Lab 12/27/21  0806 12/26/21  0901 12/25/21  1418 12/25/21  0430 12/22/21  0404 12/21/21  1519 12/21/21  1215 12/21/21  0633   ALBUMIN  --   --  2.2*  --   --  2.0*   < > 2.1*   HGB 12.4*   < >  --  12.4*   < > 12.8*   < > 12.3*   INR  --   --   --   --   --  1.44*  --   --    WBC 13.8*   < >  --  11.5*   < > 17.4*   < > 11.5*   A1C  --   --   --   --   --   --   --  5.5   CRP  --   --   --  8.1*   < > 85.0*  --  10.2*    < > = values in this interval not displayed.       Physical Exam  Skin inspection: focused lip, nose          Date of last Photo 12/27/21  Wound History: wound noted by nursing staff on 12/24, was extubated and oxymask started on 12/24.  Currently with  NC.    Exam:  Bilateral superior lip slightly edematous, no open areas noted.    Periwound skin: intact  Color: normal and consistent with surrounding tissue  Temperature: normal   Pain: denies ,     Interventions  Current support surface: Standard  Atmos Air mattress  Current off-loading measures: Pillows  Repositioning aid: Pillows  Visual inspection of wound(s) completed   Discussed plan of care with Patient and Nurse

## 2021-12-28 NOTE — PROGRESS NOTES
[facility]    Progress Note - [facility] 3 Service        Date of Admission:  12/21/2021    Assessment & Plan   Ezequiel Randhawa is a 81 y.o. M w/ history of tobacco use who presented with acute hypoxic respiratory failure due to severe COVID-19 pneumonia (required intubation; extubated 12/24) complicated by bilateral pulmonary emboli, new HFrEF (possibly stress-induced), A-fib w/ RVR (resolved), and severe hypernatremia.    Today's changes:  - started full liquid diet per SLP recs; stop mIVF  - re-discuss with cardiology plan for new reduced EF noted this admit  - start TCU referrals; discussed with pt's son, Gregorio. He thinks he would prefer pt come home with home therapies when pt is ready for discharge and feels he can provide 24/7 support but is ok with sending referrals while he considers   - consider re-trial of burris removal tomorrow    #Severe COVID-19 pneumonia with ARDS, improving  #Acute hypoxic respiratory failure  #Septic shock due to COVID-19 infection  Unvaccinated. COVID-19 positive on 12/20/21; symptoms began 1 week prior (cough, chills). Intubated on 12/20 in the [facility] ED. Remdesivir 12/20 x 1. Tocilizumab 12/20. Extubated and weaned off pressors on 12/25. Procal low on admission. Ur Strep and Legionella negative, MRSA swab negative. BCx negative. SpCx not done. Inflammatory markers now trending down and weaned to room air 12/28.  - c/t dexamethasone 6 mg qday for 10-day course, ending 12/29    - wean O2 to keep sats > 90% considering likely COPD  - continue COVID precautions/quarantine through ~ 1/9/2022 (21 days from positive test considering severe disease)    ABx:  - ceftriaxone (12/21-12/25)  - azithromycin (12/21-12/23)  - Zosyn (12/20)    #Bilateral PEs  Seen on 12/20 CT. No evidence of R heart strain. Transitioned from Lovenox to rivaroxaban on 12/25.  - rivaroxaban 15 mg BID (through 1/10, then transition to 20 mg at bedtime)  - pharmacy liaison  consult completed -     #HFrEF  #Afib with RVR, resolved (now in normal sinus rhythm)  A-fib w/ RVR; received IV metoprolol x 1 on 12/20, and then was briefly on diltiazem gtt on 12/21. Echo on 12/21 showed EF 35-40% with moderate global hypokinesia of the left ventricle, and EF improved slightly to 45-50% on 12/22. Trop negative. No baseline for comparison. Ddx includes stress cardiomyopathy, ischemic dz (smoker, very little previous medical care), or tachyarrthymia-induced. Appears euvolemic; was receiving intermittent diuresis in the ICU.  - hold diuresis; re-assess volume status daily and diurese as needed  - Discuss re-consultation with cardiology now that pt is off supplemental oxygen and hopefully nearing discharge   - Will discuss if repeat TTE necessary  - will likely need inpt vs outpt ischemic w/u  - will try to to start BB and ACE-I/ARB this admission if EF remains decreased and hemodynamics tolerate it  - intake/output   - daily wts    #Likely COPD  #Tobacco use  Does not appear to have been diagnosed PTA, but moderate emphysema seen on imaging. Unable to follow commands for inhaler use.  - duonebs qid  - albuterol 2 puffs q6h PRN    #Steroid-induced hyperglycemia  A1c normal at 5.5%. Likely induced by high steroids, and for this reason will opt to lessen dexamethasone dose now that he is stable for the floor. Possibly also affected by tube feeds.  - Low dose sliding scale insulin    # Urinary retention  Failed trial of burris removal 12/26.  - Continue burris, consider re-trial of removal in near future as mental status improves     #Hypernatremia, resolved  Na as high as 160 on admission, and has steadily improved with free water flushes.  - trend electrolytes  - keep free water flushes at 120 mL q6h today, though he will have missed at least 2 doses due to absence of tube.    #Thrombocytopenia  Platelets have been slowly dropping to ~100 at time of floor transfer and now stable in that range. Possibly  2/2 consumptive process/thrombosis attributable to hyper-inflammatory state from COVID-19. HIT screen negative.  - daily CBC    #Acute toxic-metabolic encephlopathy  #Hospital delirium  Drowsy and alert/oriented to self but not time or place. No hx of underlying cognitive impairment. Likely due to intubation, infection, and hospital delirium. Certainly hypernatremia could have been playing a role initially but this has now corrected.  - Seroquel decreased to 50 mg at bedtime; 25 mg BID PRN additional available (QTc < 500 ms)    #Moderate malnutrition in setting of acute illness  - tube feeds through NG - NG pulled 12/26 evening to be replaced with feeding tube.   - nutrition and SLP following (recommending NPO due to severe dysphagia)   - multivitamin  - on electrolyte replacement protocol  - PT/OT re-ordered; OT recs TCU vs home with home cares and 24/7 assitance  - SLP --> TCU    #Constipation, resolved  - Miralax 17 g BID PRN  - Senna 2 tabs BID PRN  - Bisacodyl 10 mg qday PRN     Diet: Full Liquid Diet Mildly Thick (level 2)  Snacks/Supplements Adult: Other; Vital Cuisine Chocolate @ 2pm & HS (naturally mildly thick); Between Meals    DVT Prophylaxis: rivaroxaban  Marina Catheter: PRESENT, indication: Strict 1-2 Hour I&O, Retention   Lines: PICC  Fluids: none  Central Lines: None  Code Status: No CPR- Pre-arrest intubation OK    Dispo: 2-4 days pending PO tolerance and improvement in mental status. Either home with home cares and 24/7 support from son vs TCU    The patient's care was discussed with the Bedside Nurse and Patient     Kristel Medrano MD  [address]  Securely message with the Vocera Web Console (learn more here)  Text page via [employer] Paging/Directory    Please see sign in/sign out for up to date coverage information  ______________________________________________________________________    Interval History   NAEO. Awake and sitting up in  bed.  Pt confused about situation. Oriented to self and using whiteboard in room to determine year. Pt had loose BM this AM, complains of some increased gas. Denies fevers/chills, chest pain, abdominal pain, dysuria.    Data reviewed today: I reviewed all medications, new labs and imaging results over the last 24 hours.    Physical Exam   Vital Signs: Temp: (!) 95.4  F (35.2  C) Temp src: Oral BP: 99/52 Pulse: 85   Resp: 20 SpO2: 95 % O2 Device: None (Room air) Oxygen Delivery: 1 LPM  Weight: 132 lbs 11.47 oz  General: Awake, follows commands, answers majority of questions appropriately  HEENT: NCAT, anicteric sclera, EOMI  Pulm/Resp: diminished bilateral lung sounds, no wheezes, end expiratory fine crackles  CV: RRR, no m/r/g, no peripheral edema  Abdomen: Soft, non-distended, non-tender.  Neuro: AAOx1-2 (knows date by looking at whiteboard). Follows commands. Moves all extremities  Skin: no rashes on exposed surfaces

## 2021-12-28 NOTE — PLAN OF CARE
"/57 (BP Location: Left arm)   Pulse 74   Temp (!) 96.4  F (35.8  C) (Oral)   Resp 18   Ht 1.88 m (6' 2\")   Wt 60.2 kg (132 lb 11.5 oz)   SpO2 96%   BMI 17.04 kg/m      VS: WNL  BG: Q4h  Labs:   Pain/Nausea/PRN: Denies pain  Diet: NPO  LDA: Picc line  GI/: Incontinent of bowel, burris cath in place due to urinary retention  Skin: No acute skin issues noted   Mobility: Assist times 2 with ADL,s  Plan: Pt awake and alert with confusion. Unable to make his needs known. Skin warm and dry to touch. D 5 LR infusing at 100cc/hrs. BS check at 4 am is 91. Remain NPO due to failed swallow studies. No distress noted at this time.  "

## 2021-12-28 NOTE — PLAN OF CARE
"Patient Care [id number]  Patient's mentation waxes and wanes, knows he's on the 7th floor, verbalizes feeling \"anxious about what to do.\"  NPO (failed swallow study)  Marina catheter in place  D5%LR running in a PICC.  Oxygen saturation 94% on room air.  Reassurance provided, much TLC given.  Continue POC  "

## 2021-12-28 NOTE — PROGRESS NOTES
"   12/28/21 1448   Quick Adds   Type of Visit Initial PT Evaluation   Living Environment   People in home child(ar), adult   Current Living Arrangements house   Home Accessibility stairs to enter home   Number of Stairs, Main Entrance 3   Stair Railings, Main Entrance railings on both sides of stairs   Transportation Anticipated family or friend will provide   Living Environment Comments Difficult to obtain subjective history, pt reports things were \"not going well with son\" information obtained through OT evaluation and chart review. Pt able to confirm.    Self-Care   Usual Activity Tolerance good   Current Activity Tolerance poor   Regular Exercise No   Equipment Currently Used at Home walker, rolling   Activity/Exercise/Self-Care Comment Per pt, his son assisted with most of cleaning and cooking. Otherwise pt was mod I for mobility. Reports having a WW, unable to determine how often pt uses. Pt reports he was still driving.    Disability/Function   Hearing Difficulty or Deaf no   Wear Glasses or Blind no   Concentrating, Remembering or Making Decisions Difficulty no   Difficulty Communicating no   Difficulty Eating/Swallowing no   Walking or Climbing Stairs Difficulty yes   Walking or Climbing Stairs ambulation difficulty, requires equipment   Mobility Management 2WW prn   Dressing/Bathing Difficulty no   Toileting issues no   Doing Errands Independently Difficulty (such as shopping) no   Fall history within last six months no   Change in Functional Status Since Onset of Current Illness/Injury yes   General Information   Onset of Illness/Injury or Date of Surgery 12/21/21   Referring Physician Rommel Alexandre MD   Patient/Family Therapy Goals Statement (PT) pt hopes to \"move better\"   Pertinent History of Current Problem (include personal factors and/or comorbidities that impact the POC) per EMR \"Ezequiel Randhawa is a 81 year old male with PMH COPD, tobacco use, who was admitted to [facility] ICU on 12/21/21 " "from [facility] for COVID acute respiratory failure requiring intubation on 12/20/21. \"   Existing Precautions/Restrictions fall;oxygen therapy device and L/min   General Observations Activity:    Cognition   Orientation Status (Cognition) oriented to;person   Affect/Mental Status (Cognition) confused;low arousal/lethargic   Follows Commands (Cognition) follows one-step commands   Pain Assessment   Patient Currently in Pain No   Integumentary/Edema   Integumentary/Edema other (describe)   Integumentary/Edema Comments bruising, redness deonte/inguinal area, RN aware   Posture    Posture Forward head position;Protracted shoulders;Kyphosis   Range of Motion (ROM)   ROM Quick Adds ROM WFL   Strength   Manual Muscle Testing Quick Adds Deficits observed during functional mobility   Strength Comments grossly deconditioned, difficulty following commands for MMT, able to demo SLR B, grossly 3/5 strength    Bed Mobility   Comment (Bed Mobility) Requires increased time and cuing to complete, rolling L and R with mod A x1   Transfers   Transfer Safety Comments max Ax1, unable to fully stand, difficulty following commands   Gait/Stairs (Locomotion)   Comment (Gait/Stairs) not assessed, unable to progress   Balance   Balance Comments impaired static seated balance   Sensory Examination   Sensory Perception patient reports no sensory changes   Clinical Impression   Criteria for Skilled Therapeutic Intervention yes, treatment indicated   PT Diagnosis (PT) impaired functional mobility    Influenced by the following impairments impaired strength, impaired balance, impaired activity tolerance, cognition   Functional limitations due to impairments bed mobility, transfers, gait, stairs, community mobility    Clinical Presentation Evolving/Changing   Clinical Presentation Rationale clinical judgement, [facility]   Clinical Decision Making (Complexity) moderate complexity   Therapy Frequency (PT) 5x/week   Predicted Duration of Therapy Intervention " (days/wks) 1 week   Planned Therapy Interventions (PT) balance training;bed mobility training;gait training;home exercise program;neuromuscular re-education;patient/family education;postural re-education;ROM (range of motion);stair training;strengthening;transfer training;home program guidelines   Anticipated Equipment Needs at Discharge (PT)   (TBD, unclear of what pt has)   Risk & Benefits of therapy have been explained evaluation/treatment results reviewed;care plan/treatment goals reviewed;risks/benefits reviewed;current/potential barriers reviewed;participants voiced agreement with care plan;participants included;patient   PT Discharge Planning    PT Discharge Recommendation (DC Rec) Transitional Care Facility;home with assist;home with home care physical therapy   PT Rationale for DC Rec Currently recommend TCU, pt requiring A for all funcitonal mobility and ADLs, unable to progress to gait this session 2/2 limited command following and safety. If pt were to d/c home, pt would require 24 hr assistance for ADLs and mobility, potentially Ax2, w/c, hopsital bed, commode,  PT/OT services.    PT Brief overview of current status  Ax1 bed mobility, lift for transfers, pt requiring max A to partially stand   Total Evaluation Time   Total Evaluation Time (Minutes) 8

## 2021-12-28 NOTE — PROGRESS NOTES
Brief Cardiology Note.      Improved hemodynamics and oxygen requirements, doing well on floor per primary team. Regarding his newly reduced LVEF. Would recommend CTA (ordered for you). NPO at midnight for this and no caffeine 12 hours prior. He would benefit from GDMT with lisinopril 2.5 mg daily and metoprolol succinate 12.5 mg daily for HFrEF. Please place full cardiology consult. Formally will see tomorrow AM.      Discussed with Dr. Kimberli Crystal MD  Cardiology Fellow PGY 5

## 2021-12-29 ENCOUNTER — APPOINTMENT (OUTPATIENT)
Dept: SPEECH THERAPY | Facility: CLINIC | Age: 81
DRG: 208 | End: 2021-12-29
Attending: INTERNAL MEDICINE
Payer: COMMERCIAL

## 2021-12-29 ENCOUNTER — APPOINTMENT (OUTPATIENT)
Dept: OCCUPATIONAL THERAPY | Facility: CLINIC | Age: 81
DRG: 208 | End: 2021-12-29
Attending: INTERNAL MEDICINE
Payer: COMMERCIAL

## 2021-12-29 ENCOUNTER — APPOINTMENT (OUTPATIENT)
Dept: PHYSICAL THERAPY | Facility: CLINIC | Age: 81
DRG: 208 | End: 2021-12-29
Attending: INTERNAL MEDICINE
Payer: COMMERCIAL

## 2021-12-29 LAB
ANION GAP SERPL CALCULATED.3IONS-SCNC: 7 MMOL/L (ref 3–14)
BUN SERPL-MCNC: 22 MG/DL (ref 7–30)
CALCIUM SERPL-MCNC: 8.3 MG/DL (ref 8.5–10.1)
CHLORIDE BLD-SCNC: 106 MMOL/L (ref 94–109)
CO2 SERPL-SCNC: 24 MMOL/L (ref 20–32)
CREAT SERPL-MCNC: 0.58 MG/DL (ref 0.66–1.25)
ERYTHROCYTE [DISTWIDTH] IN BLOOD BY AUTOMATED COUNT: 12.7 % (ref 10–15)
GFR SERPL CREATININE-BSD FRML MDRD: >90 ML/MIN/1.73M2
GLUCOSE BLD-MCNC: 75 MG/DL (ref 70–99)
GLUCOSE BLDC GLUCOMTR-MCNC: 102 MG/DL (ref 70–99)
GLUCOSE BLDC GLUCOMTR-MCNC: 106 MG/DL (ref 70–99)
GLUCOSE BLDC GLUCOMTR-MCNC: 115 MG/DL (ref 70–99)
GLUCOSE BLDC GLUCOMTR-MCNC: 119 MG/DL (ref 70–99)
GLUCOSE BLDC GLUCOMTR-MCNC: 67 MG/DL (ref 70–99)
GLUCOSE BLDC GLUCOMTR-MCNC: 86 MG/DL (ref 70–99)
GLUCOSE BLDC GLUCOMTR-MCNC: 91 MG/DL (ref 70–99)
HCT VFR BLD AUTO: 37.3 % (ref 40–53)
HGB BLD-MCNC: 12.5 G/DL (ref 13.3–17.7)
MAGNESIUM SERPL-MCNC: 2.3 MG/DL (ref 1.6–2.3)
MCH RBC QN AUTO: 32 PG (ref 26.5–33)
MCHC RBC AUTO-ENTMCNC: 33.5 G/DL (ref 31.5–36.5)
MCV RBC AUTO: 95 FL (ref 78–100)
PHOSPHATE SERPL-MCNC: 3.2 MG/DL (ref 2.5–4.5)
PLATELET # BLD AUTO: 157 10E3/UL (ref 150–450)
POTASSIUM BLD-SCNC: 3.4 MMOL/L (ref 3.4–5.3)
RBC # BLD AUTO: 3.91 10E6/UL (ref 4.4–5.9)
SODIUM SERPL-SCNC: 137 MMOL/L (ref 133–144)
WBC # BLD AUTO: 14.5 10E3/UL (ref 4–11)

## 2021-12-29 PROCEDURE — 99232 SBSQ HOSP IP/OBS MODERATE 35: CPT | Mod: GC | Performed by: STUDENT IN AN ORGANIZED HEALTH CARE EDUCATION/TRAINING PROGRAM

## 2021-12-29 PROCEDURE — 97530 THERAPEUTIC ACTIVITIES: CPT | Mod: GO | Performed by: OCCUPATIONAL THERAPIST

## 2021-12-29 PROCEDURE — 99221 1ST HOSP IP/OBS SF/LOW 40: CPT | Mod: GC | Performed by: STUDENT IN AN ORGANIZED HEALTH CARE EDUCATION/TRAINING PROGRAM

## 2021-12-29 PROCEDURE — 92526 ORAL FUNCTION THERAPY: CPT | Mod: GN

## 2021-12-29 PROCEDURE — 83735 ASSAY OF MAGNESIUM: CPT | Performed by: STUDENT IN AN ORGANIZED HEALTH CARE EDUCATION/TRAINING PROGRAM

## 2021-12-29 PROCEDURE — 84100 ASSAY OF PHOSPHORUS: CPT | Performed by: STUDENT IN AN ORGANIZED HEALTH CARE EDUCATION/TRAINING PROGRAM

## 2021-12-29 PROCEDURE — 80048 BASIC METABOLIC PNL TOTAL CA: CPT | Performed by: STUDENT IN AN ORGANIZED HEALTH CARE EDUCATION/TRAINING PROGRAM

## 2021-12-29 PROCEDURE — 258N000003 HC RX IP 258 OP 636: Performed by: STUDENT IN AN ORGANIZED HEALTH CARE EDUCATION/TRAINING PROGRAM

## 2021-12-29 PROCEDURE — 97535 SELF CARE MNGMENT TRAINING: CPT | Mod: GO | Performed by: OCCUPATIONAL THERAPIST

## 2021-12-29 PROCEDURE — 36592 COLLECT BLOOD FROM PICC: CPT | Performed by: STUDENT IN AN ORGANIZED HEALTH CARE EDUCATION/TRAINING PROGRAM

## 2021-12-29 PROCEDURE — 120N000002 HC R&B MED SURG/OB UMMC

## 2021-12-29 PROCEDURE — 250N000013 HC RX MED GY IP 250 OP 250 PS 637: Performed by: STUDENT IN AN ORGANIZED HEALTH CARE EDUCATION/TRAINING PROGRAM

## 2021-12-29 PROCEDURE — 97110 THERAPEUTIC EXERCISES: CPT | Mod: GO | Performed by: OCCUPATIONAL THERAPIST

## 2021-12-29 PROCEDURE — 85027 COMPLETE CBC AUTOMATED: CPT | Performed by: STUDENT IN AN ORGANIZED HEALTH CARE EDUCATION/TRAINING PROGRAM

## 2021-12-29 PROCEDURE — 97530 THERAPEUTIC ACTIVITIES: CPT | Mod: GP

## 2021-12-29 PROCEDURE — 258N000001 HC RX 258: Performed by: NURSE PRACTITIONER

## 2021-12-29 PROCEDURE — 250N000013 HC RX MED GY IP 250 OP 250 PS 637: Performed by: INTERNAL MEDICINE

## 2021-12-29 RX ORDER — IPRATROPIUM BROMIDE AND ALBUTEROL SULFATE 2.5; .5 MG/3ML; MG/3ML
3 SOLUTION RESPIRATORY (INHALATION) EVERY 4 HOURS PRN
Status: DISCONTINUED | OUTPATIENT
Start: 2021-12-29 | End: 2022-01-07 | Stop reason: HOSPADM

## 2021-12-29 RX ORDER — IVABRADINE 5 MG/1
15 TABLET, FILM COATED ORAL ONCE
Status: DISCONTINUED | OUTPATIENT
Start: 2021-12-29 | End: 2021-12-29

## 2021-12-29 RX ORDER — METOPROLOL TARTRATE 50 MG
50-100 TABLET ORAL
Status: CANCELLED | OUTPATIENT
Start: 2021-12-29

## 2021-12-29 RX ORDER — METOPROLOL TARTRATE 25 MG/1
25 TABLET, FILM COATED ORAL ONCE
Status: DISCONTINUED | OUTPATIENT
Start: 2021-12-29 | End: 2021-12-29

## 2021-12-29 RX ORDER — POTASSIUM CHLORIDE 1.5 G/1.58G
40 POWDER, FOR SOLUTION ORAL ONCE
Status: COMPLETED | OUTPATIENT
Start: 2021-12-29 | End: 2021-12-29

## 2021-12-29 RX ADMIN — QUETIAPINE FUMARATE 50 MG: 50 TABLET ORAL at 21:57

## 2021-12-29 RX ADMIN — Medication 1 TABLET: at 14:24

## 2021-12-29 RX ADMIN — DEXTROSE MONOHYDRATE 25 ML: 500 INJECTION PARENTERAL at 08:08

## 2021-12-29 RX ADMIN — RIVAROXABAN 15 MG: 15 TABLET, FILM COATED ORAL at 14:22

## 2021-12-29 RX ADMIN — RIVAROXABAN 15 MG: 15 TABLET, FILM COATED ORAL at 18:15

## 2021-12-29 RX ADMIN — SODIUM CHLORIDE, POTASSIUM CHLORIDE, SODIUM LACTATE AND CALCIUM CHLORIDE 500 ML: 600; 310; 30; 20 INJECTION, SOLUTION INTRAVENOUS at 13:29

## 2021-12-29 RX ADMIN — POTASSIUM CHLORIDE 40 MEQ: 1.5 POWDER, FOR SOLUTION ORAL at 14:20

## 2021-12-29 RX ADMIN — SODIUM CHLORIDE, POTASSIUM CHLORIDE, SODIUM LACTATE AND CALCIUM CHLORIDE 250 ML: 600; 310; 30; 20 INJECTION, SOLUTION INTRAVENOUS at 22:21

## 2021-12-29 ASSESSMENT — ACTIVITIES OF DAILY LIVING (ADL)
ADLS_ACUITY_SCORE: 18
ADLS_ACUITY_SCORE: 14
ADLS_ACUITY_SCORE: 18
ADLS_ACUITY_SCORE: 18
ADLS_ACUITY_SCORE: 14
ADLS_ACUITY_SCORE: 18
ADLS_ACUITY_SCORE: 18
ADLS_ACUITY_SCORE: 14
ADLS_ACUITY_SCORE: 18
ADLS_ACUITY_SCORE: 14
ADLS_ACUITY_SCORE: 18
ADLS_ACUITY_SCORE: 14
ADLS_ACUITY_SCORE: 18

## 2021-12-29 NOTE — PROGRESS NOTES
"VS /56 (BP Location: Left arm)   Pulse 96   Temp 96.8  F (36  C) (Oral)   Resp 18   Ht 1.88 m (6' 2\")   Wt 60.2 kg (132 lb 11.5 oz)   SpO2 94%   BMI 17.04 kg/m    Activity: A x2 lift  Neuros: Oriented to self only.   Cardiac: WDL. Denies cardiac chest pain  Respiratory: Diminished lungs, RA  GI/: BM x1 incontinent. Marina w/ AUOP  Diet: NPO d/t cardiac procedure in AM.  Skin: No new deficits noted  Lines: (R) TL PICC, SL.  Drains: Marina  Labs: Phos replacement complete. Recheck in AM  Pain/nausea: Denies  New changes this shift: CHG bath done for prep of cardiac procedure.  Plan: Cardiac procedure @ 0700. Continue w/ POC.      "

## 2021-12-29 NOTE — PLAN OF CARE
"/69   Pulse 87   Temp 97.7  F (36.5  C) (Oral)   Resp 18   Ht 1.88 m (6' 2\")   Wt 60.2 kg (132 lb 11.5 oz)   SpO2 96%   BMI 17.04 kg/m    Status: VSS on room air  Pain/Nausea: Denies both  Mobility: Ax2, lift  Diet: FLD, mildly thick - very poor PO intake, 25% magic cup  Labs: Phos 2.7, replacing now  Lines: R TL PICC - red lumen running phos  Drains: burris - resecured with tape  Skin/Incisions: No new deficits noted  Neuro: A&O to self only  Respiratory: Diminished lungs, on room air  Cardiac: WDL  GI: Smear of BM this afternoon  : Voiding adequate amounts via burris  New Changes: orders to be NPO @[place] for cardiac procedure tomorrow, phos replacing  Plan: Continue POC    "

## 2021-12-29 NOTE — CONSULTS
Cardiology Inpatient Consultation  December 23, 2021    Reason for Consult:  A cardiology consult was requested by the MICU service to provide clinical guidance regarding HFrEF.    Assessment and Recommendation:  Mr Randhawa is an unvaccinated pt with COVID 19 ARDS. He was noted have an EF of 35-40% with no known prior cardiac history. Currently not in ACS based on negative troponin and EKG without ischemic changes. Furthermore, his EF has actually slightly improved which is not seen in depressed EF from an ischemic insult. His HFrEFcan be due to critical illness with COVID. Initial plan for Coronary CTA today. Pt was dino to follow commands and was agreeable to test. However seems to have waxing/waning mentation which is not surprising given that he is an elderly gentleman recovering from an ICU admission for COVID. Ischemic eval is non-urgent and can be considered as an outpatient. Discussed with primary team. Agree with volume assessment of primary team that pt is not volume overloaded and diuresis is not necessarily required at this point. Regarding pAfib, pt is currently in NSR. His CHADSVASC is atleast 3 for age and heart failure. However he is on anticoagulation for PE and will be on it for at least 3 months by which time we will have a good idea about his Afib burden hopefully since he is on tele currently. Meanwhile, if pt goes back into afib with RVR with rates sustained >130s, can consider IV Metoprolol if pt is not on pressors. If pt is requiring pressors and is in sustained RVR >130s which is thought to be contributing to his decreased blood pressure, can consider Amiodarone. Please avoid diltiazem since pt has a low EF and this can exacerbate heart failure.    #HFrEF Unknown Cause  #pAfib  -Initial plan for Coronary CTA today to assess for CAD but given pt's waxing/waning mentation this was cancelled. Recommend pursuing outpatient Coronary CTA once pt is more stable from mentation point of view and  able to follow commands. Can refer pt to Cardiology clinic in around 1 month for follow up.  -GDMT initiated with Lisinopril 2.5mg once daily and Metoprolol succinate 12.5mg once daily. Holding off on MRA for now to uptitrate BB and ACEI. ICD not indicated. Currently euvolemic and not on diuretics.   -Recommend avoiding diltiazem as you are doing  -Currently anticoagulated for PE with Rivaroxaban     Plan of care to be discussed with Dr. Ag.      Thank you for consulting the cardiovascular services at the [facility]. Cardiology will sign off. Please do not hesitate to contact us with any questions.    Rhett Beckwith MD  Cardiology Fellow  Pager: [phone]        HPI:   Ezequiel Randhawa is a 81 year old male with a PMH of COPD and tobacco use disorder who was admitted to [facility] on 12/20 with COVID-19 ARDS requiring intubation and ICU admission. He is not vaccinated. He was treated with Tocilizumab, Remdesivir and dexamethasone. He had Afib with RVR and was treated with a diltiazem drip which was changed to IV Metoprolol when he was found to have an EF of 35-40%. He also experienced a 6-second pause with bradycardia in the 50s when propofol was initiated. Propofol was switched to midazolam.    At the time of interview, the patient denies chest pain, dyspnea at rest or with exertion, orthopnea, PND, palpitations, lightheadedness, or syncope.     Review of Systems:    Complete review of systems was performed and negative except per HPI.    PMH:  No past medical history on file.  Active Problems:  Patient Active Problem List    Diagnosis Date Noted     Pneumonia due to COVID-19 virus 12/21/2021     Priority: Medium     Hypernatremia 12/20/2021     Priority: Medium     Hypoxia 12/20/2021     Priority: Medium     Pneumonia due to 2019 novel coronavirus 12/20/2021     Priority: Medium     Social History:  Social History     Tobacco Use     Smoking status: Not on file      Smokeless tobacco: Not on file   Substance Use Topics     Alcohol use: Not on file     Drug use: Not on file     Family History:  No family history on file.    Medications:    [Held by provider] dexamethasone  6 mg Intravenous Daily     insulin aspart  1-4 Units Subcutaneous Q4H     ivabradine  15 mg Oral Once     lidocaine (viscous)  5 mL Topical Once     [Held by provider] lisinopril  2.5 mg Oral Daily     [Held by provider] metoprolol succinate ER  12.5 mg Oral Daily     metoprolol tartrate  25 mg Oral Once     multivitamin w/minerals  1 tablet Oral Daily     QUEtiapine  50 mg Oral QPM     rivaroxaban ANTICOAGULANT  15 mg Oral BID w/meals     sodium chloride (PF)  10 mL Intravenous Once     sodium chloride (PF)  3 mL Intracatheter Q8H         dextrose         Physical Exam:  Temp:  [96.8  F (36  C)-98.7  F (37.1  C)] 98.2  F (36.8  C)  Pulse:  [82-96] 92  Resp:  [18-20] 18  BP: ()/(56-69) 103/58  SpO2:  [94 %-98 %] 98 %    Intake/Output Summary (Last 24 hours) at 12/23/2021 0842  Last data filed at 12/23/2021 0800  Gross per 24 hour   Intake 2999.75 ml   Output 1290 ml   Net 1709.75 ml     GEN: Intubated, sedated  HEENT: no icterus  CV: RRR, normal s1/s2, no murmurs/rubs/s3/s4, no heave. JVP up to mid neck in setting of ventilated pt.   CHEST: Mechanical breath sounds bilaterally  ABD: soft, non-tender, normal active bowel sounds  EXTR:No clubbing, cyanosis or edema.   NEURO: Sedated      Diagnostics:  All labs and imaging were reviewed, of note:    CMP  Recent Labs   Lab 12/29/21  0828 12/29/21  0759 12/29/21  0714 12/29/21  0401 12/28/21  2029 12/28/21  1728 12/28/21  0837 12/28/21  0746 12/27/21  0827 12/27/21  0806 12/26/21  1240 12/26/21  1141 12/26/21  0901 12/25/21  1552 12/25/21  1418   NA  --   --  137  --   --   --   --  135  --  138  --   --  141  --  143   POTASSIUM  --   --  3.4  --   --   --   --  4.0  --  4.5  --   --  4.5  --  4.4   CHLORIDE  --   --  106  --   --   --   --  105  --  106  --    --  108  --  109   CO2  --   --  24  --   --   --   --  27  --  27  --   --  29  --  32   ANIONGAP  --   --  7  --   --   --   --  3  --  5  --   --  4  --  2*   * 67* 75 86   < >  --    < > 94   < > 86   < >  --  139*   < > 161*   BUN  --   --  22  --   --   --   --  24  --  32*  --   --  30  --  30   CR  --   --  0.58*  --   --   --   --  0.56*  --  0.55*  --   --  0.56*  --  0.55*   GFRESTIMATED  --   --  >90  --   --   --   --  >90  --  >90  --   --  >90  --  >90   GREGORY  --   --  8.3*  --   --   --   --  8.2*  --  8.4*  --   --  8.5  --  8.2*   MAG  --   --  2.3  --   --   --   --  2.3  --  2.4*  --  2.5*  --   --   --    PHOS  --   --   --   --   --  2.7  --  2.7  --  3.5  --  2.8  --   --   --    PROTTOTAL  --   --   --   --   --   --   --   --   --   --   --   --   --   --  5.5*   ALBUMIN  --   --   --   --   --   --   --   --   --   --   --   --   --   --  2.2*   BILITOTAL  --   --   --   --   --   --   --   --   --   --   --   --   --   --  0.8   ALKPHOS  --   --   --   --   --   --   --   --   --   --   --   --   --   --  71   AST  --   --   --   --   --   --   --   --   --   --   --   --   --   --  28   ALT  --   --   --   --   --   --   --   --   --   --   --   --   --   --  38    < > = values in this interval not displayed.     CBC  Recent Labs   Lab 12/29/21  0714 12/28/21  0746 12/27/21  0806 12/26/21  0901   WBC 14.5* 14.4* 13.8* 10.5   RBC 3.91* 3.77* 3.85* 3.92*   HGB 12.5* 12.1* 12.4* 12.4*   HCT 37.3* 35.8* 37.3* 38.0*   MCV 95 95 97 97   MCH 32.0 32.1 32.2 31.6   MCHC 33.5 33.8 33.2 32.6   RDW 12.7 12.6 12.6 12.6    123* 114* 104*     INR  No lab results found in last 7 days.  Arterial Blood Gas  Recent Labs   Lab 12/24/21  1129 12/24/21  0811 12/23/21  0409 12/22/21  2119   PH  --   --   --  7.44   PCO2  --   --   --  37   PO2  --   --   --  66*   HCO3  --   --   --  25   O2PER 40 30 40 30       No results found for: TROPI, TROPONIN, TROPR, TROPN    EKG:    Transthoracic  echocardiogram:     Nuclear stress test:

## 2021-12-29 NOTE — PROGRESS NOTES
[facility]    Progress Note - [facility] 3 Service        Date of Admission:  12/21/2021    Assessment & Plan   Ezequiel Randhawa is a 81 y.o. M w/ history of tobacco use who presented with acute hypoxic respiratory failure due to severe COVID-19 pneumonia (required intubation; extubated 12/24) complicated by bilateral pulmonary emboli, new HFrEF (possibly stress-induced), A-fib w/ RVR (resolved), and severe hypernatremia.    Today's changes:  - mentation waxing and waning  - pt refused coronary CT, can likely get as outpatient (or later this admit if mentation clears and pt remains inpatient for placement)  - cardiology recs uptitration of lisinopril and metoprolol as able, signed off  -  ml and restart full liquid diet  - Trial burris removal  - Stop dexamethasone, s/p 9 doses - now on room air and delirium remains main issue  - Stop LDSSI with steroids discontinued  - Pt is non-decisional at this time d/t delirium    #Acute toxic-metabolic encephlopathy  #Hospital delirium  Drowsy and alert/oriented to self but not time or place. No hx of underlying cognitive impairment. Likely due to intubation, infection, and hospital delirium. Certainly hypernatremia could have been playing a role initially but this has now corrected.  - Seroquel 50 mg at bedtime; 25 mg BID PRN additional available (QTc < 500 ms)  - Pt non-decisional at this time  - SLP following, pt on full liquid diet w/ mildly thick liquids d/t aspiration risk    #Severe COVID-19 pneumonia, weaned to room air  #Acute hypoxic respiratory failure, resolved  #Septic shock due to COVID-19 infection, resolved  Unvaccinated. COVID-19 positive on 12/20/21; symptoms began 1 week prior (cough, chills). Intubated on 12/20 in the [facility] ED. Remdesivir 12/20 x 1. Tocilizumab 12/20. Extubated and weaned off pressors on 12/25. Procal low on admission. Ur Strep and Legionella negative, MRSA swab negative. BCx negative. SpCx  not done. Inflammatory markers now trending down and weaned to room air 12/28.  - stopped dexamethasone 6 mg qday following 9 day course d/t hypoxia resolved and ongoing delirium  - wean O2 to keep sats > 90% considering likely COPD  - continue COVID precautions/quarantine through ~ 1/9/2022 (21 days from positive test considering severe disease)    ABx:  - ceftriaxone (12/21-12/25)  - azithromycin (12/21-12/23)  - Zosyn (12/20)    #Bilateral PEs  Seen on 12/20 CT. No evidence of R heart strain. Transitioned from Lovenox to rivaroxaban on 12/25.  - rivaroxaban 15 mg BID (through 1/10, then transition to 20 mg at bedtime)  - pharmacy liaison consult completed, rivaroxaban copay $45 after first month deductible met ($265)    #HFrEF  #Afib with RVR, resolved (now in normal sinus rhythm)  A-fib w/ RVR; received IV metoprolol x 1 on 12/20, and then was briefly on diltiazem gtt on 12/21. Echo on 12/21 showed EF 35-40% with moderate global hypokinesia of the left ventricle, and EF improved slightly to 45-50% on 12/22. Trop negative. No baseline for comparison. Ddx includes stress cardiomyopathy (most likely), ischemic dz (smoker, very little previous medical care), or tachyarrthymia-induced. Appears euvolemic; was receiving intermittent diuresis in the ICU.  - hold diuresis; re-assess volume status daily and diurese as needed  - Re-consulted cardiology  - Started GDMT, up-titrate as able   - metoprolol succinate 12.5 mg qday   - lisinopril 2.5 mg qday  - will likely need inpt vs outpt ischemic w/u (will defer to outpatient unless discharge delayed significantly for placement)  - intake/output   - daily wts    #Likely COPD  #Tobacco use  Does not appear to have been diagnosed PTA, but moderate emphysema seen on imaging. Unable to follow commands for inhaler use.  - albuterol 2 puffs q6h PRN    #Steroid-induced hyperglycemia  # Hypoglycemia  A1c normal at 5.5%. Now done with dexamethasone, stopped correction scale insluin as  pt borderline hypoglycemic intermittently    # Urinary retention  Failed trial of burris removal 12/26.  - Burris removal trial 12/29     #Hypernatremia, resolved  Na as high as 160 on admission, and has steadily improved with free water flushes.  - trend    #Thrombocytopenia, resolved  Platelets have been slowly dropping to ~100 at time of floor transfer and now stable in that range. Possibly 2/2 consumptive process/thrombosis attributable to hyper-inflammatory state from COVID-19. HIT screen negative.  - daily CBC    #Moderate malnutrition in setting of acute illness  - SLP following for diet advancement as able  - on electrolyte replacement protocol  - PT/OT re-ordered; PT/OT recs TCU  - SLP --> TCU    #Constipation, resolved  - Miralax 17 g BID PRN  - Senna 2 tabs BID PRN  - Bisacodyl 10 mg qday PRN     Diet: Snacks/Supplements Adult: Other; V[employer] Chocolate @ 2pm & HS (naturally mildly thick); Between Meals  Room Service  Full Liquid Diet Mildly Thick (level 2)    DVT Prophylaxis: rivaroxaban  Burris Catheter: PRESENT, indication: Strict 1-2 Hour I&O, Retention   Lines: PICC  Fluids: none  Central Lines: None  Code Status: No CPR- Pre-arrest intubation OK    Dispo: 2-4 days pending PO tolerance and improvement in mental status. Likely TCU though difficult given COVID. Will need to further clarify home safety with pt as his mental status recovers    The patient's care was discussed with the Bedside Nurse and Patient     Kristel Medrano MD  [facility] 3 Service  [facility]  Securely message with the Vocera Web Console (learn more here)  Text page via [employer] Paging/Directory    Please see sign in/sign out for up to date coverage information  ______________________________________________________________________    Interval History   NAEO. Mentation waxing and waning. Pt agreeable to interventions/medications at one time and later will refuse meds. Was NPO overnight for  "CT today, appears to have tolerated full liquid diet. Pt states he thinks he would prefer to go to TCU instead of back home with son right away - has concerns about \"son doing his own thing\" but isn't able to elaborate. 4 pt ROS otherwise negative.    Data reviewed today: I reviewed all medications, new labs and imaging results over the last 24 hours.    Physical Exam   Vital Signs: Temp: 98.2  F (36.8  C) Temp src: Axillary BP: 98/46 Pulse: 94   Resp: 18 SpO2: 98 % O2 Device: None (Room air)    Weight: 132 lbs 11.47 oz  General: Awake, follows commands, answers majority of questions appropriately, soft voice and difficult to understand  HEENT: NCAT, anicteric sclera, EOMI  Pulm/Resp: diminished bilateral lung sounds, no wheezes, end expiratory fine crackles  CV: RRR, no m/r/g, no peripheral edema  Abdomen: Soft, non-distended, non-tender.  Neuro: Unable to assess orientation, pt giving joking answers to questions (\"I am here\"). Follows commands. Moves all extremities  Skin: no rashes on exposed surfaces  "

## 2021-12-29 NOTE — PROGRESS NOTES
"Care Management Follow Up    Length of Stay (days): 8    Expected Discharge Date: 12/31/2021     Concerns to be Addressed: basic needs,home safety     Patient plan of care discussed at interdisciplinary rounds: Yes    Anticipated Discharge Disposition: Transitional Care,Home Care     Anticipated Discharge Services: None  Anticipated Discharge DME: None    Patient/family educated on Medicare website which has current facility and service quality ratings: yes  Education Provided on the Discharge Plan:  Plan TBD  Patient/Family in Agreement with the Plan: Plan TBD    Referrals Placed by CM/SW:  None at this time  Private pay costs discussed: Not applicable    Additional Information:  \"Ezequiel Randhawa is a 81 y.o. M w/ history of tobacco use who presented with acute hypoxic respiratory failure due to severe COVID-19 pneumonia (required intubation; extubated 12/24) complicated by bilateral pulmonary emboli, new HFrEF (possibly stress-induced), A-fib w/ RVR (resolved), and severe hypernatremia.\"     Per chart review, there was concern based on pt's presentation to the hospital that a VA report is needed. JONA spoke with ICU RNCC Deepa and she reported that bedside nursing was concerned as pt appeared very dirty, did not smell well, has many missing teeth, and appeared very malnourished. Deepa reported she tried to reach pt's son Gregorio many times, left voicemails and he has not yet returned any of her calls to gather additional information. Per ICU JONA Pickering no VA report was made as there was not enough information to make a report but there was discussion at the time that if bedside nursing was concerned nursing staff could make the report based on their assessment. There is no record in pt's chart of a VA report having been filed. Writer also attempted contact with pt's son, left  with no return call.     Per chart review pt will need to be in Covid precautions/quarantine until 1/9/22. This was also confirmed by pt's " provider team.    Received update from M3 team on 12/28 that they spoke with pt's son Gregorio and he is more in favor of a home with home care discharge and he can provide 24/7 assist but is ok with TCU referrals being made if needed.   Per Dr. Medrano with [facility] pt is not currently able to be his own decision maker. Dr. Medrano also noted that per her conversations with pt's son she doesn't have any neglect concerns but noted that pt has mentioned maybe not wanting to return home currently as he wasn't able to do his own things and his son wouldn't always help him. Dr. Medrano said her team will continue to assess/discuss this with pt on a daily basis as he hopefully clears cognitively.     [facility]enny[facility]Evelin (p. [phone], f. [phone], has access to Epic) to inquire about bed availability at only two Covid+ TCUs in the [facility], [facility] and [facility] (in [place]), left  and waiting to hear back.     JONA contacted pt's son Gregorio via home phone (suggested by MD) ([phone])- went to  and  box is full so no message could be left.     VANESSA Cook, [address]   [phone] (pager) [phone]  12/29/2021

## 2021-12-29 NOTE — PLAN OF CARE
"BP 98/46 (BP Location: Left arm)   Pulse 94   Temp 98.2  F (36.8  C) (Axillary)   Resp 18   Ht 1.88 m (6' 2\")   Wt 60.2 kg (132 lb 11.5 oz)   SpO2 98%   BMI 17.04 kg/m      Neuro: A&Oriented only to self consistently. Bed alarm on for safety.   Respiratory: sats well on RA, unlabored. Denies SOB.   Cardiac: soft BP. Denies cardiac chest pain  GI/: Marina catheter w/ marginal output, 500cc LR bolus started. Marina removed @ 1330. LBM overnight.   Diet: minced and moist diet, supervised eating.   Pain/Nausea: denies both.   IV Access: R TL PICC   Labs: BG this am 67, D10 given w/ recheck 119. Phos 3.2  Activity: Ax1-2, repositioned in bed    Plan: Declined cardiac procedure today. Continue POC.     "

## 2021-12-30 ENCOUNTER — APPOINTMENT (OUTPATIENT)
Dept: PHYSICAL THERAPY | Facility: CLINIC | Age: 81
DRG: 208 | End: 2021-12-30
Attending: INTERNAL MEDICINE
Payer: COMMERCIAL

## 2021-12-30 ENCOUNTER — APPOINTMENT (OUTPATIENT)
Dept: OCCUPATIONAL THERAPY | Facility: CLINIC | Age: 81
DRG: 208 | End: 2021-12-30
Attending: INTERNAL MEDICINE
Payer: COMMERCIAL

## 2021-12-30 ENCOUNTER — APPOINTMENT (OUTPATIENT)
Dept: SPEECH THERAPY | Facility: CLINIC | Age: 81
DRG: 208 | End: 2021-12-30
Attending: INTERNAL MEDICINE
Payer: COMMERCIAL

## 2021-12-30 LAB
ANION GAP SERPL CALCULATED.3IONS-SCNC: 2 MMOL/L (ref 3–14)
BUN SERPL-MCNC: 27 MG/DL (ref 7–30)
CALCIUM SERPL-MCNC: 8.6 MG/DL (ref 8.5–10.1)
CHLORIDE BLD-SCNC: 110 MMOL/L (ref 94–109)
CO2 SERPL-SCNC: 26 MMOL/L (ref 20–32)
CREAT SERPL-MCNC: 0.59 MG/DL (ref 0.66–1.25)
ERYTHROCYTE [DISTWIDTH] IN BLOOD BY AUTOMATED COUNT: 13.2 % (ref 10–15)
GFR SERPL CREATININE-BSD FRML MDRD: >90 ML/MIN/1.73M2
GLUCOSE BLD-MCNC: 87 MG/DL (ref 70–99)
GLUCOSE BLDC GLUCOMTR-MCNC: 132 MG/DL (ref 70–99)
GLUCOSE BLDC GLUCOMTR-MCNC: 75 MG/DL (ref 70–99)
GLUCOSE BLDC GLUCOMTR-MCNC: 91 MG/DL (ref 70–99)
GLUCOSE BLDC GLUCOMTR-MCNC: 96 MG/DL (ref 70–99)
HCT VFR BLD AUTO: 35.5 % (ref 40–53)
HGB BLD-MCNC: 11.9 G/DL (ref 13.3–17.7)
MAGNESIUM SERPL-MCNC: 2.3 MG/DL (ref 1.6–2.3)
MCH RBC QN AUTO: 31.7 PG (ref 26.5–33)
MCHC RBC AUTO-ENTMCNC: 33.5 G/DL (ref 31.5–36.5)
MCV RBC AUTO: 95 FL (ref 78–100)
PHOSPHATE SERPL-MCNC: 3.1 MG/DL (ref 2.5–4.5)
PLATELET # BLD AUTO: 152 10E3/UL (ref 150–450)
POTASSIUM BLD-SCNC: 4 MMOL/L (ref 3.4–5.3)
RBC # BLD AUTO: 3.75 10E6/UL (ref 4.4–5.9)
SODIUM SERPL-SCNC: 138 MMOL/L (ref 133–144)
WBC # BLD AUTO: 11.5 10E3/UL (ref 4–11)

## 2021-12-30 PROCEDURE — 97116 GAIT TRAINING THERAPY: CPT | Mod: GP

## 2021-12-30 PROCEDURE — 250N000013 HC RX MED GY IP 250 OP 250 PS 637: Performed by: STUDENT IN AN ORGANIZED HEALTH CARE EDUCATION/TRAINING PROGRAM

## 2021-12-30 PROCEDURE — 83735 ASSAY OF MAGNESIUM: CPT | Performed by: STUDENT IN AN ORGANIZED HEALTH CARE EDUCATION/TRAINING PROGRAM

## 2021-12-30 PROCEDURE — 250N000013 HC RX MED GY IP 250 OP 250 PS 637: Performed by: INTERNAL MEDICINE

## 2021-12-30 PROCEDURE — 92526 ORAL FUNCTION THERAPY: CPT | Mod: GN

## 2021-12-30 PROCEDURE — 36592 COLLECT BLOOD FROM PICC: CPT | Performed by: STUDENT IN AN ORGANIZED HEALTH CARE EDUCATION/TRAINING PROGRAM

## 2021-12-30 PROCEDURE — 84100 ASSAY OF PHOSPHORUS: CPT | Performed by: STUDENT IN AN ORGANIZED HEALTH CARE EDUCATION/TRAINING PROGRAM

## 2021-12-30 PROCEDURE — 97530 THERAPEUTIC ACTIVITIES: CPT | Mod: GP

## 2021-12-30 PROCEDURE — 120N000002 HC R&B MED SURG/OB UMMC

## 2021-12-30 PROCEDURE — 99232 SBSQ HOSP IP/OBS MODERATE 35: CPT | Mod: GC | Performed by: STUDENT IN AN ORGANIZED HEALTH CARE EDUCATION/TRAINING PROGRAM

## 2021-12-30 PROCEDURE — 85027 COMPLETE CBC AUTOMATED: CPT | Performed by: STUDENT IN AN ORGANIZED HEALTH CARE EDUCATION/TRAINING PROGRAM

## 2021-12-30 PROCEDURE — 97530 THERAPEUTIC ACTIVITIES: CPT | Mod: GO | Performed by: OCCUPATIONAL THERAPIST

## 2021-12-30 PROCEDURE — 97110 THERAPEUTIC EXERCISES: CPT | Mod: GO | Performed by: OCCUPATIONAL THERAPIST

## 2021-12-30 PROCEDURE — 80048 BASIC METABOLIC PNL TOTAL CA: CPT | Performed by: STUDENT IN AN ORGANIZED HEALTH CARE EDUCATION/TRAINING PROGRAM

## 2021-12-30 RX ORDER — TAMSULOSIN HYDROCHLORIDE 0.4 MG/1
0.4 CAPSULE ORAL EVERY EVENING
Status: DISCONTINUED | OUTPATIENT
Start: 2021-12-30 | End: 2022-01-07 | Stop reason: HOSPADM

## 2021-12-30 RX ADMIN — Medication 12.5 MG: at 09:37

## 2021-12-30 RX ADMIN — RIVAROXABAN 15 MG: 15 TABLET, FILM COATED ORAL at 09:37

## 2021-12-30 RX ADMIN — LISINOPRIL 2.5 MG: 2.5 TABLET ORAL at 09:37

## 2021-12-30 RX ADMIN — RIVAROXABAN 15 MG: 15 TABLET, FILM COATED ORAL at 17:12

## 2021-12-30 RX ADMIN — QUETIAPINE FUMARATE 50 MG: 50 TABLET ORAL at 19:45

## 2021-12-30 RX ADMIN — TAMSULOSIN HYDROCHLORIDE 0.4 MG: 0.4 CAPSULE ORAL at 21:03

## 2021-12-30 RX ADMIN — Medication 1 TABLET: at 09:37

## 2021-12-30 ASSESSMENT — ACTIVITIES OF DAILY LIVING (ADL)
ADLS_ACUITY_SCORE: 14

## 2021-12-30 NOTE — PROVIDER NOTIFICATION
"Writer paged,     \"[phone] Ezequiel Randhawa   Pt has not voided in over 12 hours. Just bladder scanned for 331. Please let me know what you'd like to do, thanks.   Isis [phone].\"    Awaiting response.   "

## 2021-12-30 NOTE — PLAN OF CARE
"BP 96/60 (BP Location: Left arm)   Pulse 90   Temp 97.8  F (36.6  C) (Oral)   Resp 18   Ht 1.88 m (6' 2\")   Wt 60.2 kg (132 lb 11.5 oz)   SpO2 95%   BMI 17.04 kg/m        Neuro: A&Oriented only to self consistently. Bed alarm on for safety. No new deficits noted. Meds taken crushed in applesauce.   Respiratory: sats well on RA, PAZ. Unlabored breathing.   Cardiac: WDL x intermittently tachy. Denies cardiac chest pain.   GI/: Urinary retention, bladder scan of 396cc. Marina catheter placed @ 1030 w/ adequate output. 2 loose/soft BM today.   Diet: soft and bite sized diet (level 6) w/ thin liquids. Fair appetite, 1:1 supervision w/ PO intake.   Pain/Nausea: Denies both.   Incisions/Drains: Barrier cream applied to reddened deonte-area.   IV Access: R TL PICC SL.   Labs: reviewed  Activity: Ax1-2. Repositions self in bed.   Plan: Continue POC.     "

## 2021-12-30 NOTE — PROGRESS NOTES
"Care Management Follow Up    Length of Stay (days): 9    Expected Discharge Date: 01/10/2022     Concerns to be Addressed: Basic needs,home safety     Patient plan of care discussed at interdisciplinary rounds: Yes    Anticipated Discharge Disposition: Transitional Care,Home Care     Anticipated Discharge Services: None  Anticipated Discharge DME: None    Patient/family educated on Medicare website which has current facility and service quality ratings: Yes  Education Provided on the Discharge Plan: Plan TBD   Patient/Family in Agreement with the Plan: Plan TBD    Referrals Placed by CM/SW: None at this time    Private pay costs discussed: Not applicable    Additional Information:  JONA followed up with [place] liaison Genny regarding openings at [facility] [place] and [facility], which are the only two facilities in the [place] [facility] able to take Covid positive patients. Genny informed SW that [facility] [place] is no longer accepting Covid positive patients and [facility] has no available beds this week. Per medical team pt is medically ready for discharge.     JONA called pt's son Gregorio ([phone]) to ask about home environment. Gregorio reported that before he became ill, his dad was mostly independent at home and did not require much assistance, except for cooking meals. Gregorio reported that along with home cooked meals, his dad would often eat microwave meals, but recently he has stated that those are \"too much food for him.\" He's noticed his dad slowly losing weight. In regards to bathing, Gregorio said that his dad has \"never been in love with the shower or bath\" and that he may only bathe himself 1x/week, which Gregorio reported has been normal for a number of years. Pt will brush his teeth but does not go to the dentist. Gregorio reported that he moved in with his dad in  after his mom . He feels that his dad may experience a low level of depression. They live in a ranch-style home and will often socialize with one another in " "the living room or kitchen. Gregorio reported that his dad has a cat that he cares for, including changing the litter box, but otherwise does not do any house cleaning. Gregorio shared that on the night that he called the ambulance for his father, pt had urinated in his bed and this was the first occurrence of this sort. Gregorio provided pt with towels and body wash to be able to clean himself before the ambulance arrived. Gregorio also shared that he would prefer that his dad return home with home care services such as rehab services, as he believes his dad would \"want to die at home.\" Gregorio was very pleasant and open to talking with SW. JONA consulted with care team (SW and RNCC) and based on this assessment a VA report does not seem necessary at this time.      VANESSA Anand, [facility]  Email: [email or web address]  Pronouns: she/her/hers        "

## 2021-12-30 NOTE — PROGRESS NOTES
CLINICAL NUTRITION SERVICES - BRIEF NOTE   (See RD note on  for full assessment)     Reason for RD note:   Checking on acceptance of Vital Cuisine 500 supplement  New Findings/Chart Review:  Nutrition support: Pt pulled out FT  and refused replacement on .      Oral Intake: Diet just advanced  pm to Level 5 (minced and moist diet) and Level 2 liquids (mildly thick= nectar).  Vital Cuisine chocolate ordered @ 2pm and HS. Room Service w/ assist ordered    Po intake otherwise has been poor yesterday.  RN noted pt did eat 50% of a magic cup (although none ordered for this pt).  However, intake may be improving with upgrade of text this AM.  Ate 50% of breakfast.  Per RN today, pt drank about 75% of one with lunch today (=390 kcal, 17 gm protein).     Noted that care team has had difficulty reaching pt's son who moved in with patient in  after pt's wife (his mother) .   was finally able to reach son who was able to share the following nutrition related info.  Pt doesn't do any cooking and relies on son for this.  Aside from home cooked meals he been eating frozen meals and lately commented that they were too much for him to finish.  Son has noted some weight loss lately.  Son questions if he is depressed.  He confirms that he is generally unkempt in appearance and only showers about 1x/wk but this has been his long term norm.    Pt had no weight hx available prior to admission as was not seeing doctors.  However wt in distant past was 91.9 kg in 2012.  Weight is down 34% since then.     Noted pt has had decrease UO and required repeated boluses yesterday.  Pt still had no UO x 12 hr as of 2am today.        Correction novolog discontinued yesterday to avoid low BGs.     Interventions:  Continue chocolate Vital Cuisine 500 @ 2pm and HS--flavor may be substituted temporarily pending availability)  Order Magic cup with dinner as pt appeared to like this.    Future/Additional  Recommendations:  If pt discharges directly home, pt would benefit from education with son on diet for weight gain and any dysphagia diet needs.     Nutrition will continue to follow per protocol.    Andreia Velasquez RD, LD   7B (M-F) Pager: [phone]  RD Weekend Pager: [phone]

## 2021-12-30 NOTE — PLAN OF CARE
"BP (!) 89/55 (BP Location: Left arm)   Pulse 91   Temp 99.1  F (37.3  C) (Oral)   Resp 16   Ht 1.88 m (6' 2\")   Wt 60.2 kg (132 lb 11.5 oz)   SpO2 95%   BMI 17.04 kg/m      Activity: Ax1-2 w/ repo&turning in bed.   Neuro: A&O x1, only oriented to self. Calls appropriately.  Cardiac: WDL ex soft Bps, team aware. Denies chest pain.   Respiratory: WDL on RA, satting above 90%.   GI/: Not voiding, bladder scanned at 0300 for 331 - team aware.+BS, + flatus. No BM this shift.    Diet: Minced & moist (level 5), mildly thick (level 2), tolerating.   Skin/Incisions/Drains: Peeling in perineum, barrier cream applied. No new deficits noted.    Lines: R TL PICC.   Labs: Reviewed.   Pain/Nausea: Denies.   New changes this shift: Pt's BG was 75, gave 2 apple juices. BG recheck 91.   Plan: Monitor UOP, encourage thickened fluids. Continue POC.       "

## 2021-12-30 NOTE — PLAN OF CARE
"BP (!) 89/55 (BP Location: Left arm)   Pulse 91   Temp 99.1  F (37.3  C) (Oral)   Resp 16   Ht 1.88 m (6' 2\")   Wt 60.2 kg (132 lb 11.5 oz)   SpO2 95%   BMI 17.04 kg/m    Status: VSS on room air, soft Bps - MAP>65; covid +  Pain/Nausea: denies both  Mobility: hard Ax2 with walker, spent a couple hours up in chair; can use lift also   Diet: Minced and moist, mildly thick liquids - ate approx 50% of dinner  Labs: Reviewed  Lines: R TL PICC - red lumen infusing 250mL LR bolus at 125/hr  Drains: X  Skin/Incisions: no new deficits noted, peeling in deonte area - barrier cream applied  Neuro: Oriented to self only, no new deficits noted  Respiratory: WDL on room air  Cardiac: Ex soft Bps, team aware, no concerns at this time since asymptomatic and MAP >65  GI: BS+, LBM 12/29  : Marina pulled at 1330, no void. Bladder scanned at 1800 for 60mL and 2200 for 137mL - team aware, 250mL LR bolus over 2hrs infusing now; orders to page if PVR>400 or no void x12hrs(0130)  New Changes: X  Plan: Continue POC, monitor UOP/page per orders    "

## 2021-12-31 ENCOUNTER — APPOINTMENT (OUTPATIENT)
Dept: PHYSICAL THERAPY | Facility: CLINIC | Age: 81
DRG: 208 | End: 2021-12-31
Attending: INTERNAL MEDICINE
Payer: COMMERCIAL

## 2021-12-31 ENCOUNTER — APPOINTMENT (OUTPATIENT)
Dept: OCCUPATIONAL THERAPY | Facility: CLINIC | Age: 81
DRG: 208 | End: 2021-12-31
Attending: INTERNAL MEDICINE
Payer: COMMERCIAL

## 2021-12-31 ENCOUNTER — APPOINTMENT (OUTPATIENT)
Dept: SPEECH THERAPY | Facility: CLINIC | Age: 81
DRG: 208 | End: 2021-12-31
Attending: INTERNAL MEDICINE
Payer: COMMERCIAL

## 2021-12-31 LAB
ANION GAP SERPL CALCULATED.3IONS-SCNC: 8 MMOL/L (ref 3–14)
BUN SERPL-MCNC: 20 MG/DL (ref 7–30)
CALCIUM SERPL-MCNC: 8.2 MG/DL (ref 8.5–10.1)
CHLORIDE BLD-SCNC: 106 MMOL/L (ref 94–109)
CO2 SERPL-SCNC: 24 MMOL/L (ref 20–32)
CREAT SERPL-MCNC: 0.58 MG/DL (ref 0.66–1.25)
ERYTHROCYTE [DISTWIDTH] IN BLOOD BY AUTOMATED COUNT: 13.1 % (ref 10–15)
GFR SERPL CREATININE-BSD FRML MDRD: >90 ML/MIN/1.73M2
GLUCOSE BLD-MCNC: 89 MG/DL (ref 70–99)
GLUCOSE BLDC GLUCOMTR-MCNC: 81 MG/DL (ref 70–99)
HCT VFR BLD AUTO: 35.3 % (ref 40–53)
HGB BLD-MCNC: 11.8 G/DL (ref 13.3–17.7)
LACTATE SERPL-SCNC: 1 MMOL/L (ref 0.7–2)
MAGNESIUM SERPL-MCNC: 2.4 MG/DL (ref 1.6–2.3)
MCH RBC QN AUTO: 32.2 PG (ref 26.5–33)
MCHC RBC AUTO-ENTMCNC: 33.4 G/DL (ref 31.5–36.5)
MCV RBC AUTO: 96 FL (ref 78–100)
PHOSPHATE SERPL-MCNC: 3 MG/DL (ref 2.5–4.5)
PLATELET # BLD AUTO: 139 10E3/UL (ref 150–450)
POTASSIUM BLD-SCNC: 3.9 MMOL/L (ref 3.4–5.3)
RBC # BLD AUTO: 3.67 10E6/UL (ref 4.4–5.9)
SODIUM SERPL-SCNC: 138 MMOL/L (ref 133–144)
WBC # BLD AUTO: 10.5 10E3/UL (ref 4–11)

## 2021-12-31 PROCEDURE — 97530 THERAPEUTIC ACTIVITIES: CPT | Mod: GO

## 2021-12-31 PROCEDURE — 250N000013 HC RX MED GY IP 250 OP 250 PS 637: Performed by: STUDENT IN AN ORGANIZED HEALTH CARE EDUCATION/TRAINING PROGRAM

## 2021-12-31 PROCEDURE — 92526 ORAL FUNCTION THERAPY: CPT | Mod: GN

## 2021-12-31 PROCEDURE — 83735 ASSAY OF MAGNESIUM: CPT | Performed by: STUDENT IN AN ORGANIZED HEALTH CARE EDUCATION/TRAINING PROGRAM

## 2021-12-31 PROCEDURE — 250N000013 HC RX MED GY IP 250 OP 250 PS 637: Performed by: INTERNAL MEDICINE

## 2021-12-31 PROCEDURE — 120N000002 HC R&B MED SURG/OB UMMC

## 2021-12-31 PROCEDURE — 82310 ASSAY OF CALCIUM: CPT | Performed by: STUDENT IN AN ORGANIZED HEALTH CARE EDUCATION/TRAINING PROGRAM

## 2021-12-31 PROCEDURE — 84100 ASSAY OF PHOSPHORUS: CPT | Performed by: STUDENT IN AN ORGANIZED HEALTH CARE EDUCATION/TRAINING PROGRAM

## 2021-12-31 PROCEDURE — 97530 THERAPEUTIC ACTIVITIES: CPT | Mod: GP

## 2021-12-31 PROCEDURE — 258N000003 HC RX IP 258 OP 636: Performed by: STUDENT IN AN ORGANIZED HEALTH CARE EDUCATION/TRAINING PROGRAM

## 2021-12-31 PROCEDURE — 85027 COMPLETE CBC AUTOMATED: CPT | Performed by: STUDENT IN AN ORGANIZED HEALTH CARE EDUCATION/TRAINING PROGRAM

## 2021-12-31 PROCEDURE — 83605 ASSAY OF LACTIC ACID: CPT | Performed by: STUDENT IN AN ORGANIZED HEALTH CARE EDUCATION/TRAINING PROGRAM

## 2021-12-31 PROCEDURE — 99231 SBSQ HOSP IP/OBS SF/LOW 25: CPT | Performed by: STUDENT IN AN ORGANIZED HEALTH CARE EDUCATION/TRAINING PROGRAM

## 2021-12-31 PROCEDURE — 97535 SELF CARE MNGMENT TRAINING: CPT | Mod: GO

## 2021-12-31 PROCEDURE — 36415 COLL VENOUS BLD VENIPUNCTURE: CPT | Performed by: STUDENT IN AN ORGANIZED HEALTH CARE EDUCATION/TRAINING PROGRAM

## 2021-12-31 RX ADMIN — RIVAROXABAN 15 MG: 15 TABLET, FILM COATED ORAL at 08:34

## 2021-12-31 RX ADMIN — QUETIAPINE FUMARATE 50 MG: 50 TABLET ORAL at 21:00

## 2021-12-31 RX ADMIN — TAMSULOSIN HYDROCHLORIDE 0.4 MG: 0.4 CAPSULE ORAL at 21:00

## 2021-12-31 RX ADMIN — SODIUM CHLORIDE, POTASSIUM CHLORIDE, SODIUM LACTATE AND CALCIUM CHLORIDE 500 ML: 600; 310; 30; 20 INJECTION, SOLUTION INTRAVENOUS at 05:14

## 2021-12-31 RX ADMIN — RIVAROXABAN 15 MG: 15 TABLET, FILM COATED ORAL at 16:37

## 2021-12-31 RX ADMIN — Medication 1 TABLET: at 08:34

## 2021-12-31 ASSESSMENT — ACTIVITIES OF DAILY LIVING (ADL)
ADLS_ACUITY_SCORE: 14

## 2021-12-31 NOTE — PROGRESS NOTES
[facility]    Progress Note - Khurram 3 Service        Date of Admission:  12/21/2021    Assessment & Plan   Ezequiel Randhawa is a 81 y.o. M w/ history of tobacco use who presented with acute hypoxic respiratory failure due to severe COVID-19 pneumonia (required intubation; extubated 12/24) complicated by bilateral pulmonary emboli, new HFrEF (possibly stress-induced), A-fib w/ RVR (resolved), and severe hypernatremia.    Today's changes:  - mentation waxing and waning, but appeared more clear today   - Pt is non-decisional at this time d/t delirium  - Diet advanced to minced and moist   - Medically ready for discharge to TCU    #Acute toxic-metabolic encephlopathy  #Hospital delirium  Drowsy and alert/oriented to self but not time or place. No hx of underlying cognitive impairment. Likely due to intubation, infection, and hospital delirium. Certainly hypernatremia could have been playing a role initially but this has now corrected. Mentation is slowly improving.   - Seroquel 50 mg at bedtime; 25 mg BID PRN additional available (QTc < 500 ms)  - Pt non-decisional at this time  - SLP following, pt on minced and moist diet     #Severe COVID-19 pneumonia, weaned to room air  #Acute hypoxic respiratory failure, resolved  #Septic shock due to COVID-19 infection, resolved  Unvaccinated. COVID-19 positive on 12/20/21; symptoms began 1 week prior (cough, chills). Intubated on 12/20 in the [facility] ED. Remdesivir 12/20 x 1. Tocilizumab 12/20. Extubated and weaned off pressors on 12/25. Procal low on admission. Ur Strep and Legionella negative, MRSA swab negative. BCx negative. SpCx not done. Inflammatory markers now trending down and weaned to room air 12/28.  - stopped dexamethasone 6 mg qday following 9 day course d/t hypoxia resolved and ongoing delirium  - Remains in room air   - continue COVID precautions/quarantine through ~ 1/9/2022 (21 days from positive test considering  severe disease)    ABx:  - ceftriaxone (12/21-12/25)  - azithromycin (12/21-12/23)  - Zosyn (12/20)    #Bilateral PEs  Seen on 12/20 CT. No evidence of R heart strain. Transitioned from Lovenox to rivaroxaban on 12/25.  - rivaroxaban 15 mg BID (through 1/10, then transition to 20 mg at bedtime)  - pharmacy liaison consult completed, rivaroxaban copay $45 after first month deductible met ($265)    #HFrEF  #Afib with RVR, resolved (now in normal sinus rhythm)  A-fib w/ RVR; received IV metoprolol x 1 on 12/20, and then was briefly on diltiazem gtt on 12/21. Echo on 12/21 showed EF 35-40% with moderate global hypokinesia of the left ventricle, and EF improved slightly to 45-50% on 12/22. Trop negative. No baseline for comparison. Ddx includes stress cardiomyopathy (most likely), ischemic dz (smoker, very little previous medical care), or tachyarrthymia-induced. Appears euvolemic; was receiving intermittent diuresis in the ICU.  - hold diuresis; re-assess volume status daily and diurese as needed  - Re-consulted cardiology 12/29  - Started GDMT, up-titrate as able   - metoprolol succinate 12.5 mg qday   - lisinopril 2.5 mg qday  - will likely need inpt vs outpt ischemic w/u (will defer to outpatient unless discharge delayed significantly for placement)  - Refused CTA, can obtain outpatient  - intake/output   - daily wts    #Likely COPD  #Tobacco use  Does not appear to have been diagnosed PTA, but moderate emphysema seen on imaging. Unable to follow commands for inhaler use.  - albuterol 2 puffs q6h PRN    #Steroid-induced hyperglycemia  # Hypoglycemia  A1c normal at 5.5%. Now done with dexamethasone, stopped correction scale insluin as pt borderline hypoglycemic intermittently    # Urinary retention  Failed trial of burris removal 12/26.  - Burris removal trial 12/29--failed  - Plan to start flomax and keep burris in for 1 week, with follow up removal and trial of void      #Hypernatremia, resolved  Na as high as 160 on  admission, and has steadily improved with free water flushes.  - trend    #Thrombocytopenia, resolved  Platelets have been slowly dropping to ~100 at time of floor transfer and now stable in that range. Possibly 2/2 consumptive process/thrombosis attributable to hyper-inflammatory state from COVID-19. HIT screen negative.  - daily CBC    #Moderate malnutrition in setting of acute illness  - SLP following for diet advancement as able  - on electrolyte replacement protocol  - PT/OT re-ordered; PT/OT recs TCU  - SLP --> TCU    #Constipation, resolved  - Miralax 17 g BID PRN  - Senna 2 tabs BID PRN  - Bisacodyl 10 mg qday PRN     Diet: Snacks/Supplements Adult: Other; Vital Cuisine Chocolate @ 2pm & HS (naturally mildly thick); Between Meals  Room Service  Snacks/Supplements Adult: Magic Cup; With Meals  Combination Diet Soft and Bite Sized Diet (level 6); Thin Liquids (level 0)    DVT Prophylaxis: rivaroxaban  Marina Catheter: PRESENT, indication: Strict 1-2 Hour I&O, Retention, Retention   Lines: PICC  Fluids: none  Central Lines: None  Code Status: No CPR- Pre-arrest intubation OK    Dispo: Medically ready for discharge, likely TCU though difficult given COVID. Will need to further clarify home safety with pt as his mental status recovers    The patient's care was discussed with the Bedside Nurse and Patient     Jessica Sanchez MD  [place] [id number] Service  [facility]  Securely message with the Vocera Web Console (learn more here)  Text page via [employer] Paging/Directory    Please see sign in/sign out for up to date coverage information  ______________________________________________________________________    Interval History   NAEO. Mentation waxing and waning. Today patient is pleasant and conversant. Denies any chest pain or shortness of breath. Asking if there's activities for him to do other than watching TV.  4 pt ROS otherwise negative.    Data reviewed today: I reviewed all  "medications, new labs and imaging results over the last 24 hours.    Physical Exam   Vital Signs: Temp: 97.8  F (36.6  C) Temp src: Oral BP: 96/60 Pulse: 90   Resp: 18 SpO2: 95 % O2 Device: None (Room air)    Weight: 132 lbs 11.47 oz  General: Awake, follows commands, answers questions appropriately, soft, mumbled voice and difficult to understand  HEENT: NCAT, anicteric sclera, EOMI  Pulm/Resp: diminished bilateral lung sounds, no wheezes, end expiratory fine crackles  CV: RRR, no m/r/g, no peripheral edema  Abdomen: Soft, non-distended, non-tender.  Neuro: Able to give full name, replies \"hospital\" to location and 2021 to year (did look at whiteboard which it was written on). Moves all extremities.   Skin: no rashes on exposed surfaces  "

## 2021-12-31 NOTE — PROGRESS NOTES
*1900-0730    Neuro: A&O X4 with some forgetful   GI:  Bowel sounds active x4 quadrate  denies nausea/vomiting .  : Marina catheter replaced yesterday draining well yellow clear urine .   Resp: Pt denies respiratory distress,No coughing congestion noted.Lung sounds diminished LLE and RLE.   Mobility: Assistx2 with mobility set-up with meals.  Cardiac: Denies chest pain  No extra sound or murmur noted.   Skin: fragile and dry   Lines/Drains: R tl PICC sl   Pain: Denies pain   Behavior: No mood/anxiety   VS: low BP 86/55 recheck 89/50 Team was updated recommended call if MAP is <65   BP continue to be low overnight MAP low 65 provider notified again Bolus 500 ml order.

## 2021-12-31 NOTE — PROGRESS NOTES
[facility]    Progress Note - Khurram 3 Service        Date of Admission:  12/21/2021    Assessment & Plan   Ezequiel Randhawa is a 81 y.o. M w/ history of tobacco use who presented with acute hypoxic respiratory failure due to severe COVID-19 pneumonia (required intubation; extubated 12/24) complicated by bilateral pulmonary emboli, new HFrEF (possibly stress-induced), A-fib w/ RVR (resolved), and severe hypernatremia.    Today's changes:  - Mental status continues to improve, although patient is still not decisional at this time  - BP has been low, with no signs of hypoperfusion. Likely due to recent initiation of BB/ACE-I. Will discontinue. Has received some gentle IVF boluses with improvement in pressures.   - Encourage PO intake  - Medically ready for discharge to TCU versus home     #Acute toxic-metabolic encephlopathy  #Hospital delirium  Drowsy and alert/oriented to self but not time or place. No hx of underlying cognitive impairment. Likely due to intubation, infection, and hospital delirium. Certainly hypernatremia could have been playing a role initially but this has now corrected. Mentation is slowly improving and is likely close to baseline.    - Seroquel 50 mg at bedtime; 25 mg BID PRN additional available (QTc < 500 ms)  - Pt non-decisional at this time  - SLP following, pt on minced and moist diet     #Severe COVID-19 pneumonia, weaned to room air  #Acute hypoxic respiratory failure, resolved  #Septic shock due to COVID-19 infection, resolved  Unvaccinated. COVID-19 positive on 12/20/21; symptoms began 1 week prior (cough, chills). Intubated on 12/20 in the [facility] ED. Remdesivir 12/20 x 1. Tocilizumab 12/20. Extubated and weaned off pressors on 12/25. Procal low on admission. Ur Strep and Legionella negative, MRSA swab negative. BCx negative. SpCx not done. Inflammatory markers now trending down and weaned to room air 12/28.  - stopped dexamethasone 6 mg  qday following 9 day course d/t hypoxia resolved and ongoing delirium  - Remains in room air   - continue COVID precautions/quarantine through ~ 1/7/2022 (21 days from symptom onset 12/17/21) positive test considering severe disease)    ABx:  - ceftriaxone (12/21-12/25)  - azithromycin (12/21-12/23)  - Zosyn (12/20)    #Bilateral PEs  Seen on 12/20 CT. No evidence of R heart strain. Transitioned from Lovenox to rivaroxaban on 12/25.  - rivaroxaban 15 mg BID (through 1/10, then transition to 20 mg at bedtime)  - pharmacy liaison consult completed, rivaroxaban copay $45 after first month deductible met ($265)    #HFrEF  #Afib with RVR, resolved (now in normal sinus rhythm)  A-fib w/ RVR; received IV metoprolol x 1 on 12/20, and then was briefly on diltiazem gtt on 12/21. Echo on 12/21 showed EF 35-40% with moderate global hypokinesia of the left ventricle, and EF improved slightly to 45-50% on 12/22. Trop negative. No baseline for comparison. Ddx includes stress cardiomyopathy (most likely), ischemic dz (smoker, very little previous medical care), or tachyarrthymia-induced. Appears euvolemic; was receiving intermittent diuresis in the ICU.  - hold diuresis; re-assess volume status daily and diurese as needed  - Re-consulted cardiology 12/29  - Started GDMT with metoprolol and lisinopril, however could not tolerate due to hypotension. Can be restarted and titrated as an outpatient.   - will likely need inpt vs outpt ischemic w/u (will defer to outpatient unless discharge delayed significantly for placement)  - Refused CTA, can obtain outpatient  - intake/output   - daily wts    #Likely COPD  #Tobacco use  Does not appear to have been diagnosed PTA, but moderate emphysema seen on imaging. Unable to follow commands for inhaler use.  - albuterol 2 puffs q6h PRN    #Steroid-induced hyperglycemia  # Hypoglycemia  A1c normal at 5.5%. Now done with dexamethasone, stopped correction scale insluin as pt borderline hypoglycemic  intermittently    # Urinary retention  Failed trial of burris removal 12/26.  - Burris removal trial 12/29--failed  - Plan to start flomax and keep burris in for 1 week, with follow up removal and trial of void      #Hypernatremia, resolved  Na as high as 160 on admission, and has steadily improved with free water flushes.  - trend    #Thrombocytopenia, resolved  Platelets have been slowly dropping to ~100 at time of floor transfer and now stable in that range. Possibly 2/2 consumptive process/thrombosis attributable to hyper-inflammatory state from COVID-19. HIT screen negative.  - daily CBC    #Moderate malnutrition in setting of acute illness  - SLP following for diet advancement as able  - on electrolyte replacement protocol  - PT/OT re-ordered; PT/OT recs TCU  - SLP --> TCU    #Constipation, resolved  - Miralax 17 g BID PRN  - Senna 2 tabs BID PRN  - Bisacodyl 10 mg qday PRN     Diet: Snacks/Supplements Adult: Other; Vital Cuisine Chocolate @ 2pm & HS (naturally mildly thick); Between Meals  Room Service  Snacks/Supplements Adult: Magic Cup; With Meals  Combination Diet Soft and Bite Sized Diet (level 6); Thin Liquids (level 0)  Room Service    DVT Prophylaxis: rivaroxaban  Burris Catheter: PRESENT, indication: Strict 1-2 Hour I&O, Retention, Retention   Lines: PICC  Fluids: none  Central Lines: None  Code Status: No CPR- Pre-arrest intubation OK    Dispo: Medically ready for discharge, TCU (recommended by PT/OT) versus home due to patient and family wishes.    The patient's care was discussed with the Bedside Nurse and Patient     Jessica Sanchez MD  [address]  Securely message with the Vocera Web Console (learn more here)  Text page via [facility] Paging/Directory    Please see sign in/sign out for up to date coverage information  ______________________________________________________________________    Interval History   Soft Bps overnight requiring  "fluid bolus. Normal lactate, no signs of decreased perfusion.     Mentation overall improving. Says his breakfast is \"partly disappointing and partly good\". Some meals he enjoys more than others. Denies any chest pain or shortness of breath. No pain anywhere. Says would still be okay going to a nursing facility for rehab but also would be okay going home--says his cat is mad at him for being away for so long.    4 pt ROS otherwise negative.    Data reviewed today: I reviewed all medications, new labs and imaging results over the last 24 hours.    Physical Exam   Vital Signs: Temp: 98.2  F (36.8  C) Temp src: Oral BP: 107/63 Pulse: 92   Resp: 18 SpO2: 96 % O2 Device: None (Room air)    Weight: 132 lbs 11.47 oz  General: Awake, follows commands, answers questions appropriately, pleasant and in no distress. Eating breakfast.   HEENT: NCAT, anicteric sclera, EOMI  Pulm/Resp: breathing comfortably in room air   CV: Extremities are warm to the touch, no lower leg edema   Abdomen: No visible distension   Neuro: Awake and alert. Speech more clear today. Situationally very appropriate but short term memory is poor. Location he says \"[facility] [place]\" and time says \"2022\".   Skin: no rashes on exposed surfaces  "

## 2021-12-31 NOTE — PROGRESS NOTES
"Brief Cross Cover Note:     Called to beside for hypotension.    SUBJECTIVE:  Ezequiel Randhawa is a 81 year old male with hx of tobacco use who presented with acute hypoxic respiratory failure due to severe COVID-19 pneumonia (required intubation; extubated 12/24 and now on room air) complicated by bilateral pulmonary emboli, new HFrEF (possibly stress-induced), A-fib w/ RVR (resolved), and severe hypernatremia.    He remains disoriented but in no acute distress during our conversation (per my previous exams, this is recent baseline). Cannot tell me whether he is feeling dizzy or is having any new symptoms.    VITALS:   BP (!) 80/40   Pulse 90   Temp (!) 95.4  F (35.2  C) (Oral)   Resp 18   Ht 1.88 m (6' 2\")   Wt 60.2 kg (132 lb 11.5 oz)   SpO2 96%   BMI 17.04 kg/m       EXAM:   Lung sounds clear bilaterally, RRR. Thin, warm extremities. No JVD seen, though obscured by facial hair. Repeated BP in the room and it was in the 90/50s range as IV fluids were running.    A/P:   Ddx for hypotension is medication-induced (lisinopril and metoprolol recently started), sepsis (hypothermic temp this AM), obstructive (known to have PE) or dehydration (difficult nutritional status due to intolerance of feeding tube and encephalopathy). Less likely related HF as this would only occur with severe systolic HF and recent EF only mildly reduced. Will get lactate to re-assure us that he is perfusing well. Okay to wait until AM labs before making other management changes like starting infectious w/u or starting ABx given recovering BP and otherwise stable clinical status.    - 500 ml bolus of LR over 2 hours, low threshold for another 500 ml this AM if remains hypotensive  - hold lisinopril and metoprolol  - STAT lactate  - has PICC, if worsens could consider oxyhemoglobin  - could consider repeating echo in the AM if volume status remains difficult    MD Khurram [facility]  PGY-3, Internal " Medicine  [facility]

## 2021-12-31 NOTE — PROVIDER NOTIFICATION
"Writer kody MERIDA \"MARIA FERNANDA Randhawa [id number] [id number] Pt BP 83/52 MAP 63- recheck 84/48 MAP 61. Thanks! Gayle [place] [id number]\"      "

## 2022-01-01 ENCOUNTER — LAB REQUISITION (OUTPATIENT)
Dept: LAB | Facility: CLINIC | Age: 82
End: 2022-01-01
Payer: COMMERCIAL

## 2022-01-01 DIAGNOSIS — N40.1 BENIGN PROSTATIC HYPERPLASIA WITH LOWER URINARY TRACT SYMPTOMS: ICD-10-CM

## 2022-01-01 DIAGNOSIS — E87.1 HYPO-OSMOLALITY AND HYPONATREMIA: ICD-10-CM

## 2022-01-01 DIAGNOSIS — N39.0 URINARY TRACT INFECTION, SITE NOT SPECIFIED: ICD-10-CM

## 2022-01-01 DIAGNOSIS — K92.2 GASTROINTESTINAL HEMORRHAGE, UNSPECIFIED: ICD-10-CM

## 2022-01-01 DIAGNOSIS — R82.90 UNSPECIFIED ABNORMAL FINDINGS IN URINE: ICD-10-CM

## 2022-01-01 DIAGNOSIS — R33.8 OTHER RETENTION OF URINE: ICD-10-CM

## 2022-01-01 DIAGNOSIS — R30.0 DYSURIA: ICD-10-CM

## 2022-01-01 DIAGNOSIS — L02.214 CUTANEOUS ABSCESS OF GROIN: ICD-10-CM

## 2022-01-01 DIAGNOSIS — R79.89 OTHER SPECIFIED ABNORMAL FINDINGS OF BLOOD CHEMISTRY: ICD-10-CM

## 2022-01-01 LAB
ALBUMIN SERPL BCG-MCNC: 3.6 G/DL (ref 3.5–5.2)
ALBUMIN UR-MCNC: 30 MG/DL
ALP SERPL-CCNC: 95 U/L (ref 40–129)
ALT SERPL W P-5'-P-CCNC: 11 U/L (ref 10–50)
ANION GAP SERPL CALCULATED.3IONS-SCNC: 10 MMOL/L (ref 7–15)
ANION GAP SERPL CALCULATED.3IONS-SCNC: 11 MMOL/L (ref 7–15)
ANION GAP SERPL CALCULATED.3IONS-SCNC: 6 MMOL/L (ref 3–14)
ANION GAP SERPL CALCULATED.3IONS-SCNC: 7 MMOL/L (ref 5–18)
ANION GAP SERPL CALCULATED.3IONS-SCNC: 8 MMOL/L (ref 7–15)
ANION GAP SERPL CALCULATED.3IONS-SCNC: 9 MMOL/L (ref 7–15)
APPEARANCE UR: ABNORMAL
AST SERPL W P-5'-P-CCNC: 21 U/L (ref 10–50)
BACTERIA UR CULT: ABNORMAL
BACTERIA UR CULT: ABNORMAL
BASOPHILS # BLD AUTO: 0.1 10E3/UL (ref 0–0.2)
BASOPHILS NFR BLD AUTO: 1 %
BILIRUB SERPL-MCNC: 0.4 MG/DL
BILIRUB UR QL STRIP: NEGATIVE
BUN SERPL-MCNC: 15.1 MG/DL (ref 8–23)
BUN SERPL-MCNC: 18 MG/DL (ref 7–30)
BUN SERPL-MCNC: 18.3 MG/DL (ref 8–23)
BUN SERPL-MCNC: 20.6 MG/DL (ref 8–23)
BUN SERPL-MCNC: 24.8 MG/DL (ref 8–23)
BUN SERPL-MCNC: 33 MG/DL (ref 8–28)
CALCIUM SERPL-MCNC: 8.7 MG/DL (ref 8.5–10.1)
CALCIUM SERPL-MCNC: 9.1 MG/DL (ref 8.8–10.2)
CALCIUM SERPL-MCNC: 9.2 MG/DL (ref 8.8–10.2)
CALCIUM SERPL-MCNC: 9.4 MG/DL (ref 8.5–10.5)
CALCIUM SERPL-MCNC: 9.4 MG/DL (ref 8.8–10.2)
CALCIUM SERPL-MCNC: 9.4 MG/DL (ref 8.8–10.2)
CHLORIDE BLD-SCNC: 101 MMOL/L (ref 98–107)
CHLORIDE BLD-SCNC: 104 MMOL/L (ref 94–109)
CHLORIDE SERPL-SCNC: 101 MMOL/L (ref 98–107)
CHLORIDE SERPL-SCNC: 90 MMOL/L (ref 98–107)
CHLORIDE SERPL-SCNC: 92 MMOL/L (ref 98–107)
CHLORIDE SERPL-SCNC: 94 MMOL/L (ref 98–107)
CO2 SERPL-SCNC: 25 MMOL/L (ref 20–32)
CO2 SERPL-SCNC: 29 MMOL/L (ref 22–31)
COLOR UR AUTO: ABNORMAL
CREAT SERPL-MCNC: 0.53 MG/DL (ref 0.67–1.17)
CREAT SERPL-MCNC: 0.54 MG/DL (ref 0.67–1.17)
CREAT SERPL-MCNC: 0.56 MG/DL (ref 0.66–1.25)
CREAT SERPL-MCNC: 0.61 MG/DL (ref 0.67–1.17)
CREAT SERPL-MCNC: 0.68 MG/DL (ref 0.67–1.17)
CREAT SERPL-MCNC: 0.7 MG/DL (ref 0.7–1.3)
DEPRECATED HCO3 PLAS-SCNC: 24 MMOL/L (ref 22–29)
DEPRECATED HCO3 PLAS-SCNC: 25 MMOL/L (ref 22–29)
DEPRECATED HCO3 PLAS-SCNC: 25 MMOL/L (ref 22–29)
DEPRECATED HCO3 PLAS-SCNC: 27 MMOL/L (ref 22–29)
EOSINOPHIL # BLD AUTO: 0.2 10E3/UL (ref 0–0.7)
EOSINOPHIL NFR BLD AUTO: 2 %
ERYTHROCYTE [DISTWIDTH] IN BLOOD BY AUTOMATED COUNT: 12.8 % (ref 10–15)
ERYTHROCYTE [DISTWIDTH] IN BLOOD BY AUTOMATED COUNT: 13.1 % (ref 10–15)
ERYTHROCYTE [DISTWIDTH] IN BLOOD BY AUTOMATED COUNT: 13.5 % (ref 10–15)
ERYTHROCYTE [DISTWIDTH] IN BLOOD BY AUTOMATED COUNT: 13.7 % (ref 10–15)
GFR SERPL CREATININE-BSD FRML MDRD: >90 ML/MIN/1.73M2
GLUCOSE BLD-MCNC: 116 MG/DL (ref 70–99)
GLUCOSE BLD-MCNC: 91 MG/DL (ref 70–125)
GLUCOSE SERPL-MCNC: 66 MG/DL (ref 70–99)
GLUCOSE SERPL-MCNC: 69 MG/DL (ref 70–99)
GLUCOSE SERPL-MCNC: 81 MG/DL (ref 70–99)
GLUCOSE SERPL-MCNC: 89 MG/DL (ref 70–99)
GLUCOSE UR STRIP-MCNC: NEGATIVE MG/DL
HCT VFR BLD AUTO: 32.7 % (ref 40–53)
HCT VFR BLD AUTO: 33.9 % (ref 40–53)
HCT VFR BLD AUTO: 35.1 % (ref 40–53)
HCT VFR BLD AUTO: 35.4 % (ref 40–53)
HGB BLD-MCNC: 10.4 G/DL (ref 13.3–17.7)
HGB BLD-MCNC: 10.8 G/DL (ref 13.3–17.7)
HGB BLD-MCNC: 11.6 G/DL (ref 13.3–17.7)
HGB BLD-MCNC: 11.9 G/DL (ref 13.3–17.7)
HGB UR QL STRIP: ABNORMAL
IMM GRANULOCYTES # BLD: 0.1 10E3/UL
IMM GRANULOCYTES NFR BLD: 1 %
KETONES UR STRIP-MCNC: NEGATIVE MG/DL
LEUKOCYTE ESTERASE UR QL STRIP: ABNORMAL
LYMPHOCYTES # BLD AUTO: 1.1 10E3/UL (ref 0.8–5.3)
LYMPHOCYTES NFR BLD AUTO: 10 %
MAGNESIUM SERPL-MCNC: 2.1 MG/DL (ref 1.6–2.3)
MCH RBC QN AUTO: 31.5 PG (ref 26.5–33)
MCH RBC QN AUTO: 31.6 PG (ref 26.5–33)
MCH RBC QN AUTO: 31.8 PG (ref 26.5–33)
MCH RBC QN AUTO: 32.1 PG (ref 26.5–33)
MCHC RBC AUTO-ENTMCNC: 31.8 G/DL (ref 31.5–36.5)
MCHC RBC AUTO-ENTMCNC: 31.9 G/DL (ref 31.5–36.5)
MCHC RBC AUTO-ENTMCNC: 32.8 G/DL (ref 31.5–36.5)
MCHC RBC AUTO-ENTMCNC: 33.9 G/DL (ref 31.5–36.5)
MCV RBC AUTO: 94 FL (ref 78–100)
MCV RBC AUTO: 98 FL (ref 78–100)
MCV RBC AUTO: 99 FL (ref 78–100)
MCV RBC AUTO: 99 FL (ref 78–100)
MONOCYTES # BLD AUTO: 1.1 10E3/UL (ref 0–1.3)
MONOCYTES NFR BLD AUTO: 10 %
NEUTROPHILS # BLD AUTO: 7.9 10E3/UL (ref 1.6–8.3)
NEUTROPHILS NFR BLD AUTO: 76 %
NITRATE UR QL: POSITIVE
NRBC # BLD AUTO: 0 10E3/UL
NRBC BLD AUTO-RTO: 0 /100
PH UR STRIP: 7.5 [PH] (ref 5–7)
PHOSPHATE SERPL-MCNC: 3 MG/DL (ref 2.5–4.5)
PLATELET # BLD AUTO: 152 10E3/UL (ref 150–450)
PLATELET # BLD AUTO: 231 10E3/UL (ref 150–450)
PLATELET # BLD AUTO: 298 10E3/UL (ref 150–450)
PLATELET # BLD AUTO: 321 10E3/UL (ref 150–450)
POTASSIUM BLD-SCNC: 3.7 MMOL/L (ref 3.4–5.3)
POTASSIUM BLD-SCNC: 3.9 MMOL/L (ref 3.5–5)
POTASSIUM SERPL-SCNC: 3.9 MMOL/L (ref 3.4–5.3)
POTASSIUM SERPL-SCNC: 3.9 MMOL/L (ref 3.4–5.3)
POTASSIUM SERPL-SCNC: 4.1 MMOL/L (ref 3.4–5.3)
POTASSIUM SERPL-SCNC: 4.4 MMOL/L (ref 3.4–5.3)
PROT SERPL-MCNC: 6.4 G/DL (ref 6.4–8.3)
PSA SERPL-MCNC: 4.71 UG/L (ref 0–6.5)
RBC # BLD AUTO: 3.29 10E6/UL (ref 4.4–5.9)
RBC # BLD AUTO: 3.43 10E6/UL (ref 4.4–5.9)
RBC # BLD AUTO: 3.61 10E6/UL (ref 4.4–5.9)
RBC # BLD AUTO: 3.74 10E6/UL (ref 4.4–5.9)
RBC URINE: 20 /HPF
SODIUM SERPL-SCNC: 125 MMOL/L (ref 136–145)
SODIUM SERPL-SCNC: 125 MMOL/L (ref 136–145)
SODIUM SERPL-SCNC: 129 MMOL/L (ref 136–145)
SODIUM SERPL-SCNC: 135 MMOL/L (ref 133–144)
SODIUM SERPL-SCNC: 137 MMOL/L (ref 136–145)
SODIUM SERPL-SCNC: 137 MMOL/L (ref 136–145)
SP GR UR STRIP: 1.01 (ref 1–1.03)
TRANSITIONAL EPI: <1 /HPF
TROPONIN I SERPL HS-MCNC: 4 NG/L
UROBILINOGEN UR STRIP-MCNC: NORMAL MG/DL
WBC # BLD AUTO: 10.4 10E3/UL (ref 4–11)
WBC # BLD AUTO: 10.9 10E3/UL (ref 4–11)
WBC # BLD AUTO: 16.1 10E3/UL (ref 4–11)
WBC # BLD AUTO: 8.9 10E3/UL (ref 4–11)
WBC CLUMPS #/AREA URNS HPF: PRESENT /HPF
WBC URINE: >182 /HPF

## 2022-01-01 PROCEDURE — 250N000013 HC RX MED GY IP 250 OP 250 PS 637: Performed by: STUDENT IN AN ORGANIZED HEALTH CARE EDUCATION/TRAINING PROGRAM

## 2022-01-01 PROCEDURE — 36415 COLL VENOUS BLD VENIPUNCTURE: CPT | Mod: ORL | Performed by: NURSE PRACTITIONER

## 2022-01-01 PROCEDURE — 93005 ELECTROCARDIOGRAM TRACING: CPT

## 2022-01-01 PROCEDURE — P9603 ONE-WAY ALLOW PRORATED MILES: HCPCS | Mod: ORL | Performed by: NURSE PRACTITIONER

## 2022-01-01 PROCEDURE — 80053 COMPREHEN METABOLIC PANEL: CPT | Mod: ORL | Performed by: NURSE PRACTITIONER

## 2022-01-01 PROCEDURE — 84100 ASSAY OF PHOSPHORUS: CPT | Performed by: STUDENT IN AN ORGANIZED HEALTH CARE EDUCATION/TRAINING PROGRAM

## 2022-01-01 PROCEDURE — P9604 ONE-WAY ALLOW PRORATED TRIP: HCPCS | Mod: ORL | Performed by: NURSE PRACTITIONER

## 2022-01-01 PROCEDURE — 84153 ASSAY OF PSA TOTAL: CPT | Mod: ORL | Performed by: NURSE PRACTITIONER

## 2022-01-01 PROCEDURE — 87086 URINE CULTURE/COLONY COUNT: CPT | Mod: ORL | Performed by: INTERNAL MEDICINE

## 2022-01-01 PROCEDURE — 250N000013 HC RX MED GY IP 250 OP 250 PS 637

## 2022-01-01 PROCEDURE — 84484 ASSAY OF TROPONIN QUANT: CPT

## 2022-01-01 PROCEDURE — 93010 ELECTROCARDIOGRAM REPORT: CPT | Performed by: INTERNAL MEDICINE

## 2022-01-01 PROCEDURE — 80048 BASIC METABOLIC PNL TOTAL CA: CPT | Mod: ORL | Performed by: NURSE PRACTITIONER

## 2022-01-01 PROCEDURE — 83735 ASSAY OF MAGNESIUM: CPT | Performed by: STUDENT IN AN ORGANIZED HEALTH CARE EDUCATION/TRAINING PROGRAM

## 2022-01-01 PROCEDURE — 250N000013 HC RX MED GY IP 250 OP 250 PS 637: Performed by: INTERNAL MEDICINE

## 2022-01-01 PROCEDURE — 80048 BASIC METABOLIC PNL TOTAL CA: CPT | Performed by: STUDENT IN AN ORGANIZED HEALTH CARE EDUCATION/TRAINING PROGRAM

## 2022-01-01 PROCEDURE — 120N000002 HC R&B MED SURG/OB UMMC

## 2022-01-01 PROCEDURE — 85025 COMPLETE CBC W/AUTO DIFF WBC: CPT | Mod: ORL | Performed by: NURSE PRACTITIONER

## 2022-01-01 PROCEDURE — 85027 COMPLETE CBC AUTOMATED: CPT | Mod: ORL | Performed by: NURSE PRACTITIONER

## 2022-01-01 PROCEDURE — 36592 COLLECT BLOOD FROM PICC: CPT | Performed by: STUDENT IN AN ORGANIZED HEALTH CARE EDUCATION/TRAINING PROGRAM

## 2022-01-01 PROCEDURE — 81001 URINALYSIS AUTO W/SCOPE: CPT | Mod: ORL | Performed by: INTERNAL MEDICINE

## 2022-01-01 PROCEDURE — 99232 SBSQ HOSP IP/OBS MODERATE 35: CPT | Mod: GC | Performed by: STUDENT IN AN ORGANIZED HEALTH CARE EDUCATION/TRAINING PROGRAM

## 2022-01-01 PROCEDURE — 85027 COMPLETE CBC AUTOMATED: CPT | Performed by: STUDENT IN AN ORGANIZED HEALTH CARE EDUCATION/TRAINING PROGRAM

## 2022-01-01 RX ORDER — POTASSIUM CHLORIDE 750 MG/1
10 TABLET, EXTENDED RELEASE ORAL ONCE
Status: COMPLETED | OUTPATIENT
Start: 2022-01-01 | End: 2022-01-01

## 2022-01-01 RX ORDER — ACETAMINOPHEN 325 MG/1
650 TABLET ORAL EVERY 4 HOURS PRN
Status: DISCONTINUED | OUTPATIENT
Start: 2022-01-01 | End: 2022-01-07 | Stop reason: HOSPADM

## 2022-01-01 RX ADMIN — Medication 1 TABLET: at 08:31

## 2022-01-01 RX ADMIN — QUETIAPINE FUMARATE 50 MG: 50 TABLET ORAL at 20:02

## 2022-01-01 RX ADMIN — ACETAMINOPHEN 650 MG: 325 TABLET, FILM COATED ORAL at 10:24

## 2022-01-01 RX ADMIN — TAMSULOSIN HYDROCHLORIDE 0.4 MG: 0.4 CAPSULE ORAL at 20:02

## 2022-01-01 RX ADMIN — POTASSIUM CHLORIDE 10 MEQ: 750 TABLET, EXTENDED RELEASE ORAL at 10:24

## 2022-01-01 RX ADMIN — ACETAMINOPHEN 650 MG: 325 TABLET, FILM COATED ORAL at 05:33

## 2022-01-01 RX ADMIN — RIVAROXABAN 15 MG: 15 TABLET, FILM COATED ORAL at 08:31

## 2022-01-01 RX ADMIN — RIVAROXABAN 15 MG: 15 TABLET, FILM COATED ORAL at 17:17

## 2022-01-01 ASSESSMENT — ACTIVITIES OF DAILY LIVING (ADL)
ADLS_ACUITY_SCORE: 14
ADLS_ACUITY_SCORE: 15
ADLS_ACUITY_SCORE: 17
ADLS_ACUITY_SCORE: 14
ADLS_ACUITY_SCORE: 15
ADLS_ACUITY_SCORE: 15
ADLS_ACUITY_SCORE: 14
ADLS_ACUITY_SCORE: 14
ADLS_ACUITY_SCORE: 17
ADLS_ACUITY_SCORE: 14
ADLS_ACUITY_SCORE: 14
ADLS_ACUITY_SCORE: 15
ADLS_ACUITY_SCORE: 15
ADLS_ACUITY_SCORE: 14
ADLS_ACUITY_SCORE: 15
ADLS_ACUITY_SCORE: 14
ADLS_ACUITY_SCORE: 17
ADLS_ACUITY_SCORE: 15
ADLS_ACUITY_SCORE: 15
ADLS_ACUITY_SCORE: 17
ADLS_ACUITY_SCORE: 15
ADLS_ACUITY_SCORE: 16
ADLS_ACUITY_SCORE: 14
ADLS_ACUITY_SCORE: 15

## 2022-01-01 NOTE — PLAN OF CARE
Temp: 98.2  F (36.8  C) Temp src: Oral BP: 107/63 Pulse: 92   Resp: 18 SpO2: 96 % O2 Device: None (Room air)       Time: 1900-0730  Reason for admission: COVID 19 + w/ pneumonia   Activity:  x1 assist w/ walker and gait belt, not OOB this shift   Pain: c/o chest pain, team saw patient and ordered EKG and troponin level. Tylenol PRN given x1.   Neuro: Oriented to self and time. Disoriented to place, situation. Needs constant re-orientation.  Cardiac: c/o chest pain, team paged see note. Tachy, team aware.   Respiratory: satting mid-upper 90's on RA. Denies SOB and PAZ. Infrequent dry cough.   GI/:  Denies nausea. Marina AUOP devorah in color. Incontinent BM's x3 this shift.   Diet: Soft bite size, thin liquids. 1:1 supervision with feeding.    Lines:  triple lumen PICC all lines SL  Skin: Red blanchable scrotum and coccyx. Barrier cream applied.   Lab: reviewed, troponin result is 4 team paged FYI  Changes this shift: Patient C/O chest pain paged team see note. EKG and troponin done.  Plan: continue to monitor, continue w/ POC

## 2022-01-01 NOTE — PLAN OF CARE
"/63 (BP Location: Left arm)   Pulse 92   Temp 98.2  F (36.8  C) (Oral)   Resp 18   Ht 1.88 m (6' 2\")   Wt 60.2 kg (132 lb 11.5 oz)   SpO2 96%   BMI 17.04 kg/m          Status: VSS  Pain/Nausea: Denies pain and N/V  Mobility: Assist x1 with walker and gaitbelt- up to chair twice. Repositions independently.   Diet: Soft bite size, thin L- 1:1 supervision with PO intake, fair appetite, swallowed pills with water  Labs: K 3.9, Mg 2.4, Phos 3.0- all AM rechecks  LDAs: R TL PICC- SL, Marina- devorah output  Skin/incisions: Intact, no new deficits noted, barrier cream on deonte area  Neuro: Alert, disoriented to place. Calls appropriately. Bed alarm on for safety  Respiratory: RA sating >90%. LS clear, PAZ. Denies cough  Cardiac: WDL, ex tachy at times, low Bps within parameters. Denies chest pain  GI/: Marina- AUOP, large loose BM this shift  New Changes: No acute changes this shift  Plan: Continue with POC  "

## 2022-01-01 NOTE — PROVIDER NOTIFICATION
Paged team FYI EKG is complete. Results were abnormal.    Addendum: troponin level ordered    Statement Selected

## 2022-01-01 NOTE — PROVIDER NOTIFICATION
Paged team patient is c/o chest pain. Vitals are in, patient is tachy currently. No pain meds avail in MAR.    Addendum: Spoke w/ Maryo from team, EKG was ordered and he will come see patient.

## 2022-01-01 NOTE — PROGRESS NOTES
[facility]    Progress Note - Khurram 3 Service        Date of Admission:  12/21/2021    Assessment & Plan   Ezequiel Randhawa is a 81 y.o. M w/ history of tobacco use who presented with acute hypoxic respiratory failure due to severe COVID-19 pneumonia (required intubation; extubated 12/24) complicated by bilateral pulmonary emboli, new HFrEF (possibly stress-induced), A-fib w/ RVR (resolved), and severe hypernatremia.    Today's changes:  - Mental status continues to improve, although patient is still not decisional at this time  - BP improved with stopping beta blocker and ACE-i  - Encourage PO intake, supplements and snacks  - Medically ready for discharge to TCU versus home   - Chest pain overnight, difficult historian, EKG and trop negative. Reproducible with palpation of L chest wall so seems most consistent with costochondritis  - Attempted to contact son x2 to discuss dispo options but no answer and voicemail box full  - Monitor for signs/symptoms of infection with new leukocytosis 1/1  - Off precautions 20 days from symptom onset per infection prevention (1/7/21)    #Acute toxic-metabolic encephlopathy  #Hospital delirium  Drowsy and alert/oriented to self but not time or place. No hx of underlying cognitive impairment. Likely due to intubation, infection, and hospital delirium. Certainly hypernatremia could have been playing a role initially but this has now corrected. Mentation is slowly improving and is likely close to baseline.    - Seroquel 50 mg at bedtime; 25 mg BID PRN additional available (QTc < 500 ms)  - Pt non-decisional at this time  - SLP following, pt on minced and moist diet     #Severe COVID-19 pneumonia, weaned to room air  #Acute hypoxic respiratory failure, resolved  #Septic shock due to COVID-19 infection, resolved  Unvaccinated. COVID-19 positive on 12/20/21; symptoms began 1 week prior (cough, chills). Intubated on 12/20 in the [facility] ED.  Remdesivir 12/20 x 1. Tocilizumab 12/20. Extubated and weaned off pressors on 12/25. Procal low on admission. Ur Strep and Legionella negative, MRSA swab negative. BCx negative. SpCx not done. Inflammatory markers now trending down and weaned to room air 12/28.  - stopped dexamethasone 6 mg qday following 9 day course d/t hypoxia resolved and ongoing delirium  - Remains in room air   - continue COVID precautions/quarantine through ~ 1/7/2022 (20 days from symptom onset 12/17/21) positive test considering severe disease)    ABx:  - ceftriaxone (12/21-12/25)  - azithromycin (12/21-12/23)  - Zosyn (12/20)    #Bilateral PEs  Seen on 12/20 CT. No evidence of R heart strain. Transitioned from Lovenox to rivaroxaban on 12/25.  - rivaroxaban 15 mg BID (through 1/10, then transition to 20 mg at bedtime)  - pharmacy liaison consult completed, rivaroxaban copay $45 after first month deductible met ($265)    #HFrEF  #Afib with RVR, resolved (now in normal sinus rhythm)  A-fib w/ RVR; received IV metoprolol x 1 on 12/20, and then was briefly on diltiazem gtt on 12/21. Echo on 12/21 showed EF 35-40% with moderate global hypokinesia of the left ventricle, and EF improved slightly to 45-50% on 12/22. Trop negative. No baseline for comparison. Ddx includes stress cardiomyopathy (most likely), ischemic dz (smoker, very little previous medical care), or tachyarrthymia-induced. Appears euvolemic; was receiving intermittent diuresis in the ICU.  - hold diuresis; re-assess volume status daily and diurese as needed  - Re-consulted cardiology 12/29  - Started GDMT with metoprolol and lisinopril, however could not tolerate due to hypotension. Can be restarted and titrated as an outpatient.   - will likely need inpt vs outpt ischemic w/u (will defer to outpatient unless discharge delayed significantly for placement)  - Refused coronary CTA, can obtain outpatient  - intake/output   - daily wts    # Chest pain, likely costochondritis  New pain  12/31-1/1 - EKG and trop not concerning for ACS. Reproducible with palpation of L lateral chest wall - likely costochondritis.  - Tylenol PRN    #Likely COPD  #Tobacco use  Does not appear to have been diagnosed PTA, but moderate emphysema seen on imaging. Unable to follow commands for inhaler use.  - albuterol 2 puffs q6h PRN    #Steroid-induced hyperglycemia  # Hypoglycemia  A1c normal at 5.5%. Now done with dexamethasone, stopped correction scale insluin as pt borderline hypoglycemic intermittently    # Urinary retention  Failed trial of burris removal 12/26.  - Burris removal trial 12/29--failed  - Plan to start flomax and keep burris in for 1 week, with follow up removal and trial of void   - Discuss with urology prior to discharge if unable to remove burris     #Hypernatremia, resolved  Na as high as 160 on admission, and has steadily improved with free water flushes.  - trend    #Thrombocytopenia, resolved  Platelets have been slowly dropping to ~100 at time of floor transfer and now stable in that range. Possibly 2/2 consumptive process/thrombosis attributable to hyper-inflammatory state from COVID-19. HIT screen negative.  - daily CBC    #Moderate malnutrition in setting of acute illness  - SLP following for diet advancement as able  - nutrition following, supplements and snacks  - PT/OT re-ordered; PT/OT recs TCU  - SLP --> TCU  - Encouraged PO intake    #Constipation, resolved  - Miralax 17 g BID PRN  - Senna 2 tabs BID PRN  - Bisacodyl 10 mg qday PRN     Diet: Snacks/Supplements Adult: Other; Vital Cuisine Chocolate @ 2pm & HS (naturally mildly thick); Between Meals  Room Service  Snacks/Supplements Adult: Magic Cup; With Meals  Combination Diet Soft and Bite Sized Diet (level 6); Thin Liquids (level 0)  Room Service    DVT Prophylaxis: rivaroxaban  Burris Catheter: PRESENT, indication: Strict 1-2 Hour I&O, Retention, Retention   Lines: PICC  Fluids: none  Central Lines: None  Code Status: No CPR- Pre-arrest  "intubation OK    Dispo: Medically ready for discharge, TCU (recommended by PT/OT) versus home due to patient and family wishes.    The patient's care was discussed with the Bedside Nurse and Patient     Kristel Medrano MD  [address]  Securely message with the Vocera Web Console (learn more here)  Text page via [facility] Paging/Directory    Please see sign in/sign out for up to date coverage information  ______________________________________________________________________    Interval History   Episode of chest pain overnight. Assessed by cross-cover MD - pt wasn't able to describe pain much. EKG and trop were unremarkable. Tylenol was given with benefit. Today pt describes L lateral chest wall pain, reproducible with palpation. Not much SOB or cough. Feels he is \"eating too much\" though per RN pt not eating/drinking much. Pt prefers fast food and cookies. BP improved after stopping beta blocker and ACE-I yesterday.   4 pt ROS otherwise negative.    Data reviewed today: I reviewed all medications, new labs and imaging results over the last 24 hours.    Physical Exam   Vital Signs: Temp: 98.2  F (36.8  C) Temp src: Oral BP: 107/67 Pulse: 101   Resp: 18 SpO2: 95 % O2 Device: None (Room air)    Weight: 132 lbs 11.47 oz  General: Awake, follows commands, answers questions appropriately, pleasant and in no distress.   HEENT: NCAT, anicteric sclera, EOMI  Pulm/Resp: breathing comfortably in room air, LCTAB anteriorly  CV: RRR, Extremities are warm to the touch, no lower leg edema   Chest: L lateral chest wall pain with palpation  Abdomen: No visible distension, soft, non-tender  Neuro: Awake and alert. Speech more clear. Situationally very appropriate but short term memory is poor. Oriented to self and year, not place.  Skin: no rashes on exposed surfaces  "

## 2022-01-01 NOTE — PROVIDER NOTIFICATION
Paged team FYI troponin result is 4. Patient states he still has chest pain but is getting better.     Addendum: Spoke w/ Gali from team and Tylenol 650mg PRN will be prescribed for pain.

## 2022-01-02 LAB
ERYTHROCYTE [DISTWIDTH] IN BLOOD BY AUTOMATED COUNT: 13.4 % (ref 10–15)
GLUCOSE BLDC GLUCOMTR-MCNC: 104 MG/DL (ref 70–99)
GLUCOSE BLDC GLUCOMTR-MCNC: 105 MG/DL (ref 70–99)
GLUCOSE BLDC GLUCOMTR-MCNC: 190 MG/DL (ref 70–99)
GLUCOSE BLDC GLUCOMTR-MCNC: 92 MG/DL (ref 70–99)
HCT VFR BLD AUTO: 36.9 % (ref 40–53)
HGB BLD-MCNC: 12.3 G/DL (ref 13.3–17.7)
HOLD SPECIMEN: NORMAL
MCH RBC QN AUTO: 32.3 PG (ref 26.5–33)
MCHC RBC AUTO-ENTMCNC: 33.3 G/DL (ref 31.5–36.5)
MCV RBC AUTO: 97 FL (ref 78–100)
PLATELET # BLD AUTO: 166 10E3/UL (ref 150–450)
RBC # BLD AUTO: 3.81 10E6/UL (ref 4.4–5.9)
WBC # BLD AUTO: 15.4 10E3/UL (ref 4–11)

## 2022-01-02 PROCEDURE — 250N000013 HC RX MED GY IP 250 OP 250 PS 637: Performed by: INTERNAL MEDICINE

## 2022-01-02 PROCEDURE — 120N000002 HC R&B MED SURG/OB UMMC

## 2022-01-02 PROCEDURE — 250N000013 HC RX MED GY IP 250 OP 250 PS 637: Performed by: STUDENT IN AN ORGANIZED HEALTH CARE EDUCATION/TRAINING PROGRAM

## 2022-01-02 PROCEDURE — 99207 PR CDG-MDM COMPONENT: MEETS MODERATE - UP CODED: CPT | Performed by: STUDENT IN AN ORGANIZED HEALTH CARE EDUCATION/TRAINING PROGRAM

## 2022-01-02 PROCEDURE — 999N000044 HC STATISTIC CVC DRESSING CHANGE

## 2022-01-02 PROCEDURE — 85027 COMPLETE CBC AUTOMATED: CPT | Performed by: STUDENT IN AN ORGANIZED HEALTH CARE EDUCATION/TRAINING PROGRAM

## 2022-01-02 PROCEDURE — 99232 SBSQ HOSP IP/OBS MODERATE 35: CPT | Performed by: STUDENT IN AN ORGANIZED HEALTH CARE EDUCATION/TRAINING PROGRAM

## 2022-01-02 PROCEDURE — 36592 COLLECT BLOOD FROM PICC: CPT | Performed by: STUDENT IN AN ORGANIZED HEALTH CARE EDUCATION/TRAINING PROGRAM

## 2022-01-02 PROCEDURE — 250N000013 HC RX MED GY IP 250 OP 250 PS 637

## 2022-01-02 RX ADMIN — QUETIAPINE FUMARATE 25 MG: 50 TABLET ORAL at 18:15

## 2022-01-02 RX ADMIN — RIVAROXABAN 15 MG: 15 TABLET, FILM COATED ORAL at 17:26

## 2022-01-02 RX ADMIN — QUETIAPINE FUMARATE 25 MG: 50 TABLET ORAL at 18:14

## 2022-01-02 RX ADMIN — RIVAROXABAN 15 MG: 15 TABLET, FILM COATED ORAL at 09:00

## 2022-01-02 RX ADMIN — TAMSULOSIN HYDROCHLORIDE 0.4 MG: 0.4 CAPSULE ORAL at 21:16

## 2022-01-02 RX ADMIN — Medication 1 TABLET: at 09:00

## 2022-01-02 RX ADMIN — ACETAMINOPHEN 650 MG: 325 TABLET, FILM COATED ORAL at 18:15

## 2022-01-02 ASSESSMENT — ACTIVITIES OF DAILY LIVING (ADL)
ADLS_ACUITY_SCORE: 15
ADLS_ACUITY_SCORE: 15
ADLS_ACUITY_SCORE: 17
ADLS_ACUITY_SCORE: 15
ADLS_ACUITY_SCORE: 17
ADLS_ACUITY_SCORE: 17
ADLS_ACUITY_SCORE: 15
ADLS_ACUITY_SCORE: 15
ADLS_ACUITY_SCORE: 17
ADLS_ACUITY_SCORE: 15
ADLS_ACUITY_SCORE: 15
ADLS_ACUITY_SCORE: 17
ADLS_ACUITY_SCORE: 15
ADLS_ACUITY_SCORE: 15
ADLS_ACUITY_SCORE: 17
ADLS_ACUITY_SCORE: 15
ADLS_ACUITY_SCORE: 17

## 2022-01-02 ASSESSMENT — MIFFLIN-ST. JEOR: SCORE: 1342.22

## 2022-01-02 NOTE — PLAN OF CARE
"/59 (BP Location: Left arm)   Pulse 97   Temp 98.2  F (36.8  C) (Oral)   Resp 18   Ht 1.88 m (6' 2\")   Wt 60.2 kg (132 lb 11.5 oz)   SpO2 97%   BMI 17.04 kg/m       Status: VSS  Pain/Nausea: Improving pain in chest- gave PRN Tylenol, denies N/V  Mobility: Assist x2 with walker and gaitbelt- up to chair twice. Repositions independently.   Diet: Soft bite size, thin L- 1:1 supervision with PO intake, poor PO intake, swallowed pills with water  Labs: K 3.7, Mg 2.1, Phos 3.0- replaced K orally, all schd AM rechecks  LDAs: R TL PICC- SL, Marina devorah output  Skin/incisions: Intact, no new deficits noted, barrier cream on deonte area  Neuro: Alert, disoriented to place. Calls appropriately. Bed alarm on for safety  Respiratory: RA sating >90%. LS clear, PAZ. Denies cough  Cardiac: WDL, ex tachy at times, low Bps within parameters. Chest pain improving, MD aware  GI/: Marina- AUOP, large loose BM this shift  New Changes: No acute changes this shift  Plan: Continue with POC  "

## 2022-01-02 NOTE — PROGRESS NOTES
[facility]    Progress Note - Khurram 3 Service        Date of Admission:  12/21/2021    Assessment & Plan   Ezequiel Randhawa is a 81 y.o. M w/ history of tobacco use who presented with acute hypoxic respiratory failure due to severe COVID-19 pneumonia (required intubation; extubated 12/24) complicated by bilateral pulmonary emboli, new HFrEF (possibly stress-induced), A-fib w/ RVR (resolved), and severe hypernatremia.    Today's changes:  - Mental status continues to improve, although patient is still not decisional at this time  - BP improved with stopping beta blocker and ACE-i  - Encourage PO intake, supplements and snacks  - Medically ready for discharge to TCU versus home   - Monitor for signs/symptoms of infection with new leukocytosis 1/1  - Off precautions 20 days from symptom onset per infection prevention (1/7/21)    #Acute toxic-metabolic encephlopathy  #Hospital delirium  Drowsy and alert/oriented to self but not time or place. No hx of underlying cognitive impairment. Likely due to intubation, infection, and hospital delirium. Certainly hypernatremia could have been playing a role initially but this has now corrected. Mentation is slowly improving and is likely close to baseline.    - Seroquel 50 mg at bedtime; 25 mg BID PRN additional available (QTc < 500 ms)  - Pt non-decisional at this time  - SLP following, pt on minced and moist diet     #Severe COVID-19 pneumonia, weaned to room air  #Acute hypoxic respiratory failure, resolved  #Septic shock due to COVID-19 infection, resolved  Unvaccinated. COVID-19 positive on 12/20/21; symptoms began 1 week prior (cough, chills). Intubated on 12/20 in the [facility] ED. Remdesivir 12/20 x 1. Tocilizumab 12/20. Extubated and weaned off pressors on 12/25. Procal low on admission. Ur Strep and Legionella negative, MRSA swab negative. BCx negative. SpCx not done. Inflammatory markers now trending down and weaned to room  air 12/28.  - stopped dexamethasone 6 mg qday following 9 day course d/t hypoxia resolved and ongoing delirium  - Remains in room air   - continue COVID precautions/quarantine through ~ 1/7/2022 (20 days from symptom onset 12/17/21) positive test considering severe disease)    ABx:  - ceftriaxone (12/21-12/25)  - azithromycin (12/21-12/23)  - Zosyn (12/20)    #Bilateral PEs  Seen on 12/20 CT. No evidence of R heart strain. Transitioned from Lovenox to rivaroxaban on 12/25.  - rivaroxaban 15 mg BID (through 1/10, then transition to 20 mg at bedtime)  - pharmacy liaison consult completed, rivaroxaban copay $45 after first month deductible met ($265)    #HFrEF  #Afib with RVR, resolved (now in normal sinus rhythm)  A-fib w/ RVR; received IV metoprolol x 1 on 12/20, and then was briefly on diltiazem gtt on 12/21. Echo on 12/21 showed EF 35-40% with moderate global hypokinesia of the left ventricle, and EF improved slightly to 45-50% on 12/22. Trop negative. No baseline for comparison. Ddx includes stress cardiomyopathy (most likely), ischemic dz (smoker, very little previous medical care), or tachyarrthymia-induced. Appears euvolemic; was receiving intermittent diuresis in the ICU.  - hold diuresis; re-assess volume status daily and diurese as needed  - Re-consulted cardiology 12/29  - Started GDMT with metoprolol and lisinopril, however could not tolerate due to hypotension. Can be restarted and titrated as an outpatient.   - will likely need inpt vs outpt ischemic w/u (will defer to outpatient unless discharge delayed significantly for placement)  - Refused coronary CTA, can obtain outpatient  - intake/output   - daily wts    # Chest pain, likely costochondritis  New pain 12/31-1/1 - EKG and trop not concerning for ACS. Reproducible with palpation of L lateral chest wall - likely costochondritis.  - Tylenol PRN    #Likely COPD  #Tobacco use  Does not appear to have been diagnosed PTA, but moderate emphysema seen on  imaging. Unable to follow commands for inhaler use.  - albuterol 2 puffs q6h PRN    #Steroid-induced hyperglycemia  # Hypoglycemia  A1c normal at 5.5%. Now done with dexamethasone, stopped correction scale insluin as pt borderline hypoglycemic intermittently    # Urinary retention  Failed trial of burris removal 12/26.  - Burris removal trial 12/29--failed  - Plan to start flomax and keep burris in for 1 week, with follow up removal and trial of void   - Discuss with urology prior to discharge if unable to remove burris     #Hypernatremia, resolved  Na as high as 160 on admission, and has steadily improved with free water flushes.  - trend    #Thrombocytopenia, resolved  Platelets have been slowly dropping to ~100 at time of floor transfer and now stable in that range. Possibly 2/2 consumptive process/thrombosis attributable to hyper-inflammatory state from COVID-19. HIT screen negative.  - daily CBC    #Moderate malnutrition in setting of acute illness  - SLP following for diet advancement as able  - nutrition following, supplements and snacks  - PT/OT re-ordered; PT/OT recs TCU  - SLP --> TCU  - Encouraged PO intake    #Constipation, resolved  - Miralax 17 g BID PRN  - Senna 2 tabs BID PRN  - Bisacodyl 10 mg qday PRN     Diet: Snacks/Supplements Adult: Other; Vital Cuisine Chocolate @ 2pm & HS (naturally mildly thick); Between Meals  Room Service  Snacks/Supplements Adult: Magic Cup; With Meals  Combination Diet Soft and Bite Sized Diet (level 6); Thin Liquids (level 0)  Room Service    DVT Prophylaxis: rivaroxaban  Burris Catheter: PRESENT, indication: Strict 1-2 Hour I&O, Retention, Retention   Lines: PICC  Fluids: none  Central Lines: None  Code Status: No CPR- Pre-arrest intubation OK    Dispo: Medically ready for discharge, TCU (recommended by PT/OT) versus home due to patient and family wishes.    The patient's care was discussed with the Bedside Nurse and Patient     MD Khurram Degroot 3 Service  M  [facility]  Securely message with the Vocera Web Console (learn more here)  Text page via [employer] Paging/Directory    Please see sign in/sign out for up to date coverage information  ______________________________________________________________________    Interval History   No events overnight. Feeling well this morning. Denies any chest pain or shortness of breath. Has tried calling his son but no answer. Missing his cat Lily. 4 pt ROS otherwise negative.    Data reviewed today: I reviewed all medications, new labs and imaging results over the last 24 hours.    Physical Exam   Vital Signs: Temp: 97.7  F (36.5  C) Temp src: Oral BP: 110/65 Pulse: 111   Resp: 18 SpO2: 96 % O2 Device: None (Room air)    Weight: 132 lbs 11.47 oz  General: Awake, follows commands, answers questions appropriately, pleasant and in no distress.   HEENT: NCAT, anicteric sclera, EOMI  Pulm/Resp: breathing comfortably in room air  CV: RRR, Extremities are warm to the touch, no lower leg edema   Abdomen: No visible distension  Neuro: Awake and alert. Speech more clear. Situationally very appropriate but short term memory is poor. Oriented to self and year, not place.  Skin: no rashes on exposed surfaces

## 2022-01-03 ENCOUNTER — APPOINTMENT (OUTPATIENT)
Dept: OCCUPATIONAL THERAPY | Facility: CLINIC | Age: 82
DRG: 208 | End: 2022-01-03
Attending: INTERNAL MEDICINE
Payer: COMMERCIAL

## 2022-01-03 ENCOUNTER — APPOINTMENT (OUTPATIENT)
Dept: SPEECH THERAPY | Facility: CLINIC | Age: 82
DRG: 208 | End: 2022-01-03
Attending: INTERNAL MEDICINE
Payer: COMMERCIAL

## 2022-01-03 ENCOUNTER — APPOINTMENT (OUTPATIENT)
Dept: PHYSICAL THERAPY | Facility: CLINIC | Age: 82
DRG: 208 | End: 2022-01-03
Attending: INTERNAL MEDICINE
Payer: COMMERCIAL

## 2022-01-03 LAB
ANION GAP SERPL CALCULATED.3IONS-SCNC: 8 MMOL/L (ref 3–14)
ATRIAL RATE - MUSE: 111 BPM
BUN SERPL-MCNC: 15 MG/DL (ref 7–30)
CALCIUM SERPL-MCNC: 8.7 MG/DL (ref 8.5–10.1)
CHLORIDE BLD-SCNC: 102 MMOL/L (ref 94–109)
CO2 SERPL-SCNC: 25 MMOL/L (ref 20–32)
CREAT SERPL-MCNC: 0.61 MG/DL (ref 0.66–1.25)
DIASTOLIC BLOOD PRESSURE - MUSE: NORMAL MMHG
ERYTHROCYTE [DISTWIDTH] IN BLOOD BY AUTOMATED COUNT: 13.7 % (ref 10–15)
GFR SERPL CREATININE-BSD FRML MDRD: >90 ML/MIN/1.73M2
GLUCOSE BLD-MCNC: 111 MG/DL (ref 70–99)
GLUCOSE BLDC GLUCOMTR-MCNC: 103 MG/DL (ref 70–99)
GLUCOSE BLDC GLUCOMTR-MCNC: 106 MG/DL (ref 70–99)
GLUCOSE BLDC GLUCOMTR-MCNC: 108 MG/DL (ref 70–99)
GLUCOSE BLDC GLUCOMTR-MCNC: 123 MG/DL (ref 70–99)
GLUCOSE BLDC GLUCOMTR-MCNC: 125 MG/DL (ref 70–99)
HCT VFR BLD AUTO: 37 % (ref 40–53)
HGB BLD-MCNC: 12.4 G/DL (ref 13.3–17.7)
INTERPRETATION ECG - MUSE: NORMAL
LACTATE SERPL-SCNC: 1 MMOL/L (ref 0.7–2)
MCH RBC QN AUTO: 32.2 PG (ref 26.5–33)
MCHC RBC AUTO-ENTMCNC: 33.5 G/DL (ref 31.5–36.5)
MCV RBC AUTO: 96 FL (ref 78–100)
P AXIS - MUSE: NORMAL DEGREES
PLATELET # BLD AUTO: 163 10E3/UL (ref 150–450)
POTASSIUM BLD-SCNC: 3.6 MMOL/L (ref 3.4–5.3)
PR INTERVAL - MUSE: 240 MS
QRS DURATION - MUSE: 76 MS
QT - MUSE: 364 MS
QTC - MUSE: 469 MS
R AXIS - MUSE: 268 DEGREES
RBC # BLD AUTO: 3.85 10E6/UL (ref 4.4–5.9)
SODIUM SERPL-SCNC: 135 MMOL/L (ref 133–144)
SYSTOLIC BLOOD PRESSURE - MUSE: NORMAL MMHG
T AXIS - MUSE: 71 DEGREES
VENTRICULAR RATE- MUSE: 100 BPM
WBC # BLD AUTO: 13.4 10E3/UL (ref 4–11)

## 2022-01-03 PROCEDURE — 36592 COLLECT BLOOD FROM PICC: CPT | Performed by: STUDENT IN AN ORGANIZED HEALTH CARE EDUCATION/TRAINING PROGRAM

## 2022-01-03 PROCEDURE — 92526 ORAL FUNCTION THERAPY: CPT | Mod: GN

## 2022-01-03 PROCEDURE — 99231 SBSQ HOSP IP/OBS SF/LOW 25: CPT | Mod: GC | Performed by: STUDENT IN AN ORGANIZED HEALTH CARE EDUCATION/TRAINING PROGRAM

## 2022-01-03 PROCEDURE — 250N000013 HC RX MED GY IP 250 OP 250 PS 637: Performed by: INTERNAL MEDICINE

## 2022-01-03 PROCEDURE — 97110 THERAPEUTIC EXERCISES: CPT | Mod: GO

## 2022-01-03 PROCEDURE — 250N000013 HC RX MED GY IP 250 OP 250 PS 637: Performed by: STUDENT IN AN ORGANIZED HEALTH CARE EDUCATION/TRAINING PROGRAM

## 2022-01-03 PROCEDURE — 85027 COMPLETE CBC AUTOMATED: CPT | Performed by: STUDENT IN AN ORGANIZED HEALTH CARE EDUCATION/TRAINING PROGRAM

## 2022-01-03 PROCEDURE — 83605 ASSAY OF LACTIC ACID: CPT | Performed by: STUDENT IN AN ORGANIZED HEALTH CARE EDUCATION/TRAINING PROGRAM

## 2022-01-03 PROCEDURE — 82310 ASSAY OF CALCIUM: CPT | Performed by: STUDENT IN AN ORGANIZED HEALTH CARE EDUCATION/TRAINING PROGRAM

## 2022-01-03 PROCEDURE — 97530 THERAPEUTIC ACTIVITIES: CPT | Mod: GP

## 2022-01-03 PROCEDURE — 97535 SELF CARE MNGMENT TRAINING: CPT | Mod: GO

## 2022-01-03 PROCEDURE — 120N000002 HC R&B MED SURG/OB UMMC

## 2022-01-03 RX ADMIN — Medication 1 TABLET: at 09:02

## 2022-01-03 RX ADMIN — QUETIAPINE FUMARATE 50 MG: 50 TABLET ORAL at 20:19

## 2022-01-03 RX ADMIN — TAMSULOSIN HYDROCHLORIDE 0.4 MG: 0.4 CAPSULE ORAL at 20:19

## 2022-01-03 RX ADMIN — RIVAROXABAN 15 MG: 15 TABLET, FILM COATED ORAL at 09:02

## 2022-01-03 RX ADMIN — RIVAROXABAN 15 MG: 15 TABLET, FILM COATED ORAL at 17:24

## 2022-01-03 ASSESSMENT — ACTIVITIES OF DAILY LIVING (ADL)
ADLS_ACUITY_SCORE: 14
ADLS_ACUITY_SCORE: 15
ADLS_ACUITY_SCORE: 14
ADLS_ACUITY_SCORE: 15
ADLS_ACUITY_SCORE: 14
ADLS_ACUITY_SCORE: 15
ADLS_ACUITY_SCORE: 14

## 2022-01-03 NOTE — PROGRESS NOTES
[facility]    Progress Note - Khurram 3 Service        Date of Admission:  12/21/2021    Assessment & Plan   Ezequiel Randhawa is a 81 y.o. M w/ history of tobacco use who presented with acute hypoxic respiratory failure due to severe COVID-19 pneumonia (required intubation; extubated 12/24) complicated by bilateral pulmonary emboli, new HFrEF (possibly stress-induced), A-fib w/ RVR (resolved), and severe hypernatremia.    Today's changes:  - I was able to talk with pt's son, Gregorio, today. Reports that patients symptoms likely started on 12/13 or 12/14 at the latest.   - Based on this, isolation will be off for pt on 1/5/22  - Medically ready for discharge to TCU (Gregorio has decided this is best situation for pt at this time)  - Mental status continues to improve, although patient is still not decisional at this time  - Encourage PO intake, supplements and snacks    #Acute toxic-metabolic encephlopathy  #Hospital delirium  Drowsy and alert/oriented to self but not time or place. No hx of underlying cognitive impairment. Likely due to intubation, infection, and hospital delirium. Certainly hypernatremia could have been playing a role initially but this has now corrected. Mentation is slowly improving and is likely close to baseline.    - Seroquel 50 mg at bedtime; 25 mg BID PRN additional available (QTc < 500 ms)  - Pt non-decisional at this time  - SLP following, pt on minced and moist diet     #Severe COVID-19 pneumonia, weaned to room air  #Acute hypoxic respiratory failure, resolved  #Septic shock due to COVID-19 infection, resolved  Unvaccinated. COVID-19 positive on 12/20/21; symptoms began 1 week prior on 12/14/21(cough, chills). Intubated on 12/20 in the [facility] ED. Remdesivir 12/20 x 1. Tocilizumab 12/20. Extubated and weaned off pressors on 12/25. Procal low on admission. Ur Strep and Legionella negative, MRSA swab negative. BCx negative. SpCx not done. Inflammatory  markers trended down and weaned to room air 12/28.  - stopped dexamethasone 6 mg qday following 9 day course d/t hypoxia resolved and ongoing delirium  - Remains in room air   - continue COVID precautions/quarantine through ~ 1/5/2022 (20 days from symptom onset 12/14/21) considering severe disease)    ABx:  - ceftriaxone (12/21-12/25)  - azithromycin (12/21-12/23)  - Zosyn (12/20)    #Bilateral PEs  Seen on 12/20 CT. No evidence of R heart strain. Transitioned from Lovenox to rivaroxaban on 12/25.  - rivaroxaban 15 mg BID (through 1/10, then transition to 20 mg at bedtime)  - pharmacy liaison consult completed, rivaroxaban copay $45 after first month deductible met ($265)    # Leukocytosis  New on 1/1/22 - down-trending without abx and no signs/symptoms of infection. Unclear etiology but doesn't seem it was d/t unrevealed infection.    #HFrEF  #Afib with RVR, resolved (now in normal sinus rhythm)  A-fib w/ RVR; received IV metoprolol x 1 on 12/20, and then was briefly on diltiazem gtt on 12/21. Echo on 12/21 showed EF 35-40% with moderate global hypokinesia of the left ventricle, and EF improved slightly to 45-50% on 12/22. Trop negative. No baseline for comparison. Ddx includes stress cardiomyopathy (most likely), ischemic dz (smoker, very little previous medical care), or tachyarrthymia-induced. Appears euvolemic; was receiving intermittent diuresis in the ICU.  - hold diuresis; re-assess volume status daily and diurese as needed  - Re-consulted cardiology 12/29  - Started GDMT with metoprolol and lisinopril, however could not tolerate due to hypotension. Can be restarted and titrated as an outpatient.   - will likely need inpt vs outpt ischemic w/u (will defer to outpatient unless discharge delayed significantly for placement)  - Refused coronary CTA, plan to obtain outpatient  - intake/output   - daily wts    # Chest pain, likely costochondritis  New pain 12/31-1/1 - EKG and trop not concerning for ACS.  Reproducible with palpation of L lateral chest wall - likely costochondritis.  - Tylenol PRN    #Likely COPD  #Tobacco use  Does not appear to have been diagnosed PTA, but moderate emphysema seen on imaging. Unable to follow commands for inhaler use.  - albuterol 2 puffs q6h PRN    #Steroid-induced hyperglycemia  # Hypoglycemia  A1c normal at 5.5%. Now done with dexamethasone, stopped correction scale insluin as pt borderline hypoglycemic intermittently    # Urinary retention  Failed trial of burris removal 12/26 and 12/29  - Started flomax and keep burris in for 1 week, with follow up removal and trial of void    - Discuss with urology prior to discharge if unable to remove burris     #Hypernatremia, resolved  Na as high as 160 on admission, and has steadily improved with free water flushes.  - trend    #Thrombocytopenia, resolved  Platelets have been slowly dropping to ~100 at time of floor transfer and now stable in that range. Possibly 2/2 consumptive process/thrombosis attributable to hyper-inflammatory state from COVID-19. HIT screen negative.  - daily CBC    #Moderate malnutrition in setting of acute illness  - SLP following for diet advancement as able  - nutrition following, supplements and snacks  - PT/OT re-ordered; PT/OT recs TCU  - SLP --> TCU  - Encouraged PO intake    #Constipation, resolved  - Miralax 17 g BID PRN  - Senna 2 tabs BID PRN  - Bisacodyl 10 mg qday PRN     Diet: Snacks/Supplements Adult: Other; Vital Cuisine Chocolate @ 2pm & HS (naturally mildly thick); Between Meals  Room Service  Snacks/Supplements Adult: Magic Cup; With Meals  Combination Diet Soft and Bite Sized Diet (level 6); Thin Liquids (level 0)  Room Service    DVT Prophylaxis: rivaroxaban  Burris Catheter: PRESENT, indication: Strict 1-2 Hour I&O, Retention, Retention   Lines: PICC  Fluids: none  Central Lines: None  Code Status: No CPR- Pre-arrest intubation OK    Dispo: Medically ready for discharge to TCU, pt will be off COVID  "isolation on 1/5/22 (20 days from symptom onset on 12/14)    The patient's care was discussed with the Bedside Nurse and Patient     Kristel Medrano MD  [facility] 3 Service  [facility]  Securely message with the Vocera Web Console (learn more here)  Text page via [facility] Paging/Directory    Please see sign in/sign out for up to date coverage information  ______________________________________________________________________    Interval History   No events overnight. Feeling well this morning. Breakfast \"wasn't very good\". Seems surprised again today to hear that he should be eating more. L chest pain improving. No SOB, abdominal pain. 4 pt ROS otherwise negative.    Data reviewed today: I reviewed all medications, new labs and imaging results over the last 24 hours.    Physical Exam   Vital Signs: Temp: 97.4  F (36.3  C) Temp src: Oral BP: 103/63 Pulse: 96   Resp: 18 SpO2: 97 % O2 Device: None (Room air)    Weight: 122 lbs 14.4 oz  General: Awake, follows commands, answers questions appropriately, pleasant and in no distress.   HEENT: NCAT, anicteric sclera, EOMI  Pulm/Resp: breathing comfortably in room air  CV: RRR, Extremities are warm to the touch, no lower leg edema   Abdomen: No visible distension  Neuro: Awake and alert. Speech more clear. Situationally very appropriate but short term memory is poor. Oriented to self and year, and \"[facility]\".  Skin: no rashes on exposed surfaces  "

## 2022-01-03 NOTE — PROGRESS NOTES
"Care Management Follow Up    Length of Stay (days): 13    Expected Discharge Date: 01/10/2022     Concerns to be Addressed: basic needs,home safety     Patient plan of care discussed at interdisciplinary rounds: Yes    Anticipated Discharge Disposition: Transitional Care vs home with services     Anticipated Discharge Services: None  Anticipated Discharge DME: None    Patient/family educated on Medicare website which has current facility and service quality ratings: yes  Education Provided on the Discharge Plan:  Yes  Patient/Family in Agreement with the Plan: Plan TBD    Referrals Placed by CM/SW:  None at this time  Private pay costs discussed: Not applicable    Additional Information:  \"Ezequiel Randhawa is a 81 y.o. M w/ history of tobacco use who presented with acute hypoxic respiratory failure due to severe COVID-19 pneumonia (required intubation; extubated 12/24) complicated by bilateral pulmonary emboli, new HFrEF (possibly stress-induced), A-fib w/ RVR (resolved), and severe hypernatremia.\"    TCU is recommended and per medical team pt will be out of Covid precautions on 1/5/22. Unfortunately there is only 1 remaining TCU in the whole [facility] that can take Covid+ patients but more TCU options should become available once pt is out of Covid precautions for 24 hours.     [facility]/Estefani (p. [phone], f. [phone], has access to Epic) via email inquiring about bed availability at only Covid+ TCU option in the state, [facility] (in [place]), waiting to hear back. JONA also later left a  with the same inquiry, waiting to hear back.     VANESSA Cook, [address]   [phone] (pager) [phone]  1/3/2022          "

## 2022-01-03 NOTE — PLAN OF CARE
"BP 97/61 (BP Location: Left arm)   Pulse 102   Temp 98.4  F (36.9  C) (Oral)   Resp 18   Ht 1.88 m (6' 2\")   Wt 55.7 kg (122 lb 14.4 oz)   SpO2 97%   BMI 15.78 kg/m      Reason for admission: COVID 19 + w/ pneumonia   Activity:  x1 assist w/ walker and gait belt, OOB for lunch/dinner  Pain:denies   Neuro: Oriented to self and place. Disoriented to time, situation. Needs constant re-orientation.  Cardiac: Intermittent Tachy, team aware. Denies chest pain.   Respiratory: On RA. Denies SOB,satting mid-upper 90's, Infrequent dry cough.   GI/: Marina AUOP devorah in color. Incontinent of bowel   Diet: Soft bite size, thin liquids. needs 1:1 supervision with feeding.    Lines: R triple lumen PICC SL  Skin: Red blanchable scrotum and coccyx. Barrier cream applied.   Lab: reviewed  Plan: continue to monitor, continue w/ POC   "

## 2022-01-03 NOTE — PLAN OF CARE
[id number]  Status: Admitted for acute hypoxic resp failure secondary to COVID. Required intubation-extubated 12/24.  Vitals: VSS, on RA  Neuros: Orientation waxes and wanes- Disorientated to time, place, and situation at times.   IV: R Triple Lumen PICC- SL  Labs/Electrolytes: BS stable throughout night  Resp/trach: On RA. SOB on exertion. Infrequent dry cough.   Diet: Needs 1:1 while eating for swallowing precautions. Soft diet and bite sized, thin liquids.   Bowel status: LBM 1/2 incontinent but aware of when he goes.  : Marina present. Output is devorah.   Skin: Red blanchable coccyx and scrotum, no interventions needed at this time. Barrier cream applied in previous shift.   Pain: Denies.   Activity: Ax1 w/ walker and GB.   Plan: Medically ready for discharge to TCU, awaiting placement. COVID precautions completed 1/7.

## 2022-01-04 ENCOUNTER — APPOINTMENT (OUTPATIENT)
Dept: PHYSICAL THERAPY | Facility: CLINIC | Age: 82
DRG: 208 | End: 2022-01-04
Attending: INTERNAL MEDICINE
Payer: COMMERCIAL

## 2022-01-04 ENCOUNTER — APPOINTMENT (OUTPATIENT)
Dept: SPEECH THERAPY | Facility: CLINIC | Age: 82
DRG: 208 | End: 2022-01-04
Attending: INTERNAL MEDICINE
Payer: COMMERCIAL

## 2022-01-04 LAB
ERYTHROCYTE [DISTWIDTH] IN BLOOD BY AUTOMATED COUNT: 13.9 % (ref 10–15)
GLUCOSE BLDC GLUCOMTR-MCNC: 147 MG/DL (ref 70–99)
HCT VFR BLD AUTO: 35.8 % (ref 40–53)
HGB BLD-MCNC: 12.2 G/DL (ref 13.3–17.7)
HOLD SPECIMEN: NORMAL
MCH RBC QN AUTO: 32.6 PG (ref 26.5–33)
MCHC RBC AUTO-ENTMCNC: 34.1 G/DL (ref 31.5–36.5)
MCV RBC AUTO: 96 FL (ref 78–100)
PLATELET # BLD AUTO: 163 10E3/UL (ref 150–450)
RBC # BLD AUTO: 3.74 10E6/UL (ref 4.4–5.9)
WBC # BLD AUTO: 10.8 10E3/UL (ref 4–11)

## 2022-01-04 PROCEDURE — 85027 COMPLETE CBC AUTOMATED: CPT | Performed by: STUDENT IN AN ORGANIZED HEALTH CARE EDUCATION/TRAINING PROGRAM

## 2022-01-04 PROCEDURE — 250N000013 HC RX MED GY IP 250 OP 250 PS 637: Performed by: STUDENT IN AN ORGANIZED HEALTH CARE EDUCATION/TRAINING PROGRAM

## 2022-01-04 PROCEDURE — 36592 COLLECT BLOOD FROM PICC: CPT | Performed by: STUDENT IN AN ORGANIZED HEALTH CARE EDUCATION/TRAINING PROGRAM

## 2022-01-04 PROCEDURE — 97530 THERAPEUTIC ACTIVITIES: CPT | Mod: GP

## 2022-01-04 PROCEDURE — 99232 SBSQ HOSP IP/OBS MODERATE 35: CPT | Mod: GC | Performed by: INTERNAL MEDICINE

## 2022-01-04 PROCEDURE — 92526 ORAL FUNCTION THERAPY: CPT | Mod: GN

## 2022-01-04 PROCEDURE — 250N000013 HC RX MED GY IP 250 OP 250 PS 637: Performed by: INTERNAL MEDICINE

## 2022-01-04 PROCEDURE — 120N000002 HC R&B MED SURG/OB UMMC

## 2022-01-04 RX ORDER — QUETIAPINE FUMARATE 25 MG/1
25 TABLET, FILM COATED ORAL EVERY EVENING
Status: DISCONTINUED | OUTPATIENT
Start: 2022-01-04 | End: 2022-01-06

## 2022-01-04 RX ADMIN — Medication 1 TABLET: at 09:22

## 2022-01-04 RX ADMIN — RIVAROXABAN 15 MG: 15 TABLET, FILM COATED ORAL at 09:23

## 2022-01-04 RX ADMIN — RIVAROXABAN 15 MG: 15 TABLET, FILM COATED ORAL at 19:45

## 2022-01-04 RX ADMIN — QUETIAPINE FUMARATE 25 MG: 25 TABLET ORAL at 19:46

## 2022-01-04 RX ADMIN — TAMSULOSIN HYDROCHLORIDE 0.4 MG: 0.4 CAPSULE ORAL at 19:45

## 2022-01-04 ASSESSMENT — ACTIVITIES OF DAILY LIVING (ADL)
ADLS_ACUITY_SCORE: 14
ADLS_ACUITY_SCORE: 14
ADLS_ACUITY_SCORE: 13
ADLS_ACUITY_SCORE: 14
ADLS_ACUITY_SCORE: 13
ADLS_ACUITY_SCORE: 14
ADLS_ACUITY_SCORE: 13
ADLS_ACUITY_SCORE: 14
ADLS_ACUITY_SCORE: 14
ADLS_ACUITY_SCORE: 13
ADLS_ACUITY_SCORE: 13
ADLS_ACUITY_SCORE: 14
ADLS_ACUITY_SCORE: 13
ADLS_ACUITY_SCORE: 14
ADLS_ACUITY_SCORE: 13
ADLS_ACUITY_SCORE: 14

## 2022-01-04 ASSESSMENT — MIFFLIN-ST. JEOR: SCORE: 1386.75

## 2022-01-04 NOTE — PLAN OF CARE
"BP 97/69 (BP Location: Left arm)   Pulse 105   Temp 98.1  F (36.7  C) (Oral)   Resp 16   Ht 1.88 m (6' 2\")   Wt 55.7 kg (122 lb 14.4 oz)   SpO2 97%   BMI 15.78 kg/m      Reason for admission: COVID 19 + w/ pneumonia   Activity:  x1 assist w/ walker and gait belt, OOB for dinner  Pain: Denies   Neuro: Oriented to self and place. Disoriented to time, situation. Needs constant re-orientation.  Cardiac: Intermittent Tachy, team aware. Denies chest pain.   Respiratory: On RA. Denies SOB,satting mid-upper 90's, Infrequent dry cough.   GI/: Marina AUOP devorah in color. Incontinent of bowel x1.   Diet: Soft bite size, thin liquids. needs 1:1 supervision with feeding.    Lines: R triple lumen PICC SL  Skin: Red blanchable scrotum and coccyx. Barrier cream applied.   Lab: reviewed  Plan: Medically ready for discharge to TCU, awaiting placement.  continue w/ POC   "

## 2022-01-04 NOTE — PLAN OF CARE
"/68 (BP Location: Left arm, Patient Position: Sitting)   Pulse (!) 121   Temp 98.1  F (36.7  C) (Oral)   Resp 15   Ht 1.88 m (6' 2\")   Wt 55.7 kg (122 lb 14.4 oz)   SpO2 93%   BMI 15.78 kg/m     Patient alert and oriented to self and place only.  Denies pain. Abdomen soft with bowel sounds present,patient reports he is passing gas but no BM this shift. Marina intact with minimal urine output, MD notified. Tolerating diet, crush pill in applesauce. Continue with POC.   "

## 2022-01-04 NOTE — PLAN OF CARE
Reason for admission: COVID 19 + w/ pneumonia and PE's.  Activity:  x1 assist w/ walker and gait belt, not OOB overnight.  Pain: Denies   Neuro: Oriented to self and place. Disoriented to time, situation.  Cardiac: Tachy 100-115. Other VS stable.  Respiratory: On RA.  Sats WNL.Denies SOB. Infrequent dry cough.   GI/: Marina with adequate amount U.O. No BM.  Diet: Soft bite size, thin liquids. needs 1:1 supervision with feeding.  No intake overnight.  Lines: R triple lumen PICC SL  Skin: Red blanchable scrotum and coccyx. Barrier cream applied.   Plan:  awaiting  TCU placement.  continue w/ POC Notify MDs with concerns.

## 2022-01-04 NOTE — PROVIDER NOTIFICATION
Paged provider at #[phone] regarding minimal urine output.  As well as increased HR while working with PT

## 2022-01-04 NOTE — PROGRESS NOTES
"Care Management Follow Up    Length of Stay (days): 14    Expected Discharge Date: 01/06/2022     Concerns to be Addressed: basic needs,home safety     Patient plan of care discussed at interdisciplinary rounds: Yes    Anticipated Discharge Disposition: Transitional Care,Home Care     Anticipated Discharge Services: None  Anticipated Discharge DME: None    Patient/family educated on Medicare website which has current facility and service quality ratings: yes  Education Provided on the Discharge Plan:  Not at this time  Patient/Family in Agreement with the Plan: Plan TBD    Referrals Placed by CM/SW:  None at this time  Private pay costs discussed: Not applicable    Additional Information:  \"Ezequiel Randhawa is a 81 y.o. M w/ history of tobacco use who presented with acute hypoxic respiratory failure due to severe COVID-19 pneumonia (required intubation; extubated 12/24) complicated by bilateral pulmonary emboli, new HFrEF (possibly stress-induced), A-fib w/ RVR (resolved), and severe hypernatremia.\"     TCU is recommended and per medical team pt will be out of Covid precautions on 1/5/22. Unfortunately there is only 1 remaining TCU in the [facility] that can take Covid+ patients but more TCU options should become available once pt is out of Covid precautions for 24 hours.     Per medical team, they spoke with pt's son Gregorio on 1/3 and he is agreeable to TCU. Medical team also noted that pt's Covid precautions will still be lifted 1/5.     SW attempted to contact pt's son Gregorio via home phone ([phone]) twice to obtain TCU choices but the phone went to  and the  was full so on message could be left.       VANESSA Cook, [address]   [phone] (pager) [phone]  1/4/2022          "

## 2022-01-04 NOTE — PROGRESS NOTES
CLINICAL NUTRITION SERVICES - REASSESSMENT NOTE     Nutrition Prescription    RECOMMENDATIONS FOR MDs/PROVIDERS TO ORDER:  - Pt's po intake is very poor and noted he refused to have a nasal FT replaced after he pulled it out around 12/26.  Unclear if appetite stimulants would worsen his confusion (such as perez or remeronl) but may be worth a try.  He started Seroquel 12/25 which is associated w/ wt gain side effect but not noted here.    - With chronic poor intake, may have a Zn deficiency that can impact sense of taste.  Would likely need short term supplementation if Zn deficiency noted--220 mg Zn Sulfate x 10-14 days is standard dose followed by lab check.  Could repeat this short term doing and recheck until Zn wnl.  Long term Zinc not recommended as it can cause copper deficiency.     Malnutrition Status:    Severe malnutrition in the context of acute on chronic illness.    Recommendations already ordered by Registered Dietitian (RD):  - Collaborate with other providers--bedside RN to discuss specifics of intake, textpaged team re: poor intake.  - Continue current nutrition plan of care.   - Daily weights to trend accuracy of 1/2 wt.    Future/Additional Recommendations:  - Follow po intake, weights, check whether appetite stimulants and/or Zn lab checked.      Patient was not seen in person d/t number of staff going into COVID+ or PUI rooms restricted to limit exposure. Information collected from chart and unable to reach pt via phone as he was noted to be confused/disoriented per nursing charting.    EVALUATION OF THE PROGRESS TOWARD GOALS   Diet: Regular with room service, 2 pm and HS: Vital Cuisine-Chocolate, Magic cup sent with dinner  Nutrition Support: TF stopped 12/26 when pt pulled out FT.  Intake: Po intake only 25-50% from meals/snacks/supplements noted 12/28 through 1/2.  No intake noted 1/3 and took 75% of something @ 10am this morning.   Pt generally receiving adequate calories and protein with  meals and supplements but intake appears very poor overall.       NEW FINDINGS   -General: Noted pulse has been > 100 majority of checks since 1/1/21.  UO < 1.5 L most days but unclear if charting is complete.  Covid + as of 12/20/21.  -Wt:  Wt down 4.8% (~6#) over past 12 days.  Down 7.5% (10#) over 6 days without TF and pt received repeated boluses 12/29-12/31 due to hypotension and very poor intake.    01/02/22 1700 55.7 kg (122 lb 14.4 oz) no wt source   12/27/21 0720 60.2 kg (132 lb 11.5 oz) bed    12/25/21 0400 63 kg (138 lb 14.2 oz)  bed   12/24/21 0400 62.5 kg (137 lb 12.6 oz)  bed   12/22/21 0200 60.4 kg (133 lb 2.5 oz) bed    12/21/21 1430 58.5 kg (128 lb 15.5 oz) bed   Adjust dose weight to 55.7 kg (actual 1/2)    ASSESSED NUTRITION NEEDS   Estimated Energy Needs: [id number] kcals/day (30 - 35 -40 kcals/kg)   Justification: Underweight with continued wt loss  Estimated Protein Needs: 84 - 111 grams protein/day (1.5 - 2.0 grams of pro/kg) (= 1.0-1.3 gm/kg IBW)  Justification: Increased needs, repletion     -Labs: Noted  -GI: Last BM 1/3.  Generally have daily BMs.  -Skin: No wounds other than small area noted on upper lip on 12/24 that is now just reddened.   -Meds: Thera-vit.  Only prn bowel Rx--dulcolax supp, miralax, pericolace.  Seroquel started 12/25.   -Diet/SLP: per 1/4 care plan note: Recommend pt advance to regular textures and thin liquids with tray set-up. Pt should be fully upright and alert for all PO, take small sips/bites, slow pacing, and alternate between consistencies. SLP recommends ongoing ST intervention as he is below his baseline oropharyngeal swallow fxn.  -EN Access: pt removed 12/26.    MALNUTRITION  % Intake: </= 50% for >/= 5 days (severe)  % Weight Loss: > 2% in 1 week (severe)  Subcutaneous Fat Loss: Unable to assess due to COVID restriction--but suspect it's at least moderate fat wasting as BMI is now 15.78  Muscle Loss: Unable to assess due to COVID restriction--but  suspect it's at least moderate muscle wasting as BMI is now 15.78  Fluid Accumulation/Edema: None noted per RN charting  Malnutrition Diagnosis: Severe malnutrition in the context of acute on chronic illness    Previous Goals   Patient to consume % of nutritionally adequate meal trays TID, or the equivalent with supplements/snacks.  Evaluation: Not met    Previous Nutrition Diagnosis  Inadequate protein-energy intake related to previous NPO status and premature/accidental FT removal as evidenced by no nutrition x2 days since FT removed, diet just advanced to full liquid mildly thick per SLP, and BMI 17 kg/m2.  Evaluation: Declining    CURRENT NUTRITION DIAGNOSIS  Inadequate oral intake related to chronic poor appetite, possibly confusion/disorientation as evidenced by 6# wt loss over past 12 days and very poor intake documented over past week.       INTERVENTIONS  Implementation  Collaboration with other providers--bedside RN.   Medical food supplement therapy--continue current plan.  Order daily weight for better trend--standing preferred.   See recommendations above.     Goals  Patient to consume % of nutritionally adequate meal trays TID, or the equivalent with supplements/snacks.  No further wt loss below 57.8 kg.    Monitoring/Evaluation  Progress toward goals will be monitored and evaluated per protocol.    Andreia Velasquez RD, LD   7B (M-F) Pager: [phone]  RD Weekend Pager: [phone]

## 2022-01-04 NOTE — PROGRESS NOTES
[facility]    Progress Note - Kh[facility] 3 Service        Date of Admission:  12/21/2021    Assessment & Plan   Ezequiel Randhawa is a 81 y.o. M w/ history of tobacco use who presented with acute hypoxic respiratory failure due to severe COVID-19 pneumonia (required intubation; extubated 12/24) complicated by bilateral pulmonary emboli, new HFrEF (possibly stress-induced), A-fib w/ RVR (resolved), and severe hypernatremia.    Today's changes:  - Pt will be off COVID isolation precautions on 1/5/22 (21 days from onset of symptoms on 12/14/21)  - Medically ready for discharge to TCU (Gregorio has decided this is best situation for pt at this time) - SW and I unable to get a hold of Gregorio to determine location preference 1/4  - Mental status continues to slowly improve, although patient is still not decisional at this time  - Encourage PO intake, supplements and snacks  - Decrease seroquel dose to 25 mg QHS    #Acute toxic-metabolic encephlopathy  #Hospital delirium  # Likely underlying dementia  Drowsy and alert/oriented to self but not time or place. No hx of underlying cognitive impairment. Likely due to intubation, infection, and hospital delirium. Certainly hypernatremia could have been playing a role initially but this has now corrected. Mentation is slowly improving and is likely close to baseline - more likely that pt has underlying dementia that has been undiagnosed    - Seroquel 50 mg at bedtime; 25 mg BID PRN additional available (QTc < 500 ms)  - Pt non-decisional at this time  - SLP following, pt on minced and moist diet     #Severe COVID-19 pneumonia, weaned to room air  #Acute hypoxic respiratory failure, resolved  #Septic shock due to COVID-19 infection, resolved  Unvaccinated. COVID-19 positive on 12/20/21; symptoms began 1 week prior on 12/14/21(cough, chills). Intubated on 12/20 in the [facility] ED. Remdesivir 12/20 x 1. Tocilizumab 12/20. Extubated and weaned off  pressors on 12/25. Procal low on admission. Ur Strep and Legionella negative, MRSA swab negative. BCx negative. SpCx not done. Inflammatory markers trended down and weaned to room air 12/28.  - stopped dexamethasone 6 mg qday following 9 day course d/t hypoxia resolved and ongoing delirium  - Remains in room air   - continue COVID precautions/quarantine through ~ 1/5/2022 (20 days from symptom onset 12/14/21) considering severe disease)    ABx:  - ceftriaxone (12/21-12/25)  - azithromycin (12/21-12/23)  - Zosyn (12/20)    #Bilateral PEs  Seen on 12/20 CT. No evidence of R heart strain. Transitioned from Lovenox to rivaroxaban on 12/25.  - rivaroxaban 15 mg BID (through 1/10, then transition to 20 mg at bedtime)  - pharmacy liaison consult completed, rivaroxaban copay $45 after first month deductible met ($265)    #HFrEF  #Afib with RVR, resolved (now in normal sinus rhythm)  A-fib w/ RVR; received IV metoprolol x 1 on 12/20, and then was briefly on diltiazem gtt on 12/21. Echo on 12/21 showed EF 35-40% with moderate global hypokinesia of the left ventricle, and EF improved slightly to 45-50% on 12/22. Trop negative. No baseline for comparison. Ddx includes stress cardiomyopathy (most likely), ischemic dz (smoker, very little previous medical care), or tachyarrthymia-induced. Appears euvolemic; was receiving intermittent diuresis in the ICU.  - hold diuresis; re-assess volume status daily and diurese as needed  - Re-consulted cardiology 12/29  - Started GDMT with metoprolol and lisinopril, however could not tolerate due to hypotension. Can be restarted and titrated as an outpatient.   - will likely need inpt vs outpt ischemic w/u (will defer to outpatient unless discharge delayed significantly for placement)  - Refused coronary CTA, plan to obtain outpatient  - intake/output   - daily wts    # Chest pain, likely costochondritis  New pain 12/31-1/1 - EKG and trop not concerning for ACS. Reproducible with palpation of  L lateral chest wall - likely costochondritis.  - Tylenol PRN    #Likely COPD  #Tobacco use  Does not appear to have been diagnosed PTA, but moderate emphysema seen on imaging. Unable to follow commands for inhaler use.  - albuterol 2 puffs q6h PRN    #Steroid-induced hyperglycemia  # Hypoglycemia  A1c normal at 5.5%. Now done with dexamethasone, stopped correction scale insluin as pt borderline hypoglycemic intermittently    # Urinary retention  Failed trial of burris removal 12/26 and 12/29  - Started flomax and keep burris in for 1 week, with follow up removal and trial of void    - Discuss with urology prior to discharge if unable to remove burris     #Hypernatremia, resolved  Na as high as 160 on admission, and has steadily improved with free water flushes.  - trend    #Thrombocytopenia, resolved  Platelets have been slowly dropping to ~100 at time of floor transfer and now stable in that range. Possibly 2/2 consumptive process/thrombosis attributable to hyper-inflammatory state from COVID-19. HIT screen negative.  - daily CBC    #Moderate malnutrition in setting of acute illness  #Underweight  - Consider appetite stimulant in coming days  - Consider checking zinc level with next lab check  - SLP following  - nutrition following, supplements and snacks  - Encouraged PO intake    #Constipation, resolved  - Miralax 17 g BID PRN  - Senna 2 tabs BID PRN  - Bisacodyl 10 mg qday PRN     Diet: Snacks/Supplements Adult: Other; Vital Cuisine Chocolate @ 2pm & HS (naturally mildly thick); Between Meals  Room Service  Snacks/Supplements Adult: Magic Cup; With Meals  Combination Diet Soft and Bite Sized Diet (level 6); Thin Liquids (level 0)  Room Service    DVT Prophylaxis: rivaroxaban  Burris Catheter: PRESENT, indication: Strict 1-2 Hour I&O, Retention, Retention   Lines: PICC  Fluids: none  Central Lines: None  Code Status: No CPR- Pre-arrest intubation OK    Dispo: Medically ready for discharge to TCU, pt will be off  "COVID isolation on 1/5/22 (20 days from symptom onset on 12/14)    The patient's care was discussed with the Attending Physician, Dr. Sarkar, Bedside Nurse and Patient     Kristel Medrano MD  [address]  Securely message with the Vocera Web Console (learn more here)  Text page via [employer] Paging/Directory    Please see sign in/sign out for up to date coverage information  ______________________________________________________________________    Interval History   No events overnight. Pt feels well today. Has some mild \"aches and pains\" but no chest pain today. Some SOB and lightheadedness with moving around. Worked with PT today and got pretty tired. Says food isn't very good but having a burger which he's \"been looking forward to\". RN confirms pt drinking fluids. 4 pt ROS otherwise negative.    Data reviewed today: I reviewed all medications, new labs and imaging results over the last 24 hours.    Physical Exam   Vital Signs: Temp: 98.1  F (36.7  C) Temp src: Oral BP: 96/59 Pulse: 108   Resp: 15 SpO2: 90 % O2 Device: None (Room air)    Weight: 122 lbs 14.4 oz  General: Chronically ill, awake, follows commands, answers questions appropriately, pleasant and in no distress.   HEENT: NCAT, anicteric sclera, EOMI  Pulm/Resp: breathing comfortably in room air  CV: RRR, Extremities are warm to the touch, no lower leg edema   Abdomen: No visible distension  Neuro: Awake and alert. Speech more clear. Situationally very appropriate but short term memory is poor. Oriented to self, hospital, year.  Skin: no rashes on exposed surfaces  "

## 2022-01-05 ENCOUNTER — APPOINTMENT (OUTPATIENT)
Dept: PHYSICAL THERAPY | Facility: CLINIC | Age: 82
DRG: 208 | End: 2022-01-05
Attending: INTERNAL MEDICINE
Payer: COMMERCIAL

## 2022-01-05 ENCOUNTER — APPOINTMENT (OUTPATIENT)
Dept: SPEECH THERAPY | Facility: CLINIC | Age: 82
DRG: 208 | End: 2022-01-05
Attending: INTERNAL MEDICINE
Payer: COMMERCIAL

## 2022-01-05 LAB
GLUCOSE BLDC GLUCOMTR-MCNC: 101 MG/DL (ref 70–99)
GLUCOSE BLDC GLUCOMTR-MCNC: 107 MG/DL (ref 70–99)
GLUCOSE BLDC GLUCOMTR-MCNC: 97 MG/DL (ref 70–99)

## 2022-01-05 PROCEDURE — 250N000013 HC RX MED GY IP 250 OP 250 PS 637: Performed by: STUDENT IN AN ORGANIZED HEALTH CARE EDUCATION/TRAINING PROGRAM

## 2022-01-05 PROCEDURE — 250N000013 HC RX MED GY IP 250 OP 250 PS 637

## 2022-01-05 PROCEDURE — 92526 ORAL FUNCTION THERAPY: CPT | Mod: GN

## 2022-01-05 PROCEDURE — 99232 SBSQ HOSP IP/OBS MODERATE 35: CPT | Mod: GC | Performed by: INTERNAL MEDICINE

## 2022-01-05 PROCEDURE — 99207 PR CDG-MDM COMPONENT: MEETS MODERATE - UP CODED: CPT | Performed by: INTERNAL MEDICINE

## 2022-01-05 PROCEDURE — 97530 THERAPEUTIC ACTIVITIES: CPT | Mod: GP

## 2022-01-05 PROCEDURE — 97116 GAIT TRAINING THERAPY: CPT | Mod: GP

## 2022-01-05 PROCEDURE — 120N000002 HC R&B MED SURG/OB UMMC

## 2022-01-05 RX ADMIN — QUETIAPINE FUMARATE 25 MG: 25 TABLET ORAL at 20:13

## 2022-01-05 RX ADMIN — Medication 1 TABLET: at 08:22

## 2022-01-05 RX ADMIN — TAMSULOSIN HYDROCHLORIDE 0.4 MG: 0.4 CAPSULE ORAL at 20:13

## 2022-01-05 RX ADMIN — ACETAMINOPHEN 650 MG: 325 TABLET, FILM COATED ORAL at 08:22

## 2022-01-05 RX ADMIN — RIVAROXABAN 15 MG: 15 TABLET, FILM COATED ORAL at 19:30

## 2022-01-05 RX ADMIN — RIVAROXABAN 15 MG: 15 TABLET, FILM COATED ORAL at 08:22

## 2022-01-05 ASSESSMENT — ACTIVITIES OF DAILY LIVING (ADL)
ADLS_ACUITY_SCORE: 13

## 2022-01-05 NOTE — PROGRESS NOTES
[facility]    Progress Note - Khurram 3 Service        Date of Admission:  12/21/2021    Assessment & Plan   Ezequiel Randhawa is a 81 y.o. M w/ history of tobacco use who presented with acute hypoxic respiratory failure due to severe COVID-19 pneumonia (required intubation; extubated 12/24) complicated by bilateral pulmonary emboli, new HFrEF (possibly stress-induced), A-fib w/ RVR (resolved), and severe hypernatremia.    Today's changes:  - COVID precautions removed  - Medically ready for discharge to TCU (Gregorio has decided this is best situation for pt at this time) - SW and I unable to get a hold of Gregorio to determine location preference 1/5; but upon discussion with pt, he reports [place] may be a good option   - Mental status continues to slowly improve, although patient is still not decisional at this time  - Encourage PO intake, supplements and snacks    #Acute toxic-metabolic encephlopathy  #Hospital delirium  # Likely underlying dementia  Drowsy and alert/oriented to self but not time or place. No hx of underlying cognitive impairment. Likely due to intubation, infection, and hospital delirium. Certainly hypernatremia could have been playing a role initially but this has now corrected. Mentation is slowly improving and is likely close to baseline - more likely that pt has underlying dementia that has been undiagnosed    - Seroquel 25 mg at bedtime; 25 mg BID PRN additional available (QTc < 500 ms)  - Pt non-decisional at this time  - SLP following, pt on regular diet    #Severe COVID-19 pneumonia, weaned to room air  #Acute hypoxic respiratory failure, resolved  #Septic shock due to COVID-19 infection, resolved  Unvaccinated. COVID-19 positive on 12/20/21; symptoms began 1 week prior on 12/14/21(cough, chills). Intubated on 12/20 in the [facility] ED. Remdesivir 12/20 x 1. Tocilizumab 12/20. Extubated and weaned off pressors on 12/25. Procal low on admission. Ur  Strep and Legionella negative, MRSA swab negative. BCx negative. SpCx not done. Inflammatory markers trended down and weaned to room air 12/28.  - stopped dexamethasone 6 mg qday following 9 day course d/t hypoxia resolved and ongoing delirium  - Remains in room air   - COVID precautions removed per Infection Prevention on 1/5  - discontinuing continuous pulse ox, changed to PRN    ABx:  - ceftriaxone (12/21-12/25)  - azithromycin (12/21-12/23)  - Zosyn (12/20)    #Bilateral PEs  Seen on 12/20 CT. No evidence of R heart strain. Transitioned from Lovenox to rivaroxaban on 12/25.  - rivaroxaban 15 mg BID (through 1/10, then transition to 20 mg at bedtime)  - pharmacy liaison consult completed, rivaroxaban copay $45 after first month deductible met ($265)    #HFrEF  #Afib with RVR, resolved (now in normal sinus rhythm)  A-fib w/ RVR; received IV metoprolol x 1 on 12/20, and then was briefly on diltiazem gtt on 12/21. Echo on 12/21 showed EF 35-40% with moderate global hypokinesia of the left ventricle, and EF improved slightly to 45-50% on 12/22. Trop negative. No baseline for comparison. Ddx includes stress cardiomyopathy (most likely), ischemic dz (smoker, very little previous medical care), or tachyarrthymia-induced. Appears euvolemic; was receiving intermittent diuresis in the ICU.  - hold diuresis; re-assess volume status daily and diurese as needed  - Re-consulted cardiology 12/29  - Started GDMT with metoprolol and lisinopril, however could not tolerate due to hypotension. Can be restarted and titrated as an outpatient.   - will likely need inpt vs outpt ischemic w/u (will defer to outpatient unless discharge delayed significantly for placement)  - Refused coronary CTA, plan to obtain outpatient  - intake/output   - daily wts    # Chest pain, likely costochondritis  New pain 12/31-1/1 - EKG and trop not concerning for ACS. Reproducible with palpation of L lateral chest wall - likely costochondritis.  - Tylenol  PRN    #Likely COPD  #Tobacco use  Does not appear to have been diagnosed PTA, but moderate emphysema seen on imaging. Unable to follow commands for inhaler use.  - albuterol 2 puffs q6h PRN    #Steroid-induced hyperglycemia  # Hypoglycemia  A1c normal at 5.5%. Now done with dexamethasone, stopped correction scale insluin as pt borderline hypoglycemic intermittently    # Urinary retention  Failed trial of burris removal 12/26 and 12/29  - Started flomax and keep burris in for 1 week, with follow up removal and trial of void  - Discuss with urology prior to discharge if unable to remove burris     #Hypernatremia, resolved  Na as high as 160 on admission, and has steadily improved with free water flushes.  - trend    #Thrombocytopenia, resolved  Platelets have been slowly dropping to ~100 at time of floor transfer and now stable in that range. Possibly 2/2 consumptive process/thrombosis attributable to hyper-inflammatory state from COVID-19. HIT screen negative.  - daily CBC    #Moderate malnutrition in setting of acute illness  #Underweight  - Consider appetite stimulant in coming days  - Consider checking zinc level with next lab check  - SLP following  - nutrition following, supplements and snacks  - Encouraged PO intake    #Constipation, resolved  - Miralax 17 g BID PRN  - Senna 2 tabs BID PRN  - Bisacodyl 10 mg qday PRN     Diet: Snacks/Supplements Adult: Other; Vital Cuisine Chocolate @ 2pm & HS (naturally mildly thick); Between Meals  Room Service  Snacks/Supplements Adult: Magic Cup; With Meals  Room Service  Regular Diet Adult    DVT Prophylaxis: rivaroxaban  Burris Catheter: PRESENT, indication: Strict 1-2 Hour I&O, Retention, Retention   Lines: PICC  Fluids: none  Central Lines: PRESENT  PICC Triple Lumen 12/20/21 Right Brachial vein medial Medications-Site Assessment: WDL  Code Status: No CPR- Pre-arrest intubation OK    Dispo: Medically ready for discharge to TCU    The patient's care was discussed with the  Attending Physician, Dr. Sarkar, Bedside Nurse and Patient     MD Khurram [place] 3 Service  [facility]  Securely message with the Vocera Web Console (learn more here)  Text page via [employer] Paging/Directory    Please see sign in/sign out for up to date coverage information  ______________________________________________________________________    Interval History   No events overnight. Pt feels well today. He is happier to have a normal diet with more options - ate a large breakfast this AM. Denies dyspnea, other concerns today. 4 pt ROS otherwise negative.    Data reviewed today: I reviewed all medications, new labs and imaging results over the last 24 hours.    Physical Exam   Vital Signs: Temp: 98.3  F (36.8  C) Temp src: Oral BP: 114/72 Pulse: 116   Resp: 18 SpO2: 97 % O2 Device: None (Room air)    Weight: 132 lbs 11.47 oz  General: Chronically ill, awake, follows commands, answers questions appropriately, pleasant and in no distress.   HEENT: NCAT, anicteric sclera, EOMI  Pulm/Resp: breathing comfortably in room air, CTAB  CV: RRR, Extremities are warm to the touch, no lower leg edema   Abdomen: No visible distension  Neuro: Awake and alert. Speech more clear. Situationally very appropriate but short term memory is poor. Oriented to self, hospital, year.  Skin: no rashes on exposed surfaces

## 2022-01-05 NOTE — PLAN OF CARE
Time: Day shift 1/5   Status: Stable, improving  NEURO: Alert, slow to understand and respond. Oriented to self, place, thought it was February. Hearing impairment interferes with him understanding the question.  RESPIRATORY: Lungs diminished throughout. O2 sats 96% on room air.  CARDIAC: -113 and regular.   GI/: Abdomen flat and non-tender. Incontinent of small amount of loose dark brown stool.  DIET: Tolerating regular diet. Feeding self if set up. Taking pills whole without difficulty.  IV ACCESS: PIV saline locked and patent.  ACTIVITY: Up to chair for lunch. Turns easily in bed.  LAB: Bgs 107   PLAN: Covid quarantine will ended at 1900. Drs say there is no need to do another Covid test.

## 2022-01-05 NOTE — PLAN OF CARE
"Vital signs:  Temp: 97.7  F (36.5  C) Temp src: Oral BP: 102/60 Pulse: 90   Resp: 16 SpO2: 98 % O2 Device: None (Room air) Height: 188 cm (6' 2\") Weight: 60.2 kg (132 lb 11.5 oz)    7805-9077:    Activity: Repositioned with assist of 1-2.   Neuros: [place]. Disoriented to situation. Otherwise neuro intact. Bed alarm on.   Cardiac: 90s-110s. Asymptomatic.  Respiratory: LS diminished in bases. O2 sats above 92% on RA. Denies SOB. Unlabored.  GI/: BS+, passing flatus, had one incontinent BM. Marina intact with adequate devorah urine output.   Diet: Regular diet.   Skin: Scattered bruising, lip wound. Bilateral feet are cracked and peeling.  Lines: Triple lumen PICC saline locked.   Incisions: None.  Labs:  at midnight.  Pain/nausea: Denies pain. Slept in between cares. Denies nausea.   New changes this shift: None.   Plan: Continue POC.     "

## 2022-01-05 NOTE — PROGRESS NOTES
Patient Name: Ezequiel Randhawa   MRN: [id number]   Admit Date: 12/21/2021    Current Infection Status: COVID-19   Current Isolation Status: Special Precautions-COVID-19     Review of COVID-19 status and required isolation precautions    Refer to Special Precautions Duration and Period of Transmissibility Guideline, in Policy Tech.        Involved parties: Christina Lu, Infection Prevention    Criteria used to make decision: Symptom onset 12/13/21; Immunocompetent. Afebrile for >24 hours without fever-reducing medications. Significant improvement in O2 requirements.     The involved parties have reviewed this patient's chart per the Special Precautions Duration and Period of Transmissibility guideline and have taken the following action:     DECISION - OPTION 1: Patient meets all the criteria for Special Precautions isolation discontinuation and this writer will resolve the COVID-19 infection flag and isolation status, due to: Immunocompetent Symptomatic: Severe or Critical, MIS-C (Admit PCR+): At least 20 days since symptoms appeared AND greater than or equal to 24hrs since last fever (without fever-reducing meds) AND COVID-19 symptoms have substantially improved. .       Christina Lu, Infection Prevention

## 2022-01-05 NOTE — PROGRESS NOTES
"Jmh-[id number]-[id number] progress note. Care from: 15:00 - 23:00     /59 (BP Location: Left arm)   Pulse 104   Temp 97.9  F (36.6  C) (Oral)   Resp 16   Ht 1.88 m (6' 2\")   Wt 60.2 kg (132 lb 11.5 oz)   SpO2 97%   BMI 17.04 kg/m         Vitals: VSS ex intermittent tachycardia     Neuro: Alert, oriented x 4 today. Speech remains slow but making improvements. Pt is conversational and appropriate   o Pain: Denies   o Mood/Behavior: Calm, cooperative     Activity: Resting in bed this afternoon and evening. Bed alarm on for safety.     Cardiovascular: WDL ex tachycardia. Per orders, okay to work with PT even if tachycardic.     Respiratory: WDL ex dyspnea on exertion. SpO2 stable on RA.     GI & Nutrition: Pt able to feed himself unsupervised this evening. No signs of aspiration after meal.   o Nausea: Denies   o Bowel Movement: No BM this evening     : Marina in place while initiating flomax.     Skin, Wounds & LDAs: Scattered bruising, lip wound. Bilateral feet are cracked and peeling. PICC intact, saline locked. Caps changed.     Events & Plan Updates: Working toward TCU placement. COVID precautions will be discontinued tomorrow.   "

## 2022-01-05 NOTE — PROGRESS NOTES
"Care Management Follow Up    Length of Stay (days): 15    Expected Discharge Date: 01/07/2022     Concerns to be Addressed: basic needs,home safety, discharge planning     Patient plan of care discussed at interdisciplinary rounds: Yes    Anticipated Discharge Disposition: Transitional Care,Home Care     Anticipated Discharge Services: None  Anticipated Discharge DME: None    Patient/family educated on Medicare website which has current facility and service quality ratings: yes  Education Provided on the Discharge Plan:  Yes by medical team  Patient/Family in Agreement with the Plan: Plan TBD    Referrals Placed by CM/SW: Post Acute Facilities   Private pay costs discussed: Not applicable    Additional Information:  \"Ezequiel Randhawa is a 81 y.o. M w/ history of tobacco use who presented with acute hypoxic respiratory failure due to severe COVID-19 pneumonia (required intubation; extubated 12/24) complicated by bilateral pulmonary emboli, new HFrEF (possibly stress-induced), A-fib w/ RVR (resolved), and severe hypernatremia.\"     TCU is recommended and per medical team pt will be out of Covid precautions on 1/5/22. Unfortunately there is only 1 remaining TCU in the [facility] that can take Covid+ patients but more TCU options should become available once pt is out of Covid precautions for 24 hours.      Per medical team, they spoke with pt's son Gregorio on 1/3 and he is agreeable to TCU. Medical team also noted that pt's Covid precautions will still be lifted 1/5.    JONA contacted pt's son Gregorio ([phone]) at 10:17am to obtain TCU choices- phone went to  and  box is full so no message could be left.   JONA contacted Gregorio again at 11:40am- no answer and still not able to leave a message.     Per pt's medical team pt has reported that placement in [place] would be good.     JONA contacted the following TCUs to initiate referrals:  [facility]  [address] [place] [address]  P: [phone]  F: " [phone]  - left vm for Jaya in admissions, referral sent via Epic, waiting to hear back.      [facility]  [address] [place], [place] [id number] [phone]  - left vm for admissions, referral sent via Epic, waiting to hear back.     [facility]  [address] [place], [place] [id number] [phone]  - Gayle in admissions and facility is not taking any admissions until next week at the earliest.       VANESSA Cook, [address]   [phone] (pager) [phone]  1/5/2022

## 2022-01-06 ENCOUNTER — APPOINTMENT (OUTPATIENT)
Dept: OCCUPATIONAL THERAPY | Facility: CLINIC | Age: 82
DRG: 208 | End: 2022-01-06
Attending: INTERNAL MEDICINE
Payer: COMMERCIAL

## 2022-01-06 ENCOUNTER — APPOINTMENT (OUTPATIENT)
Dept: PHYSICAL THERAPY | Facility: CLINIC | Age: 82
DRG: 208 | End: 2022-01-06
Attending: INTERNAL MEDICINE
Payer: COMMERCIAL

## 2022-01-06 LAB
ANION GAP SERPL CALCULATED.3IONS-SCNC: 4 MMOL/L (ref 3–14)
BUN SERPL-MCNC: 16 MG/DL (ref 7–30)
CALCIUM SERPL-MCNC: 8.4 MG/DL (ref 8.5–10.1)
CHLORIDE BLD-SCNC: 101 MMOL/L (ref 94–109)
CO2 SERPL-SCNC: 27 MMOL/L (ref 20–32)
CREAT SERPL-MCNC: 0.59 MG/DL (ref 0.66–1.25)
GFR SERPL CREATININE-BSD FRML MDRD: >90 ML/MIN/1.73M2
GLUCOSE BLD-MCNC: 180 MG/DL (ref 70–99)
GLUCOSE BLDC GLUCOMTR-MCNC: 105 MG/DL (ref 70–99)
POTASSIUM BLD-SCNC: 3.9 MMOL/L (ref 3.4–5.3)
SODIUM SERPL-SCNC: 132 MMOL/L (ref 133–144)

## 2022-01-06 PROCEDURE — 97535 SELF CARE MNGMENT TRAINING: CPT | Mod: GO

## 2022-01-06 PROCEDURE — 97116 GAIT TRAINING THERAPY: CPT | Mod: GP

## 2022-01-06 PROCEDURE — 250N000013 HC RX MED GY IP 250 OP 250 PS 637: Performed by: STUDENT IN AN ORGANIZED HEALTH CARE EDUCATION/TRAINING PROGRAM

## 2022-01-06 PROCEDURE — 36592 COLLECT BLOOD FROM PICC: CPT | Performed by: STUDENT IN AN ORGANIZED HEALTH CARE EDUCATION/TRAINING PROGRAM

## 2022-01-06 PROCEDURE — 120N000002 HC R&B MED SURG/OB UMMC

## 2022-01-06 PROCEDURE — 97530 THERAPEUTIC ACTIVITIES: CPT | Mod: GP

## 2022-01-06 PROCEDURE — 80048 BASIC METABOLIC PNL TOTAL CA: CPT | Performed by: STUDENT IN AN ORGANIZED HEALTH CARE EDUCATION/TRAINING PROGRAM

## 2022-01-06 PROCEDURE — 99232 SBSQ HOSP IP/OBS MODERATE 35: CPT | Mod: GC | Performed by: INTERNAL MEDICINE

## 2022-01-06 RX ADMIN — TAMSULOSIN HYDROCHLORIDE 0.4 MG: 0.4 CAPSULE ORAL at 20:42

## 2022-01-06 RX ADMIN — RIVAROXABAN 15 MG: 15 TABLET, FILM COATED ORAL at 18:56

## 2022-01-06 RX ADMIN — RIVAROXABAN 15 MG: 15 TABLET, FILM COATED ORAL at 08:00

## 2022-01-06 RX ADMIN — Medication 1 TABLET: at 08:00

## 2022-01-06 ASSESSMENT — ACTIVITIES OF DAILY LIVING (ADL)
ADLS_ACUITY_SCORE: 15
ADLS_ACUITY_SCORE: 13
ADLS_ACUITY_SCORE: 15
ADLS_ACUITY_SCORE: 13
ADLS_ACUITY_SCORE: 15
ADLS_ACUITY_SCORE: 15
ADLS_ACUITY_SCORE: 13
ADLS_ACUITY_SCORE: 15
ADLS_ACUITY_SCORE: 15
ADLS_ACUITY_SCORE: 13
ADLS_ACUITY_SCORE: 15

## 2022-01-06 NOTE — PLAN OF CARE
Temp: 98  F (36.7  C) Temp src: Oral BP: 97/60 Pulse: 99   Resp: 16 SpO2: 98 % O2 Device: None (Room air)       NEURO: A&Ox3, does not fully understand situation at times. slow to understand and respond. Hearing impairment interferes with him understanding the question.  RESPIRATORY: LS diminished throughout. O2 sats 98% on room air.  CARDIAC: HR 99 and regular. BP soft.  GI/: Abdomen flat, non-tender. Incontinent of large amount of loose dark brown stool. Blanchable redness to perineum and scrotum, cleansed and barrier applied. Urethral catheter w/ AUOP, cloudy.    DIET: Regular diet. Pills whole.  IV ACCESS: PIV SL.   ACTIVITY: sleeps most of shift.   LAB: BG 97 at HS.   PLAN: continue w/ POC.

## 2022-01-06 NOTE — PROGRESS NOTES
Care Management Discharge Note    Discharge Date: 01/07/2022       Discharge Disposition: TCU    Discharge Services: Nicole    Discharge DME: NA    Discharge Transportation: family or friend will provide    Private pay costs discussed: NA    PAS Confirmation Code:NA    Patient/family educated on Medicare website which has current facility and service quality ratings: yes    Education Provided on the Discharge Plan:yes    Persons Notified of Discharge Plans: Patient, son, Gregorio  Patient/Family in Agreement with the Plan: yes    Handoff Referral Completed: No    Additional Information:  Per SW patient has TCU placement secured for tomorrow at [facility] in [place].  This writer called patients son, Gregorio, to update him of this plan.  He is agreeable.  Discussed transportation options.  He would like to transport his Dad himself, bedside nursing and PT feel this is a safe option.  He will plan on being to the hospital at 2pm to pick his Dad up to bring him to [facility].  He will call the nurses station tomorrow before he leaves his house, but plans on being here around 2pm.  Paged MD team with update, will need orders by 12p tomorrow. Nurse to call report to number below.  Updated patient regarding discharge plan, and he is agreeable. RNCC will remain available to assist with discharge planning as needed.        Fátima bradly Ross  [address] [place] [address]   Phone: [phone]         ALISE Burkett  Phone: [phone]  Pager: [phone]  To contact the weekend RNCC, Page: [phone]

## 2022-01-06 NOTE — PROGRESS NOTES
[facility]    Progress Note - Khurram 3 Service        Date of Admission:  12/21/2021    Assessment & Plan   Ezequiel Randhawa is a 81 y.o. M w/ history of tobacco use who presented with acute hypoxic respiratory failure due to severe COVID-19 pneumonia (required intubation; extubated 12/24) complicated by bilateral pulmonary emboli, new HFrEF (possibly stress-induced), A-fib w/ RVR (resolved), and severe hypernatremia.    Today's changes:  - Medically ready for discharge to TCU - Talked with Gregorio and he agrees with focusing on TCUs in [facility]  - Mental status continues to slowly improve, although patient is still not decisional at this time  - Encourage PO intake - particularly fluids, supplements and snacks    #Hospital delirium  #Likely underlying dementia  Drowsy and alert/oriented to self but not time or place. No hx of underlying cognitive impairment per pt's son. Likely due to intubation, infection, and hospital delirium. Certainly hypernatremia could have been playing a role initially but this has now corrected. Mentation is slowly improving and is likely close to baseline - more likely that pt has underlying dementia that has been undiagnosed    - Seroquel 25 mg at bedtime; 25 mg BID PRN additional available (QTc < 500 ms)  - Pt non-decisional at this time  - SLP following, pt on regular diet    #Severe COVID-19 pneumonia, weaned to room air  #Acute hypoxic respiratory failure, resolved  #Septic shock due to COVID-19 infection, resolved  Unvaccinated. COVID-19 positive on 12/20/21; symptoms began 1 week prior on 12/14/21(cough, chills). Intubated on 12/20 in the [facility] ED. Remdesivir 12/20 x 1. Tocilizumab 12/20. Extubated and weaned off pressors on 12/25. Procal low on admission. Ur Strep and Legionella negative, MRSA swab negative. BCx negative. SpCx not done. Inflammatory markers trended down and weaned to room air 12/28.  - stopped  dexamethasone 6 mg qday following 9 day course d/t hypoxia resolved and ongoing delirium  - Remains in room air   - COVID precautions removed per Infection Prevention on 1/5  - discontinuing continuous pulse ox, changed to PRN    ABx:  - ceftriaxone (12/21-12/25)  - azithromycin (12/21-12/23)  - Zosyn (12/20)    #Bilateral PEs  Seen on 12/20 CT. No evidence of R heart strain. Transitioned from Lovenox to rivaroxaban on 12/25.  - rivaroxaban 15 mg BID (through 1/10, then transition to 20 mg at bedtime)  - pharmacy liaison consult completed, rivaroxaban copay $45 after first month deductible met ($265)    #HFrEF  #Afib with RVR, resolved (now in normal sinus rhythm)  A-fib w/ RVR; received IV metoprolol x 1 on 12/20, and then was briefly on diltiazem gtt on 12/21. Echo on 12/21 showed EF 35-40% with moderate global hypokinesia of the left ventricle, and EF improved slightly to 45-50% on 12/22. Trop negative. No baseline for comparison. Ddx includes stress cardiomyopathy (most likely), ischemic dz (smoker, very little previous medical care), or tachyarrthymia-induced. Appears euvolemic; was receiving intermittent diuresis in the ICU.  - hold diuresis; re-assess volume status daily and diurese as needed  - Re-consulted cardiology 12/29  - Started GDMT with metoprolol and lisinopril, however could not tolerate due to hypotension. Can be restarted and titrated as an outpatient.   - will likely need inpt vs outpt ischemic w/u (will defer to outpatient unless discharge delayed significantly for placement)  - Refused coronary CTA, plan to obtain outpatient  - intake/output   - daily wts    # Chest pain, likely costochondritis  New pain 12/31-1/1 - EKG and trop not concerning for ACS. Reproducible with palpation of L lateral chest wall - likely costochondritis.  - Tylenol PRN    #Likely COPD  #Tobacco use  Does not appear to have been diagnosed PTA, but moderate emphysema seen on imaging. Unable to follow commands for inhaler  use.  - albuterol 2 puffs q6h PRN    #Steroid-induced hyperglycemia  # Hypoglycemia  A1c normal at 5.5%. Now done with dexamethasone, stopped correction scale insluin as pt borderline hypoglycemic intermittently    # Urinary retention  Failed trial of burris removal 12/26 and 12/29  - Started flomax and keep burris in for 1 week, with follow up removal and trial of void  - Discuss with urology prior to discharge if unable to remove burris     #Hypernatremia, resolved  Na as high as 160 on admission, and has steadily improved with free water flushes.  - trend    #Thrombocytopenia, resolved  Platelets have been slowly dropping to ~100 at time of floor transfer and now stable in that range. Possibly 2/2 consumptive process/thrombosis attributable to hyper-inflammatory state from COVID-19. HIT screen negative.  - daily CBC    #Moderate malnutrition in setting of acute illness  #Underweight  - Consider appetite stimulant in coming days  - Consider checking zinc level with next lab check  - SLP following  - nutrition following, supplements and snacks  - Encouraged PO intake    #Constipation, resolved  - Miralax 17 g BID PRN  - Senna 2 tabs BID PRN  - Bisacodyl 10 mg qday PRN     Diet: Snacks/Supplements Adult: Other; Vital Cuisine Chocolate @ 2pm & HS (naturally mildly thick); Between Meals  Room Service  Snacks/Supplements Adult: Magic Cup; With Meals  Room Service  Regular Diet Adult    DVT Prophylaxis: rivaroxaban  Burris Catheter: PRESENT, indication: Strict 1-2 Hour I&O, Retention, Retention   Fluids: none  Central Lines: PRESENT  PICC Triple Lumen 12/20/21 Right Brachial vein medial Medications-Site Assessment: [facility]  Code Status: No CPR- Pre-arrest intubation OK    Dispo: Medically ready for discharge to TCU    The patient's care was discussed with the Attending Physician, Dr. Sarkar, Bedside Nurse and Patient     Kristel Medrano MD  [address]  Securely  "message with the Vocera Web Console (learn more here)  Text page via [employer] Paging/Directory    Please see sign in/sign out for up to date coverage information  ______________________________________________________________________    Interval History   No events overnight. When asked how he's feeling he says \"I don't know\". No pain, SOB. Decreased urine output, unclear if pt has been drinking much fluids per RN. 4 pt ROS otherwise negative.    Data reviewed today: I reviewed all medications, new labs and imaging results over the last 24 hours.    Physical Exam   Vital Signs: Temp: 97.8  F (36.6  C) Temp src: Oral BP: 96/57 Pulse: 101   Resp: 16 SpO2: 97 % O2 Device: None (Room air)    Weight: 132 lbs 11.47 oz  General: Chronically ill, awake, follows commands, answers questions appropriately, pleasant and in no distress.   HEENT: NCAT, anicteric sclera, EOMI  Pulm/Resp: breathing comfortably in room air  Abdomen: No visible distension  Neuro: Awake and alert. Speech more clear. Situationally very appropriate but short term memory is poor  Skin: no rashes on exposed surfaces  "

## 2022-01-06 NOTE — PLAN OF CARE
Activity: dangle on edge of bed this shift, feet soaked and cleansed with soapy water/lotion applied  Neuros:disoriented to time  Cardiac:denies chest pain  Respiratory:room air  GI/:LBM 1/5 (incontinent), burris catheter - urine devorah - service notified  Diet:regular  Skin:dry, flaky, perineum erythema  Lines/Drains:PICC line

## 2022-01-06 NOTE — PROGRESS NOTES
"Care Management Follow Up    Length of Stay (days): 16    Expected Discharge Date: 01/07/2022     Concerns to be Addressed: Discharge planning  Patient plan of care discussed at interdisciplinary rounds: Yes    Anticipated Discharge Disposition: Transitional Care,Home Care     Anticipated Discharge Services: None  Anticipated Discharge DME: None    Patient/family educated on Medicare website which has current facility and service quality ratings: yes  Education Provided on the Discharge Plan:  Yes- by medical team. Pt is not his own decision maker currently.   Patient/Family in Agreement with the Plan: unable to assess    Referrals Placed by CM/SW:  Post Acute Facilities  Private pay costs discussed: Not applicable    Additional Information:  \"Ezequiel Randhawa is a 81 y.o. 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 TCU is recommended and referrals are pending.     Pt's son Gregorio has been unable to be reached recently but per team is agreeable to TCU and pt indicated preferring a TCU near [place]. Per medical team pt is currently not appropriate to be his own decision maker.     JONA contacted the following TCUs to follow up on referrals:  [facility] [address] [address]  P: [phone]  F: [phone]  - fqdr  for Jaya in admissions, waiting to hear back.     [facility]  [address] [place] [address] [phone]  - aoxx  for admissions, waiting to hear back. JONA spoke with Christian in admissions and she reported pt is declined but did not give a reason. JONA asked for a reason in case there is a barrier that can be removed etc. Christian said she will check into this " and report back, waiting to hear back.     JONA contacted the following TCUs/liaisons to inquire about bed availability:  [facility] [place] via [facility] liaison Genny (p. [phone], f. [phone], has access to [facility])- emailed Genny, waiting to hear back. Genny replied that [place] does have bed availability, referral sent via Epic, waiting to hear back.     [facility] via [facility] liaison Tanisha (p. [phone], f. [phone], has access to [employer])- emailed Tanisha, waiting to hear back. Tanisha replied there may be bed availability Friday or over the weekend. Referral sent via Epic, waiting to hear back. JONA was later informed by Tanisha that pt is accepted for Friday to [facility] and facility is requesting transport at 2 or 3pm.     SW received vm from M3 team (Dr. Medrano) reporting she was able to get in contact with pt's son Gregorio and he is agreeable to TCU placement anywhere in the [facility] including [facility] or near there.     PAS completed- confirmation code: [id number].      VANESSA Cook, [address]   [phone] (pager) [phone]  1/6/2022

## 2022-01-06 NOTE — PLAN OF CARE
"BP 96/57 (BP Location: Left arm)   Pulse 101   Temp 97.8  F (36.6  C) (Oral)   Resp 16   Ht 1.88 m (6' 2\")   Wt 60.2 kg (132 lb 11.5 oz)   SpO2 97%   BMI 17.04 kg/m      Activity: Ax1 w/ walker & gait belt.   Neuro: A&O x3, disoriented to time.   Cardiac: WDL. Denies chest pain.   Respiratory: WDL on RA, satting above 95%. Denies SOB.   GI/: Marina in place w/ dark devorah urine, team aware. +BS, + flatus. Incontinent of stool. Last BM 1/5.   Diet: Regular.   Skin/Incisions/Drains: Blanchable redness - perineum & scrotum - barrier applied.   Lines: R TL PICC - SL.   Labs: Reviewed.   Pain/Nausea: Denies.   Plan: Discharge tomorrow to TCU, son will transport pt. Continue POC.       "

## 2022-01-07 ENCOUNTER — TELEPHONE (OUTPATIENT)
Dept: UROLOGY | Facility: CLINIC | Age: 82
End: 2022-01-07
Payer: COMMERCIAL

## 2022-01-07 VITALS
DIASTOLIC BLOOD PRESSURE: 63 MMHG | SYSTOLIC BLOOD PRESSURE: 109 MMHG | OXYGEN SATURATION: 97 % | WEIGHT: 132.72 LBS | BODY MASS INDEX: 17.03 KG/M2 | HEART RATE: 96 BPM | RESPIRATION RATE: 16 BRPM | TEMPERATURE: 95.2 F | HEIGHT: 74 IN

## 2022-01-07 PROCEDURE — 99238 HOSP IP/OBS DSCHRG MGMT 30/<: CPT | Mod: GC | Performed by: INTERNAL MEDICINE

## 2022-01-07 PROCEDURE — 250N000013 HC RX MED GY IP 250 OP 250 PS 637: Performed by: STUDENT IN AN ORGANIZED HEALTH CARE EDUCATION/TRAINING PROGRAM

## 2022-01-07 RX ORDER — TAMSULOSIN HYDROCHLORIDE 0.4 MG/1
0.4 CAPSULE ORAL EVERY EVENING
DISCHARGE
Start: 2022-01-07

## 2022-01-07 RX ORDER — ACETAMINOPHEN 325 MG/1
650 TABLET ORAL EVERY 4 HOURS PRN
Status: ON HOLD | DISCHARGE
Start: 2022-01-07 | End: 2023-01-01

## 2022-01-07 RX ORDER — POLYETHYLENE GLYCOL 3350 17 G/17G
17 POWDER, FOR SOLUTION ORAL 2 TIMES DAILY PRN
DISCHARGE
Start: 2022-01-07

## 2022-01-07 RX ORDER — AMOXICILLIN 250 MG
2 CAPSULE ORAL
Status: ON HOLD | DISCHARGE
Start: 2022-01-07 | End: 2023-01-01

## 2022-01-07 RX ADMIN — Medication 1 TABLET: at 07:39

## 2022-01-07 RX ADMIN — RIVAROXABAN 15 MG: 15 TABLET, FILM COATED ORAL at 07:39

## 2022-01-07 ASSESSMENT — ACTIVITIES OF DAILY LIVING (ADL)
ADLS_ACUITY_SCORE: 14
ADLS_ACUITY_SCORE: 15
ADLS_ACUITY_SCORE: 13
ADLS_ACUITY_SCORE: 15
ADLS_ACUITY_SCORE: 13
ADLS_ACUITY_SCORE: 15
ADLS_ACUITY_SCORE: 14
ADLS_ACUITY_SCORE: 14
ADLS_ACUITY_SCORE: 15
ADLS_ACUITY_SCORE: 13
ADLS_ACUITY_SCORE: 15
ADLS_ACUITY_SCORE: 14
ADLS_ACUITY_SCORE: 14

## 2022-01-07 NOTE — PLAN OF CARE
Occupational Therapy Discharge Summary    Reason for therapy discharge:    Discharged to transitional care facility.    Progress towards therapy goal(s). See goals on Care Plan in Psychiatric electronic health record for goal details.  Goals partially met.  Barriers to achieving goals:   discharge from facility.    Therapy recommendation(s):    Continued therapy is recommended.  Rationale/Recommendations:  decreased ADL I.

## 2022-01-07 NOTE — PLAN OF CARE
Speech Language Therapy Discharge Summary    Reason for therapy discharge:    Discharged to transitional care facility.    Progress towards therapy goal(s). See goals on Care Plan in [facility] electronic health record for goal details.  Goals partially met.  Barriers to achieving goals:   discharge from facility.    Therapy recommendation(s):    Continued therapy is recommended.  Rationale/Recommendations:  Continued ST for dysphagia as appropriate.    Recommend pt continue regular textures and thin liquids with tray set-up. Pt should be fully upright and alert for all PO, take small sips/bites, slow pacing, and alternate between consistencies.

## 2022-01-07 NOTE — PLAN OF CARE
Physical Therapy Discharge Summary    Reason for therapy discharge:    Discharged to transitional care facility.    Progress towards therapy goal(s). See goals on Care Plan in [facility] electronic health record for goal details.  Goals partially met.  Barriers to achieving goals:   discharge from facility.    Therapy recommendation(s):    Continued therapy is recommended.  Rationale/Recommendations:  for maximization of functional independence and return to PLOF.

## 2022-01-07 NOTE — PROGRESS NOTES
Care Management Follow Up    Length of Stay (days): 17    Expected Discharge Date: 01/07/2022     Concerns to be Addressed: basic needs,home safety     Patient plan of care discussed at interdisciplinary rounds: N/A    Anticipated Discharge Disposition: Home     Anticipated Discharge Services: None  Anticipated Discharge DME: None (no new needs, has wound care supplies)    Patient/family educated on Medicare website which has current facility and service quality ratings: yes  Education Provided on the Discharge Plan:    Patient/Family in Agreement with the Plan: unable to assess    Referrals Placed by CM/SW:    Private pay costs discussed: Not applicable    Additional Information:  Writer received call from 7B charge RN that pt still was not picked up by his son, who, per the notes was suppose to transport pt to SNF this afternoon. Writer left  with [facility] (P: [phone]) Assistant DON RE: the admission. Shortly after writer left  writer received another page that pt's son was in the lobby ready to transport pt to SNF. Pt discharged to SNF.        ________________    VANESSA Olsen, [facility]  ED/Observation   [facility]  Phone: [phone]  Pager: [phone]  Fax: [phone]    On-call pager, [phone], 4:00pm to midnight

## 2022-01-07 NOTE — PROVIDER NOTIFICATION
"Writer kody MERIDA \"MARIA FERNANDA Randhawa [address] FYI Pt is discharging today @1400- if you could please put in discharge orders as soon as possible. Thanks! MARIA FERNANDA Araujo RN [id number]\"      "

## 2022-01-07 NOTE — PLAN OF CARE
"/63 (BP Location: Left arm)   Pulse 96   Temp (!) 95.2  F (35.1  C) (Oral)   Resp 16   Ht 1.88 m (6' 2\")   Wt 60.2 kg (132 lb 11.5 oz)   SpO2 97%   BMI 17.04 kg/m       Status: VSS  Pain/Nausea: Denies pain and N/V  Mobility: Assist x1 with walker/gb  Diet: Regular- good PO intake  Labs: Na 132, Cr 0.59  LDAs: No IV access- removed PICC before discharge. Marina- AUOP  Skin/incisions: Intact ex redness on perineum and scrotum- barrier applied  Neuro: A&Ox4, confused at times and forgetful. Denies N/T.   Respiratory: RA sating mid 90s. LS clear-diminished. Dry infrequent cough. PAZ  Cardiac: WDL, tachy at times. Soft Bps within parameters- asymptomatic. Denies chest pain  GI/: Incontinent B/B. Marina- yellow cloudy urine. BM this shift. +BS  New Changes: No acute changes this shift  Plan: Discharge @1400 to TCU in [place]  transported by pt laureano Perkins. Continue with POC  "

## 2022-01-07 NOTE — PROVIDER NOTIFICATION
"Writer kody MERIDA \"MARIA FERNANDA Randhawa [address] Pt is discharging today @1400- is the Marina and PICC staying in or do they need to be taken out? Thanks! Gayle IRVING RN [id number]\"      "

## 2022-01-07 NOTE — PROGRESS NOTES
"VS ./56   Pulse 100   Temp 98.3  F (36.8  C) (Oral)   Resp 14   Ht 1.88 m (6' 2\")   Wt 60.2 kg (132 lb 11.5 oz)   SpO2 98%   BMI 17.04 kg/m    Activity: A x1 w/ walker & gait belt  Neuro: Disoriented to time.  Cardiac: WDL. Soft BP. Denies cardiac chest pain.  Respiratory: Wheezing on exhale RUL. Other lobes, clear and diminished. Denies SOB.  GI/: Marina in place w/ Yellow urine. +BS, +flatus. x1 BM on shift  Diet: Regular. Encourage fluids.  Skin: Blanchable redness - perineum & scrotum - barrier applied.   Lines: R TL PICC - SL.  Incisions/Drains: Marina  Labs: Reviewed.  Pain/nausea: Denies.   Plan: Plan on discharge today around 1400 per patient. Pt states son is picking pt up.      "

## 2022-01-07 NOTE — DISCHARGE SUMMARY
[facility]  Discharge Summary - Medicine & Pediatrics       Date of Admission:  12/21/2021  Date of Discharge:  1/7/22  Discharging Provider: Laura Sarkar MD  Discharge Service: [place] [facility]    Discharge Diagnoses   Severe COVID Pneumonia  Pulmonary embolism  Urinary retention  HFrEF  Afib with RVR  Hospital delirium    Follow-ups Needed After Discharge   PCP: CBC, BMP in 1 week. Pt needs coronary CTA to evaluate for CAD.   Patient has a urinary catheter in place, failed multiple trials of void. Please attempt another one on 1/12, if fails can replace Marina. Consider outpatient PFTs to evaluate for COPD  Urology: Follow-up in 2-4 weeks.     Unresulted Labs Ordered in the Past 30 Days of this Admission     No orders found from 11/21/2021 to 12/22/2021.      These results will be followed up by N/a    Discharge Disposition   Discharged to short-term care facility  Condition at discharge: Stable    Hospital Course   Ezequiel Randhawa was admitted on 12/21/2021 for severe COVID pneumonia requiring mechanical ventilation, PE, and afib with RVR.  The following problems were addressed during his hospitalization:     #Severe COVID-19 pneumonia, weaned to room air  #Acute hypoxic respiratory failure, resolved  #Septic shock due to COVID-19 infection, resolved  Unvaccinated. COVID-19 positive on 12/20/21; symptoms began 1 week prior on 12/14/21 (cough, chills). Intubated on 12/20 in the [facility] ED. Received dexamethasone x9 days, remdesivir x1 day, and tocilizumab 12/20. Extubated and weaned off pressors on 12/25. No signs of secondary bacterial process, antibiotics stopped. Inflammatory markers trended down and weaned to room air 12/28.  - COVID precautions removed per Infection Prevention on 1/5/22     #Bilateral PEs  Seen on 12/20 CT. Transitioned from Lovenox to rivaroxaban on 12/25.  - rivaroxaban 15 mg BID (through 1/10), then transition to 20 mg at bedtime on 1/11  - defer  to PCP to consider length of treatment, likely 3 months for provoked PE     #HFrEF  #Afib with RVR, resolved (now in normal sinus rhythm)  Afib with RVR noted while critically ill in ICU treated with IV dilt and metoprolol, self converted to normal sinus rhythm and remained in sinus rhythm throughout remainder of inpatient stay. TTE showed EF reduced initially to 35-40% and improved to 45-50% once pt back in NSR. Ddx includes stress cardiomyopathy (most likely), ischemic disease (smoker, very little previous medical care), or tachyarrthymia-induced. No signs of decompensation. Attempted inpatient work-up with coronary CTA but pt couldn't tolerate d/t delirium. Started briefly on goal directed therapy with metoprolol and lisinopril in low doses but pt didn't tolerate d/t hypotension.  - Follow-up with PCP at TCU   - Coronary CTA recommended by cardiology for further work-up, can complete by PCP  - Consider re-trialing metoprolol and lisinopril as goal directed therapy as an outpatient as tolerated    #Hospital delirium  #Likely underlying dementia  Pt's mental status improved after extubation from confused and agitated  - Seroquel 25 mg at bedtime; 25 mg BID PRN additional available (QTc < 500 ms)  - Pt non-decisional at this time  - SLP following, pt on regular diet     # Chest pain, likely costochondritis  Intermittent. EKG and trop not concerning for ACS. Reproducible with palpation of L lateral chest wall - likely costochondritis.  - Tylenol PRN     #Likely COPD  #Tobacco use  Does not appear to have been diagnosed PTA, but moderate emphysema seen on imaging.   - Consider outpatient PFTs after discharge from TCU     # Urinary retention  Failed trial of burris removal 12/26 and 12/29  - Started flomax and keep burris  - Discussed with urology prior to discharge, leave catheter in place until 1/12/22. At that point, trial of void should be completed. If fails, please replace the Burris and follow-up with Urology in  2-4 weeks (Urology is scheduling this appointment. TCU, please cancel if patient passes trial of void and doesn't require follow-up with Urology)     #Hypernatremia, resolved  Na as high as 160 on admission, improved with free water and remained stable with PO intake     #Severe malnutrition in the context of acute on chronic illness.  - Consider appetite stimulant as outpatient  - Continue SLP  - Nutrition consulted, supplements and snacks     # Constipation  - Miralax 17 g BID PRN  - Senna 2 tabs BID PRN    Consultations This Hospital Stay   PHYSICAL THERAPY ADULT IP CONSULT  OCCUPATIONAL THERAPY ADULT IP CONSULT  PHARMACY IP CONSULT  PHARMACY IP CONSULT  NUTRITION SERVICES ADULT IP CONSULT  VASCULAR ACCESS CARE ADULT IP CONSULT  PHARMACY IP CONSULT  CARDIOLOGY HEART FAILURE (HF) IP CONSULT  CARE MANAGEMENT / SOCIAL WORK IP CONSULT  SPEECH LANGUAGE PATH ADULT IP CONSULT  WOUND OSTOMY CONTINENCE NURSE  IP CONSULT  PHYSICAL THERAPY ADULT IP CONSULT  OCCUPATIONAL THERAPY ADULT IP CONSULT  PHARMACY LIAISON FOR MEDICATION COVERAGE CONSULT  SPEECH LANGUAGE PATH ADULT IP CONSULT  NUTRITION SERVICES ADULT IP CONSULT  CARDIOLOGY GENERAL ADULT IP CONSULT  PHYSICAL THERAPY ADULT IP CONSULT  OCCUPATIONAL THERAPY ADULT IP CONSULT  SPEECH LANGUAGE PATH ADULT IP CONSULT    Code Status   No CPR- Pre-arrest intubation OK     The patient was discussed with Dr. Sarkar.    Kristel Medrano MD  [place] [id number] Service  [facility] [address] [address]  Phone: [phone]  ______________________________________________________________________    Physical Exam   Vital Signs: Temp: (!) 95.2  F (35.1  C) Temp src: Oral BP: 109/63 Pulse: 96   Resp: 16 SpO2: 97 % O2 Device: None (Room air)    Weight: 132 lbs 11.47 oz  General: Chronically ill appearing, awake, follows commands, answers questions appropriately appropriately, pleasant and in no distress.   HEENT: NCAT, anicteric sclera, EOMI  Cards: RRR,  "no murmurs noted  Pulm/Resp: breathing comfortably in room air  Abdomen: No visible distension  Neuro: Awake and alert. Mild dysarthria. Situationally very appropriate but short term memory is poor. Oriented to self, \"hospital\", year - not situation  Skin: no rashes on exposed surfaces         Primary Care Physician   Physician No Ref-Primary    Discharge Orders      General info for SNF    Length of Stay Estimate: Short Term Care: Estimated # of Days <30  Condition at Discharge: Improving  Level of care:skilled   Rehabilitation Potential: Good  Admission H&P remains valid and up-to-date: Yes  Recent Chemotherapy: N/A  Use Nursing Home Standing Orders: Yes     Mantoux instructions    Give two-step Mantoux (PPD) Per Facility Policy Yes     Follow Up and recommended labs and tests    Follow up with long-term physician.  The following labs/tests are recommended: CBC, BMP in one week.    - Pt will need an outpatient coronary CTA to evaluate for CAD  - Follow-up with urology clinic in 2-4 weeks for burris catheter removal. The TCU can trial removing the Burris in 5 days (1/12/2022) and replace the Burris if needed     Reason for your hospital stay    You were hospitalized with severe COVID pneumonia. You required a breathing tube and mechanical ventilation for several days before you improved. With treatments, your pneumonia improved and you now are breathing on your own without any supplemental oxygen. You had some delirium (confusion) during your hospitalization but this also improved. You were diagnosed with blood clots in your lungs (pulmonary embolisms) and were started on a medication called rivaroxaban (xarelto) which is a blood thinner to treat this clot, follow-up with your primary care doctor (by March 2022) to determine when you should stop your blood thinner (likely after 3 months of treatment). You have generalized weakness due to your critical illness and prolonged hospitalization so you are going to a " transitional care facility to get stronger before going home.     Activity - Up with nursing assistance     Discontinue indwelling urinary catheter (Marina)    Please remove Marina catheter 1/12. If patient cannot void, can replace Marina     No CPR- Pre-arrest intubation OK     Physical Therapy Adult Consult    Evaluate and treat as clinically indicated.    Reason:  Generalized deconditioning and weakness     Occupational Therapy Adult Consult    Evaluate and treat as clinically indicated.    Reason:  General deconditioning and weakness     Speech Language Path Adult Consult    Evaluate and treat as clinically indicated.    Reason:  Dysphagia     Fall precautions     Diet    Follow this diet upon discharge: Orders Placed This Encounter      Snacks/Supplements Adult: Other; Vital Cuisine Chocolate @ 2pm & HS (naturally mildly thick); Between Meals      Room Service      Snacks/Supplements Adult: Magic Cup; With Meals      Room Service      Regular Diet Adult       Significant Results and Procedures   Most Recent 3 CBC's:  Recent Labs   Lab Test 01/04/22  0730 01/03/22  0700 01/02/22  0841   WBC 10.8 13.4* 15.4*   HGB 12.2* 12.4* 12.3*   MCV 96 96 97    163 166     Most Recent 3 BMP's:  Recent Labs   Lab Test 01/06/22 2012 01/06/22  1751 01/05/22  2234 01/03/22  1527 01/03/22  0700 01/02/22  0900 01/01/22  0433   *  --   --   --  135  --  135   POTASSIUM 3.9  --   --   --  3.6  --  3.7   CHLORIDE 101  --   --   --  102  --  104   CO2 27  --   --   --  25  --  25   BUN 16  --   --   --  15  --  18   CR 0.59*  --   --   --  0.61*  --  0.56*   ANIONGAP 4  --   --   --  8  --  6   GREGORY 8.4*  --   --   --  8.7  --  8.7   * 105* 97   < > 111*   < > 116*    < > = values in this interval not displayed.   ,   Results for orders placed or performed during the hospital encounter of 12/21/21   XR Chest Port 1 View    Narrative    EXAM: XR CHEST PORT 1 VIEW  12/21/2021 3:31 PM     HISTORY:  73 yo male with COVID  pneumonia, transferred after  intubation       Additional HISTORY: History of COPD and A. fib with RVR    COMPARISON:  None    TECHNIQUE: Single view of the chest    FINDINGS:   Portable AP view of the chest performed at 45 degrees. The  endotracheal tube tip is within the mid thoracic trachea and  terminates 6.5 cm from the garret. The gastric tube tip projects over  the GE junction with the sidehole projecting over the lower thoracic  esophagus. Right upper extremity PICC line tip terminates in the mid  SVC.    Trachea is midline. Normal-appearing cardiomediastinal silhouette. No  pleural effusion or pneumothorax. Emphysematous changes of the lung  parenchyma. Patchy interstitial and airspace opacities, left greater  than right. Visualized upper abdomen is unremarkable. No acute osseous  abnormalities.      Impression    IMPRESSION:   1. Patchy interstitial and airspace opacities, left greater than  right, appearance compatible with  COVID pneumonia.  2. Gastric tube tip and sidehole project over the GE junction and  lower thoracic esophagus, respectively. Recommend advancement at least  10 cm.  3. Emphysematous changes of the lung parenchyma.     I have personally reviewed the examination and initial interpretation  and I agree with the findings.    RASHAAD HILL MD         SYSTEM ID:  [id number]   XR Abdomen Port 1 View    Narrative    Exam: XR ABDOMEN PORT 1 VIEWS, 12/21/2021 3:31 PM    Indication: 80 yo male, intubated, assess ET tube placement    Comparison: None    Findings:   Supine AP radiograph of the abdomen. Enteric tube is subdiaphragmatic  with tip projecting over the gastric lumen and sidehole just distal to  the gastroesophageal junction. Left basilar atelectasis. No portal  venous gas. No acute osseous abnormalities.    Impression    Impression: Enteric tube is subdiaphragmatic with tip projecting over  the gastric lumen and sidehole near the level of the diaphragmatic  hiatus, consider advancement  by approximately 5 cm.    I have personally reviewed the examination and initial interpretation  and I agree with the findings.    YNES HUNT MD         SYSTEM ID:  [id number]   XR Abdomen Port 1 View    Narrative    Exam: XR ABDOMEN PORT 1 VIEWS, 12/21/2021 6:11 PM    Indication: OG tube repositioned, COVID patient    Comparison: Abdominal x-ray 12/21/2021, chest x-ray 12/21/2021    Findings:   Portable semiupright AP radiograph of the abdomen. The tip of the  NG/OG tube projects over the stomach and the sidehole projects near  the GE junction, only slightly advanced compared to prior. The  visualized bowel gas pattern is nonobstructive. No visualized  pneumatosis or portal venous gas. Left basilar opacities are better  evaluated on same-day chest x-ray. Partially visualized catheter tip  projects over the mid SVC.      Impression    Impression:   1. The tip of the NG/OG tube projects over the stomach and the  sidehole projects near the GE junction, only slightly advanced  compared to prior. Recommend continued advancing.  2. The visualized bowel gas pattern is nonobstructive.    I have personally reviewed the examination and initial interpretation  and I agree with the findings.    EMILIO SCOTT MD         SYSTEM ID:  [id number]   XR Abdomen Port 1 View    Narrative    EXAMINATION:  XR ABDOMEN PORT 1 VIEWS, XR ABDOMEN PORT 1 VIEWS  12/22/2021 1:13 PM     COMPARISON: Chest radiograph 12/ 21/21..    HISTORY: OG repositioned.    TECHNIQUE: Frontal view of the abdomen-2 radiographs.    FINDINGS: Enteric tube side-port projects over the stomach. No  abnormally dilated loops of bowel. No pneumatosis or portal venous  gas. Mixed interstitial and airspace opacities in the left lung  unchanged from prior chest radiograph.      Impression    IMPRESSION: Enteric tube tip and side-port are in the stomach.    ALONDRA ZAVALA MD         SYSTEM ID:  [id number]   XR Abdomen Port 1 View    Narrative    EXAMINATION:  XR  ABDOMEN PORT 1 VIEWS, XR ABDOMEN PORT 1 VIEWS  12/22/2021 1:13 PM     COMPARISON: Chest radiograph 12/ 21/21..    HISTORY: OG repositioned.    TECHNIQUE: Frontal view of the abdomen-2 radiographs.    FINDINGS: Enteric tube side-port projects over the stomach. No  abnormally dilated loops of bowel. No pneumatosis or portal venous  gas. Mixed interstitial and airspace opacities in the left lung  unchanged from prior chest radiograph.      Impression    IMPRESSION: Enteric tube tip and side-port are in the stomach.    ALONDRA ZAVALA MD         SYSTEM ID:  [id number]   XR Abdomen Port 1 View    Narrative    Exam: XR ABDOMEN PORT 1 VIEWS, 12/24/2021 5:11 PM    Indication: For NG placement    Comparison: Radiograph of the abdomen dated 12/22/2021    Findings:   Supine AP radiograph of the abdomen. Enteric tube is subdiaphragmatic  with tip in the proximal stomach and sidehole at the distal esophagus.  Nonobstructive bowel gas pattern. Left basilar opacities, favor  atelectasis. No right pleural effusion. No acute osseous  abnormalities.      Impression    Impression: Enteric tube is diaphragmatic with tip projecting over  proximal stomach and sidehole projecting over distal esophagus.  Recommend advancement.    I have personally reviewed the examination and initial interpretation  and I agree with the findings.    DANIELA FARMER MD         SYSTEM ID:  [id number]   XR Abdomen Port 1 View    Narrative    Exam: XR ABDOMEN PORT 1 VIEWS, 12/24/2021 5:11 PM    Indication: OG advancement    Comparison: Same-day radiograph of the abdomen at 16:52    Findings:     Enteric tube tip projects over the stomach and sidehole projects near  the expected location of the gastroesophageal junction. Nonobstructive  bowel gas pattern. Left basilar opacities, favor atelectasis. No  pleural effusions. No acute osseous abnormalities.      Impression    Impression: Enteric tube tip projects over the stomach and sidehole  projects near the expected  location of the gastroesophageal junction.  Recommend advancement by 5 cm.    I have personally reviewed the examination and initial interpretation  and I agree with the findings.    DANIELA FARMER MD         SYSTEM ID:  [id number]   XR Abdomen Port 1 View    Narrative    Exam: XR ABDOMEN PORT 1 VIEWS, 2021 6:22 PM    Indication: NG placement, advanced 5cm    Comparison: Abdominal x-ray 2021, chest x-ray 2021    Findings:   Portable semiupright AP radiograph of the chest. The tip and sidehole  of the NG/OG tube project over the stomach, advanced compared to  prior. The visualized bowel gas pattern is nonobstructive. Partially  visualized catheter tip projects over the low SVC. Hazy opacities of  the left lung base, better evaluated on chest x-ray from 2021.  Degenerative changes of the spine.      Impression    Impression: The tip and sidehole of the NG/OG tube project over the  stomach, advanced compared to prior.    I have personally reviewed the examination and initial interpretation  and I agree with the findings.    DANIELA FARMER MD         SYSTEM ID:  [id number]   Echo Limited     Value    LVEF  45-50%    Narrative    [id number]  [id number]  [id number]  [id number]^RJ^OLGA^SIERRA     [facility]  Echocardiography Laboratory  [address] [place], [place] [address]     Name: JULIO ARCE  MRN: [id number]  : 1940  Study Date: 2021 02:00 PM  Age: 81 yrs  Gender: Male  Patient Location: [facility]  Reason For Study: Heart Failure  Ordering Physician: OLGA DE LA ROSA  Referring Physician: POP NIETO  Performed By: Christina Leach     BSA: 1.8 m2  Height: 74 in  Weight: 133 lb  ______________________________________________________________________________  Procedure  Limited Portable Echo Adult.  ______________________________________________________________________________  Interpretation Summary  Left ventricular size is normal.  The visual ejection  fraction is 45-50%.  Right ventricular function, chamber size, wall motion, and thickness are  normal.  No pericardial effusion is present.  ______________________________________________________________________________  Left Ventricle  Left ventricular size is normal. The visual ejection fraction is 45-50%.     Right Ventricle  Right ventricular function, chamber size, wall motion, and thickness are  normal.     Pericardium  No pericardial effusion is present.     ______________________________________________________________________________  Report approved by: Yasmani Wilson 12/22/2021 03:36 PM     ______________________________________________________________________________            Discharge Medications   Discharge Medication List as of 1/7/2022  2:02 PM      START taking these medications    Details   acetaminophen (TYLENOL) 325 MG tablet Take 2 tablets (650 mg) by mouth every 4 hours as needed for mild pain or fever, Transitional      polyethylene glycol (MIRALAX) 17 g packet Take 17 g by mouth 2 times daily as needed for constipation, Transitional      !! rivaroxaban ANTICOAGULANT (XARELTO) 15 MG TABS tablet Take 1 tablet (15 mg) by mouth 2 times daily (with meals), Transitional      !! rivaroxaban ANTICOAGULANT (XARELTO) 20 MG TABS tablet Take 1 tablet (20 mg) by mouth daily (with dinner), Transitional      senna-docusate (SENOKOT-S/PERICOLACE) 8.6-50 MG tablet Take 2 tablets by mouth nightly as needed for constipation, Transitional      tamsulosin (FLOMAX) 0.4 MG capsule Take 1 capsule (0.4 mg) by mouth every evening, Transitional       !! - Potential duplicate medications found. Please discuss with provider.        Allergies   No Known Allergies

## 2022-01-07 NOTE — PROGRESS NOTES
VSS. Pt. discharged at 1400 to [facility], was accompanied by son Gregorio, and left with personal belongings. Pt. received complete discharge paperwork. Pt. was given times of last dose for all discharge medications in writing on discharge medication sheets. Discharge teaching included medications, pain management, activity restrictions, and signs and symptoms of infection. Pt. to follow up with primary in . Pt. had no further questions at the time of discharge and no unmet needs were identified.

## 2022-01-12 ENCOUNTER — LAB REQUISITION (OUTPATIENT)
Dept: LAB | Facility: CLINIC | Age: 82
End: 2022-01-12
Payer: COMMERCIAL

## 2022-01-12 DIAGNOSIS — R68.89 OTHER GENERAL SYMPTOMS AND SIGNS: ICD-10-CM

## 2022-01-12 DIAGNOSIS — R79.9 ABNORMAL FINDING OF BLOOD CHEMISTRY, UNSPECIFIED: ICD-10-CM

## 2022-01-13 ENCOUNTER — LAB REQUISITION (OUTPATIENT)
Dept: LAB | Facility: CLINIC | Age: 82
End: 2022-01-13
Payer: COMMERCIAL

## 2022-01-13 DIAGNOSIS — R53.83 OTHER FATIGUE: ICD-10-CM

## 2022-01-13 DIAGNOSIS — R53.1 WEAKNESS: ICD-10-CM

## 2022-01-14 ENCOUNTER — LAB REQUISITION (OUTPATIENT)
Dept: LAB | Facility: CLINIC | Age: 82
End: 2022-01-14
Payer: COMMERCIAL

## 2022-01-14 DIAGNOSIS — R82.90 UNSPECIFIED ABNORMAL FINDINGS IN URINE: ICD-10-CM

## 2022-01-14 LAB
ALBUMIN UR-MCNC: 20 MG/DL
ANION GAP SERPL CALCULATED.3IONS-SCNC: 8 MMOL/L (ref 5–18)
APPEARANCE UR: ABNORMAL
BACTERIA #/AREA URNS HPF: ABNORMAL /HPF
BASOPHILS # BLD AUTO: 0 10E3/UL (ref 0–0.2)
BASOPHILS NFR BLD AUTO: 0 %
BILIRUB UR QL STRIP: NEGATIVE
BUN SERPL-MCNC: 12 MG/DL (ref 8–28)
CALCIUM SERPL-MCNC: 9 MG/DL (ref 8.5–10.5)
CHLORIDE BLD-SCNC: 97 MMOL/L (ref 98–107)
CO2 SERPL-SCNC: 23 MMOL/L (ref 22–31)
COLOR UR AUTO: YELLOW
CREAT SERPL-MCNC: 0.61 MG/DL (ref 0.7–1.3)
EOSINOPHIL # BLD AUTO: 0 10E3/UL (ref 0–0.7)
EOSINOPHIL NFR BLD AUTO: 0 %
ERYTHROCYTE [DISTWIDTH] IN BLOOD BY AUTOMATED COUNT: 14 % (ref 10–15)
GFR SERPL CREATININE-BSD FRML MDRD: >90 ML/MIN/1.73M2
GLUCOSE BLD-MCNC: 116 MG/DL (ref 70–125)
GLUCOSE UR STRIP-MCNC: NEGATIVE MG/DL
HCT VFR BLD AUTO: 33.9 % (ref 40–53)
HGB BLD-MCNC: 11.3 G/DL (ref 13.3–17.7)
HGB UR QL STRIP: ABNORMAL
IMM GRANULOCYTES # BLD: 0.1 10E3/UL
IMM GRANULOCYTES NFR BLD: 1 %
KETONES UR STRIP-MCNC: NEGATIVE MG/DL
LEUKOCYTE ESTERASE UR QL STRIP: ABNORMAL
LYMPHOCYTES # BLD AUTO: 1 10E3/UL (ref 0.8–5.3)
LYMPHOCYTES NFR BLD AUTO: 10 %
MCH RBC QN AUTO: 31.9 PG (ref 26.5–33)
MCHC RBC AUTO-ENTMCNC: 33.3 G/DL (ref 31.5–36.5)
MCV RBC AUTO: 96 FL (ref 78–100)
MONOCYTES # BLD AUTO: 1.2 10E3/UL (ref 0–1.3)
MONOCYTES NFR BLD AUTO: 12 %
NEUTROPHILS # BLD AUTO: 7.7 10E3/UL (ref 1.6–8.3)
NEUTROPHILS NFR BLD AUTO: 77 %
NITRATE UR QL: NEGATIVE
NRBC # BLD AUTO: 0 10E3/UL
NRBC BLD AUTO-RTO: 0 /100
PH UR STRIP: 6 [PH] (ref 5–7)
PLATELET # BLD AUTO: 243 10E3/UL (ref 150–450)
POTASSIUM BLD-SCNC: 4 MMOL/L (ref 3.5–5)
RBC # BLD AUTO: 3.54 10E6/UL (ref 4.4–5.9)
RBC URINE: >182 /HPF
SODIUM SERPL-SCNC: 128 MMOL/L (ref 136–145)
SP GR UR STRIP: 1.01 (ref 1–1.03)
UROBILINOGEN UR STRIP-MCNC: <2 MG/DL
WBC # BLD AUTO: 10 10E3/UL (ref 4–11)
WBC CLUMPS #/AREA URNS HPF: PRESENT /HPF
WBC URINE: >182 /HPF
YEAST #/AREA URNS HPF: ABNORMAL /HPF
YEAST #/AREA URNS HPF: ABNORMAL /HPF

## 2022-01-14 PROCEDURE — 36415 COLL VENOUS BLD VENIPUNCTURE: CPT | Performed by: INTERNAL MEDICINE

## 2022-01-14 PROCEDURE — 81001 URINALYSIS AUTO W/SCOPE: CPT | Mod: ORL | Performed by: NURSE PRACTITIONER

## 2022-01-14 PROCEDURE — 80048 BASIC METABOLIC PNL TOTAL CA: CPT | Performed by: INTERNAL MEDICINE

## 2022-01-14 PROCEDURE — P9604 ONE-WAY ALLOW PRORATED TRIP: HCPCS | Performed by: INTERNAL MEDICINE

## 2022-01-14 PROCEDURE — 87086 URINE CULTURE/COLONY COUNT: CPT | Performed by: NURSE PRACTITIONER

## 2022-01-14 PROCEDURE — 85025 COMPLETE CBC W/AUTO DIFF WBC: CPT | Mod: ORL | Performed by: INTERNAL MEDICINE

## 2022-01-15 LAB — BACTERIA UR CULT: NORMAL

## 2022-01-16 ENCOUNTER — LAB REQUISITION (OUTPATIENT)
Dept: LAB | Facility: CLINIC | Age: 82
End: 2022-01-16
Payer: COMMERCIAL

## 2022-01-16 DIAGNOSIS — E87.0 HYPEROSMOLALITY AND HYPERNATREMIA: ICD-10-CM

## 2022-01-17 LAB
ANION GAP SERPL CALCULATED.3IONS-SCNC: 10 MMOL/L (ref 5–18)
BUN SERPL-MCNC: 16 MG/DL (ref 8–28)
CALCIUM SERPL-MCNC: 9.1 MG/DL (ref 8.5–10.5)
CHLORIDE BLD-SCNC: 96 MMOL/L (ref 98–107)
CO2 SERPL-SCNC: 23 MMOL/L (ref 22–31)
CREAT SERPL-MCNC: 0.59 MG/DL (ref 0.7–1.3)
GFR SERPL CREATININE-BSD FRML MDRD: >90 ML/MIN/1.73M2
GLUCOSE BLD-MCNC: 127 MG/DL (ref 70–125)
POTASSIUM BLD-SCNC: 4.5 MMOL/L (ref 3.5–5)
SODIUM SERPL-SCNC: 129 MMOL/L (ref 136–145)

## 2022-01-17 PROCEDURE — P9604 ONE-WAY ALLOW PRORATED TRIP: HCPCS | Performed by: NURSE PRACTITIONER

## 2022-01-17 PROCEDURE — 36415 COLL VENOUS BLD VENIPUNCTURE: CPT | Performed by: NURSE PRACTITIONER

## 2022-01-17 PROCEDURE — 80048 BASIC METABOLIC PNL TOTAL CA: CPT | Performed by: NURSE PRACTITIONER

## 2022-01-19 ENCOUNTER — LAB REQUISITION (OUTPATIENT)
Dept: LAB | Facility: CLINIC | Age: 82
End: 2022-01-19
Payer: COMMERCIAL

## 2022-01-19 DIAGNOSIS — E87.1 HYPO-OSMOLALITY AND HYPONATREMIA: ICD-10-CM

## 2022-01-20 ENCOUNTER — LAB REQUISITION (OUTPATIENT)
Dept: LAB | Facility: CLINIC | Age: 82
End: 2022-01-20
Payer: COMMERCIAL

## 2022-01-20 DIAGNOSIS — E87.1 HYPO-OSMOLALITY AND HYPONATREMIA: ICD-10-CM

## 2022-01-20 LAB — OSMOLALITY UR: 546 MOSM/KG (ref 300–900)

## 2022-01-20 PROCEDURE — 83935 ASSAY OF URINE OSMOLALITY: CPT | Performed by: INTERNAL MEDICINE

## 2022-01-21 LAB
ANION GAP SERPL CALCULATED.3IONS-SCNC: 8 MMOL/L (ref 5–18)
BUN SERPL-MCNC: 10 MG/DL (ref 8–28)
CALCIUM SERPL-MCNC: 8.3 MG/DL (ref 8.5–10.5)
CHLORIDE BLD-SCNC: 101 MMOL/L (ref 98–107)
CO2 SERPL-SCNC: 22 MMOL/L (ref 22–31)
CREAT SERPL-MCNC: 0.49 MG/DL (ref 0.7–1.3)
GFR SERPL CREATININE-BSD FRML MDRD: >90 ML/MIN/1.73M2
GLUCOSE BLD-MCNC: 97 MG/DL (ref 70–125)
POTASSIUM BLD-SCNC: 4.5 MMOL/L (ref 3.5–5)
SODIUM SERPL-SCNC: 131 MMOL/L (ref 136–145)
SODIUM SERPL-SCNC: 131 MMOL/L (ref 136–145)

## 2022-01-21 PROCEDURE — 80048 BASIC METABOLIC PNL TOTAL CA: CPT | Performed by: NURSE PRACTITIONER

## 2022-01-21 PROCEDURE — P9603 ONE-WAY ALLOW PRORATED MILES: HCPCS | Performed by: INTERNAL MEDICINE

## 2022-01-21 PROCEDURE — 36415 COLL VENOUS BLD VENIPUNCTURE: CPT | Performed by: NURSE PRACTITIONER

## 2022-01-25 ENCOUNTER — LAB REQUISITION (OUTPATIENT)
Dept: LAB | Facility: CLINIC | Age: 82
End: 2022-01-25
Payer: COMMERCIAL

## 2022-01-25 DIAGNOSIS — N39.0 URINARY TRACT INFECTION, SITE NOT SPECIFIED: ICD-10-CM

## 2022-01-25 DIAGNOSIS — E87.1 HYPO-OSMOLALITY AND HYPONATREMIA: ICD-10-CM

## 2022-01-25 LAB
ALBUMIN UR-MCNC: 30 MG/DL
AMORPH CRY #/AREA URNS HPF: ABNORMAL /HPF
APPEARANCE UR: ABNORMAL
BACTERIA #/AREA URNS HPF: ABNORMAL /HPF
BILIRUB UR QL STRIP: NEGATIVE
CAOX CRY #/AREA URNS HPF: ABNORMAL /HPF
COLOR UR AUTO: YELLOW
GLUCOSE UR STRIP-MCNC: NEGATIVE MG/DL
HGB UR QL STRIP: NEGATIVE
KETONES UR STRIP-MCNC: ABNORMAL MG/DL
LEUKOCYTE ESTERASE UR QL STRIP: ABNORMAL
MUCOUS THREADS #/AREA URNS LPF: PRESENT /LPF
NITRATE UR QL: NEGATIVE
PH UR STRIP: 6 [PH] (ref 5–7)
RBC URINE: 1 /HPF
SP GR UR STRIP: 1.02 (ref 1–1.03)
SQUAMOUS EPITHELIAL: 1 /HPF
UROBILINOGEN UR STRIP-MCNC: <2 MG/DL
WBC URINE: 41 /HPF

## 2022-01-25 PROCEDURE — 87086 URINE CULTURE/COLONY COUNT: CPT | Performed by: INTERNAL MEDICINE

## 2022-01-25 PROCEDURE — 81001 URINALYSIS AUTO W/SCOPE: CPT | Performed by: INTERNAL MEDICINE

## 2022-01-26 LAB
ANION GAP SERPL CALCULATED.3IONS-SCNC: 8 MMOL/L (ref 5–18)
BUN SERPL-MCNC: 15 MG/DL (ref 8–28)
CALCIUM SERPL-MCNC: 8.8 MG/DL (ref 8.5–10.5)
CHLORIDE BLD-SCNC: 101 MMOL/L (ref 98–107)
CO2 SERPL-SCNC: 24 MMOL/L (ref 22–31)
CREAT SERPL-MCNC: 0.54 MG/DL (ref 0.7–1.3)
GFR SERPL CREATININE-BSD FRML MDRD: >90 ML/MIN/1.73M2
GLUCOSE BLD-MCNC: 91 MG/DL (ref 70–125)
POTASSIUM BLD-SCNC: 3.8 MMOL/L (ref 3.5–5)
SODIUM SERPL-SCNC: 133 MMOL/L (ref 136–145)

## 2022-01-26 PROCEDURE — 80048 BASIC METABOLIC PNL TOTAL CA: CPT | Performed by: PHYSICIAN ASSISTANT

## 2022-01-26 PROCEDURE — P9604 ONE-WAY ALLOW PRORATED TRIP: HCPCS | Performed by: PHYSICIAN ASSISTANT

## 2022-01-26 PROCEDURE — 36415 COLL VENOUS BLD VENIPUNCTURE: CPT | Performed by: PHYSICIAN ASSISTANT

## 2022-01-27 LAB — BACTERIA UR CULT: NORMAL

## 2022-01-31 ENCOUNTER — TELEPHONE (OUTPATIENT)
Dept: UROLOGY | Facility: CLINIC | Age: 82
End: 2022-01-31
Payer: COMMERCIAL

## 2022-01-31 NOTE — TELEPHONE ENCOUNTER
[facility] Call Center    Phone Message    May a detailed message be left on voicemail: yes     Reason for Call: Other: . pts son gregorio is calling, pt is in a care facility and is having bladder spasms, they are wondering if pt should have an office visit, or go to the ED, please call Gregorio, thank you    Action Taken: Message routed to:  [facility] ([facility]): uro    Travel Screening: Not Applicable

## 2022-02-03 NOTE — TELEPHONE ENCOUNTER
Spoke to pt's son Gregorio. Pt is in a [facility] TCU. He expressed frustration with nursing staff at facility that he doesn't feel that his input is being considered. Informed him to speak with nurse management about receiving provider's contact information. He verbalized understanding. Also discussed with him that pt has not yet established care at our clinic. Once he is out of TCU, he can schedule a visit. He agrees.     Maria Guadalupe Gruber RN MSN

## 2022-02-05 ENCOUNTER — LAB REQUISITION (OUTPATIENT)
Dept: LAB | Facility: CLINIC | Age: 82
End: 2022-02-05

## 2022-02-05 DIAGNOSIS — R39.89 OTHER SYMPTOMS AND SIGNS INVOLVING THE GENITOURINARY SYSTEM: ICD-10-CM

## 2022-02-05 LAB
ALBUMIN UR-MCNC: 50 MG/DL
APPEARANCE UR: ABNORMAL
BACTERIA #/AREA URNS HPF: ABNORMAL /HPF
BILIRUB UR QL STRIP: NEGATIVE
COLOR UR AUTO: YELLOW
GLUCOSE UR STRIP-MCNC: NEGATIVE MG/DL
HGB UR QL STRIP: ABNORMAL
KETONES UR STRIP-MCNC: NEGATIVE MG/DL
LEUKOCYTE ESTERASE UR QL STRIP: ABNORMAL
MUCOUS THREADS #/AREA URNS LPF: PRESENT /LPF
NITRATE UR QL: POSITIVE
PH UR STRIP: 6 [PH] (ref 5–7)
RBC URINE: 17 /HPF
SP GR UR STRIP: 1.03 (ref 1–1.03)
UROBILINOGEN UR STRIP-MCNC: <2 MG/DL
WBC CLUMPS #/AREA URNS HPF: PRESENT /HPF
WBC URINE: >182 /HPF

## 2022-02-05 PROCEDURE — 87086 URINE CULTURE/COLONY COUNT: CPT | Performed by: NURSE PRACTITIONER

## 2022-02-05 PROCEDURE — 81001 URINALYSIS AUTO W/SCOPE: CPT | Performed by: NURSE PRACTITIONER

## 2022-02-07 LAB — BACTERIA UR CULT: ABNORMAL

## 2022-02-23 ENCOUNTER — LAB REQUISITION (OUTPATIENT)
Dept: LAB | Facility: CLINIC | Age: 82
End: 2022-02-23
Payer: COMMERCIAL

## 2022-02-23 DIAGNOSIS — R39.89 OTHER SYMPTOMS AND SIGNS INVOLVING THE GENITOURINARY SYSTEM: ICD-10-CM

## 2022-02-23 LAB
ALBUMIN UR-MCNC: 10 MG/DL
AMORPH CRY #/AREA URNS HPF: ABNORMAL /HPF
APPEARANCE UR: ABNORMAL
BACTERIA #/AREA URNS HPF: ABNORMAL /HPF
BILIRUB UR QL STRIP: NEGATIVE
COLOR UR AUTO: YELLOW
GLUCOSE UR STRIP-MCNC: NEGATIVE MG/DL
HGB UR QL STRIP: ABNORMAL
KETONES UR STRIP-MCNC: NEGATIVE MG/DL
LEUKOCYTE ESTERASE UR QL STRIP: ABNORMAL
MUCOUS THREADS #/AREA URNS LPF: PRESENT /LPF
NITRATE UR QL: POSITIVE
PH UR STRIP: 6.5 [PH] (ref 5–7)
RBC URINE: 15 /HPF
SP GR UR STRIP: 1.02 (ref 1–1.03)
URATE CRY #/AREA URNS HPF: ABNORMAL /HPF
UROBILINOGEN UR STRIP-MCNC: <2 MG/DL
WBC URINE: 67 /HPF

## 2022-02-23 PROCEDURE — 87088 URINE BACTERIA CULTURE: CPT | Performed by: NURSE PRACTITIONER

## 2022-02-23 PROCEDURE — 81001 URINALYSIS AUTO W/SCOPE: CPT | Mod: ORL | Performed by: NURSE PRACTITIONER

## 2022-02-26 LAB
BACTERIA UR CULT: ABNORMAL
BACTERIA UR CULT: ABNORMAL

## 2022-03-09 ENCOUNTER — LAB REQUISITION (OUTPATIENT)
Dept: LAB | Facility: CLINIC | Age: 82
End: 2022-03-09
Payer: COMMERCIAL

## 2022-03-09 DIAGNOSIS — K92.1 MELENA: ICD-10-CM

## 2022-03-10 LAB
ERYTHROCYTE [DISTWIDTH] IN BLOOD BY AUTOMATED COUNT: 16.9 % (ref 10–15)
HCT VFR BLD AUTO: 33.7 % (ref 40–53)
HGB BLD-MCNC: 11 G/DL (ref 13.3–17.7)
MCH RBC QN AUTO: 31 PG (ref 26.5–33)
MCHC RBC AUTO-ENTMCNC: 32.6 G/DL (ref 31.5–36.5)
MCV RBC AUTO: 95 FL (ref 78–100)
PLATELET # BLD AUTO: 315 10E3/UL (ref 150–450)
RBC # BLD AUTO: 3.55 10E6/UL (ref 4.4–5.9)
WBC # BLD AUTO: 11.6 10E3/UL (ref 4–11)

## 2022-03-10 PROCEDURE — 36415 COLL VENOUS BLD VENIPUNCTURE: CPT | Mod: ORL | Performed by: NURSE PRACTITIONER

## 2022-03-10 PROCEDURE — 85027 COMPLETE CBC AUTOMATED: CPT | Mod: ORL | Performed by: NURSE PRACTITIONER

## 2022-03-10 PROCEDURE — P9604 ONE-WAY ALLOW PRORATED TRIP: HCPCS | Performed by: NURSE PRACTITIONER

## 2022-03-24 PROCEDURE — 87088 URINE BACTERIA CULTURE: CPT | Mod: ORL | Performed by: NURSE PRACTITIONER

## 2022-03-24 PROCEDURE — 81001 URINALYSIS AUTO W/SCOPE: CPT | Mod: ORL | Performed by: NURSE PRACTITIONER

## 2022-03-25 ENCOUNTER — LAB REQUISITION (OUTPATIENT)
Dept: LAB | Facility: CLINIC | Age: 82
End: 2022-03-25
Payer: COMMERCIAL

## 2022-03-25 DIAGNOSIS — K92.1 MELENA: ICD-10-CM

## 2022-03-25 LAB
ALBUMIN UR-MCNC: 70 MG/DL
APPEARANCE UR: ABNORMAL
BACTERIA #/AREA URNS HPF: ABNORMAL /HPF
BILIRUB UR QL STRIP: NEGATIVE
CAOX CRY #/AREA URNS HPF: ABNORMAL /HPF
COLOR UR AUTO: YELLOW
GLUCOSE UR STRIP-MCNC: NEGATIVE MG/DL
HGB UR QL STRIP: ABNORMAL
KETONES UR STRIP-MCNC: NEGATIVE MG/DL
LEUKOCYTE ESTERASE UR QL STRIP: ABNORMAL
MUCOUS THREADS #/AREA URNS LPF: PRESENT /LPF
NITRATE UR QL: NEGATIVE
PH UR STRIP: 6.5 [PH] (ref 5–7)
RBC URINE: 6 /HPF
SP GR UR STRIP: 1.02 (ref 1–1.03)
SQUAMOUS EPITHELIAL: 1 /HPF
UROBILINOGEN UR STRIP-MCNC: <2 MG/DL
WBC CLUMPS #/AREA URNS HPF: PRESENT /HPF
WBC URINE: >182 /HPF

## 2022-03-27 ENCOUNTER — LAB REQUISITION (OUTPATIENT)
Dept: LAB | Facility: CLINIC | Age: 82
End: 2022-03-27
Payer: COMMERCIAL

## 2022-03-27 DIAGNOSIS — K64.8 OTHER HEMORRHOIDS: ICD-10-CM

## 2022-03-27 DIAGNOSIS — K92.1 MELENA: ICD-10-CM

## 2022-03-27 DIAGNOSIS — K92.2 GASTROINTESTINAL HEMORRHAGE, UNSPECIFIED: ICD-10-CM

## 2022-03-27 LAB
BACTERIA UR CULT: ABNORMAL

## 2022-03-28 LAB
ALBUMIN SERPL-MCNC: 2.8 G/DL (ref 3.5–5)
ALP SERPL-CCNC: 74 U/L (ref 45–120)
ALT SERPL W P-5'-P-CCNC: <9 U/L (ref 0–45)
ANION GAP SERPL CALCULATED.3IONS-SCNC: 7 MMOL/L (ref 5–18)
AST SERPL W P-5'-P-CCNC: 12 U/L (ref 0–40)
BASOPHILS # BLD AUTO: 0.1 10E3/UL (ref 0–0.2)
BASOPHILS NFR BLD AUTO: 1 %
BILIRUB DIRECT SERPL-MCNC: 0.1 MG/DL
BILIRUB SERPL-MCNC: 0.3 MG/DL (ref 0–1)
BUN SERPL-MCNC: 14 MG/DL (ref 8–28)
CALCIUM SERPL-MCNC: 9.2 MG/DL (ref 8.5–10.5)
CHLORIDE BLD-SCNC: 100 MMOL/L (ref 98–107)
CO2 SERPL-SCNC: 28 MMOL/L (ref 22–31)
CREAT SERPL-MCNC: 0.6 MG/DL (ref 0.7–1.3)
EOSINOPHIL # BLD AUTO: 0.3 10E3/UL (ref 0–0.7)
EOSINOPHIL NFR BLD AUTO: 5 %
ERYTHROCYTE [DISTWIDTH] IN BLOOD BY AUTOMATED COUNT: 17.9 % (ref 10–15)
GFR SERPL CREATININE-BSD FRML MDRD: >90 ML/MIN/1.73M2
GLUCOSE BLD-MCNC: 84 MG/DL (ref 70–125)
HCT VFR BLD AUTO: 33.3 % (ref 40–53)
HGB BLD-MCNC: 10.5 G/DL (ref 13.3–17.7)
IMM GRANULOCYTES # BLD: 0.1 10E3/UL
IMM GRANULOCYTES NFR BLD: 1 %
LYMPHOCYTES # BLD AUTO: 1.1 10E3/UL (ref 0.8–5.3)
LYMPHOCYTES NFR BLD AUTO: 16 %
MCH RBC QN AUTO: 31.1 PG (ref 26.5–33)
MCHC RBC AUTO-ENTMCNC: 31.5 G/DL (ref 31.5–36.5)
MCV RBC AUTO: 99 FL (ref 78–100)
MONOCYTES # BLD AUTO: 0.9 10E3/UL (ref 0–1.3)
MONOCYTES NFR BLD AUTO: 14 %
NEUTROPHILS # BLD AUTO: 4.2 10E3/UL (ref 1.6–8.3)
NEUTROPHILS NFR BLD AUTO: 63 %
NRBC # BLD AUTO: 0 10E3/UL
NRBC BLD AUTO-RTO: 0 /100
PLATELET # BLD AUTO: 263 10E3/UL (ref 150–450)
POTASSIUM BLD-SCNC: 4.4 MMOL/L (ref 3.5–5)
PROT SERPL-MCNC: 5.9 G/DL (ref 6–8)
RBC # BLD AUTO: 3.38 10E6/UL (ref 4.4–5.9)
SODIUM SERPL-SCNC: 135 MMOL/L (ref 136–145)
WBC # BLD AUTO: 6.7 10E3/UL (ref 4–11)

## 2022-03-28 PROCEDURE — P9604 ONE-WAY ALLOW PRORATED TRIP: HCPCS | Mod: ORL | Performed by: NURSE PRACTITIONER

## 2022-03-28 PROCEDURE — 80053 COMPREHEN METABOLIC PANEL: CPT | Mod: ORL | Performed by: INTERNAL MEDICINE

## 2022-03-28 PROCEDURE — 85025 COMPLETE CBC W/AUTO DIFF WBC: CPT | Mod: ORL

## 2022-03-28 PROCEDURE — 82248 BILIRUBIN DIRECT: CPT | Mod: ORL | Performed by: INTERNAL MEDICINE

## 2022-03-28 PROCEDURE — 85025 COMPLETE CBC W/AUTO DIFF WBC: CPT | Mod: ORL | Performed by: NURSE PRACTITIONER

## 2022-03-28 PROCEDURE — P9604 ONE-WAY ALLOW PRORATED TRIP: HCPCS | Mod: ORL | Performed by: INTERNAL MEDICINE

## 2022-03-28 PROCEDURE — 36415 COLL VENOUS BLD VENIPUNCTURE: CPT | Mod: ORL | Performed by: INTERNAL MEDICINE

## 2022-03-28 PROCEDURE — P9604 ONE-WAY ALLOW PRORATED TRIP: HCPCS | Mod: ORL

## 2022-03-31 ENCOUNTER — LAB REQUISITION (OUTPATIENT)
Dept: LAB | Facility: CLINIC | Age: 82
End: 2022-03-31
Payer: COMMERCIAL

## 2022-03-31 DIAGNOSIS — R39.89 OTHER SYMPTOMS AND SIGNS INVOLVING THE GENITOURINARY SYSTEM: ICD-10-CM

## 2022-03-31 LAB
ALBUMIN UR-MCNC: 50 MG/DL
APPEARANCE UR: ABNORMAL
BACTERIA #/AREA URNS HPF: ABNORMAL /HPF
BILIRUB UR QL STRIP: NEGATIVE
COLOR UR AUTO: YELLOW
GLUCOSE UR STRIP-MCNC: NEGATIVE MG/DL
HGB UR QL STRIP: ABNORMAL
KETONES UR STRIP-MCNC: NEGATIVE MG/DL
LEUKOCYTE ESTERASE UR QL STRIP: ABNORMAL
MUCOUS THREADS #/AREA URNS LPF: PRESENT /LPF
NITRATE UR QL: NEGATIVE
PH UR STRIP: 6.5 [PH] (ref 5–7)
RBC URINE: 13 /HPF
SP GR UR STRIP: 1.02 (ref 1–1.03)
SQUAMOUS EPITHELIAL: <1 /HPF
UROBILINOGEN UR STRIP-MCNC: <2 MG/DL
WBC URINE: >182 /HPF

## 2022-03-31 PROCEDURE — 87086 URINE CULTURE/COLONY COUNT: CPT | Mod: ORL | Performed by: INTERNAL MEDICINE

## 2022-03-31 PROCEDURE — 81001 URINALYSIS AUTO W/SCOPE: CPT | Mod: ORL | Performed by: INTERNAL MEDICINE

## 2022-04-02 LAB — BACTERIA UR CULT: NORMAL

## 2022-04-08 ENCOUNTER — LAB REQUISITION (OUTPATIENT)
Dept: LAB | Facility: CLINIC | Age: 82
End: 2022-04-08
Payer: COMMERCIAL

## 2022-04-08 DIAGNOSIS — R92.1 MAMMOGRAPHIC CALCIFICATION FOUND ON DIAGNOSTIC IMAGING OF BREAST: ICD-10-CM

## 2022-04-11 LAB
ERYTHROCYTE [DISTWIDTH] IN BLOOD BY AUTOMATED COUNT: 16 % (ref 10–15)
HCT VFR BLD AUTO: 32 % (ref 40–53)
HGB BLD-MCNC: 10.2 G/DL (ref 13.3–17.7)
MCH RBC QN AUTO: 30.8 PG (ref 26.5–33)
MCHC RBC AUTO-ENTMCNC: 31.9 G/DL (ref 31.5–36.5)
MCV RBC AUTO: 97 FL (ref 78–100)
PLATELET # BLD AUTO: 251 10E3/UL (ref 150–450)
RBC # BLD AUTO: 3.31 10E6/UL (ref 4.4–5.9)
WBC # BLD AUTO: 8.5 10E3/UL (ref 4–11)

## 2022-04-11 PROCEDURE — 85027 COMPLETE CBC AUTOMATED: CPT | Mod: ORL | Performed by: NURSE PRACTITIONER

## 2022-04-11 PROCEDURE — 36415 COLL VENOUS BLD VENIPUNCTURE: CPT | Mod: ORL | Performed by: NURSE PRACTITIONER

## 2022-04-11 PROCEDURE — P9604 ONE-WAY ALLOW PRORATED TRIP: HCPCS | Mod: ORL | Performed by: NURSE PRACTITIONER

## 2022-04-27 ENCOUNTER — LAB REQUISITION (OUTPATIENT)
Dept: LAB | Facility: CLINIC | Age: 82
End: 2022-04-27
Payer: COMMERCIAL

## 2022-04-27 DIAGNOSIS — K92.2 GASTROINTESTINAL HEMORRHAGE, UNSPECIFIED: ICD-10-CM

## 2022-04-27 LAB
BASOPHILS # BLD AUTO: 0.1 10E3/UL (ref 0–0.2)
BASOPHILS NFR BLD AUTO: 1 %
EOSINOPHIL # BLD AUTO: 0.1 10E3/UL (ref 0–0.7)
EOSINOPHIL NFR BLD AUTO: 1 %
ERYTHROCYTE [DISTWIDTH] IN BLOOD BY AUTOMATED COUNT: 13.7 % (ref 10–15)
HCT VFR BLD AUTO: 35.6 % (ref 40–53)
HGB BLD-MCNC: 11.4 G/DL (ref 13.3–17.7)
IMM GRANULOCYTES # BLD: 0.1 10E3/UL
IMM GRANULOCYTES NFR BLD: 1 %
LYMPHOCYTES # BLD AUTO: 1.1 10E3/UL (ref 0.8–5.3)
LYMPHOCYTES NFR BLD AUTO: 10 %
MCH RBC QN AUTO: 30.9 PG (ref 26.5–33)
MCHC RBC AUTO-ENTMCNC: 32 G/DL (ref 31.5–36.5)
MCV RBC AUTO: 97 FL (ref 78–100)
MONOCYTES # BLD AUTO: 1.1 10E3/UL (ref 0–1.3)
MONOCYTES NFR BLD AUTO: 10 %
NEUTROPHILS # BLD AUTO: 8.6 10E3/UL (ref 1.6–8.3)
NEUTROPHILS NFR BLD AUTO: 77 %
NRBC # BLD AUTO: 0 10E3/UL
NRBC BLD AUTO-RTO: 0 /100
PLATELET # BLD AUTO: 322 10E3/UL (ref 150–450)
RBC # BLD AUTO: 3.69 10E6/UL (ref 4.4–5.9)
WBC # BLD AUTO: 11.1 10E3/UL (ref 4–11)

## 2022-04-27 PROCEDURE — P9604 ONE-WAY ALLOW PRORATED TRIP: HCPCS | Mod: ORL | Performed by: NURSE PRACTITIONER

## 2022-04-27 PROCEDURE — 85025 COMPLETE CBC W/AUTO DIFF WBC: CPT | Mod: ORL | Performed by: NURSE PRACTITIONER

## 2022-04-27 PROCEDURE — 36415 COLL VENOUS BLD VENIPUNCTURE: CPT | Mod: ORL | Performed by: NURSE PRACTITIONER

## 2023-01-01 ENCOUNTER — APPOINTMENT (OUTPATIENT)
Dept: RADIOLOGY | Facility: HOSPITAL | Age: 83
DRG: 871 | End: 2023-01-01
Attending: INTERNAL MEDICINE
Payer: COMMERCIAL

## 2023-01-01 ENCOUNTER — ANCILLARY PROCEDURE (OUTPATIENT)
Dept: ULTRASOUND IMAGING | Facility: HOSPITAL | Age: 83
DRG: 871 | End: 2023-01-01
Attending: EMERGENCY MEDICINE
Payer: COMMERCIAL

## 2023-01-01 ENCOUNTER — MEDICAL CORRESPONDENCE (OUTPATIENT)
Dept: HEALTH INFORMATION MANAGEMENT | Facility: CLINIC | Age: 83
End: 2023-01-01

## 2023-01-01 ENCOUNTER — DOCUMENTATION ONLY (OUTPATIENT)
Dept: OTHER | Facility: CLINIC | Age: 83
End: 2023-01-01
Payer: COMMERCIAL

## 2023-01-01 ENCOUNTER — MEDICAL CORRESPONDENCE (OUTPATIENT)
Dept: HEALTH INFORMATION MANAGEMENT | Facility: CLINIC | Age: 83
End: 2023-01-01
Payer: OTHER MISCELLANEOUS

## 2023-01-01 ENCOUNTER — HOSPITAL ENCOUNTER (INPATIENT)
Facility: HOSPITAL | Age: 83
LOS: 9 days | Discharge: HOSPICE | DRG: 871 | End: 2023-04-26
Attending: EMERGENCY MEDICINE | Admitting: INTERNAL MEDICINE
Payer: COMMERCIAL

## 2023-01-01 ENCOUNTER — APPOINTMENT (OUTPATIENT)
Dept: RADIOLOGY | Facility: HOSPITAL | Age: 83
DRG: 871 | End: 2023-01-01
Attending: EMERGENCY MEDICINE
Payer: COMMERCIAL

## 2023-01-01 ENCOUNTER — LAB REQUISITION (OUTPATIENT)
Dept: LAB | Facility: CLINIC | Age: 83
End: 2023-01-01
Payer: COMMERCIAL

## 2023-01-01 ENCOUNTER — APPOINTMENT (OUTPATIENT)
Dept: SPEECH THERAPY | Facility: HOSPITAL | Age: 83
DRG: 871 | End: 2023-01-01
Payer: COMMERCIAL

## 2023-01-01 ENCOUNTER — APPOINTMENT (OUTPATIENT)
Dept: SPEECH THERAPY | Facility: HOSPITAL | Age: 83
DRG: 871 | End: 2023-01-01
Attending: INTERNAL MEDICINE
Payer: COMMERCIAL

## 2023-01-01 ENCOUNTER — HOSPITAL ENCOUNTER (INPATIENT)
Facility: HOSPITAL | Age: 83
LOS: 3 days | End: 2023-04-29
Attending: HOSPITALIST | Admitting: HOSPITALIST
Payer: MEDICARE

## 2023-01-01 ENCOUNTER — MEDICAL CORRESPONDENCE (OUTPATIENT)
Dept: HEALTH INFORMATION MANAGEMENT | Facility: CLINIC | Age: 83
End: 2023-01-01
Payer: COMMERCIAL

## 2023-01-01 ENCOUNTER — APPOINTMENT (OUTPATIENT)
Dept: CT IMAGING | Facility: HOSPITAL | Age: 83
DRG: 871 | End: 2023-01-01
Attending: EMERGENCY MEDICINE
Payer: COMMERCIAL

## 2023-01-01 VITALS
TEMPERATURE: 98.1 F | SYSTOLIC BLOOD PRESSURE: 91 MMHG | HEART RATE: 116 BPM | OXYGEN SATURATION: 96 % | DIASTOLIC BLOOD PRESSURE: 52 MMHG | RESPIRATION RATE: 12 BRPM

## 2023-01-01 VITALS
DIASTOLIC BLOOD PRESSURE: 57 MMHG | OXYGEN SATURATION: 97 % | TEMPERATURE: 97.6 F | WEIGHT: 110.34 LBS | BODY MASS INDEX: 14.16 KG/M2 | HEART RATE: 104 BPM | SYSTOLIC BLOOD PRESSURE: 107 MMHG | RESPIRATION RATE: 22 BRPM | HEIGHT: 74 IN

## 2023-01-01 DIAGNOSIS — N40.1 BENIGN PROSTATIC HYPERPLASIA WITH LOWER URINARY TRACT SYMPTOMS: ICD-10-CM

## 2023-01-01 DIAGNOSIS — N39.0 URINARY TRACT INFECTION, SITE NOT SPECIFIED: ICD-10-CM

## 2023-01-01 DIAGNOSIS — J96.01 ACUTE RESPIRATORY FAILURE WITH HYPOXIA AND HYPERCAPNIA (H): ICD-10-CM

## 2023-01-01 DIAGNOSIS — J96.02 ACUTE RESPIRATORY FAILURE WITH HYPOXIA AND HYPERCAPNIA (H): ICD-10-CM

## 2023-01-01 DIAGNOSIS — J18.9 MULTIFOCAL PNEUMONIA: ICD-10-CM

## 2023-01-01 DIAGNOSIS — D64.89 OTHER SPECIFIED ANEMIAS: ICD-10-CM

## 2023-01-01 DIAGNOSIS — Z79.01 ANTICOAGULATED: ICD-10-CM

## 2023-01-01 DIAGNOSIS — R30.0 DYSURIA: ICD-10-CM

## 2023-01-01 DIAGNOSIS — Z66 DNR (DO NOT RESUSCITATE): ICD-10-CM

## 2023-01-01 DIAGNOSIS — T83.511A URINARY TRACT INFECTION ASSOCIATED WITH INDWELLING URETHRAL CATHETER, INITIAL ENCOUNTER (H): ICD-10-CM

## 2023-01-01 DIAGNOSIS — A41.9 SEPTIC SHOCK (H): ICD-10-CM

## 2023-01-01 DIAGNOSIS — R82.90 UNSPECIFIED ABNORMAL FINDINGS IN URINE: ICD-10-CM

## 2023-01-01 DIAGNOSIS — R65.21 SEPTIC SHOCK (H): ICD-10-CM

## 2023-01-01 DIAGNOSIS — N39.0 URINARY TRACT INFECTION ASSOCIATED WITH INDWELLING URETHRAL CATHETER, INITIAL ENCOUNTER (H): ICD-10-CM

## 2023-01-01 LAB
ALBUMIN SERPL BCG-MCNC: 3.8 G/DL (ref 3.5–5.2)
ALBUMIN UR-MCNC: 100 MG/DL
ALBUMIN UR-MCNC: 100 MG/DL
ALBUMIN UR-MCNC: 70 MG/DL
ALP SERPL-CCNC: 108 U/L (ref 40–129)
ALT SERPL W P-5'-P-CCNC: 18 U/L (ref 10–50)
ANION GAP SERPL CALCULATED.3IONS-SCNC: 10 MMOL/L (ref 7–15)
ANION GAP SERPL CALCULATED.3IONS-SCNC: 18 MMOL/L (ref 7–15)
ANION GAP SERPL CALCULATED.3IONS-SCNC: 5 MMOL/L (ref 7–15)
ANION GAP SERPL CALCULATED.3IONS-SCNC: 7 MMOL/L (ref 7–15)
ANION GAP SERPL CALCULATED.3IONS-SCNC: 9 MMOL/L (ref 7–15)
APPEARANCE UR: ABNORMAL
AST SERPL W P-5'-P-CCNC: 27 U/L (ref 10–50)
ATRIAL RATE - MUSE: 159 BPM
BACTERIA #/AREA URNS HPF: ABNORMAL /HPF
BACTERIA #/AREA URNS HPF: ABNORMAL /HPF
BACTERIA BLD CULT: ABNORMAL
BACTERIA BLD CULT: NO GROWTH
BACTERIA UR CULT: NORMAL
BASE EXCESS BLDV CALC-SCNC: -2.8 MMOL/L
BASE EXCESS BLDV CALC-SCNC: -3.2 MMOL/L
BASOPHILS # BLD AUTO: 0 10E3/UL (ref 0–0.2)
BASOPHILS # BLD AUTO: 0.1 10E3/UL (ref 0–0.2)
BASOPHILS NFR BLD AUTO: 0 %
BASOPHILS NFR BLD AUTO: 1 %
BILIRUB DIRECT SERPL-MCNC: <0.2 MG/DL (ref 0–0.3)
BILIRUB SERPL-MCNC: 0.5 MG/DL
BILIRUB UR QL STRIP: NEGATIVE
BUN SERPL-MCNC: 11.7 MG/DL (ref 8–23)
BUN SERPL-MCNC: 14.1 MG/DL (ref 8–23)
BUN SERPL-MCNC: 15.9 MG/DL (ref 8–23)
BUN SERPL-MCNC: 18.5 MG/DL (ref 8–23)
BUN SERPL-MCNC: 19.2 MG/DL (ref 8–23)
BUN SERPL-MCNC: 19.5 MG/DL (ref 8–23)
BUN SERPL-MCNC: 19.7 MG/DL (ref 8–23)
BUN SERPL-MCNC: 22.7 MG/DL (ref 8–23)
BUN SERPL-MCNC: 26.5 MG/DL (ref 8–23)
BUN SERPL-MCNC: 34.5 MG/DL (ref 8–23)
BUN SERPL-MCNC: 40.5 MG/DL (ref 8–23)
CALCIUM SERPL-MCNC: 7.7 MG/DL (ref 8.8–10.2)
CALCIUM SERPL-MCNC: 7.8 MG/DL (ref 8.8–10.2)
CALCIUM SERPL-MCNC: 7.8 MG/DL (ref 8.8–10.2)
CALCIUM SERPL-MCNC: 8 MG/DL (ref 8.8–10.2)
CALCIUM SERPL-MCNC: 8.1 MG/DL (ref 8.8–10.2)
CALCIUM SERPL-MCNC: 8.2 MG/DL (ref 8.8–10.2)
CALCIUM SERPL-MCNC: 8.2 MG/DL (ref 8.8–10.2)
CALCIUM SERPL-MCNC: 8.4 MG/DL (ref 8.8–10.2)
CALCIUM SERPL-MCNC: 8.5 MG/DL (ref 8.8–10.2)
CALCIUM SERPL-MCNC: 9 MG/DL (ref 8.8–10.2)
CALCIUM SERPL-MCNC: 9.2 MG/DL (ref 8.8–10.2)
CALCIUM, IONIZED MEASURED: 1.18 MMOL/L (ref 1.11–1.3)
CHLORIDE SERPL-SCNC: 100 MMOL/L (ref 98–107)
CHLORIDE SERPL-SCNC: 101 MMOL/L (ref 98–107)
CHLORIDE SERPL-SCNC: 102 MMOL/L (ref 98–107)
CHLORIDE SERPL-SCNC: 103 MMOL/L (ref 98–107)
CHLORIDE SERPL-SCNC: 103 MMOL/L (ref 98–107)
CHLORIDE SERPL-SCNC: 104 MMOL/L (ref 98–107)
CHLORIDE SERPL-SCNC: 113 MMOL/L (ref 98–107)
CHLORIDE SERPL-SCNC: 117 MMOL/L (ref 98–107)
COLOR UR AUTO: ABNORMAL
COLOR UR AUTO: YELLOW
COLOR UR AUTO: YELLOW
CREAT SERPL-MCNC: 0.39 MG/DL (ref 0.67–1.17)
CREAT SERPL-MCNC: 0.41 MG/DL (ref 0.67–1.17)
CREAT SERPL-MCNC: 0.43 MG/DL (ref 0.67–1.17)
CREAT SERPL-MCNC: 0.44 MG/DL (ref 0.67–1.17)
CREAT SERPL-MCNC: 0.45 MG/DL (ref 0.67–1.17)
CREAT SERPL-MCNC: 0.49 MG/DL (ref 0.67–1.17)
CREAT SERPL-MCNC: 0.59 MG/DL (ref 0.67–1.17)
CREAT SERPL-MCNC: 0.65 MG/DL (ref 0.67–1.17)
CREAT SERPL-MCNC: 0.71 MG/DL (ref 0.67–1.17)
CRP SERPL-MCNC: 18.5 MG/L
DEPRECATED HCO3 PLAS-SCNC: 21 MMOL/L (ref 22–29)
DEPRECATED HCO3 PLAS-SCNC: 22 MMOL/L (ref 22–29)
DEPRECATED HCO3 PLAS-SCNC: 24 MMOL/L (ref 22–29)
DEPRECATED HCO3 PLAS-SCNC: 25 MMOL/L (ref 22–29)
DEPRECATED HCO3 PLAS-SCNC: 25 MMOL/L (ref 22–29)
DEPRECATED HCO3 PLAS-SCNC: 26 MMOL/L (ref 22–29)
DEPRECATED HCO3 PLAS-SCNC: 26 MMOL/L (ref 22–29)
DEPRECATED HCO3 PLAS-SCNC: 27 MMOL/L (ref 22–29)
DEPRECATED HCO3 PLAS-SCNC: 29 MMOL/L (ref 22–29)
DEPRECATED HCO3 PLAS-SCNC: 30 MMOL/L (ref 22–29)
DEPRECATED HCO3 PLAS-SCNC: 30 MMOL/L (ref 22–29)
DIASTOLIC BLOOD PRESSURE - MUSE: NORMAL MMHG
ENTEROCOCCUS FAECALIS: NOT DETECTED
ENTEROCOCCUS FAECIUM: NOT DETECTED
EOSINOPHIL # BLD AUTO: 0.1 10E3/UL (ref 0–0.7)
EOSINOPHIL # BLD AUTO: 0.2 10E3/UL (ref 0–0.7)
EOSINOPHIL NFR BLD AUTO: 1 %
EOSINOPHIL NFR BLD AUTO: 3 %
ERYTHROCYTE [DISTWIDTH] IN BLOOD BY AUTOMATED COUNT: 12.7 % (ref 10–15)
ERYTHROCYTE [DISTWIDTH] IN BLOOD BY AUTOMATED COUNT: 13.1 % (ref 10–15)
ERYTHROCYTE [DISTWIDTH] IN BLOOD BY AUTOMATED COUNT: 13.2 % (ref 10–15)
ERYTHROCYTE [DISTWIDTH] IN BLOOD BY AUTOMATED COUNT: 14.5 % (ref 10–15)
FLUAV RNA SPEC QL NAA+PROBE: NEGATIVE
FLUBV RNA RESP QL NAA+PROBE: NEGATIVE
FOLATE SERPL-MCNC: 7.7 NG/ML (ref 4.6–34.8)
GFR SERPL CREATININE-BSD FRML MDRD: >90 ML/MIN/1.73M2
GLUCOSE BLDC GLUCOMTR-MCNC: 100 MG/DL (ref 70–99)
GLUCOSE BLDC GLUCOMTR-MCNC: 101 MG/DL (ref 70–99)
GLUCOSE BLDC GLUCOMTR-MCNC: 107 MG/DL (ref 70–99)
GLUCOSE BLDC GLUCOMTR-MCNC: 110 MG/DL (ref 70–99)
GLUCOSE BLDC GLUCOMTR-MCNC: 111 MG/DL (ref 70–99)
GLUCOSE BLDC GLUCOMTR-MCNC: 111 MG/DL (ref 70–99)
GLUCOSE BLDC GLUCOMTR-MCNC: 117 MG/DL (ref 70–99)
GLUCOSE BLDC GLUCOMTR-MCNC: 129 MG/DL (ref 70–99)
GLUCOSE BLDC GLUCOMTR-MCNC: 131 MG/DL (ref 70–99)
GLUCOSE BLDC GLUCOMTR-MCNC: 137 MG/DL (ref 70–99)
GLUCOSE BLDC GLUCOMTR-MCNC: 137 MG/DL (ref 70–99)
GLUCOSE BLDC GLUCOMTR-MCNC: 139 MG/DL (ref 70–99)
GLUCOSE BLDC GLUCOMTR-MCNC: 148 MG/DL (ref 70–99)
GLUCOSE BLDC GLUCOMTR-MCNC: 148 MG/DL (ref 70–99)
GLUCOSE BLDC GLUCOMTR-MCNC: 149 MG/DL (ref 70–99)
GLUCOSE BLDC GLUCOMTR-MCNC: 152 MG/DL (ref 70–99)
GLUCOSE BLDC GLUCOMTR-MCNC: 154 MG/DL (ref 70–99)
GLUCOSE BLDC GLUCOMTR-MCNC: 156 MG/DL (ref 70–99)
GLUCOSE BLDC GLUCOMTR-MCNC: 157 MG/DL (ref 70–99)
GLUCOSE BLDC GLUCOMTR-MCNC: 157 MG/DL (ref 70–99)
GLUCOSE BLDC GLUCOMTR-MCNC: 159 MG/DL (ref 70–99)
GLUCOSE BLDC GLUCOMTR-MCNC: 163 MG/DL (ref 70–99)
GLUCOSE BLDC GLUCOMTR-MCNC: 166 MG/DL (ref 70–99)
GLUCOSE BLDC GLUCOMTR-MCNC: 169 MG/DL (ref 70–99)
GLUCOSE BLDC GLUCOMTR-MCNC: 175 MG/DL (ref 70–99)
GLUCOSE BLDC GLUCOMTR-MCNC: 176 MG/DL (ref 70–99)
GLUCOSE BLDC GLUCOMTR-MCNC: 178 MG/DL (ref 70–99)
GLUCOSE BLDC GLUCOMTR-MCNC: 181 MG/DL (ref 70–99)
GLUCOSE BLDC GLUCOMTR-MCNC: 190 MG/DL (ref 70–99)
GLUCOSE BLDC GLUCOMTR-MCNC: 191 MG/DL (ref 70–99)
GLUCOSE BLDC GLUCOMTR-MCNC: 82 MG/DL (ref 70–99)
GLUCOSE BLDC GLUCOMTR-MCNC: 98 MG/DL (ref 70–99)
GLUCOSE SERPL-MCNC: 110 MG/DL (ref 70–99)
GLUCOSE SERPL-MCNC: 128 MG/DL (ref 70–99)
GLUCOSE SERPL-MCNC: 146 MG/DL (ref 70–99)
GLUCOSE SERPL-MCNC: 150 MG/DL (ref 70–99)
GLUCOSE SERPL-MCNC: 166 MG/DL (ref 70–99)
GLUCOSE SERPL-MCNC: 190 MG/DL (ref 70–99)
GLUCOSE SERPL-MCNC: 74 MG/DL (ref 70–99)
GLUCOSE SERPL-MCNC: 80 MG/DL (ref 70–99)
GLUCOSE SERPL-MCNC: 83 MG/DL (ref 70–99)
GLUCOSE SERPL-MCNC: 86 MG/DL (ref 70–99)
GLUCOSE SERPL-MCNC: 88 MG/DL (ref 70–99)
GLUCOSE UR STRIP-MCNC: NEGATIVE MG/DL
HBA1C MFR BLD: 5 %
HCO3 BLDV-SCNC: 23 MMOL/L (ref 24–30)
HCO3 BLDV-SCNC: 24 MMOL/L (ref 24–30)
HCT VFR BLD AUTO: 31.7 % (ref 40–53)
HCT VFR BLD AUTO: 32.4 % (ref 40–53)
HCT VFR BLD AUTO: 33.4 % (ref 40–53)
HCT VFR BLD AUTO: 34.6 % (ref 40–53)
HCT VFR BLD AUTO: 36.7 % (ref 40–53)
HCT VFR BLD AUTO: 45.7 % (ref 40–53)
HGB BLD-MCNC: 10.8 G/DL (ref 13.3–17.7)
HGB BLD-MCNC: 10.9 G/DL (ref 13.3–17.7)
HGB BLD-MCNC: 11 G/DL (ref 13.3–17.7)
HGB BLD-MCNC: 11.2 G/DL (ref 13.3–17.7)
HGB BLD-MCNC: 11.2 G/DL (ref 13.3–17.7)
HGB BLD-MCNC: 14.5 G/DL (ref 13.3–17.7)
HGB UR QL STRIP: ABNORMAL
HOLD SPECIMEN: NORMAL
IMM GRANULOCYTES # BLD: 0 10E3/UL
IMM GRANULOCYTES # BLD: 0 10E3/UL
IMM GRANULOCYTES NFR BLD: 0 %
IMM GRANULOCYTES NFR BLD: 0 %
INR PPP: 1.16 (ref 0.85–1.15)
INTERPRETATION ECG - MUSE: NORMAL
ION CA PH 7.4: 1.2 MMOL/L (ref 1.11–1.3)
KETONES UR STRIP-MCNC: NEGATIVE MG/DL
LACTATE SERPL-SCNC: 3.4 MMOL/L (ref 0.7–2)
LACTATE SERPL-SCNC: 3.6 MMOL/L (ref 0.7–2)
LACTATE SERPL-SCNC: 8.1 MMOL/L (ref 0.7–2)
LEUKOCYTE ESTERASE UR QL STRIP: ABNORMAL
LIPASE SERPL-CCNC: 49 U/L (ref 13–60)
LISTERIA SPECIES (DETECTED/NOT DETECTED): NOT DETECTED
LYMPHOCYTES # BLD AUTO: 0.9 10E3/UL (ref 0.8–5.3)
LYMPHOCYTES # BLD AUTO: 1.8 10E3/UL (ref 0.8–5.3)
LYMPHOCYTES NFR BLD AUTO: 25 %
LYMPHOCYTES NFR BLD AUTO: 6 %
MAGNESIUM SERPL-MCNC: 1.4 MG/DL (ref 1.7–2.3)
MAGNESIUM SERPL-MCNC: 1.8 MG/DL (ref 1.7–2.3)
MAGNESIUM SERPL-MCNC: 1.8 MG/DL (ref 1.7–2.3)
MAGNESIUM SERPL-MCNC: 1.9 MG/DL (ref 1.7–2.3)
MAGNESIUM SERPL-MCNC: 1.9 MG/DL (ref 1.7–2.3)
MAGNESIUM SERPL-MCNC: 2 MG/DL (ref 1.7–2.3)
MAGNESIUM SERPL-MCNC: 2 MG/DL (ref 1.7–2.3)
MAGNESIUM SERPL-MCNC: 2.1 MG/DL (ref 1.7–2.3)
MAGNESIUM SERPL-MCNC: 2.4 MG/DL (ref 1.7–2.3)
MCH RBC QN AUTO: 32.8 PG (ref 26.5–33)
MCH RBC QN AUTO: 32.9 PG (ref 26.5–33)
MCH RBC QN AUTO: 33.8 PG (ref 26.5–33)
MCH RBC QN AUTO: 33.9 PG (ref 26.5–33)
MCH RBC QN AUTO: 33.9 PG (ref 26.5–33)
MCH RBC QN AUTO: 34.1 PG (ref 26.5–33)
MCHC RBC AUTO-ENTMCNC: 30.5 G/DL (ref 31.5–36.5)
MCHC RBC AUTO-ENTMCNC: 31.7 G/DL (ref 31.5–36.5)
MCHC RBC AUTO-ENTMCNC: 32.4 G/DL (ref 31.5–36.5)
MCHC RBC AUTO-ENTMCNC: 32.9 G/DL (ref 31.5–36.5)
MCHC RBC AUTO-ENTMCNC: 33.6 G/DL (ref 31.5–36.5)
MCHC RBC AUTO-ENTMCNC: 34.1 G/DL (ref 31.5–36.5)
MCV RBC AUTO: 100 FL (ref 78–100)
MCV RBC AUTO: 101 FL (ref 78–100)
MCV RBC AUTO: 105 FL (ref 78–100)
MCV RBC AUTO: 108 FL (ref 78–100)
MCV RBC AUTO: 108 FL (ref 78–100)
MCV RBC AUTO: 99 FL (ref 78–100)
MONOCYTES # BLD AUTO: 0.2 10E3/UL (ref 0–1.3)
MONOCYTES # BLD AUTO: 0.8 10E3/UL (ref 0–1.3)
MONOCYTES NFR BLD AUTO: 1 %
MONOCYTES NFR BLD AUTO: 11 %
MUCOUS THREADS #/AREA URNS LPF: PRESENT /LPF
NEUTROPHILS # BLD AUTO: 15.8 10E3/UL (ref 1.6–8.3)
NEUTROPHILS # BLD AUTO: 4.1 10E3/UL (ref 1.6–8.3)
NEUTROPHILS NFR BLD AUTO: 60 %
NEUTROPHILS NFR BLD AUTO: 92 %
NITRATE UR QL: NEGATIVE
NITRATE UR QL: POSITIVE
NITRATE UR QL: POSITIVE
NRBC # BLD AUTO: 0 10E3/UL
NRBC # BLD AUTO: 0 10E3/UL
NRBC BLD AUTO-RTO: 0 /100
NRBC BLD AUTO-RTO: 0 /100
NT-PROBNP SERPL-MCNC: 178 PG/ML (ref 0–1800)
OXYHGB MFR BLDV: 30.7 % (ref 70–75)
OXYHGB MFR BLDV: 70.9 % (ref 70–75)
P AXIS - MUSE: NORMAL DEGREES
PCO2 BLDV: 48 MM HG (ref 35–50)
PCO2 BLDV: 55 MM HG (ref 35–50)
PF4 HEPARIN CMPLX AB SER QL: NEGATIVE
PH BLDV: 7.25 [PH] (ref 7.35–7.45)
PH BLDV: 7.3 [PH] (ref 7.35–7.45)
PH UR STRIP: 6 [PH] (ref 5–7)
PH UR STRIP: 6.5 [PH] (ref 5–7)
PH UR STRIP: 6.5 [PH] (ref 5–7)
PH: 7.44 (ref 7.35–7.45)
PHOSPHATE SERPL-MCNC: 0.6 MG/DL (ref 2.5–4.5)
PHOSPHATE SERPL-MCNC: 1.5 MG/DL (ref 2.5–4.5)
PHOSPHATE SERPL-MCNC: 1.8 MG/DL (ref 2.5–4.5)
PHOSPHATE SERPL-MCNC: 2.1 MG/DL (ref 2.5–4.5)
PHOSPHATE SERPL-MCNC: 2.2 MG/DL (ref 2.5–4.5)
PHOSPHATE SERPL-MCNC: 2.3 MG/DL (ref 2.5–4.5)
PHOSPHATE SERPL-MCNC: 2.4 MG/DL (ref 2.5–4.5)
PHOSPHATE SERPL-MCNC: 2.8 MG/DL (ref 2.5–4.5)
PHOSPHATE SERPL-MCNC: 3.2 MG/DL (ref 2.5–4.5)
PLATELET # BLD AUTO: 100 10E3/UL (ref 150–450)
PLATELET # BLD AUTO: 146 10E3/UL (ref 150–450)
PLATELET # BLD AUTO: 181 10E3/UL (ref 150–450)
PLATELET # BLD AUTO: 205 10E3/UL (ref 150–450)
PLATELET # BLD AUTO: 251 10E3/UL (ref 150–450)
PLATELET # BLD AUTO: 85 10E3/UL (ref 150–450)
PLATELET # BLD AUTO: 86 10E3/UL (ref 150–450)
PLATELET # BLD AUTO: 87 10E3/UL (ref 150–450)
PO2 BLDV: 23 MM HG (ref 25–47)
PO2 BLDV: 41 MM HG (ref 25–47)
POTASSIUM SERPL-SCNC: 2.5 MMOL/L (ref 3.4–5.3)
POTASSIUM SERPL-SCNC: 2.7 MMOL/L (ref 3.4–5.3)
POTASSIUM SERPL-SCNC: 2.7 MMOL/L (ref 3.4–5.3)
POTASSIUM SERPL-SCNC: 2.8 MMOL/L (ref 3.4–5.3)
POTASSIUM SERPL-SCNC: 3.1 MMOL/L (ref 3.4–5.3)
POTASSIUM SERPL-SCNC: 3.3 MMOL/L (ref 3.4–5.3)
POTASSIUM SERPL-SCNC: 3.4 MMOL/L (ref 3.4–5.3)
POTASSIUM SERPL-SCNC: 3.4 MMOL/L (ref 3.4–5.3)
POTASSIUM SERPL-SCNC: 3.5 MMOL/L (ref 3.4–5.3)
POTASSIUM SERPL-SCNC: 3.5 MMOL/L (ref 3.4–5.3)
POTASSIUM SERPL-SCNC: 3.6 MMOL/L (ref 3.4–5.3)
POTASSIUM SERPL-SCNC: 3.7 MMOL/L (ref 3.4–5.3)
POTASSIUM SERPL-SCNC: 3.7 MMOL/L (ref 3.4–5.3)
POTASSIUM SERPL-SCNC: 3.8 MMOL/L (ref 3.4–5.3)
POTASSIUM SERPL-SCNC: 4.4 MMOL/L (ref 3.4–5.3)
POTASSIUM SERPL-SCNC: 4.5 MMOL/L (ref 3.4–5.3)
PR INTERVAL - MUSE: NORMAL MS
PROCALCITONIN SERPL IA-MCNC: 0.44 NG/ML
PROT SERPL-MCNC: 7.1 G/DL (ref 6.4–8.3)
QRS DURATION - MUSE: 86 MS
QT - MUSE: 350 MS
QTC - MUSE: 553 MS
R AXIS - MUSE: 267 DEGREES
RBC # BLD AUTO: 3.2 10E6/UL (ref 4.4–5.9)
RBC # BLD AUTO: 3.22 10E6/UL (ref 4.4–5.9)
RBC # BLD AUTO: 3.3 10E6/UL (ref 4.4–5.9)
RBC # BLD AUTO: 3.34 10E6/UL (ref 4.4–5.9)
RBC # BLD AUTO: 3.41 10E6/UL (ref 4.4–5.9)
RBC # BLD AUTO: 4.25 10E6/UL (ref 4.4–5.9)
RBC URINE: 13 /HPF
RBC URINE: 16 /HPF
RBC URINE: 5 /HPF
RSV RNA SPEC NAA+PROBE: NEGATIVE
SAO2 % BLDV: 31.3 % (ref 70–75)
SAO2 % BLDV: 71.7 % (ref 70–75)
SARS-COV-2 RNA RESP QL NAA+PROBE: NEGATIVE
SODIUM SERPL-SCNC: 136 MMOL/L (ref 136–145)
SODIUM SERPL-SCNC: 136 MMOL/L (ref 136–145)
SODIUM SERPL-SCNC: 137 MMOL/L (ref 136–145)
SODIUM SERPL-SCNC: 137 MMOL/L (ref 136–145)
SODIUM SERPL-SCNC: 138 MMOL/L (ref 136–145)
SODIUM SERPL-SCNC: 138 MMOL/L (ref 136–145)
SODIUM SERPL-SCNC: 139 MMOL/L (ref 136–145)
SODIUM SERPL-SCNC: 140 MMOL/L (ref 136–145)
SODIUM SERPL-SCNC: 140 MMOL/L (ref 136–145)
SODIUM SERPL-SCNC: 144 MMOL/L (ref 136–145)
SODIUM SERPL-SCNC: 144 MMOL/L (ref 136–145)
SODIUM SERPL-SCNC: 148 MMOL/L (ref 136–145)
SP GR UR STRIP: 1.01 (ref 1–1.03)
SP GR UR STRIP: 1.02 (ref 1–1.03)
SP GR UR STRIP: 1.02 (ref 1–1.03)
SQUAMOUS EPITHELIAL: 1 /HPF
STAPHYLOCOCCUS AUREUS: NOT DETECTED
STAPHYLOCOCCUS EPIDERMIDIS: NOT DETECTED
STAPHYLOCOCCUS LUGDUNENSIS: NOT DETECTED
STAPHYLOCOCCUS SPECIES: NOT DETECTED
STREPTOCOCCUS AGALACTIAE: NOT DETECTED
STREPTOCOCCUS ANGINOSUS GROUP: NOT DETECTED
STREPTOCOCCUS PNEUMONIAE: NOT DETECTED
STREPTOCOCCUS PYOGENES: NOT DETECTED
STREPTOCOCCUS SPECIES: DETECTED
SYSTOLIC BLOOD PRESSURE - MUSE: NORMAL MMHG
T AXIS - MUSE: 75 DEGREES
TSH SERPL DL<=0.005 MIU/L-ACNC: 0.46 UIU/ML (ref 0.3–4.2)
TSH SERPL DL<=0.005 MIU/L-ACNC: 1 UIU/ML (ref 0.3–4.2)
UROBILINOGEN UR STRIP-MCNC: <2 MG/DL
UROBILINOGEN UR STRIP-MCNC: NORMAL MG/DL
UROBILINOGEN UR STRIP-MCNC: NORMAL MG/DL
VANCOMYCIN SERPL-MCNC: 5.3 UG/ML
VENTRICULAR RATE- MUSE: 150 BPM
VIT B12 SERPL-MCNC: 515 PG/ML (ref 232–1245)
WBC # BLD AUTO: 11.9 10E3/UL (ref 4–11)
WBC # BLD AUTO: 14.2 10E3/UL (ref 4–11)
WBC # BLD AUTO: 17.1 10E3/UL (ref 4–11)
WBC # BLD AUTO: 18 10E3/UL (ref 4–11)
WBC # BLD AUTO: 23.4 10E3/UL (ref 4–11)
WBC # BLD AUTO: 6.9 10E3/UL (ref 4–11)
WBC CLUMPS #/AREA URNS HPF: PRESENT /HPF
WBC URINE: >182 /HPF

## 2023-01-01 PROCEDURE — 96366 THER/PROPH/DIAG IV INF ADDON: CPT

## 2023-01-01 PROCEDURE — 99231 SBSQ HOSP IP/OBS SF/LOW 25: CPT | Mod: GV | Performed by: STUDENT IN AN ORGANIZED HEALTH CARE EDUCATION/TRAINING PROGRAM

## 2023-01-01 PROCEDURE — 5A09357 ASSISTANCE WITH RESPIRATORY VENTILATION, LESS THAN 24 CONSECUTIVE HOURS, CONTINUOUS POSITIVE AIRWAY PRESSURE: ICD-10-PCS | Performed by: INTERNAL MEDICINE

## 2023-01-01 PROCEDURE — 110N000005 HC R&B HOSPICE, ACCENT

## 2023-01-01 PROCEDURE — 258N000003 HC RX IP 258 OP 636: Performed by: INTERNAL MEDICINE

## 2023-01-01 PROCEDURE — 250N000013 HC RX MED GY IP 250 OP 250 PS 637: Performed by: HOSPITALIST

## 2023-01-01 PROCEDURE — 999N000185 HC STATISTIC TRANSPORT TIME EA 15 MIN

## 2023-01-01 PROCEDURE — 272N000452 HC KIT SHRLOCK 5FR POWER PICC TRIPLE LUMEN

## 2023-01-01 PROCEDURE — 84100 ASSAY OF PHOSPHORUS: CPT | Performed by: INTERNAL MEDICINE

## 2023-01-01 PROCEDURE — 85025 COMPLETE CBC W/AUTO DIFF WBC: CPT | Performed by: EMERGENCY MEDICINE

## 2023-01-01 PROCEDURE — 84132 ASSAY OF SERUM POTASSIUM: CPT | Performed by: INTERNAL MEDICINE

## 2023-01-01 PROCEDURE — 250N000009 HC RX 250: Performed by: INTERNAL MEDICINE

## 2023-01-01 PROCEDURE — 999N000215 HC STATISTIC HFNC ADULT NON-CPAP

## 2023-01-01 PROCEDURE — 85049 AUTOMATED PLATELET COUNT: CPT | Performed by: INTERNAL MEDICINE

## 2023-01-01 PROCEDURE — 250N000013 HC RX MED GY IP 250 OP 250 PS 637: Performed by: FAMILY MEDICINE

## 2023-01-01 PROCEDURE — 99291 CRITICAL CARE FIRST HOUR: CPT | Mod: 25,CS

## 2023-01-01 PROCEDURE — 250N000013 HC RX MED GY IP 250 OP 250 PS 637: Performed by: INTERNAL MEDICINE

## 2023-01-01 PROCEDURE — 83735 ASSAY OF MAGNESIUM: CPT | Performed by: INTERNAL MEDICINE

## 2023-01-01 PROCEDURE — 999N000287 HC ICU ADULT ROUNDING, EACH 10 MINS

## 2023-01-01 PROCEDURE — 99291 CRITICAL CARE FIRST HOUR: CPT | Performed by: INTERNAL MEDICINE

## 2023-01-01 PROCEDURE — 250N000011 HC RX IP 250 OP 636: Performed by: INTERNAL MEDICINE

## 2023-01-01 PROCEDURE — 83735 ASSAY OF MAGNESIUM: CPT | Performed by: EMERGENCY MEDICINE

## 2023-01-01 PROCEDURE — 999N000157 HC STATISTIC RCP TIME EA 10 MIN

## 2023-01-01 PROCEDURE — C9113 INJ PANTOPRAZOLE SODIUM, VIA: HCPCS | Performed by: INTERNAL MEDICINE

## 2023-01-01 PROCEDURE — 250N000011 HC RX IP 250 OP 636: Performed by: HOSPITALIST

## 2023-01-01 PROCEDURE — P9604 ONE-WAY ALLOW PRORATED TRIP: HCPCS | Mod: ORL | Performed by: NURSE PRACTITIONER

## 2023-01-01 PROCEDURE — 99232 SBSQ HOSP IP/OBS MODERATE 35: CPT | Performed by: INTERNAL MEDICINE

## 2023-01-01 PROCEDURE — 93308 TTE F-UP OR LMTD: CPT

## 2023-01-01 PROCEDURE — 200N000001 HC R&B ICU

## 2023-01-01 PROCEDURE — 82607 VITAMIN B-12: CPT | Mod: ORL | Performed by: NURSE PRACTITIONER

## 2023-01-01 PROCEDURE — 83735 ASSAY OF MAGNESIUM: CPT | Performed by: HOSPITALIST

## 2023-01-01 PROCEDURE — 94660 CPAP INITIATION&MGMT: CPT

## 2023-01-01 PROCEDURE — 82746 ASSAY OF FOLIC ACID SERUM: CPT | Mod: ORL | Performed by: NURSE PRACTITIONER

## 2023-01-01 PROCEDURE — 120N000001 HC R&B MED SURG/OB

## 2023-01-01 PROCEDURE — 36415 COLL VENOUS BLD VENIPUNCTURE: CPT | Mod: ORL | Performed by: NURSE PRACTITIONER

## 2023-01-01 PROCEDURE — 250N000013 HC RX MED GY IP 250 OP 250 PS 637: Performed by: EMERGENCY MEDICINE

## 2023-01-01 PROCEDURE — 83880 ASSAY OF NATRIURETIC PEPTIDE: CPT | Performed by: INTERNAL MEDICINE

## 2023-01-01 PROCEDURE — 85610 PROTHROMBIN TIME: CPT | Performed by: EMERGENCY MEDICINE

## 2023-01-01 PROCEDURE — 86140 C-REACTIVE PROTEIN: CPT | Performed by: EMERGENCY MEDICINE

## 2023-01-01 PROCEDURE — 80048 BASIC METABOLIC PNL TOTAL CA: CPT | Performed by: INTERNAL MEDICINE

## 2023-01-01 PROCEDURE — 99233 SBSQ HOSP IP/OBS HIGH 50: CPT | Performed by: FAMILY MEDICINE

## 2023-01-01 PROCEDURE — 81001 URINALYSIS AUTO W/SCOPE: CPT | Performed by: EMERGENCY MEDICINE

## 2023-01-01 PROCEDURE — 99292 CRITICAL CARE ADDL 30 MIN: CPT | Mod: CS

## 2023-01-01 PROCEDURE — 84100 ASSAY OF PHOSPHORUS: CPT | Performed by: HOSPITALIST

## 2023-01-01 PROCEDURE — 258N000003 HC RX IP 258 OP 636: Performed by: EMERGENCY MEDICINE

## 2023-01-01 PROCEDURE — 85027 COMPLETE CBC AUTOMATED: CPT | Performed by: INTERNAL MEDICINE

## 2023-01-01 PROCEDURE — 87040 BLOOD CULTURE FOR BACTERIA: CPT | Performed by: INTERNAL MEDICINE

## 2023-01-01 PROCEDURE — 92610 EVALUATE SWALLOWING FUNCTION: CPT | Mod: GN

## 2023-01-01 PROCEDURE — 83690 ASSAY OF LIPASE: CPT | Performed by: EMERGENCY MEDICINE

## 2023-01-01 PROCEDURE — 83036 HEMOGLOBIN GLYCOSYLATED A1C: CPT | Performed by: INTERNAL MEDICINE

## 2023-01-01 PROCEDURE — 84443 ASSAY THYROID STIM HORMONE: CPT | Performed by: EMERGENCY MEDICINE

## 2023-01-01 PROCEDURE — 96365 THER/PROPH/DIAG IV INF INIT: CPT

## 2023-01-01 PROCEDURE — 76604 US EXAM CHEST: CPT

## 2023-01-01 PROCEDURE — 96367 TX/PROPH/DG ADDL SEQ IV INF: CPT

## 2023-01-01 PROCEDURE — 81001 URINALYSIS AUTO W/SCOPE: CPT | Mod: ORL | Performed by: INTERNAL MEDICINE

## 2023-01-01 PROCEDURE — 36415 COLL VENOUS BLD VENIPUNCTURE: CPT | Performed by: INTERNAL MEDICINE

## 2023-01-01 PROCEDURE — 250N000011 HC RX IP 250 OP 636: Performed by: EMERGENCY MEDICINE

## 2023-01-01 PROCEDURE — 99207 PR CDG-CUT & PASTE-POTENTIAL IMPACT ON LEVEL: CPT | Performed by: HOSPITALIST

## 2023-01-01 PROCEDURE — 36415 COLL VENOUS BLD VENIPUNCTURE: CPT | Performed by: EMERGENCY MEDICINE

## 2023-01-01 PROCEDURE — 92526 ORAL FUNCTION THERAPY: CPT | Mod: GN

## 2023-01-01 PROCEDURE — 99239 HOSP IP/OBS DSCHRG MGMT >30: CPT | Performed by: HOSPITALIST

## 2023-01-01 PROCEDURE — 71045 X-RAY EXAM CHEST 1 VIEW: CPT

## 2023-01-01 PROCEDURE — 87086 URINE CULTURE/COLONY COUNT: CPT | Performed by: EMERGENCY MEDICINE

## 2023-01-01 PROCEDURE — 83605 ASSAY OF LACTIC ACID: CPT | Performed by: EMERGENCY MEDICINE

## 2023-01-01 PROCEDURE — 87086 URINE CULTURE/COLONY COUNT: CPT | Mod: ORL | Performed by: INTERNAL MEDICINE

## 2023-01-01 PROCEDURE — 84443 ASSAY THYROID STIM HORMONE: CPT | Mod: ORL | Performed by: NURSE PRACTITIONER

## 2023-01-01 PROCEDURE — 87149 DNA/RNA DIRECT PROBE: CPT | Performed by: EMERGENCY MEDICINE

## 2023-01-01 PROCEDURE — 84145 PROCALCITONIN (PCT): CPT | Performed by: EMERGENCY MEDICINE

## 2023-01-01 PROCEDURE — 250N000009 HC RX 250: Performed by: HOSPITALIST

## 2023-01-01 PROCEDURE — 250N000009 HC RX 250: Performed by: EMERGENCY MEDICINE

## 2023-01-01 PROCEDURE — 93005 ELECTROCARDIOGRAM TRACING: CPT | Performed by: EMERGENCY MEDICINE

## 2023-01-01 PROCEDURE — 80202 ASSAY OF VANCOMYCIN: CPT | Performed by: INTERNAL MEDICINE

## 2023-01-01 PROCEDURE — 99223 1ST HOSP IP/OBS HIGH 75: CPT | Performed by: FAMILY MEDICINE

## 2023-01-01 PROCEDURE — 82248 BILIRUBIN DIRECT: CPT | Performed by: EMERGENCY MEDICINE

## 2023-01-01 PROCEDURE — 99233 SBSQ HOSP IP/OBS HIGH 50: CPT | Performed by: HOSPITALIST

## 2023-01-01 PROCEDURE — 99418 PROLNG IP/OBS E/M EA 15 MIN: CPT | Performed by: FAMILY MEDICINE

## 2023-01-01 PROCEDURE — 3E033XZ INTRODUCTION OF VASOPRESSOR INTO PERIPHERAL VEIN, PERCUTANEOUS APPROACH: ICD-10-PCS | Performed by: EMERGENCY MEDICINE

## 2023-01-01 PROCEDURE — 80053 COMPREHEN METABOLIC PANEL: CPT | Performed by: EMERGENCY MEDICINE

## 2023-01-01 PROCEDURE — 80048 BASIC METABOLIC PNL TOTAL CA: CPT | Mod: ORL | Performed by: NURSE PRACTITIONER

## 2023-01-01 PROCEDURE — 99291 CRITICAL CARE FIRST HOUR: CPT | Mod: CS | Performed by: INTERNAL MEDICINE

## 2023-01-01 PROCEDURE — 258N000003 HC RX IP 258 OP 636: Performed by: HOSPITALIST

## 2023-01-01 PROCEDURE — 82962 GLUCOSE BLOOD TEST: CPT

## 2023-01-01 PROCEDURE — 999N000065 XR CHEST PORT 1 VIEW

## 2023-01-01 PROCEDURE — C9803 HOPD COVID-19 SPEC COLLECT: HCPCS

## 2023-01-01 PROCEDURE — 85025 COMPLETE CBC W/AUTO DIFF WBC: CPT | Mod: ORL | Performed by: NURSE PRACTITIONER

## 2023-01-01 PROCEDURE — 99222 1ST HOSP IP/OBS MODERATE 55: CPT | Mod: AI | Performed by: HOSPITALIST

## 2023-01-01 PROCEDURE — P9603 ONE-WAY ALLOW PRORATED MILES: HCPCS | Mod: ORL | Performed by: NURSE PRACTITIONER

## 2023-01-01 PROCEDURE — 250N000012 HC RX MED GY IP 250 OP 636 PS 637: Performed by: INTERNAL MEDICINE

## 2023-01-01 PROCEDURE — 99232 SBSQ HOSP IP/OBS MODERATE 35: CPT | Mod: GV | Performed by: INTERNAL MEDICINE

## 2023-01-01 PROCEDURE — 96368 THER/DIAG CONCURRENT INF: CPT

## 2023-01-01 PROCEDURE — 87077 CULTURE AEROBIC IDENTIFY: CPT | Performed by: EMERGENCY MEDICINE

## 2023-01-01 PROCEDURE — 99239 HOSP IP/OBS DSCHRG MGMT >30: CPT | Mod: GV | Performed by: INTERNAL MEDICINE

## 2023-01-01 PROCEDURE — 82805 BLOOD GASES W/O2 SATURATION: CPT | Performed by: EMERGENCY MEDICINE

## 2023-01-01 PROCEDURE — 36569 INSJ PICC 5 YR+ W/O IMAGING: CPT

## 2023-01-01 PROCEDURE — 99231 SBSQ HOSP IP/OBS SF/LOW 25: CPT | Performed by: FAMILY MEDICINE

## 2023-01-01 PROCEDURE — 86022 PLATELET ANTIBODIES: CPT | Performed by: INTERNAL MEDICINE

## 2023-01-01 PROCEDURE — 82330 ASSAY OF CALCIUM: CPT | Performed by: INTERNAL MEDICINE

## 2023-01-01 PROCEDURE — 76705 ECHO EXAM OF ABDOMEN: CPT

## 2023-01-01 PROCEDURE — 250N000009 HC RX 250: Performed by: FAMILY MEDICINE

## 2023-01-01 PROCEDURE — 84295 ASSAY OF SERUM SODIUM: CPT | Performed by: INTERNAL MEDICINE

## 2023-01-01 PROCEDURE — 71250 CT THORAX DX C-: CPT

## 2023-01-01 PROCEDURE — 87637 SARSCOV2&INF A&B&RSV AMP PRB: CPT | Performed by: EMERGENCY MEDICINE

## 2023-01-01 RX ORDER — ZINC OXIDE 20 %
OINTMENT (GRAM) TOPICAL
Status: CANCELLED | OUTPATIENT
Start: 2023-01-01

## 2023-01-01 RX ORDER — PIPERACILLIN SODIUM, TAZOBACTAM SODIUM 3; .375 G/15ML; G/15ML
3.38 INJECTION, POWDER, LYOPHILIZED, FOR SOLUTION INTRAVENOUS ONCE
Status: COMPLETED | OUTPATIENT
Start: 2023-01-01 | End: 2023-01-01

## 2023-01-01 RX ORDER — POTASSIUM CHLORIDE 29.8 MG/ML
20 INJECTION INTRAVENOUS
Status: COMPLETED | OUTPATIENT
Start: 2023-01-01 | End: 2023-01-01

## 2023-01-01 RX ORDER — ACETAMINOPHEN 325 MG/1
650 TABLET ORAL EVERY 4 HOURS PRN
Status: DISCONTINUED | OUTPATIENT
Start: 2023-01-01 | End: 2023-01-01 | Stop reason: HOSPADM

## 2023-01-01 RX ORDER — ACETAMINOPHEN 325 MG/1
650 TABLET ORAL 3 TIMES DAILY
Status: DISCONTINUED | OUTPATIENT
Start: 2023-01-01 | End: 2023-01-01 | Stop reason: HOSPADM

## 2023-01-01 RX ORDER — LORAZEPAM 2 MG/ML
1 CONCENTRATE ORAL
Status: DISCONTINUED | OUTPATIENT
Start: 2023-01-01 | End: 2023-01-01

## 2023-01-01 RX ORDER — OXYCODONE HCL 20 MG/ML
5 CONCENTRATE, ORAL ORAL EVERY 6 HOURS
Status: DISCONTINUED | OUTPATIENT
Start: 2023-01-01 | End: 2023-01-01 | Stop reason: HOSPADM

## 2023-01-01 RX ORDER — CEFAZOLIN SODIUM 1 G/50ML
1250 SOLUTION INTRAVENOUS EVERY 24 HOURS
Status: DISCONTINUED | OUTPATIENT
Start: 2023-01-01 | End: 2023-01-01

## 2023-01-01 RX ORDER — NALOXONE HYDROCHLORIDE 0.4 MG/ML
0.2 INJECTION, SOLUTION INTRAMUSCULAR; INTRAVENOUS; SUBCUTANEOUS
Status: DISCONTINUED | OUTPATIENT
Start: 2023-01-01 | End: 2023-01-01 | Stop reason: HOSPADM

## 2023-01-01 RX ORDER — FUROSEMIDE 10 MG/ML
40 INJECTION INTRAMUSCULAR; INTRAVENOUS EVERY 12 HOURS
Status: DISCONTINUED | OUTPATIENT
Start: 2023-01-01 | End: 2023-01-01

## 2023-01-01 RX ORDER — MORPHINE SULFATE 2 MG/ML
1 INJECTION, SOLUTION INTRAMUSCULAR; INTRAVENOUS EVERY 4 HOURS PRN
Status: DISCONTINUED | OUTPATIENT
Start: 2023-01-01 | End: 2023-01-01 | Stop reason: HOSPADM

## 2023-01-01 RX ORDER — PREGABALIN 75 MG/1
75 CAPSULE ORAL 2 TIMES DAILY
Status: DISCONTINUED | OUTPATIENT
Start: 2023-01-01 | End: 2023-01-01 | Stop reason: HOSPADM

## 2023-01-01 RX ORDER — ZINC OXIDE 20 %
OINTMENT (GRAM) TOPICAL
Status: DISCONTINUED | OUTPATIENT
Start: 2023-01-01 | End: 2023-01-01 | Stop reason: HOSPADM

## 2023-01-01 RX ORDER — CEFAZOLIN SODIUM 1 G/50ML
1250 SOLUTION INTRAVENOUS ONCE
Status: COMPLETED | OUTPATIENT
Start: 2023-01-01 | End: 2023-01-01

## 2023-01-01 RX ORDER — PIPERACILLIN SODIUM, TAZOBACTAM SODIUM 3; .375 G/15ML; G/15ML
3.38 INJECTION, POWDER, LYOPHILIZED, FOR SOLUTION INTRAVENOUS EVERY 8 HOURS
Status: DISCONTINUED | OUTPATIENT
Start: 2023-01-01 | End: 2023-01-01

## 2023-01-01 RX ORDER — NICOTINE POLACRILEX 4 MG
15-30 LOZENGE BUCCAL
Status: CANCELLED | OUTPATIENT
Start: 2023-01-01

## 2023-01-01 RX ORDER — PREGABALIN 75 MG/1
75 CAPSULE ORAL 2 TIMES DAILY
COMMUNITY

## 2023-01-01 RX ORDER — MAGNESIUM OXIDE 400 MG/1
400 TABLET ORAL EVERY 4 HOURS
Status: COMPLETED | OUTPATIENT
Start: 2023-01-01 | End: 2023-01-01

## 2023-01-01 RX ORDER — MAGNESIUM SULFATE HEPTAHYDRATE 40 MG/ML
2 INJECTION, SOLUTION INTRAVENOUS ONCE
Status: COMPLETED | OUTPATIENT
Start: 2023-01-01 | End: 2023-01-01

## 2023-01-01 RX ORDER — LIDOCAINE 4 G/G
2 PATCH TOPICAL
Status: DISCONTINUED | OUTPATIENT
Start: 2023-01-01 | End: 2023-01-01 | Stop reason: HOSPADM

## 2023-01-01 RX ORDER — POTASSIUM CHLORIDE 7.45 MG/ML
10 INJECTION INTRAVENOUS
Status: COMPLETED | OUTPATIENT
Start: 2023-01-01 | End: 2023-01-01

## 2023-01-01 RX ORDER — HEPARIN SODIUM 5000 [USP'U]/.5ML
5000 INJECTION, SOLUTION INTRAVENOUS; SUBCUTANEOUS EVERY 8 HOURS
Status: DISCONTINUED | OUTPATIENT
Start: 2023-01-01 | End: 2023-01-01

## 2023-01-01 RX ORDER — DEXTROSE MONOHYDRATE 25 G/50ML
25-50 INJECTION, SOLUTION INTRAVENOUS
Status: DISCONTINUED | OUTPATIENT
Start: 2023-01-01 | End: 2023-01-01

## 2023-01-01 RX ORDER — NICOTINE POLACRILEX 4 MG
15-30 LOZENGE BUCCAL
Status: DISCONTINUED | OUTPATIENT
Start: 2023-01-01 | End: 2023-01-01 | Stop reason: HOSPADM

## 2023-01-01 RX ORDER — FUROSEMIDE 10 MG/ML
20 INJECTION INTRAMUSCULAR; INTRAVENOUS EVERY 12 HOURS
Status: DISCONTINUED | OUTPATIENT
Start: 2023-01-01 | End: 2023-01-01

## 2023-01-01 RX ORDER — DEXTROSE MONOHYDRATE 25 G/50ML
25-50 INJECTION, SOLUTION INTRAVENOUS
Status: DISCONTINUED | OUTPATIENT
Start: 2023-01-01 | End: 2023-01-01 | Stop reason: HOSPADM

## 2023-01-01 RX ORDER — LIDOCAINE 50 MG/G
OINTMENT TOPICAL EVERY 4 HOURS PRN
Status: DISCONTINUED | OUTPATIENT
Start: 2023-01-01 | End: 2023-01-01 | Stop reason: HOSPADM

## 2023-01-01 RX ORDER — NALOXONE HYDROCHLORIDE 0.4 MG/ML
0.4 INJECTION, SOLUTION INTRAMUSCULAR; INTRAVENOUS; SUBCUTANEOUS
Status: DISCONTINUED | OUTPATIENT
Start: 2023-01-01 | End: 2023-01-01 | Stop reason: HOSPADM

## 2023-01-01 RX ORDER — NOREPINEPHRINE BITARTRATE 0.02 MG/ML
.01-.6 INJECTION, SOLUTION INTRAVENOUS CONTINUOUS
Status: DISCONTINUED | OUTPATIENT
Start: 2023-01-01 | End: 2023-01-01

## 2023-01-01 RX ORDER — MORPHINE SULFATE 10 MG/5ML
3 SOLUTION ORAL EVERY 4 HOURS PRN
Status: CANCELLED | OUTPATIENT
Start: 2023-01-01

## 2023-01-01 RX ORDER — POTASSIUM CHLORIDE 1.5 G/1.58G
40 POWDER, FOR SOLUTION ORAL ONCE
Status: COMPLETED | OUTPATIENT
Start: 2023-01-01 | End: 2023-01-01

## 2023-01-01 RX ORDER — DEXTROSE MONOHYDRATE 25 G/50ML
25-50 INJECTION, SOLUTION INTRAVENOUS
Status: CANCELLED | OUTPATIENT
Start: 2023-01-01

## 2023-01-01 RX ORDER — NALOXONE HYDROCHLORIDE 0.4 MG/ML
0.2 INJECTION, SOLUTION INTRAMUSCULAR; INTRAVENOUS; SUBCUTANEOUS
Status: CANCELLED | OUTPATIENT
Start: 2023-01-01

## 2023-01-01 RX ORDER — NALOXONE HYDROCHLORIDE 0.4 MG/ML
0.1 INJECTION, SOLUTION INTRAMUSCULAR; INTRAVENOUS; SUBCUTANEOUS
Status: CANCELLED | OUTPATIENT
Start: 2023-01-01

## 2023-01-01 RX ORDER — ACETAMINOPHEN 500 MG
1000 TABLET ORAL 3 TIMES DAILY
COMMUNITY

## 2023-01-01 RX ORDER — NALOXONE HYDROCHLORIDE 0.4 MG/ML
0.1 INJECTION, SOLUTION INTRAMUSCULAR; INTRAVENOUS; SUBCUTANEOUS
Status: DISCONTINUED | OUTPATIENT
Start: 2023-01-01 | End: 2023-01-01 | Stop reason: HOSPADM

## 2023-01-01 RX ORDER — ACETAMINOPHEN 325 MG/1
650 TABLET ORAL 3 TIMES DAILY
Status: CANCELLED | OUTPATIENT
Start: 2023-01-01

## 2023-01-01 RX ORDER — ACETAMINOPHEN 325 MG/1
975 TABLET ORAL ONCE
Status: COMPLETED | OUTPATIENT
Start: 2023-01-01 | End: 2023-01-01

## 2023-01-01 RX ORDER — MORPHINE SULFATE 2 MG/ML
1 INJECTION, SOLUTION INTRAMUSCULAR; INTRAVENOUS EVERY 4 HOURS PRN
Status: DISCONTINUED | OUTPATIENT
Start: 2023-01-01 | End: 2023-01-01

## 2023-01-01 RX ORDER — BISACODYL 10 MG
10 SUPPOSITORY, RECTAL RECTAL DAILY PRN
COMMUNITY

## 2023-01-01 RX ORDER — IBUPROFEN 200 MG
200 TABLET ORAL 2 TIMES DAILY PRN
COMMUNITY

## 2023-01-01 RX ORDER — SALIVA STIMULANT COMB. NO.3
1 SPRAY, NON-AEROSOL (ML) MUCOUS MEMBRANE
Status: DISCONTINUED | OUTPATIENT
Start: 2023-01-01 | End: 2023-01-01

## 2023-01-01 RX ORDER — CARBOXYMETHYLCELLULOSE SODIUM 5 MG/ML
1-2 SOLUTION/ DROPS OPHTHALMIC
Status: DISCONTINUED | OUTPATIENT
Start: 2023-01-01 | End: 2023-01-01 | Stop reason: HOSPADM

## 2023-01-01 RX ORDER — PREGABALIN 75 MG/1
75 CAPSULE ORAL 2 TIMES DAILY
Status: CANCELLED | OUTPATIENT
Start: 2023-01-01

## 2023-01-01 RX ORDER — FUROSEMIDE 10 MG/ML
20 INJECTION INTRAMUSCULAR; INTRAVENOUS ONCE
Status: COMPLETED | OUTPATIENT
Start: 2023-01-01 | End: 2023-01-01

## 2023-01-01 RX ORDER — MORPHINE SULFATE 10 MG/5ML
3 SOLUTION ORAL EVERY 4 HOURS PRN
Status: DISCONTINUED | OUTPATIENT
Start: 2023-01-01 | End: 2023-01-01 | Stop reason: HOSPADM

## 2023-01-01 RX ORDER — PIPERACILLIN SODIUM, TAZOBACTAM SODIUM 3; .375 G/15ML; G/15ML
3.38 INJECTION, POWDER, LYOPHILIZED, FOR SOLUTION INTRAVENOUS EVERY 8 HOURS
Status: COMPLETED | OUTPATIENT
Start: 2023-01-01 | End: 2023-01-01

## 2023-01-01 RX ORDER — NICOTINE POLACRILEX 4 MG
15-30 LOZENGE BUCCAL
Status: DISCONTINUED | OUTPATIENT
Start: 2023-01-01 | End: 2023-01-01

## 2023-01-01 RX ORDER — XYLITOL/YERBA SANTA
1 AEROSOL, SPRAY WITH PUMP (ML) MUCOUS MEMBRANE
Status: DISCONTINUED | OUTPATIENT
Start: 2023-01-01 | End: 2023-01-01 | Stop reason: HOSPADM

## 2023-01-01 RX ORDER — NALOXONE HYDROCHLORIDE 0.4 MG/ML
0.4 INJECTION, SOLUTION INTRAMUSCULAR; INTRAVENOUS; SUBCUTANEOUS
Status: CANCELLED | OUTPATIENT
Start: 2023-01-01

## 2023-01-01 RX ORDER — METHYLPREDNISOLONE SODIUM SUCCINATE 125 MG/2ML
60 INJECTION, POWDER, LYOPHILIZED, FOR SOLUTION INTRAMUSCULAR; INTRAVENOUS EVERY 24 HOURS
Status: DISCONTINUED | OUTPATIENT
Start: 2023-01-01 | End: 2023-01-01

## 2023-01-01 RX ORDER — LIDOCAINE 40 MG/G
CREAM TOPICAL
Status: ACTIVE | OUTPATIENT
Start: 2023-01-01 | End: 2023-01-01

## 2023-01-01 RX ORDER — MAGNESIUM SULFATE 4 G/50ML
4 INJECTION INTRAVENOUS ONCE
Status: COMPLETED | OUTPATIENT
Start: 2023-01-01 | End: 2023-01-01

## 2023-01-01 RX ORDER — POTASSIUM CHLORIDE 1.5 G/1.58G
20 POWDER, FOR SOLUTION ORAL ONCE
Status: DISCONTINUED | OUTPATIENT
Start: 2023-01-01 | End: 2023-01-01 | Stop reason: ALTCHOICE

## 2023-01-01 RX ORDER — MORPHINE SULFATE 2 MG/ML
1 INJECTION, SOLUTION INTRAMUSCULAR; INTRAVENOUS EVERY 4 HOURS PRN
Status: CANCELLED | OUTPATIENT
Start: 2023-01-01

## 2023-01-01 RX ORDER — ACETAMINOPHEN 325 MG/1
650 TABLET ORAL EVERY 4 HOURS PRN
Status: CANCELLED | OUTPATIENT
Start: 2023-01-01

## 2023-01-01 RX ORDER — SODIUM CHLORIDE 9 MG/ML
INJECTION, SOLUTION INTRAVENOUS CONTINUOUS
Status: DISCONTINUED | OUTPATIENT
Start: 2023-01-01 | End: 2023-01-01

## 2023-01-01 RX ORDER — ROPIVACAINE IN 0.9% SOD CHL/PF 0.1 %
.03-.125 PLASTIC BAG, INJECTION (ML) EPIDURAL CONTINUOUS
Status: DISCONTINUED | OUTPATIENT
Start: 2023-01-01 | End: 2023-01-01

## 2023-01-01 RX ORDER — POTASSIUM CHLORIDE 1500 MG/1
40 TABLET, EXTENDED RELEASE ORAL ONCE
Status: COMPLETED | OUTPATIENT
Start: 2023-01-01 | End: 2023-01-01

## 2023-01-01 RX ORDER — LORAZEPAM 2 MG/ML
0.5 CONCENTRATE ORAL
Status: DISCONTINUED | OUTPATIENT
Start: 2023-01-01 | End: 2023-01-01 | Stop reason: HOSPADM

## 2023-01-01 RX ORDER — LIDOCAINE 4 G/G
2 PATCH TOPICAL
Status: CANCELLED | OUTPATIENT
Start: 2023-01-01

## 2023-01-01 RX ORDER — LANOLIN ALCOHOL/MO/W.PET/CERES
3 CREAM (GRAM) TOPICAL AT BEDTIME
COMMUNITY

## 2023-01-01 RX ORDER — LORAZEPAM 2 MG/ML
0.5 CONCENTRATE ORAL
Status: DISCONTINUED | OUTPATIENT
Start: 2023-01-01 | End: 2023-01-01

## 2023-01-01 RX ORDER — LIDOCAINE 4 G/G
PATCH TOPICAL EVERY 24 HOURS
COMMUNITY

## 2023-01-01 RX ORDER — LIDOCAINE 50 MG/G
OINTMENT TOPICAL EVERY 4 HOURS PRN
Status: CANCELLED | OUTPATIENT
Start: 2023-01-01

## 2023-01-01 RX ORDER — DEXTROSE MONOHYDRATE 50 MG/ML
INJECTION, SOLUTION INTRAVENOUS CONTINUOUS
Status: DISCONTINUED | OUTPATIENT
Start: 2023-01-01 | End: 2023-01-01

## 2023-01-01 RX ADMIN — AMIODARONE HYDROCHLORIDE 1 MG/MIN: 1.8 INJECTION, SOLUTION INTRAVENOUS at 02:53

## 2023-01-01 RX ADMIN — ACETAMINOPHEN 650 MG: 325 TABLET ORAL at 13:50

## 2023-01-01 RX ADMIN — ACETAMINOPHEN 650 MG: 325 TABLET ORAL at 22:43

## 2023-01-01 RX ADMIN — Medication 0.11 MCG/KG/MIN: at 03:26

## 2023-01-01 RX ADMIN — DICLOFENAC SODIUM 4 G: 10 GEL TOPICAL at 21:06

## 2023-01-01 RX ADMIN — HEPARIN SODIUM 5000 UNITS: 5000 INJECTION, SOLUTION INTRAVENOUS; SUBCUTANEOUS at 01:55

## 2023-01-01 RX ADMIN — METHYLPREDNISOLONE SODIUM SUCCINATE 62.5 MG: 125 INJECTION, POWDER, FOR SOLUTION INTRAMUSCULAR; INTRAVENOUS at 08:04

## 2023-01-01 RX ADMIN — Medication 0.18 MCG/KG/MIN: at 12:56

## 2023-01-01 RX ADMIN — DICLOFENAC SODIUM 4 G: 10 GEL TOPICAL at 22:46

## 2023-01-01 RX ADMIN — ACETAMINOPHEN 650 MG: 325 TABLET ORAL at 20:37

## 2023-01-01 RX ADMIN — HEPARIN SODIUM 5000 UNITS: 5000 INJECTION, SOLUTION INTRAVENOUS; SUBCUTANEOUS at 09:43

## 2023-01-01 RX ADMIN — PIPERACILLIN AND TAZOBACTAM 3.38 G: 3; .375 INJECTION, POWDER, LYOPHILIZED, FOR SOLUTION INTRAVENOUS at 09:14

## 2023-01-01 RX ADMIN — PIPERACILLIN AND TAZOBACTAM 3.38 G: 3; .375 INJECTION, POWDER, LYOPHILIZED, FOR SOLUTION INTRAVENOUS at 17:01

## 2023-01-01 RX ADMIN — SODIUM CHLORIDE 500 ML: 9 INJECTION, SOLUTION INTRAVENOUS at 20:59

## 2023-01-01 RX ADMIN — Medication 0.1 MCG/KG/MIN: at 23:50

## 2023-01-01 RX ADMIN — Medication 0.03 MCG/KG/MIN: at 05:21

## 2023-01-01 RX ADMIN — INSULIN ASPART 1 UNITS: 100 INJECTION, SOLUTION INTRAVENOUS; SUBCUTANEOUS at 12:16

## 2023-01-01 RX ADMIN — SODIUM PHOSPHATE, MONOBASIC, MONOHYDRATE AND SODIUM PHOSPHATE, DIBASIC, ANHYDROUS 9 MMOL: 142; 276 INJECTION, SOLUTION INTRAVENOUS at 09:43

## 2023-01-01 RX ADMIN — DICLOFENAC SODIUM 4 G: 10 GEL TOPICAL at 09:43

## 2023-01-01 RX ADMIN — PIPERACILLIN AND TAZOBACTAM 3.38 G: 3; .375 INJECTION, POWDER, LYOPHILIZED, FOR SOLUTION INTRAVENOUS at 03:04

## 2023-01-01 RX ADMIN — POTASSIUM CHLORIDE 20 MEQ: 29.8 INJECTION, SOLUTION INTRAVENOUS at 05:34

## 2023-01-01 RX ADMIN — ACETAMINOPHEN 650 MG: 325 TABLET ORAL at 17:23

## 2023-01-01 RX ADMIN — FUROSEMIDE 20 MG: 10 INJECTION, SOLUTION INTRAMUSCULAR; INTRAVENOUS at 12:37

## 2023-01-01 RX ADMIN — ACETAMINOPHEN 650 MG: 325 TABLET ORAL at 18:56

## 2023-01-01 RX ADMIN — HEPARIN SODIUM 5000 UNITS: 5000 INJECTION, SOLUTION INTRAVENOUS; SUBCUTANEOUS at 09:18

## 2023-01-01 RX ADMIN — PIPERACILLIN AND TAZOBACTAM 3.38 G: 3; .375 INJECTION, POWDER, LYOPHILIZED, FOR SOLUTION INTRAVENOUS at 09:09

## 2023-01-01 RX ADMIN — ACETAMINOPHEN 650 MG: 325 TABLET ORAL at 13:46

## 2023-01-01 RX ADMIN — DICLOFENAC SODIUM 4 G: 10 GEL TOPICAL at 16:50

## 2023-01-01 RX ADMIN — POTASSIUM CHLORIDE 10 MEQ: 7.46 INJECTION, SOLUTION INTRAVENOUS at 16:09

## 2023-01-01 RX ADMIN — ACETAMINOPHEN 650 MG: 325 TABLET ORAL at 20:54

## 2023-01-01 RX ADMIN — PIPERACILLIN AND TAZOBACTAM 3.38 G: 3; .375 INJECTION, POWDER, LYOPHILIZED, FOR SOLUTION INTRAVENOUS at 01:36

## 2023-01-01 RX ADMIN — PIPERACILLIN AND TAZOBACTAM 3.38 G: 3; .375 INJECTION, POWDER, LYOPHILIZED, FOR SOLUTION INTRAVENOUS at 03:09

## 2023-01-01 RX ADMIN — RUGBY ZINC OXIDE 20%: 20 OINTMENT TOPICAL at 08:01

## 2023-01-01 RX ADMIN — LIDOCAINE 2 PATCH: 4 PATCH TOPICAL at 20:57

## 2023-01-01 RX ADMIN — MAGNESIUM OXIDE TAB 400 MG (241.3 MG ELEMENTAL MG) 400 MG: 400 (241.3 MG) TAB at 09:31

## 2023-01-01 RX ADMIN — ACETAMINOPHEN 650 MG: 325 TABLET ORAL at 10:12

## 2023-01-01 RX ADMIN — DICLOFENAC SODIUM 4 G: 10 GEL TOPICAL at 17:00

## 2023-01-01 RX ADMIN — AMIODARONE HYDROCHLORIDE 0.5 MG/MIN: 1.8 INJECTION, SOLUTION INTRAVENOUS at 08:44

## 2023-01-01 RX ADMIN — PIPERACILLIN AND TAZOBACTAM 3.38 G: 3; .375 INJECTION, POWDER, LYOPHILIZED, FOR SOLUTION INTRAVENOUS at 17:54

## 2023-01-01 RX ADMIN — HEPARIN SODIUM 5000 UNITS: 5000 INJECTION, SOLUTION INTRAVENOUS; SUBCUTANEOUS at 05:42

## 2023-01-01 RX ADMIN — DICLOFENAC SODIUM 4 G: 10 GEL TOPICAL at 13:19

## 2023-01-01 RX ADMIN — HEPARIN SODIUM 5000 UNITS: 5000 INJECTION, SOLUTION INTRAVENOUS; SUBCUTANEOUS at 16:12

## 2023-01-01 RX ADMIN — MORPHINE SULFATE 1 MG: 2 INJECTION, SOLUTION INTRAMUSCULAR; INTRAVENOUS at 12:00

## 2023-01-01 RX ADMIN — INSULIN ASPART 1 UNITS: 100 INJECTION, SOLUTION INTRAVENOUS; SUBCUTANEOUS at 12:09

## 2023-01-01 RX ADMIN — VASOPRESSIN 2.4 UNITS/HR: 20 INJECTION, SOLUTION INTRAMUSCULAR; SUBCUTANEOUS at 21:12

## 2023-01-01 RX ADMIN — POTASSIUM CHLORIDE 20 MEQ: 29.8 INJECTION, SOLUTION INTRAVENOUS at 04:55

## 2023-01-01 RX ADMIN — INSULIN ASPART 1 UNITS: 100 INJECTION, SOLUTION INTRAVENOUS; SUBCUTANEOUS at 07:50

## 2023-01-01 RX ADMIN — ACETAMINOPHEN 650 MG: 325 TABLET ORAL at 13:25

## 2023-01-01 RX ADMIN — PIPERACILLIN AND TAZOBACTAM 3.38 G: 3; .375 INJECTION, POWDER, LYOPHILIZED, FOR SOLUTION INTRAVENOUS at 09:26

## 2023-01-01 RX ADMIN — DICLOFENAC SODIUM 4 G: 10 GEL TOPICAL at 17:06

## 2023-01-01 RX ADMIN — ACETAMINOPHEN 650 MG: 325 TABLET ORAL at 08:18

## 2023-01-01 RX ADMIN — INSULIN ASPART 1 UNITS: 100 INJECTION, SOLUTION INTRAVENOUS; SUBCUTANEOUS at 16:40

## 2023-01-01 RX ADMIN — ACETAMINOPHEN 650 MG: 325 TABLET ORAL at 09:04

## 2023-01-01 RX ADMIN — MAGNESIUM SULFATE HEPTAHYDRATE 2 G: 40 INJECTION, SOLUTION INTRAVENOUS at 05:34

## 2023-01-01 RX ADMIN — PREGABALIN 75 MG: 75 CAPSULE ORAL at 10:46

## 2023-01-01 RX ADMIN — ACETAMINOPHEN 650 MG: 325 TABLET ORAL at 09:44

## 2023-01-01 RX ADMIN — VASOPRESSIN 2.4 UNITS/HR: 20 INJECTION, SOLUTION INTRAMUSCULAR; SUBCUTANEOUS at 06:13

## 2023-01-01 RX ADMIN — HEPARIN SODIUM 5000 UNITS: 5000 INJECTION, SOLUTION INTRAVENOUS; SUBCUTANEOUS at 23:53

## 2023-01-01 RX ADMIN — FUROSEMIDE 20 MG: 10 INJECTION, SOLUTION INTRAVENOUS at 06:08

## 2023-01-01 RX ADMIN — PIPERACILLIN AND TAZOBACTAM 3.38 G: 3; .375 INJECTION, POWDER, LYOPHILIZED, FOR SOLUTION INTRAVENOUS at 10:43

## 2023-01-01 RX ADMIN — METHYLPREDNISOLONE SODIUM SUCCINATE 62.5 MG: 125 INJECTION, POWDER, FOR SOLUTION INTRAMUSCULAR; INTRAVENOUS at 09:14

## 2023-01-01 RX ADMIN — ACETAMINOPHEN 650 MG: 325 TABLET ORAL at 13:22

## 2023-01-01 RX ADMIN — Medication 1 SPRAY: at 12:01

## 2023-01-01 RX ADMIN — POTASSIUM CHLORIDE 10 MEQ: 7.46 INJECTION, SOLUTION INTRAVENOUS at 13:45

## 2023-01-01 RX ADMIN — LIDOCAINE 2 PATCH: 4 PATCH TOPICAL at 21:01

## 2023-01-01 RX ADMIN — VASOPRESSIN 2.4 UNITS/HR: 20 INJECTION, SOLUTION INTRAMUSCULAR; SUBCUTANEOUS at 06:36

## 2023-01-01 RX ADMIN — INSULIN ASPART 1 UNITS: 100 INJECTION, SOLUTION INTRAVENOUS; SUBCUTANEOUS at 12:44

## 2023-01-01 RX ADMIN — PREGABALIN 75 MG: 75 CAPSULE ORAL at 09:28

## 2023-01-01 RX ADMIN — ACETAMINOPHEN 650 MG: 325 TABLET ORAL at 09:14

## 2023-01-01 RX ADMIN — ACETAMINOPHEN 650 MG: 325 TABLET ORAL at 09:31

## 2023-01-01 RX ADMIN — CARBOXYMETHYLCELLULOSE SODIUM 2 DROP: 0.5 SOLUTION/ DROPS OPHTHALMIC at 11:56

## 2023-01-01 RX ADMIN — LIDOCAINE 2 PATCH: 4 PATCH TOPICAL at 20:38

## 2023-01-01 RX ADMIN — DICLOFENAC SODIUM 4 G: 10 GEL TOPICAL at 09:26

## 2023-01-01 RX ADMIN — ACETAMINOPHEN 650 MG: 325 TABLET ORAL at 20:35

## 2023-01-01 RX ADMIN — LIDOCAINE 2 PATCH: 4 PATCH TOPICAL at 19:05

## 2023-01-01 RX ADMIN — POTASSIUM CHLORIDE 20 MEQ: 29.8 INJECTION, SOLUTION INTRAVENOUS at 14:34

## 2023-01-01 RX ADMIN — HEPARIN SODIUM 5000 UNITS: 5000 INJECTION, SOLUTION INTRAVENOUS; SUBCUTANEOUS at 14:13

## 2023-01-01 RX ADMIN — DICLOFENAC SODIUM 4 G: 10 GEL TOPICAL at 20:54

## 2023-01-01 RX ADMIN — ACETAMINOPHEN 650 MG: 325 TABLET ORAL at 20:20

## 2023-01-01 RX ADMIN — DICLOFENAC SODIUM 4 G: 10 GEL TOPICAL at 16:15

## 2023-01-01 RX ADMIN — DICLOFENAC SODIUM 4 G: 10 GEL TOPICAL at 13:46

## 2023-01-01 RX ADMIN — MAGNESIUM OXIDE TAB 400 MG (241.3 MG ELEMENTAL MG) 400 MG: 400 (241.3 MG) TAB at 12:19

## 2023-01-01 RX ADMIN — LIDOCAINE 2 PATCH: 4 PATCH TOPICAL at 20:46

## 2023-01-01 RX ADMIN — LIDOCAINE 2 PATCH: 4 PATCH TOPICAL at 22:47

## 2023-01-01 RX ADMIN — RUGBY ZINC OXIDE 20%: 20 OINTMENT TOPICAL at 16:26

## 2023-01-01 RX ADMIN — DICLOFENAC SODIUM 4 G: 10 GEL TOPICAL at 16:12

## 2023-01-01 RX ADMIN — PIPERACILLIN AND TAZOBACTAM 3.38 G: 3; .375 INJECTION, POWDER, LYOPHILIZED, FOR SOLUTION INTRAVENOUS at 11:15

## 2023-01-01 RX ADMIN — PREGABALIN 75 MG: 75 CAPSULE ORAL at 09:05

## 2023-01-01 RX ADMIN — OXYCODONE HYDROCHLORIDE 2.5 MG: 5 TABLET ORAL at 09:05

## 2023-01-01 RX ADMIN — INSULIN ASPART 1 UNITS: 100 INJECTION, SOLUTION INTRAVENOUS; SUBCUTANEOUS at 08:01

## 2023-01-01 RX ADMIN — HEPARIN SODIUM 5000 UNITS: 5000 INJECTION, SOLUTION INTRAVENOUS; SUBCUTANEOUS at 08:41

## 2023-01-01 RX ADMIN — ACETAMINOPHEN 650 MG: 325 TABLET ORAL at 21:00

## 2023-01-01 RX ADMIN — INSULIN ASPART 1 UNITS: 100 INJECTION, SOLUTION INTRAVENOUS; SUBCUTANEOUS at 08:37

## 2023-01-01 RX ADMIN — ACETAMINOPHEN 650 MG: 325 TABLET ORAL at 20:46

## 2023-01-01 RX ADMIN — INSULIN ASPART 1 UNITS: 100 INJECTION, SOLUTION INTRAVENOUS; SUBCUTANEOUS at 08:03

## 2023-01-01 RX ADMIN — MAGNESIUM SULFATE HEPTAHYDRATE 2 G: 40 INJECTION, SOLUTION INTRAVENOUS at 07:43

## 2023-01-01 RX ADMIN — DICLOFENAC SODIUM 4 G: 10 GEL TOPICAL at 13:23

## 2023-01-01 RX ADMIN — METHYLPREDNISOLONE SODIUM SUCCINATE 62.5 MG: 125 INJECTION, POWDER, FOR SOLUTION INTRAMUSCULAR; INTRAVENOUS at 08:03

## 2023-01-01 RX ADMIN — HEPARIN SODIUM 5000 UNITS: 5000 INJECTION, SOLUTION INTRAVENOUS; SUBCUTANEOUS at 00:19

## 2023-01-01 RX ADMIN — INSULIN ASPART 1 UNITS: 100 INJECTION, SOLUTION INTRAVENOUS; SUBCUTANEOUS at 11:48

## 2023-01-01 RX ADMIN — METHYLPREDNISOLONE SODIUM SUCCINATE 62.5 MG: 125 INJECTION, POWDER, FOR SOLUTION INTRAMUSCULAR; INTRAVENOUS at 08:37

## 2023-01-01 RX ADMIN — PIPERACILLIN AND TAZOBACTAM 3.38 G: 3; .375 INJECTION, POWDER, LYOPHILIZED, FOR SOLUTION INTRAVENOUS at 02:01

## 2023-01-01 RX ADMIN — ACETAMINOPHEN 650 MG: 325 TABLET ORAL at 08:00

## 2023-01-01 RX ADMIN — FUROSEMIDE 20 MG: 10 INJECTION, SOLUTION INTRAMUSCULAR; INTRAVENOUS at 17:34

## 2023-01-01 RX ADMIN — SODIUM CHLORIDE 899 ML: 9 INJECTION, SOLUTION INTRAVENOUS at 03:02

## 2023-01-01 RX ADMIN — ACETAMINOPHEN 650 MG: 325 TABLET ORAL at 14:26

## 2023-01-01 RX ADMIN — ACETAMINOPHEN 975 MG: 325 TABLET ORAL at 03:10

## 2023-01-01 RX ADMIN — PIPERACILLIN AND TAZOBACTAM 3.38 G: 3; .375 INJECTION, POWDER, LYOPHILIZED, FOR SOLUTION INTRAVENOUS at 17:00

## 2023-01-01 RX ADMIN — VASOPRESSIN 2.4 UNITS/HR: 20 INJECTION, SOLUTION INTRAMUSCULAR; SUBCUTANEOUS at 14:13

## 2023-01-01 RX ADMIN — DICLOFENAC SODIUM 4 G: 10 GEL TOPICAL at 08:18

## 2023-01-01 RX ADMIN — HEPARIN SODIUM 5000 UNITS: 5000 INJECTION, SOLUTION INTRAVENOUS; SUBCUTANEOUS at 21:05

## 2023-01-01 RX ADMIN — POTASSIUM CHLORIDE 40 MEQ: 1500 TABLET, EXTENDED RELEASE ORAL at 22:05

## 2023-01-01 RX ADMIN — POTASSIUM CHLORIDE 20 MEQ: 29.8 INJECTION, SOLUTION INTRAVENOUS at 17:25

## 2023-01-01 RX ADMIN — PIPERACILLIN AND TAZOBACTAM 3.38 G: 3; .375 INJECTION, POWDER, LYOPHILIZED, FOR SOLUTION INTRAVENOUS at 09:32

## 2023-01-01 RX ADMIN — OXYCODONE HYDROCHLORIDE 5 MG: 100 SOLUTION ORAL at 13:46

## 2023-01-01 RX ADMIN — SODIUM CHLORIDE 899 ML: 9 INJECTION, SOLUTION INTRAVENOUS at 05:25

## 2023-01-01 RX ADMIN — INSULIN ASPART 1 UNITS: 100 INJECTION, SOLUTION INTRAVENOUS; SUBCUTANEOUS at 03:10

## 2023-01-01 RX ADMIN — INSULIN ASPART 1 UNITS: 100 INJECTION, SOLUTION INTRAVENOUS; SUBCUTANEOUS at 17:44

## 2023-01-01 RX ADMIN — POTASSIUM CHLORIDE 40 MEQ: 1500 TABLET, EXTENDED RELEASE ORAL at 10:18

## 2023-01-01 RX ADMIN — POTASSIUM CHLORIDE 10 MEQ: 7.46 INJECTION, SOLUTION INTRAVENOUS at 06:30

## 2023-01-01 RX ADMIN — HEPARIN SODIUM 5000 UNITS: 5000 INJECTION, SOLUTION INTRAVENOUS; SUBCUTANEOUS at 09:07

## 2023-01-01 RX ADMIN — POTASSIUM CHLORIDE 20 MEQ: 29.8 INJECTION, SOLUTION INTRAVENOUS at 07:24

## 2023-01-01 RX ADMIN — Medication 0.05 MCG/KG/MIN: at 12:51

## 2023-01-01 RX ADMIN — VANCOMYCIN HYDROCHLORIDE 1250 MG: 5 INJECTION, POWDER, LYOPHILIZED, FOR SOLUTION INTRAVENOUS at 05:22

## 2023-01-01 RX ADMIN — DICLOFENAC SODIUM 4 G: 10 GEL TOPICAL at 20:43

## 2023-01-01 RX ADMIN — SODIUM PHOSPHATE, MONOBASIC, MONOHYDRATE AND SODIUM PHOSPHATE, DIBASIC, ANHYDROUS 15 MMOL: 142; 276 INJECTION, SOLUTION INTRAVENOUS at 07:24

## 2023-01-01 RX ADMIN — DEXTROSE MONOHYDRATE: 50 INJECTION, SOLUTION INTRAVENOUS at 13:57

## 2023-01-01 RX ADMIN — HEPARIN SODIUM 5000 UNITS: 5000 INJECTION, SOLUTION INTRAVENOUS; SUBCUTANEOUS at 15:04

## 2023-01-01 RX ADMIN — FUROSEMIDE 20 MG: 10 INJECTION, SOLUTION INTRAVENOUS at 17:00

## 2023-01-01 RX ADMIN — DEXTROSE MONOHYDRATE: 50 INJECTION, SOLUTION INTRAVENOUS at 01:35

## 2023-01-01 RX ADMIN — INSULIN ASPART 1 UNITS: 100 INJECTION, SOLUTION INTRAVENOUS; SUBCUTANEOUS at 20:45

## 2023-01-01 RX ADMIN — DICLOFENAC SODIUM 4 G: 10 GEL TOPICAL at 12:19

## 2023-01-01 RX ADMIN — DEXTROSE MONOHYDRATE: 50 INJECTION, SOLUTION INTRAVENOUS at 08:38

## 2023-01-01 RX ADMIN — VASOPRESSIN 2.4 UNITS/HR: 20 INJECTION, SOLUTION INTRAMUSCULAR; SUBCUTANEOUS at 13:10

## 2023-01-01 RX ADMIN — SODIUM PHOSPHATE, MONOBASIC, MONOHYDRATE AND SODIUM PHOSPHATE, DIBASIC, ANHYDROUS 15 MMOL: 142; 276 INJECTION, SOLUTION INTRAVENOUS at 16:03

## 2023-01-01 RX ADMIN — SODIUM PHOSPHATE, MONOBASIC, MONOHYDRATE AND SODIUM PHOSPHATE, DIBASIC, ANHYDROUS 15 MMOL: 142; 276 INJECTION, SOLUTION INTRAVENOUS at 12:07

## 2023-01-01 RX ADMIN — MORPHINE SULFATE 1 MG: 2 INJECTION, SOLUTION INTRAMUSCULAR; INTRAVENOUS at 20:55

## 2023-01-01 RX ADMIN — ACETAMINOPHEN 650 MG: 325 TABLET ORAL at 00:08

## 2023-01-01 RX ADMIN — PIPERACILLIN AND TAZOBACTAM 3.38 G: 3; .375 INJECTION, POWDER, LYOPHILIZED, FOR SOLUTION INTRAVENOUS at 02:58

## 2023-01-01 RX ADMIN — POTASSIUM CHLORIDE 20 MEQ: 29.8 INJECTION, SOLUTION INTRAVENOUS at 15:59

## 2023-01-01 RX ADMIN — DICLOFENAC SODIUM 4 G: 10 GEL TOPICAL at 16:56

## 2023-01-01 RX ADMIN — PIPERACILLIN AND TAZOBACTAM 3.38 G: 3; .375 INJECTION, POWDER, LYOPHILIZED, FOR SOLUTION INTRAVENOUS at 09:28

## 2023-01-01 RX ADMIN — INSULIN ASPART 1 UNITS: 100 INJECTION, SOLUTION INTRAVENOUS; SUBCUTANEOUS at 15:56

## 2023-01-01 RX ADMIN — DICLOFENAC SODIUM 4 G: 10 GEL TOPICAL at 14:13

## 2023-01-01 RX ADMIN — FUROSEMIDE 40 MG: 10 INJECTION, SOLUTION INTRAVENOUS at 06:05

## 2023-01-01 RX ADMIN — HEPARIN SODIUM 5000 UNITS: 5000 INJECTION, SOLUTION INTRAVENOUS; SUBCUTANEOUS at 09:58

## 2023-01-01 RX ADMIN — POTASSIUM CHLORIDE 40 MEQ: 1.5 POWDER, FOR SOLUTION ORAL at 10:32

## 2023-01-01 RX ADMIN — PANTOPRAZOLE SODIUM 40 MG: 40 INJECTION, POWDER, FOR SOLUTION INTRAVENOUS at 07:43

## 2023-01-01 RX ADMIN — DICLOFENAC SODIUM 4 G: 10 GEL TOPICAL at 08:01

## 2023-01-01 RX ADMIN — FUROSEMIDE 20 MG: 10 INJECTION, SOLUTION INTRAMUSCULAR; INTRAVENOUS at 08:40

## 2023-01-01 RX ADMIN — PREGABALIN 75 MG: 75 CAPSULE ORAL at 09:44

## 2023-01-01 RX ADMIN — PANTOPRAZOLE SODIUM 40 MG: 40 INJECTION, POWDER, FOR SOLUTION INTRAVENOUS at 09:14

## 2023-01-01 RX ADMIN — PREGABALIN 75 MG: 75 CAPSULE ORAL at 09:33

## 2023-01-01 RX ADMIN — DICLOFENAC SODIUM 4 G: 10 GEL TOPICAL at 20:57

## 2023-01-01 RX ADMIN — ACETAMINOPHEN 650 MG: 325 TABLET ORAL at 14:13

## 2023-01-01 RX ADMIN — DICLOFENAC SODIUM 4 G: 10 GEL TOPICAL at 17:23

## 2023-01-01 RX ADMIN — VASOPRESSIN 2.4 UNITS/HR: 20 INJECTION, SOLUTION INTRAMUSCULAR; SUBCUTANEOUS at 22:32

## 2023-01-01 RX ADMIN — LIDOCAINE 2 PATCH: 4 PATCH TOPICAL at 20:20

## 2023-01-01 RX ADMIN — POTASSIUM CHLORIDE 10 MEQ: 7.46 INJECTION, SOLUTION INTRAVENOUS at 20:39

## 2023-01-01 RX ADMIN — POTASSIUM CHLORIDE 10 MEQ: 7.46 INJECTION, SOLUTION INTRAVENOUS at 21:47

## 2023-01-01 RX ADMIN — DICLOFENAC SODIUM 4 G: 10 GEL TOPICAL at 13:07

## 2023-01-01 RX ADMIN — DICLOFENAC SODIUM 4 G: 10 GEL TOPICAL at 11:55

## 2023-01-01 RX ADMIN — DICLOFENAC SODIUM 4 G: 10 GEL TOPICAL at 12:16

## 2023-01-01 RX ADMIN — PANTOPRAZOLE SODIUM 40 MG: 40 INJECTION, POWDER, FOR SOLUTION INTRAVENOUS at 08:06

## 2023-01-01 RX ADMIN — ACETAMINOPHEN 650 MG: 325 TABLET ORAL at 13:55

## 2023-01-01 RX ADMIN — PANTOPRAZOLE SODIUM 40 MG: 40 INJECTION, POWDER, FOR SOLUTION INTRAVENOUS at 08:37

## 2023-01-01 RX ADMIN — PREGABALIN 75 MG: 75 CAPSULE ORAL at 20:46

## 2023-01-01 RX ADMIN — PREGABALIN 75 MG: 75 CAPSULE ORAL at 21:00

## 2023-01-01 RX ADMIN — HEPARIN SODIUM 5000 UNITS: 5000 INJECTION, SOLUTION INTRAVENOUS; SUBCUTANEOUS at 16:49

## 2023-01-01 RX ADMIN — INSULIN ASPART 1 UNITS: 100 INJECTION, SOLUTION INTRAVENOUS; SUBCUTANEOUS at 05:00

## 2023-01-01 RX ADMIN — HEPARIN SODIUM 5000 UNITS: 5000 INJECTION, SOLUTION INTRAVENOUS; SUBCUTANEOUS at 16:56

## 2023-01-01 RX ADMIN — SODIUM PHOSPHATE, MONOBASIC, MONOHYDRATE AND SODIUM PHOSPHATE, DIBASIC, ANHYDROUS 15 MMOL: 142; 276 INJECTION, SOLUTION INTRAVENOUS at 05:05

## 2023-01-01 RX ADMIN — HEPARIN SODIUM 5000 UNITS: 5000 INJECTION, SOLUTION INTRAVENOUS; SUBCUTANEOUS at 23:54

## 2023-01-01 RX ADMIN — POTASSIUM CHLORIDE 10 MEQ: 7.46 INJECTION, SOLUTION INTRAVENOUS at 22:42

## 2023-01-01 RX ADMIN — HEPARIN SODIUM 5000 UNITS: 5000 INJECTION, SOLUTION INTRAVENOUS; SUBCUTANEOUS at 09:31

## 2023-01-01 RX ADMIN — LIDOCAINE HYDROCHLORIDE 2 ML: 10 INJECTION, SOLUTION EPIDURAL; INFILTRATION; INTRACAUDAL; PERINEURAL at 07:15

## 2023-01-01 RX ADMIN — DICLOFENAC SODIUM 4 G: 10 GEL TOPICAL at 16:08

## 2023-01-01 RX ADMIN — OXYCODONE HYDROCHLORIDE 5 MG: 100 SOLUTION ORAL at 03:16

## 2023-01-01 RX ADMIN — INSULIN ASPART 1 UNITS: 100 INJECTION, SOLUTION INTRAVENOUS; SUBCUTANEOUS at 16:42

## 2023-01-01 RX ADMIN — MAGNESIUM SULFATE HEPTAHYDRATE 4 G: 80 INJECTION, SOLUTION INTRAVENOUS at 04:29

## 2023-01-01 RX ADMIN — SODIUM PHOSPHATE, MONOBASIC, MONOHYDRATE AND SODIUM PHOSPHATE, DIBASIC, ANHYDROUS 15 MMOL: 142; 276 INJECTION, SOLUTION INTRAVENOUS at 13:46

## 2023-01-01 RX ADMIN — VASOPRESSIN 2.4 UNITS/HR: 20 INJECTION, SOLUTION INTRAMUSCULAR; SUBCUTANEOUS at 14:05

## 2023-01-01 RX ADMIN — Medication 0.18 MCG/KG/MIN: at 19:08

## 2023-01-01 RX ADMIN — POTASSIUM CHLORIDE 20 MEQ: 29.8 INJECTION, SOLUTION INTRAVENOUS at 09:42

## 2023-01-01 RX ADMIN — POTASSIUM CHLORIDE 10 MEQ: 7.46 INJECTION, SOLUTION INTRAVENOUS at 04:53

## 2023-01-01 RX ADMIN — VANCOMYCIN HYDROCHLORIDE 1250 MG: 5 INJECTION, POWDER, LYOPHILIZED, FOR SOLUTION INTRAVENOUS at 03:37

## 2023-01-01 RX ADMIN — PIPERACILLIN AND TAZOBACTAM 3.38 G: 3; .375 INJECTION, POWDER, LYOPHILIZED, FOR SOLUTION INTRAVENOUS at 01:12

## 2023-01-01 RX ADMIN — SODIUM PHOSPHATE, MONOBASIC, MONOHYDRATE AND SODIUM PHOSPHATE, DIBASIC, ANHYDROUS 9 MMOL: 142; 276 INJECTION, SOLUTION INTRAVENOUS at 08:01

## 2023-01-01 RX ADMIN — HEPARIN SODIUM 5000 UNITS: 5000 INJECTION, SOLUTION INTRAVENOUS; SUBCUTANEOUS at 16:03

## 2023-01-01 RX ADMIN — SODIUM CHLORIDE 899 ML: 9 INJECTION, SOLUTION INTRAVENOUS at 04:25

## 2023-01-01 RX ADMIN — AMIODARONE HYDROCHLORIDE 150 MG: 1.5 INJECTION, SOLUTION INTRAVENOUS at 02:33

## 2023-01-01 RX ADMIN — DICLOFENAC SODIUM 4 G: 10 GEL TOPICAL at 10:12

## 2023-01-01 RX ADMIN — PREGABALIN 75 MG: 75 CAPSULE ORAL at 22:43

## 2023-01-01 RX ADMIN — POTASSIUM CHLORIDE 20 MEQ: 29.8 INJECTION, SOLUTION INTRAVENOUS at 09:14

## 2023-01-01 RX ADMIN — VASOPRESSIN 2.4 UNITS/HR: 20 INJECTION, SOLUTION INTRAMUSCULAR; SUBCUTANEOUS at 22:00

## 2023-01-01 RX ADMIN — ACETAMINOPHEN 650 MG: 325 TABLET ORAL at 14:37

## 2023-01-01 RX ADMIN — PREGABALIN 75 MG: 75 CAPSULE ORAL at 09:06

## 2023-01-01 RX ADMIN — PIPERACILLIN AND TAZOBACTAM 3.38 G: 3; .375 INJECTION, POWDER, LYOPHILIZED, FOR SOLUTION INTRAVENOUS at 18:57

## 2023-01-01 RX ADMIN — OXYCODONE HYDROCHLORIDE 2.5 MG: 5 TABLET ORAL at 16:57

## 2023-01-01 RX ADMIN — PIPERACILLIN AND TAZOBACTAM 3.38 G: 3; .375 INJECTION, POWDER, LYOPHILIZED, FOR SOLUTION INTRAVENOUS at 19:13

## 2023-01-01 RX ADMIN — OXYCODONE HYDROCHLORIDE 5 MG: 100 SOLUTION ORAL at 21:02

## 2023-01-01 RX ADMIN — VASOPRESSIN 2.4 UNITS/HR: 20 INJECTION, SOLUTION INTRAMUSCULAR; SUBCUTANEOUS at 05:38

## 2023-01-01 RX ADMIN — DICLOFENAC SODIUM 4 G: 10 GEL TOPICAL at 09:18

## 2023-01-01 RX ADMIN — MORPHINE SULFATE 1 MG: 2 INJECTION, SOLUTION INTRAMUSCULAR; INTRAVENOUS at 09:43

## 2023-01-01 RX ADMIN — PREGABALIN 75 MG: 75 CAPSULE ORAL at 20:19

## 2023-01-01 RX ADMIN — ACETAMINOPHEN 650 MG: 325 TABLET ORAL at 09:28

## 2023-01-01 RX ADMIN — ACETAMINOPHEN 650 MG: 325 TABLET ORAL at 01:39

## 2023-01-01 RX ADMIN — SODIUM CHLORIDE: 9 INJECTION, SOLUTION INTRAVENOUS at 12:59

## 2023-01-01 RX ADMIN — Medication 0.12 MCG/KG/MIN: at 13:31

## 2023-01-01 RX ADMIN — SODIUM PHOSPHATE, MONOBASIC, MONOHYDRATE AND SODIUM PHOSPHATE, DIBASIC, ANHYDROUS 9 MMOL: 142; 276 INJECTION, SOLUTION INTRAVENOUS at 01:01

## 2023-01-01 RX ADMIN — AMIODARONE HYDROCHLORIDE 0.5 MG/MIN: 1.8 INJECTION, SOLUTION INTRAVENOUS at 20:38

## 2023-01-01 RX ADMIN — VANCOMYCIN HYDROCHLORIDE 1250 MG: 5 INJECTION, POWDER, LYOPHILIZED, FOR SOLUTION INTRAVENOUS at 04:30

## 2023-01-01 RX ADMIN — PREGABALIN 75 MG: 75 CAPSULE ORAL at 20:38

## 2023-01-01 RX ADMIN — INSULIN ASPART 1 UNITS: 100 INJECTION, SOLUTION INTRAVENOUS; SUBCUTANEOUS at 21:05

## 2023-01-01 RX ADMIN — SODIUM PHOSPHATE, MONOBASIC, MONOHYDRATE AND SODIUM PHOSPHATE, DIBASIC, ANHYDROUS 15 MMOL: 142; 276 INJECTION, SOLUTION INTRAVENOUS at 04:43

## 2023-01-01 RX ADMIN — DICLOFENAC SODIUM 4 G: 10 GEL TOPICAL at 21:01

## 2023-01-01 RX ADMIN — POTASSIUM CHLORIDE 10 MEQ: 7.46 INJECTION, SOLUTION INTRAVENOUS at 23:46

## 2023-01-01 RX ADMIN — SODIUM PHOSPHATE, MONOBASIC, MONOHYDRATE AND SODIUM PHOSPHATE, DIBASIC, ANHYDROUS 9 MMOL: 142; 276 INJECTION, SOLUTION INTRAVENOUS at 09:33

## 2023-01-01 RX ADMIN — PIPERACILLIN AND TAZOBACTAM 3.38 G: 3; .375 INJECTION, POWDER, LYOPHILIZED, FOR SOLUTION INTRAVENOUS at 01:54

## 2023-01-01 RX ADMIN — DICLOFENAC SODIUM 4 G: 10 GEL TOPICAL at 14:27

## 2023-01-01 RX ADMIN — DICLOFENAC SODIUM 4 G: 10 GEL TOPICAL at 09:06

## 2023-01-01 RX ADMIN — PIPERACILLIN AND TAZOBACTAM 3.38 G: 3; .375 INJECTION, POWDER, LYOPHILIZED, FOR SOLUTION INTRAVENOUS at 17:44

## 2023-01-01 RX ADMIN — PREGABALIN 75 MG: 75 CAPSULE ORAL at 09:14

## 2023-01-01 RX ADMIN — LIDOCAINE: 50 OINTMENT TOPICAL at 09:27

## 2023-01-01 RX ADMIN — LORAZEPAM 1 MG: 2 LIQUID ORAL at 15:57

## 2023-01-01 RX ADMIN — INSULIN ASPART 1 UNITS: 100 INJECTION, SOLUTION INTRAVENOUS; SUBCUTANEOUS at 00:08

## 2023-01-01 RX ADMIN — MORPHINE SULFATE 1 MG: 2 INJECTION, SOLUTION INTRAMUSCULAR; INTRAVENOUS at 15:09

## 2023-01-01 RX ADMIN — HEPARIN SODIUM 5000 UNITS: 5000 INJECTION, SOLUTION INTRAVENOUS; SUBCUTANEOUS at 22:08

## 2023-01-01 RX ADMIN — DICLOFENAC SODIUM 4 G: 10 GEL TOPICAL at 13:56

## 2023-01-01 RX ADMIN — LIDOCAINE: 50 OINTMENT TOPICAL at 08:02

## 2023-01-01 RX ADMIN — POTASSIUM CHLORIDE 20 MEQ: 29.8 INJECTION, SOLUTION INTRAVENOUS at 23:54

## 2023-01-01 RX ADMIN — POTASSIUM CHLORIDE 20 MEQ: 29.8 INJECTION, SOLUTION INTRAVENOUS at 08:48

## 2023-01-01 RX ADMIN — FUROSEMIDE 20 MG: 10 INJECTION, SOLUTION INTRAMUSCULAR; INTRAVENOUS at 05:00

## 2023-01-01 RX ADMIN — OXYCODONE HYDROCHLORIDE 2.5 MG: 5 TABLET ORAL at 21:00

## 2023-01-01 RX ADMIN — VANCOMYCIN HYDROCHLORIDE 1250 MG: 5 INJECTION, POWDER, LYOPHILIZED, FOR SOLUTION INTRAVENOUS at 03:09

## 2023-01-01 RX ADMIN — DICLOFENAC SODIUM 4 G: 10 GEL TOPICAL at 20:47

## 2023-01-01 RX ADMIN — PIPERACILLIN AND TAZOBACTAM 3.38 G: 3; .375 INJECTION, POWDER, LYOPHILIZED, FOR SOLUTION INTRAVENOUS at 17:49

## 2023-01-01 RX ADMIN — PANTOPRAZOLE SODIUM 40 MG: 40 INJECTION, POWDER, FOR SOLUTION INTRAVENOUS at 08:01

## 2023-01-01 RX ADMIN — FUROSEMIDE 40 MG: 10 INJECTION, SOLUTION INTRAVENOUS at 17:17

## 2023-01-01 RX ADMIN — PIPERACILLIN AND TAZOBACTAM 3.38 G: 3; .375 INJECTION, POWDER, LYOPHILIZED, FOR SOLUTION INTRAVENOUS at 09:57

## 2023-01-01 RX ADMIN — HEPARIN SODIUM 5000 UNITS: 5000 INJECTION, SOLUTION INTRAVENOUS; SUBCUTANEOUS at 15:50

## 2023-01-01 RX ADMIN — DICLOFENAC SODIUM 4 G: 10 GEL TOPICAL at 20:38

## 2023-01-01 RX ADMIN — PIPERACILLIN AND TAZOBACTAM 3.38 G: 3; .375 INJECTION, POWDER, LYOPHILIZED, FOR SOLUTION INTRAVENOUS at 02:09

## 2023-01-01 RX ADMIN — INSULIN ASPART 1 UNITS: 100 INJECTION, SOLUTION INTRAVENOUS; SUBCUTANEOUS at 00:45

## 2023-01-01 RX ADMIN — POTASSIUM CHLORIDE 20 MEQ: 29.8 INJECTION, SOLUTION INTRAVENOUS at 12:17

## 2023-01-01 RX ADMIN — POTASSIUM CHLORIDE 20 MEQ: 29.8 INJECTION, SOLUTION INTRAVENOUS at 21:59

## 2023-01-01 RX ADMIN — PIPERACILLIN AND TAZOBACTAM 3.38 G: 3; .375 INJECTION, POWDER, LYOPHILIZED, FOR SOLUTION INTRAVENOUS at 17:26

## 2023-01-01 RX ADMIN — OXYCODONE HYDROCHLORIDE 2.5 MG: 5 TABLET ORAL at 13:23

## 2023-01-01 RX ADMIN — ACETAMINOPHEN 650 MG: 325 TABLET ORAL at 09:06

## 2023-01-01 RX ADMIN — ACETAMINOPHEN 650 MG: 325 TABLET ORAL at 15:52

## 2023-01-01 ASSESSMENT — ACTIVITIES OF DAILY LIVING (ADL)
ADLS_ACUITY_SCORE: 32
ADLS_ACUITY_SCORE: 45
ADLS_ACUITY_SCORE: 35
ADLS_ACUITY_SCORE: 39
ADLS_ACUITY_SCORE: 47
DESCRIBE_HEARING_LOSS: BILATERAL HEARING LOSS
ADLS_ACUITY_SCORE: 49
ADLS_ACUITY_SCORE: 51
ADLS_ACUITY_SCORE: 45
ADLS_ACUITY_SCORE: 48
DOING_ERRANDS_INDEPENDENTLY_DIFFICULTY: NO
ADLS_ACUITY_SCORE: 48
ADLS_ACUITY_SCORE: 47
ADLS_ACUITY_SCORE: 47
TOILETING_ISSUES: NO
ADLS_ACUITY_SCORE: 47
ADLS_ACUITY_SCORE: 51
ADLS_ACUITY_SCORE: 48
ADLS_ACUITY_SCORE: 47
ADLS_ACUITY_SCORE: 48
ADLS_ACUITY_SCORE: 49
ADLS_ACUITY_SCORE: 45
ADLS_ACUITY_SCORE: 34
ADLS_ACUITY_SCORE: 49
ADLS_ACUITY_SCORE: 34
ADLS_ACUITY_SCORE: 34
ADLS_ACUITY_SCORE: 47
ADLS_ACUITY_SCORE: 32
ADLS_ACUITY_SCORE: 49
ADLS_ACUITY_SCORE: 49
ADLS_ACUITY_SCORE: 45
CONCENTRATING,_REMEMBERING_OR_MAKING_DECISIONS_DIFFICULTY: YES
ADLS_ACUITY_SCORE: 45
ADLS_ACUITY_SCORE: 34
FALL_HISTORY_WITHIN_LAST_SIX_MONTHS: NO
ADLS_ACUITY_SCORE: 39
ADLS_ACUITY_SCORE: 34
ADLS_ACUITY_SCORE: 47
ADLS_ACUITY_SCORE: 45
ADLS_ACUITY_SCORE: 45
WERE_AUXILIARY_AIDS_OFFERED?: YES
ADLS_ACUITY_SCORE: 34
ADLS_ACUITY_SCORE: 30
ADLS_ACUITY_SCORE: 34
ADLS_ACUITY_SCORE: 35
ADLS_ACUITY_SCORE: 48
ADLS_ACUITY_SCORE: 45
ADLS_ACUITY_SCORE: 49
ADLS_ACUITY_SCORE: 32
ADLS_ACUITY_SCORE: 45
ADLS_ACUITY_SCORE: 32
ADLS_ACUITY_SCORE: 51
ADLS_ACUITY_SCORE: 45
ADLS_ACUITY_SCORE: 32
ADLS_ACUITY_SCORE: 34
ADLS_ACUITY_SCORE: 30
ADLS_ACUITY_SCORE: 51
ADLS_ACUITY_SCORE: 49
ADLS_ACUITY_SCORE: 45
CONCENTRATING,_REMEMBERING_OR_MAKING_DECISIONS_DIFFICULTY: YES
ADLS_ACUITY_SCORE: 47
ADLS_ACUITY_SCORE: 48
ADLS_ACUITY_SCORE: 32
ADLS_ACUITY_SCORE: 45
ADLS_ACUITY_SCORE: 33
ADLS_ACUITY_SCORE: 45
DRESSING/BATHING_DIFFICULTY: NO
ADLS_ACUITY_SCORE: 47
ADLS_ACUITY_SCORE: 49
ADLS_ACUITY_SCORE: 47
ADLS_ACUITY_SCORE: 49
ADLS_ACUITY_SCORE: 45
ADLS_ACUITY_SCORE: 45
ADLS_ACUITY_SCORE: 47
ADLS_ACUITY_SCORE: 34
FALL_HISTORY_WITHIN_LAST_SIX_MONTHS: NO
ADLS_ACUITY_SCORE: 45
ADLS_ACUITY_SCORE: 51
WALKING_OR_CLIMBING_STAIRS_DIFFICULTY: NO
ADLS_ACUITY_SCORE: 47
DIFFICULTY_EATING/SWALLOWING: NO
ADLS_ACUITY_SCORE: 48
ADLS_ACUITY_SCORE: 45
ADLS_ACUITY_SCORE: 48
TOILETING_ISSUES: NO
ADLS_ACUITY_SCORE: 35
ADLS_ACUITY_SCORE: 32
ADLS_ACUITY_SCORE: 45
ADLS_ACUITY_SCORE: 30
ADLS_ACUITY_SCORE: 35
ADLS_ACUITY_SCORE: 47
ADLS_ACUITY_SCORE: 48
ADLS_ACUITY_SCORE: 34
DIFFICULTY_EATING/SWALLOWING: NO
ADLS_ACUITY_SCORE: 39
ADLS_ACUITY_SCORE: 45
ADLS_ACUITY_SCORE: 47
ADLS_ACUITY_SCORE: 48
ADLS_ACUITY_SCORE: 49
ADLS_ACUITY_SCORE: 48
HEARING_MANAGEMENT: SPEAK LOUDER
ADLS_ACUITY_SCORE: 34
ADLS_ACUITY_SCORE: 47
DOING_ERRANDS_INDEPENDENTLY_DIFFICULTY: NO
DRESSING/BATHING_DIFFICULTY: NO
ADLS_ACUITY_SCORE: 49
ADLS_ACUITY_SCORE: 49
ADLS_ACUITY_SCORE: 48
ADLS_ACUITY_SCORE: 32
ADLS_ACUITY_SCORE: 35
ADLS_ACUITY_SCORE: 35
ADLS_ACUITY_SCORE: 32
ADLS_ACUITY_SCORE: 49
ADLS_ACUITY_SCORE: 49
ADLS_ACUITY_SCORE: 48
HEARING_DIFFICULTY_OR_DEAF: YES
ADLS_ACUITY_SCORE: 48
CHANGE_IN_FUNCTIONAL_STATUS_SINCE_ONSET_OF_CURRENT_ILLNESS/INJURY: NO
WEAR_GLASSES_OR_BLIND: NO
EQUIPMENT_CURRENTLY_USED_AT_HOME: WALKER, ROLLING
ADLS_ACUITY_SCORE: 45
ADLS_ACUITY_SCORE: 32
ADLS_ACUITY_SCORE: 48
ADLS_ACUITY_SCORE: 45
ADLS_ACUITY_SCORE: 48
DIFFICULTY_COMMUNICATING: NO
ADLS_ACUITY_SCORE: 48
ADLS_ACUITY_SCORE: 49
ADLS_ACUITY_SCORE: 34
ADLS_ACUITY_SCORE: 48
ADLS_ACUITY_SCORE: 45
EQUIPMENT_CURRENTLY_USED_AT_HOME: WALKER, ROLLING
ADLS_ACUITY_SCORE: 47
ADLS_ACUITY_SCORE: 34
ADLS_ACUITY_SCORE: 51
ADLS_ACUITY_SCORE: 48
ADLS_ACUITY_SCORE: 48
ADLS_ACUITY_SCORE: 34
WEAR_GLASSES_OR_BLIND: NO
ADLS_ACUITY_SCORE: 47
DEPENDENT_IADLS:: CLEANING;COOKING;LAUNDRY;SHOPPING;MEAL PREPARATION;MEDICATION MANAGEMENT;TRANSPORTATION;INCONTINENCE
ADLS_ACUITY_SCORE: 32
ADLS_ACUITY_SCORE: 51
ADLS_ACUITY_SCORE: 49
ADLS_ACUITY_SCORE: 51
ADLS_ACUITY_SCORE: 32
ADLS_ACUITY_SCORE: 32
ADLS_ACUITY_SCORE: 47
ADLS_ACUITY_SCORE: 45
ADLS_ACUITY_SCORE: 47
ADLS_ACUITY_SCORE: 21
ADLS_ACUITY_SCORE: 49
ADLS_ACUITY_SCORE: 48
ADLS_ACUITY_SCORE: 32
ADLS_ACUITY_SCORE: 45
ADLS_ACUITY_SCORE: 32
ADLS_ACUITY_SCORE: 48
ADLS_ACUITY_SCORE: 47
ADLS_ACUITY_SCORE: 49
CHANGE_IN_FUNCTIONAL_STATUS_SINCE_ONSET_OF_CURRENT_ILLNESS/INJURY: NO
ADLS_ACUITY_SCORE: 34
ADLS_ACUITY_SCORE: 47
ADLS_ACUITY_SCORE: 49
ADLS_ACUITY_SCORE: 48
ADLS_ACUITY_SCORE: 32
ADLS_ACUITY_SCORE: 49
WALKING_OR_CLIMBING_STAIRS_DIFFICULTY: NO
ADLS_ACUITY_SCORE: 48
ADLS_ACUITY_SCORE: 48

## 2023-01-01 ASSESSMENT — ENCOUNTER SYMPTOMS
CONFUSION: 1
HYPOTENSION: 1
VOMITING: 1
FEVER: 1
ABDOMINAL PAIN: 1

## 2023-04-17 PROBLEM — J18.9 MULTIFOCAL PNEUMONIA: Status: ACTIVE | Noted: 2023-01-01

## 2023-04-17 PROBLEM — N39.0 URINARY TRACT INFECTION ASSOCIATED WITH INDWELLING URETHRAL CATHETER, INITIAL ENCOUNTER (H): Status: ACTIVE | Noted: 2023-01-01

## 2023-04-17 PROBLEM — Z79.01 ANTICOAGULATED: Status: ACTIVE | Noted: 2023-01-01

## 2023-04-17 PROBLEM — J96.02 ACUTE RESPIRATORY FAILURE WITH HYPOXIA AND HYPERCAPNIA (H): Status: ACTIVE | Noted: 2023-01-01

## 2023-04-17 PROBLEM — J96.01 ACUTE RESPIRATORY FAILURE WITH HYPOXIA AND HYPERCAPNIA (H): Status: ACTIVE | Noted: 2023-01-01

## 2023-04-17 PROBLEM — T83.511A URINARY TRACT INFECTION ASSOCIATED WITH INDWELLING URETHRAL CATHETER, INITIAL ENCOUNTER (H): Status: ACTIVE | Noted: 2023-01-01

## 2023-04-17 PROBLEM — R65.21 SEPTIC SHOCK (H): Status: ACTIVE | Noted: 2023-01-01

## 2023-04-17 PROBLEM — Z66 DNR (DO NOT RESUSCITATE): Status: ACTIVE | Noted: 2023-01-01

## 2023-04-17 PROBLEM — A41.9 SEPTIC SHOCK (H): Status: ACTIVE | Noted: 2023-01-01

## 2023-04-17 NOTE — PROGRESS NOTES
CLINICAL NUTRITION SERVICES - ASSESSMENT NOTE     Nutrition Prescription    RECOMMENDATIONS FOR MDs/PROVIDERS TO ORDER:  Consider Nutrition support if unable to start diet in 24-48 hrs.  Patient may be at risk for refeeding syndrome, please monitor electrolytes and replace if low.  Recommend MVI and 100 mg thiamine daily with malnutrition.     Malnutrition  Severe In acute illness    Recommendations already ordered by Registered Dietitian (RD):      Future/Additional Recommendations:  Monitor for refeeding syndrome.  Monitor for diet start.     REASON FOR ASSESSMENT  Ezequiel Randhawa is a/an 82 year old male assessed by the dietitian for team rounds referral.    NUTRITION HISTORY  Patient on BiPaP, does not waken during visit.  Patient resides in a nursing home.  Patient brought to hospital with nausea, vomiting, chills that started around midnight.  Spoke with Kelly, nurse at [facility].  She reports patient was on a regular diet but had lost weight so he was getting supplements 4 times/day.  She did not have details on wt loss.    CURRENT NUTRITION ORDERS  Diet: No diet order.  Patient on BiPaP.    LABS  Labs:  Electrolytes  Potassium (mmol/L)   Date Value   04/17/2023 3.8   03/02/2023 4.5   11/07/2022 4.4   05/17/2022 3.9   03/28/2022 4.4   01/26/2022 3.8     Phosphorus (mg/dL)   Date Value   01/01/2022 3.0   12/31/2021 3.0   12/30/2021 3.1   12/29/2021 3.2   12/28/2021 2.7    Blood Glucose  Glucose (mg/dL)   Date Value   04/17/2023 190 (H)   03/02/2023 74   11/07/2022 89   10/10/2022 69 (L)   10/03/2022 81   05/17/2022 91   03/28/2022 84   01/26/2022 91   01/21/2022 97   01/17/2022 127 (H)     GLUCOSE BY METER POCT (mg/dL)   Date Value   04/17/2023 178 (H)   04/17/2023 139 (H)   04/17/2023 175 (H)     Hemoglobin A1C (%)   Date Value   04/17/2023 5.0   12/21/2021 5.5    Inflammatory Markers  CRP Inflammation (mg/L)   Date Value   12/25/2021 8.1 (H)   12/24/2021 12.0 (H)   12/23/2021 24.0 (H)     CRP  "(mg/dL)   Date Value   12/21/2021 10.2 (H)   12/20/2021 11.4 (H)   12/20/2021 17.9 (H)     WBC Count (10e3/uL)   Date Value   04/17/2023 17.1 (H)   03/02/2023 6.9   09/28/2022 8.9     Albumin (g/dL)   Date Value   04/17/2023 3.8   09/28/2022 3.6   03/28/2022 2.8 (L)   12/25/2021 2.2 (L)   12/21/2021 2.0 (L)      Magnesium (mg/dL)   Date Value   04/17/2023 1.8   01/01/2022 2.1   12/31/2021 2.4 (H)     Sodium (mmol/L)   Date Value   04/17/2023 140   03/02/2023 139   11/07/2022 137    Renal  Urea Nitrogen (mg/dL)   Date Value   04/17/2023 40.5 (H)   03/02/2023 22.7   11/07/2022 24.8 (H)   05/17/2022 33 (H)   03/28/2022 14   01/26/2022 15     Creatinine (mg/dL)   Date Value   04/17/2023 0.71   03/02/2023 0.65 (L)   11/07/2022 0.61 (L)     Additional  Ketones Urine (mg/dL)   Date Value   04/17/2023 Negative        Labs reviewed    MEDICATIONS    heparin ANTICOAGULANT  5,000 Units Subcutaneous Q8H     insulin aspart  1-4 Units Subcutaneous Q4H     methylPREDNISolone  62.5 mg Intravenous Q24H     pantoprazole  40 mg Intravenous Daily with breakfast     piperacillin-tazobactam  3.375 g Intravenous Q8H     sodium chloride (PF)  10-40 mL Intracatheter Q7 Days     [START ON 4/18/2023] vancomycin  1,250 mg Intravenous Q24H        norepinephrine 0.18 mcg/kg/min (04/17/23 1103)     vasopressin 2.4 Units/hr (04/17/23 0636)      glucose **OR** dextrose **OR** glucagon, lidocaine 4%, lidocaine (buffered or not buffered), sodium chloride (PF), sodium chloride (PF), sodium chloride (PF)   Medications reviewed    ANTHROPOMETRICS  Height: 188 cm (6' 2.016\")  Most Recent Weight: 59.9 kg (132 lb)    BMI: Underweight BMI <18.5  Weight History:   Wt Readings from Last 8 Encounters:   04/17/23 59.9 kg (132 lb)   01/04/22 60.2 kg (132 lb 11.5 oz)   12/21/21 59 kg (130 lb)       Dosing Weight: 59.9 kg    ASSESSED NUTRITION NEEDS  Estimated Energy Needs: 1800+ kcals/day (30+ kcals/kg )  Justification: Increased needs and Underweight  Estimated " Protein Needs: 60+ grams protein/day (1+ grams of pro/kg)  Justification: Repletion  Estimated Fluid Needs: 1500+ mL/day (25+ ml/kcal)   Justification: Maintenance    PHYSICAL FINDINGS  See malnutrition section below.  Thin, cachectic.  BiPap       MALNUTRITION:  % Weight Loss:  Weight loss does not meet criteria for malnutrition, limited data.  % Intake:  Decreased intake does not meet criteria for malnutrition, limited data though suspect decreased intake longer than 1 wk.  Subcutaneous Fat Loss:  Orbital region severe depletion, Upper arm region severe depletion and Thoracic region severe depletion  Muscle Loss:  Temporal region severe depletion, Clavicle bone region severe depletion, Acromion bone region severe depletion, Dorsal hand region severe depletion and Patellar region severe depletion  Fluid Retention:  None noted    Malnutrition Diagnosis: Severe malnutrition  In Context of:  Chronic illness or disease    NUTRITION DIAGNOSIS  Malnutrition related to chronic illness as evidenced by severe loss of lean body mass      INTERVENTIONS  Implementation  Nutrition Education: Not appropriate at this time due to patient condition   Consider Nutrition support if unable to start diet in 24-48 hrs.  Patient may be at risk for refeeding syndrome, please monitor electrolytes and replace if low.  Recommend MVI and 100 mg thiamine daily with malnutrition.     Goals  No wt loss  Electrolytes WNL  Diet advancement vs nutrition support within 2-3 days.  Patient to consume % of nutritionally adequate meals three times per day, or the equivalent with supplements/snacks.     Monitoring/Evaluation  Progress toward goals will be monitored and evaluated per protocol.

## 2023-04-17 NOTE — PROCEDURES
"PICC Line Insertion Procedure Note  Pt. Name: Ezequiel Randhawa  MRN:        [id number]    Procedure: Insertion of a  triple Lumen  5 fr  Bard SOLO (valved) Power PICC, Lot number [id number]    Indications: Vasopressor    Contraindications : none    Procedure Details     Patient identified with 2 identifiers and \"Time Out\" conducted.  .     Central line insertion bundle followed: hand hygiene performed prior to procedure, site cleansed with cholraprep, hat, mask, sterile gloves, sterile gown worn, patient draped with maximum barrier head to toe drape, sterile field maintained.    The vein was assessed and found to be compressible and of adequate size.     Lidocaine 1% 2 ml administered sq to the insertion site. A 5 Fr PICC was inserted into the basilic vein of the right arm with ultrasound guidance. 1 attempt(s) required to access vein.   Catheter threaded without difficulty. Good blood return noted.    Modified Seldinger Technique used for insertion.    The 8 sharps that are included in the PICC insertion kit were accounted for and disposed of in the sharps container prior to breakdown of the sterile field.    Catheter secured with Statlock, biopatch and Tegaderm dressing applied.    Findings:    Total catheter length  39 cm, with 0 cm exposed. Mid upper arm circumference is 19 cm. Catheter was flushed with 30 cc NS. Patient  tolerated procedure well.    Tip placement verified by xray. Xray read by Dr. Swartz . Tip placement in the proximal SVC.    CLABSI prevention brochure left at bedside.    Patient's primary RN notified PICC is ready for use.      Comments:        Carlota OSORION,RN,VA-BC  Vascular Access - [facility]        "

## 2023-04-17 NOTE — CONSULTS
Care Management Initial Consult    General Information  Assessment completed with: Other (Nursing home staff),    Type of CM/SW Visit: Initial Assessment    Primary Care Provider verified and updated as needed: Yes   Readmission within the last 30 days: no previous admission in last 30 days      Reason for Consult: discharge planning  Advance Care Planning:            Communication Assessment  Patient's communication style: spoken language (English or Bilingual)             Cognitive  Cognitive/Neuro/Behavioral: .WDL except, orientation, speech  Level of Consciousness: confused  Arousal Level: arouses to voice  Orientation: disoriented to, place, time, situation  Mood/Behavior: restless  Best Language: 0 - No aphasia  Speech: slow    Living Environment:   People in home: facility resident     Current living Arrangements: residential facility  Name of Facility: [facility]   Able to return to prior arrangements: other (see comments)  Living Arrangement Comments: waiting to see if there is a bed hold    Family/Social Support:  Care provided by: other (see comments) (facility)  Provides care for: no one, unable/limited ability to care for self                Description of Support System:           Current Resources:   Patient receiving home care services: No     Community Resources: None  Equipment currently used at home:    Supplies currently used at home:      Employment/Financial:  Employment Status:          Financial Concerns:             Lifestyle & Psychosocial Needs:  Social Determinants of Health     Tobacco Use: Not on file   Alcohol Use: Not on file   Financial Resource Strain: Not on file   Food Insecurity: Not on file   Transportation Needs: Not on file   Physical Activity: Not on file   Stress: Not on file   Social Connections: Not on file   Intimate Partner Violence: Not on file   Depression: Not on file   Housing Stability: Not on file       Functional Status:  Prior to admission patient needed  assistance:   Dependent ADLs:: Wheelchair-independent, Transfers, Positioning, Incontinence, Grooming, Dressing, Bathing, Toileting  Dependent IADLs:: Cleaning, Cooking, Laundry, Shopping, Meal Preparation, Medication Management, Transportation, Incontinence       Mental Health Status:          Chemical Dependency Status:                Values/Beliefs:  Spiritual, Cultural Beliefs, Muslim Practices, Values that affect care:                 Additional Information:  CM called pt son and pt son told CM that the pt lives with him but has been at [facility] since 01/06/22.  CM then reached out to [facility] and spoke to nursing and they told CM that pt does live there in the LTC and he is total cares will get to the  via lift sometimes but mostly refuses and stays in bed. When writer asked if there is a bed hold I was told no and that the facility will reach out to the pt son to get a bed hold.     RNCM to follow for medical progression, recommendations, and final discharge plan.      Ayleen Bai RN

## 2023-04-17 NOTE — PHARMACY-ADMISSION MEDICATION HISTORY
Pharmacist Admission Medication History    Admission medication history is complete. The information provided in this note is only as accurate as the sources available at the time of the update.    Medication reconciliation/reorder completed by provider prior to medication history? No    Information Source(s): Facility ([facility] medication list/MAR via N/A    Pertinent Information:  None    Changes made to PTA medication list:    Added: Pregabalin    Deleted: Rivaroxaban    Changed: Miralax    Allergies reviewed with patient and updates made in EHR: yes    Medication History Completed By: Jose Alfredo Evangelista RPH 4/17/2023 9:46 AM    PTA Med List   Medication Sig Last Dose     acetaminophen (TYLENOL) 500 MG tablet Take 1,000 mg by mouth 3 times daily 4/16/2023 at 1600     bisacodyl (DULCOLAX) 10 MG suppository Place 10 mg rectally daily as needed for constipation Unknown     ibuprofen (ADVIL/MOTRIN) 200 MG tablet Take 200 mg by mouth 2 times daily as needed for pain 4/15/2023 at 0400     Lidocaine (LIDOCARE) 4 % Patch Place onto the skin every 24 hours Both knees  To prevent lidocaine toxicity, patient should be patch free for 12 hrs daily. 4/16/2023 at 0800     melatonin 3 MG tablet Take 3 mg by mouth At Bedtime 4/16/2023 at 2000     Miconazole Nitrate 2 % ointment Apply topically 2 times daily Apply to groin 4/16/2023 at 1600     polyethylene glycol (MIRALAX) 17 g packet Take 17 g by mouth 2 times daily as needed for constipation (Patient taking differently: Take 17 g by mouth daily) 4/16/2023 at 0800     pregabalin (LYRICA) 75 MG capsule Take 75 mg by mouth 2 times daily 4/16/2023 at 2000     tamsulosin (FLOMAX) 0.4 MG capsule Take 1 capsule (0.4 mg) by mouth every evening 4/16/2023 at 1800

## 2023-04-17 NOTE — PROGRESS NOTES
Arrived in ER found patient on high flow NC 40lpm 60% and an oxymask on 10lpm Sao2 88%.  Placed patient on BiPAP 14/8 RR 12 80%. Sao2 increased to 95% Patient transferred to ICU on BiPAP without incident.  Mo Montanez, RT

## 2023-04-17 NOTE — H&P
"Critical Care Admission Note      04/17/2023    Name: Ezequiel Randhawa MRN#: [id number]   Age: 82 year old YOB: 1940                    Problem List:   Principal Problem:    Multifocal pneumonia  Active Problems:    DNR (do not resuscitate)    Anticoagulated    Septic shock (H)    Acute respiratory failure with hypoxia and hypercapnia (H)    Urinary tract infection associated with indwelling urethral catheter, initial encounter (H)    Clinically Significant Risk Factors Present on Admission               # Coagulation Defect: INR = 1.16 (Ref range: 0.85 - 1.15) and/or PTT = N/A, will monitor for bleeding      # Circulatory Shock: currently requiring pressors for blood pressure support  # Non-Invasive mechanical ventilation: current O2 Device: High Flow Nasal Cannula (HFNC) (Also oxymask 2 L)  # Acute hypoxic respiratory failure: continue supplemental O2 as needed     # Cachexia: Estimated body mass index is 16.94 kg/m  as calculated from the following:    Height as of this encounter: 1.88 m (6' 2.02\").    Weight as of this encounter: 59.9 kg (132 lb).                     HPI:     82-year-old male with a history of COVID-pneumonia in 2021 complicated at that time by pulmonary embolism and requiring intubation and mechanical ventilation.  Also history of atrial fibrillation, emphysema and urinary retention.  He woke up in the middle of the night with chills.  Transferred to the ED where he was found to have a urinary tract infection and bilateral pneumonia.    Hypoxic initially requiring high flow nasal cannula and eventually transitioned to BiPAP for increased work of breathing.  Sepsis treated with broad-spectrum antibiotics and vasopressors  Lactic acid initially elevated but now improving    According to the nursing home notes he is DNR.  Unable to reach family as the creation of this note      Assessment and plan :       I have personally reviewed the labs, imaging studies, cultures and discussed " the case with referring physician and consulting physicians.     My assessment and plan by system for this patient is as follows:    Neurology/Psychiatry:   Oriented to person only.  States that he is feeling better but no further history can be obtained from the patient      Cardiovascular:   Heart failure with reduced ejection fraction, last known EF of 25 to 30%  History of atrial fibrillation but currently in sinus rhythm  Septic shock requiring norepinephrine and vasopressin  Lactic acid improving      Pulmonary/Ventilator Management:   Acute hypoxic respiratory failure secondary to what appears to be aspiration pneumonia  Currently on BiPAP and tachypneic  DNR according to records from nursing home.  Would like to clarify that with the son    Continue vancomycin and Zosyn  Add Solu-Medrol steroids if shown to improve outcomes with pneumonia    History of emphysema      GI and Nutrition :   N.p.o. for now  Does appear pretty malnourished.  Protein calorie malnutrition      Renal/Fluids/Electrolytes:   Chronic Marina  CT with nonobstructing renal stones  Treating for UTI     - monitor function and electrolytes as needed with replacement per ICU protocols. - generally avoid nephrotoxic agents such as NSAID, IV contrast unless specifically required  - adjust medications as needed for renal clearance  - follow I/O's as appropriate.    Infectious Disease:   Septic shock  UTI and aspiration pneumonia  Continue vancomycin and Zosyn    Cultures pending    Endocrine:   We will start sliding scale insulin at low intensity  Plan  - ICU insulin protocol, goal sugar <180      Hematology/Oncology:   Chronically anticoagulated with rivaroxaban for a PE that he sustained while he had COVID and for A-fib  Currently in sinus rhythm.  I wonder how long he needs to do list anticoagulated but currently n.p.o. so heparin subcutaneously and we will see how he does            ICU Prophylaxis:   DVT: Hep Subq  PPI                  Medical History:     History reviewed. No pertinent past medical history.  Past Surgical History:   Procedure Laterality Date     PICC TRIPLE LUMEN PLACEMENT  12/20/2021          PICC TRIPLE LUMEN PLACEMENT  4/17/2023          Social History     Socioeconomic History     Marital status:      Spouse name: Not on file     Number of children: Not on file     Years of education: Not on file     Highest education level: Not on file   Occupational History     Not on file   Tobacco Use     Smoking status: Not on file     Smokeless tobacco: Not on file   Vaping Use     Vaping status: Not on file   Substance and Sexual Activity     Alcohol use: Not on file     Drug use: Not on file     Sexual activity: Not on file   Other Topics Concern     Not on file   Social History Narrative     Not on file     Social Determinants of Health     Financial Resource Strain: Not on file   Food Insecurity: Not on file   Transportation Needs: Not on file   Physical Activity: Not on file   Stress: Not on file   Social Connections: Not on file   Intimate Partner Violence: Not on file   Housing Stability: Not on file      No Known Allergies           Key Medications:       heparin ANTICOAGULANT  5,000 Units Subcutaneous Q8H     methylPREDNISolone  62.5 mg Intravenous Q24H     pantoprazole  40 mg Intravenous Daily with breakfast     piperacillin-tazobactam  3.375 g Intravenous Q8H     sodium chloride (PF)  10-40 mL Intracatheter Q7 Days     [START ON 4/18/2023] vancomycin  1,250 mg Intravenous Q24H       norepinephrine 0.15 mcg/kg/min (04/17/23 0931)     vasopressin 2.4 Units/hr (04/17/23 0636)        Home Meds  No current facility-administered medications on file prior to encounter.  acetaminophen (TYLENOL) 500 MG tablet, Take 1,000 mg by mouth 3 times daily  bisacodyl (DULCOLAX) 10 MG suppository, Place 10 mg rectally daily as needed for constipation  ibuprofen (ADVIL/MOTRIN) 200 MG tablet, Take 200 mg by mouth 2 times daily as needed for  pain  Lidocaine (LIDOCARE) 4 % Patch, Place onto the skin every 24 hours Both knees  To prevent lidocaine toxicity, patient should be patch free for 12 hrs daily.  melatonin 3 MG tablet, Take 3 mg by mouth At Bedtime  Miconazole Nitrate 2 % ointment, Apply topically 2 times daily Apply to groin  polyethylene glycol (MIRALAX) 17 g packet, Take 17 g by mouth 2 times daily as needed for constipation (Patient taking differently: Take 17 g by mouth daily)  pregabalin (LYRICA) 75 MG capsule, Take 75 mg by mouth 2 times daily  tamsulosin (FLOMAX) 0.4 MG capsule, Take 1 capsule (0.4 mg) by mouth every evening               Physical Examination:   Temp:  [101  F (38.3  C)] 101  F (38.3  C)  Pulse:  [112-150] 113  Resp:  [24-58] 32  BP: (58-98)/(42-62) 93/54  FiO2 (%):  [40 %-65 %] 60 %  SpO2:  [86 %-96 %] 86 %    Intake/Output Summary (Last 24 hours) at 4/17/2023 1009  Last data filed at 4/17/2023 0642  Gross per 24 hour   Intake 3048 ml   Output --   Net 3048 ml     Wt Readings from Last 4 Encounters:   04/17/23 59.9 kg (132 lb)   01/04/22 60.2 kg (132 lb 11.5 oz)   12/21/21 59 kg (130 lb)     BP - Mean:  [47-72] 66  FiO2 (%): 60 %  Resp: (!) 32    No lab results found in last 7 days.    GEN: On BiPAP, tachypneic  HEENT: head ncat, sclera anicteric, OP patent, trachea midline   PULM: Bilateral crackles  CV/COR: RRR S1S2 no gallop,  No rub, no murmur  ABD: soft nontender, hypoactive bowel sounds, no mass  EXT: No edema.  Significant muscle wasting  NEURO: Wakes up.  Oriented to person only  SKIN: no obvious rash  LINES: clean, dry intact         Data:   All data and imaging reviewed     ROUTINE ICU LABS (Last four results)  CMP  Recent Labs   Lab 04/17/23  0756 04/17/23  0255 04/17/23  0213   NA  --  140  --    POTASSIUM  --  3.8  --    CHLORIDE  --  100  --    CO2  --  22  --    ANIONGAP  --  18*  --    * 190* 175*   BUN  --  40.5*  --    CR  --  0.71  --    GFRESTIMATED  --  >90  --    GREGORY  --  9.2  --    MAG  --   1.8  --    PROTTOTAL  --  7.1  --    ALBUMIN  --  3.8  --    BILITOTAL  --  0.5  --    ALKPHOS  --  108  --    AST  --  27  --    ALT  --  18  --      CBC  Recent Labs   Lab 04/17/23  0255   WBC 17.1*   RBC 4.25*   HGB 14.5   HCT 45.7   *   MCH 34.1*   MCHC 31.7   RDW 12.7        INR  Recent Labs   Lab 04/17/23  0255   INR 1.16*     Arterial Blood GasNo lab results found in last 7 days.    All cultures:  No results for input(s): CULT in the last 168 hours.  Recent Results (from the past 24 hour(s))   POC US CHEST B-SCAN    Narrative    Sean Cardona MD     4/17/2023  6:09 AM  POC US CHEST B-SCAN    Date/Time: 4/17/2023 5:49 AM    Performed by: Sean Cardona MD  Authorized by: Sean Cardona MD    Procedure details:     Indications: hypotension and hypoxia    Comments:      E-FAST bedside ultrasound performed by me for hypoxia & low blood   pressures with clinical picture of sepsis.    Cardiac: no pericardial effusion, hyperdynamic  Lungs:  Lung sliding present bilaterally. Right lung with no B lines. Left   lower lower with B lines present.  Abdomen:  No free fluid, no AAA seen    Imaged saved.   CT Chest Abdomen Pelvis w/o Contrast    Narrative    EXAM: CT CHEST ABDOMEN PELVIS W/O CONTRAST  LOCATION: [facility]  DATE/TIME: 4/17/2023 3:37 AM CDT    INDICATION: abdominal pain, hypoxia, sepsis  COMPARISON: 12/20/2021  TECHNIQUE: CT scan of the chest, abdomen, and pelvis was performed without IV contrast. Multiplanar reformats were obtained. Dose reduction techniques were used.   CONTRAST: None.    FINDINGS:   LUNGS AND PLEURA: Apical scarring. Mild emphysema. Extensive bilateral nodular, patchy, and/or confluent airspace opacities with surrounding groundglass in all lobes. No pleural effusions.    MEDIASTINUM/AXILLAE: 3.6 cm ectasia ascending thoracic aorta. Heart size normal, no pericardial effusion. No adenopathy. Patulous debris-filled esophagus.    CORONARY  ARTERY CALCIFICATION: Severe.    HEPATOBILIARY: Gallstones.    PANCREAS: Normal.    SPLEEN: Normal.    ADRENAL GLANDS: Normal.    KIDNEYS/BLADDER: Bilateral renal stones. No hydronephrosis. Bladder mild distention with air-fluid level and Marina catheter in place. Dependent bladder stones.    BOWEL: Gastric distention with air debris level. Small and large bowel loops normal caliber. Rectal distention with gas and debris 10 cm caliber without inflammatory changes.    LYMPH NODES: Normal.    VASCULATURE: Atherosclerosis. 2.6 cm ectasia distal abdominal aorta.    PELVIC ORGANS: Enlarged prostate.    MUSCULOSKELETAL: Degenerative changes. Muscle atrophy.      Impression    IMPRESSION:  1.  Multifocal pneumonia.  2.  Delayed gastric clearance suspicious for gastric paresis less likely obstruction.  3.  Nephrolithiasis.  4.  Marina catheter malfunction versus clamped Marina.  5.  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   XR Chest Port 1 View    Narrative    EXAM: XR CHEST PORT 1 VIEW  LOCATION: [facility]  DATE/TIME: 4/17/2023 7:25 AM CDT    INDICATION: RN placed PICC   verify tip placement  COMPARISON: 04/17/2023 chest CT      Impression    IMPRESSION:     Right PICC tip over the proximal SVC.    No significant change of diffuse bilateral opacities with mid to lower lung predominant since recent chest CT.    No pleural effusion of significant size. No pneumothorax. Normal heart size.             Billing: This patient is critically ill: Yes. Total critical care time today 45  min exclusive of procedures or teaching.  Managing Septic shock, vasopressors and respiratory failure on BiPAP

## 2023-04-17 NOTE — ED PROVIDER NOTES
EMERGENCY DEPARTMENT ENCOUNTER      NAME: Ezequiel Randhawa  AGE: 82 year old male  YOB: 1940  MRN: [id number]  EVALUATION DATE & TIME: 4/17/2023  2:08 AM    PCP: Gaviota Mcgowan    ED PROVIDER: Sean Cardona M.D.      Chief Complaint   Patient presents with     Shaking     Fever     Altered Mental Status     Nausea, Vomiting, & Diarrhea         IMPRESSION  1. Septic shock (H)    2. Acute respiratory failure with hypoxia and hypercapnia (H)    3. Anticoagulated    4. Urinary tract infection associated with indwelling urethral catheter, initial encounter (H)    5. DNR (do not resuscitate)    6. Multifocal pneumonia        PLAN  - admit to intensivist for further care; ICU admit    ED COURSE & MEDICAL DECISION MAKING    ED Course as of 04/17/23 0609   Mon Apr 17, 2023   0223 82yoM with history of PE (takes Xarelto), COPD, CHF (EF 35-40% in 9/2021), BPH with chronic Marina catheter, chronic muscle wasting, DNR code status presenting from nursing home for evaluation of nausea, vomiting, fever, abdominal pain. Awoke tonight around midnight with nausea, vomiting, shaking chills. Reports diffuse abdominal pain as well. Increased confusion per nursing home. Given 4mg Zofran en route per EMS.    BP 91/54, HR 150s, temp 101F, RR 30, satting 88% on 15L oxymask. Cachectic elderly male on exam with mild increased work of breathing with no distress, no stridor, clear lungs bilaterally, diffuse abdominal tenderness with guarding, no peripheral edema, cloudy urine in Marina, follows commands & moves all limbs, fully oriented to person/place/time, no meningismus.    High concern for sepsis clinically; possible intraabdominal source given exam. Marina tubing suggestive of UTI. Doubt PE given Xarelto use. Not meningeal on exam; doubt meningitis. Will give IVF, IV antibiotics, Tylenol while obtaining blood, urine, CT.   0259 Bedside E-fast with no pericardial effusion, hyperdynamic heart, no B lines to right lung,  B lines to left lower lung, no free fluid in abdomen. Abdominal US limited by patient tolerance of exam.   0345 CT chest/abdomen/pelvis w/o contrast independently reviewed & interpreted by me: patchy lung infiltrates, no pneumothorax, no free intraabdominal air, dilated stomach, no obvious SBO or intestinal dilation, large stool in rectum, Marina in place   0430 Workup with both UTI & pneumonia as infectious sources. UA with positive nitrites, WBC clumps, bacteria---culture pending. CT with multifocal infiltrates; has gastroparesis suggestion but no SBO or other acute intraabdominal process. Doubt cholangitis with normal bilirubin/LFTs/lipase. Labs otherwise with WBC 17, high-sensitivity CRP 18.5, procal 0.44, lactic acid 8.1---meets septic shock criteria. Initially given 15mL/kg IVF given new hypoxia; 2nd 15mL/kg bolus ordered after CT resulted and no change in O2 need after 1st bolus complete. BP ranging 80s/50s to 90s/50s here now. COVID/influenza/RSV swab negative. Labs otherwise with no LORRAINE, no glaring electrolyte abnormality, no anemia. VBG 7.25/pCO2 55/bicarb 24; honestly I expected worse per clinical exam.    Patient answers simple questions appropriately on exam. States he is feeling improved from earlier tonight.   0523 Second IVF bolus finishing, MAP still <65. Levophed & PICC ordered.   0606 ICU bed now available here at [facility]. Consulted intensivist for admission; they agreed. Admission orders placed at this time. Repeat lactic acid pending at this time.     --------------------------------------------------------------------------------   --------------------------------------------------------------------------------     2:28 AM I met with the patient for the initial interview and physical examination. Discussed plan for treatment and workup in the ED.  4:04 AM I reassessed patient.   4:56 AM I reassessed patient.  6:01 AM I spoke to the intensivist Dr Rodriguez regarding patient.      This patient  involved a high degree of complexity in medical decision making, as significant risks were present and assessed. Recent encounters & results in medical record reviewed by me.    All workup (i.e. any EKG/labs/imaging as per charting below) reviewed and independently interpreted by me. See respective sections for details.    Broad differential considered for this patient presenting, including but not limited to:  Septic shock, UTI, pneumonia, intraabdominal infectious source, SBO, ischemic bowel, ACS, PE, acute aortic syndrome, COPD exacerbation, CHF    I wore the following PPE during this patient encounter:  N95 mask, face shield w/ eye protection, gloves    See additional MDM below if interested.      MEDICATIONS GIVEN IN THE EMERGENCY DEPARTMENT  Medications   lidocaine 1 % 0.1-5 mL (has no administration in time range)   lidocaine (LMX4) cream (has no administration in time range)   sodium chloride (PF) 0.9% PF flush 10-40 mL (has no administration in time range)   norepinephrine (LEVOPHED) 4 mg in  mL PERIPHERAL infusion (0.125 mcg/kg/min × 59.9 kg Intravenous Rate/Dose Change 4/17/23 0551)   piperacillin-tazobactam (ZOSYN) 3.375 g vial to attach to  mL bag (0 g Intravenous Stopped 4/17/23 0340)   0.9% sodium chloride BOLUS (0 mLs Intravenous Stopped 4/17/23 0408)   vancomycin (VANCOCIN) 1,250 mg in sodium chloride 0.9 % 250 mL intermittent infusion (0 mg Intravenous Stopped 4/17/23 0445)   acetaminophen (TYLENOL) tablet 975 mg (975 mg Oral $Given 4/17/23 0310)   0.9% sodium chloride BOLUS (0 mLs Intravenous Stopped 4/17/23 0523)   0.9% sodium chloride BOLUS ( Intravenous Restarted 4/17/23 0528)               =================================================================      HPI  Patient information was obtained from: Patient, nursing home    Use of : N/A        Ezequiel Randhawa is a 82 year old male with a pertinent history of COPD, PE, afib, CHF, BPH with chronic burris, muscle wasting  jitendra, who presents to this ED via EMS for evaluation of fever, confusion.    Per nursing home, patient is more confused tonight with an episode of vomiting around 12 AM and a fever with Tmax 101 that is ongoing. Nursing home and EMS each gave patient a dose of zofran.    Patient endorses abdominal pain and chest pain that he states is new.    Patient denies all other complaints at this time.      REVIEW OF SYSTEMS   Review of Systems   Constitutional: Positive for fever.   Cardiovascular: Positive for chest pain.   Gastrointestinal: Positive for abdominal pain and vomiting.   Psychiatric/Behavioral: Positive for confusion.   All other systems reviewed and are negative.    All other systems reviewed and are negative except as noted above in HPI.        --------------- MEDICAL HISTORY ---------------  PAST MEDICAL HISTORY:  Reviewed independently by me.  History reviewed. No pertinent past medical history.  Patient Active Problem List   Diagnosis     Hypernatremia     Hypoxia     Pneumonia due to 2019 novel coronavirus     Pneumonia due to COVID-19 virus     DNR (do not resuscitate)     Anticoagulated     Septic shock (H)     Acute respiratory failure with hypoxia and hypercapnia (H)     Multifocal pneumonia     Urinary tract infection associated with indwelling urethral catheter, initial encounter (H)   - COPD  - CHF    PAST SURGICAL HISTORY:  Reviewed independently by me.  Past Surgical History:   Procedure Laterality Date     PICC TRIPLE LUMEN PLACEMENT  12/20/2021            CURRENT MEDICATIONS:    Reviewed independently by me.    Current Facility-Administered Medications:      lidocaine (LMX4) cream, , Topical, Q1H PRN, Sean Cardona MD     lidocaine 1 % 0.1-5 mL, 0.1-5 mL, Other, Q1H PRN, Sean Cardona MD     norepinephrine (LEVOPHED) 4 mg in  mL PERIPHERAL infusion, 0.03-0.125 mcg/kg/min, Intravenous, Continuous, Sean Cardona MD, Last Rate: 28.1 mL/hr at 04/17/23 0551, 0.125 mcg/kg/min  at 04/17/23 0551     sodium chloride (PF) 0.9% PF flush 10-40 mL, 10-40 mL, Intracatheter, Once PRN, Sean Cardona MD    Current Outpatient Medications:      acetaminophen (TYLENOL) 325 MG tablet, Take 2 tablets (650 mg) by mouth every 4 hours as needed for mild pain or fever, Disp: , Rfl:      polyethylene glycol (MIRALAX) 17 g packet, Take 17 g by mouth 2 times daily as needed for constipation, Disp: , Rfl:      rivaroxaban ANTICOAGULANT (XARELTO) 15 MG TABS tablet, Take 1 tablet (15 mg) by mouth 2 times daily (with meals), Disp: , Rfl:      rivaroxaban ANTICOAGULANT (XARELTO) 20 MG TABS tablet, Take 1 tablet (20 mg) by mouth daily (with dinner), Disp: , Rfl:      senna-docusate (SENOKOT-S/PERICOLACE) 8.6-50 MG tablet, Take 2 tablets by mouth nightly as needed for constipation, Disp: , Rfl:      tamsulosin (FLOMAX) 0.4 MG capsule, Take 1 capsule (0.4 mg) by mouth every evening, Disp: , Rfl:     ALLERGIES:  Reviewed independently by me.  No Known Allergies    FAMILY HISTORY:  Reviewed independently by me.  History reviewed. No pertinent family history.      SOCIAL HISTORY:   Reviewed independently by me.  Social History     Socioeconomic History     Marital status:        --------------- PHYSICAL EXAM ---------------  Nursing notes and vitals independently reviewed by me.  VITALS:  Vitals:    04/17/23 0546 04/17/23 0550 04/17/23 0556 04/17/23 0600   BP:  (!) 81/51 (!) 80/54 (!) 88/50   Pulse:  112 115 114   Resp: 28 (!) 32 (!) 34 30   Temp:       TempSrc:       SpO2: 91% 92% 93% 91%   Weight:           PHYSICAL EXAM:    General:  Cachectic elderly white male lying in bed, acutely ill appearing  Eyes:  conjunctivae clear, conjugate gaze  HENT:  atraumatic, nose with no rhinorrhea, oropharynx clear, dry mucous membranes  Neck:  no meningismus  Cardiovascular:  HR 150s during exam, regular rhythm, no murmurs, brisk cap refill  Chest:  no chest wall tenderness  Pulmonary:  no stridor, normal phonation, mild  increased work of breathing with no distress, clear lungs bilaterally. Patient satting 88% on 15L oxymask  Abdomen:  soft, nondistended. Diffuse abdominal tenderness with guarding.  :  no CVA tenderness. Cloudy urine in burris  Back:  no midline spinal tenderness  Musculoskeletal:  no pretibial edema, no calf tenderness. Gross ROM intact to joints of extremities with no obvious deformities.  Skin:  warm, dry, no rash  Neuro:  awake, alert, answers questions appropriately, follows commands, moves all limbs, no tremor  Psych:  calm, normal affect      --------------- RESULTS ---------------  EKG:    Reviewed and independently interpreted by me.  - sinus tachycardia at 150bpm, no ST or T wave changes, QRS 86, QTc 553  - increased heart rate from prior on 1/1/22  My read.    LAB:  Reviewed and independently interpreted by me.  Results for orders placed or performed during the hospital encounter of 04/17/23   CT Chest Abdomen Pelvis w/o Contrast    Impression    IMPRESSION:  1.  Multifocal pneumonia.  2.  Delayed gastric clearance suspicious for gastric paresis less likely obstruction.  3.  Nephrolithiasis.  4.  Burris catheter malfunction versus clamped Burris.  5.  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   POC US CHEST B-SCAN    Impression    no pericardial effusion, hyperdynamic heart, no B lines to right lung, B lines to left lower lung, no free fluid in abdomen   Glucose by meter   Result Value Ref Range    GLUCOSE BY METER POCT 175 (H) 70 - 99 mg/dL   UA with Microscopic reflex to Culture    Specimen: Urine, Burris Catheter   Result Value Ref Range    Color Urine Yellow Colorless, Straw, Light Yellow, Yellow    Appearance Urine Cloudy (A) Clear    Glucose Urine Negative Negative mg/dL    Bilirubin Urine Negative Negative    Ketones Urine Negative Negative mg/dL    Specific Gravity Urine 1.022 1.001 - 1.030    Blood Urine 0.06 mg/dL (A) Negative    pH Urine 6.5 5.0 - 7.0    Protein Albumin  Urine 100 (A) Negative mg/dL    Urobilinogen Urine <2.0 <2.0 mg/dL    Nitrite Urine Positive (A) Negative    Leukocyte Esterase Urine 500 Tiny/uL (A) Negative    Bacteria Urine Few (A) None Seen /HPF    WBC Clumps Urine Present (A) None Seen /HPF    RBC Urine 13 (H) <=2 /HPF    WBC Urine >182 (H) <=5 /HPF    Squamous Epithelials Urine 1 <=1 /HPF   Result Value Ref Range    INR 1.16 (H) 0.85 - 1.15   Basic metabolic panel   Result Value Ref Range    Sodium 140 136 - 145 mmol/L    Potassium 3.8 3.4 - 5.3 mmol/L    Chloride 100 98 - 107 mmol/L    Carbon Dioxide (CO2) 22 22 - 29 mmol/L    Anion Gap 18 (H) 7 - 15 mmol/L    Urea Nitrogen 40.5 (H) 8.0 - 23.0 mg/dL    Creatinine 0.71 0.67 - 1.17 mg/dL    Calcium 9.2 8.8 - 10.2 mg/dL    Glucose 190 (H) 70 - 99 mg/dL    GFR Estimate >90 >60 mL/min/1.73m2   Result Value Ref Range    Lipase 49 13 - 60 U/L   Hepatic function panel   Result Value Ref Range    Protein Total 7.1 6.4 - 8.3 g/dL    Albumin 3.8 3.5 - 5.2 g/dL    Bilirubin Total 0.5 <=1.2 mg/dL    Alkaline Phosphatase 108 40 - 129 U/L    AST 27 10 - 50 U/L    ALT 18 10 - 50 U/L    Bilirubin Direct <0.20 0.00 - 0.30 mg/dL   Result Value Ref Range    Magnesium 1.8 1.7 - 2.3 mg/dL   TSH with free T4 reflex   Result Value Ref Range    TSH 1.00 0.30 - 4.20 uIU/mL   Result Value Ref Range    CRP Inflammation 18.50 (H) <5.00 mg/L   Result Value Ref Range    Procalcitonin 0.44 (H) <0.05 ng/mL   Lactic acid whole blood   Result Value Ref Range    Lactic Acid 8.1 (HH) 0.7 - 2.0 mmol/L   Blood gas venous   Result Value Ref Range    pH Venous 7.25 (L) 7.35 - 7.45    pCO2 Venous 55 (H) 35 - 50 mm Hg    pO2 Venous 23 (L) 25 - 47 mm Hg    Bicarbonate Venous 24 24 - 30 mmol/L    Base Excess/Deficit (+/-) -2.8   mmol/L    Oxyhemoglobin Venous 30.7 (L) 70.0 - 75.0 %    O2 Sat, Venous 31.3 (L) 70.0 - 75.0 %   CBC with platelets and differential   Result Value Ref Range    WBC Count 17.1 (H) 4.0 - 11.0 10e3/uL    RBC Count 4.25 (L) 4.40 -  5.90 10e6/uL    Hemoglobin 14.5 13.3 - 17.7 g/dL    Hematocrit 45.7 40.0 - 53.0 %     (H) 78 - 100 fL    MCH 34.1 (H) 26.5 - 33.0 pg    MCHC 31.7 31.5 - 36.5 g/dL    RDW 12.7 10.0 - 15.0 %    Platelet Count 251 150 - 450 10e3/uL    % Neutrophils 92 %    % Lymphocytes 6 %    % Monocytes 1 %    % Eosinophils 1 %    % Basophils 0 %    % Immature Granulocytes 0 %    NRBCs per 100 WBC 0 <1 /100    Absolute Neutrophils 15.8 (H) 1.6 - 8.3 10e3/uL    Absolute Lymphocytes 0.9 0.8 - 5.3 10e3/uL    Absolute Monocytes 0.2 0.0 - 1.3 10e3/uL    Absolute Eosinophils 0.1 0.0 - 0.7 10e3/uL    Absolute Basophils 0.0 0.0 - 0.2 10e3/uL    Absolute Immature Granulocytes 0.0 <=0.4 10e3/uL    Absolute NRBCs 0.0 10e3/uL   Symptomatic Influenza A/B, RSV, & SARS-CoV2 PCR (COVID-19) Nasopharyngeal    Specimen: Nasopharyngeal; Swab   Result Value Ref Range    Influenza A PCR Negative Negative    Influenza B PCR Negative Negative    RSV PCR Negative Negative    SARS CoV2 PCR Negative Negative   Extra Red Top Tube   Result Value Ref Range    Hold Specimen JIC    Lactic acid whole blood   Result Value Ref Range    Lactic Acid 3.6 (H) 0.7 - 2.0 mmol/L   ECG 12-LEAD WITH MUSE (L[facility])   Result Value Ref Range    Systolic Blood Pressure  mmHg    Diastolic Blood Pressure  mmHg    Ventricular Rate 150 BPM    Atrial Rate 159 BPM    WY Interval  ms    QRS Duration 86 ms     ms    QTc 553 ms    P Axis  degrees    R AXIS 267 degrees    T Axis 75 degrees    Interpretation ECG       ** Poor data quality, interpretation may be adversely affected  Supraventricular tachycardia  Right superior axis deviation  Right ventricular hypertrophy  Cannot rule out Anterior infarct (cited on or before 17-APR-2023)  Abnormal ECG  When compared with ECG of 01-JAN-2022 03:16,  Previous ECG has undetermined rhythm, needs review  Questionable change in initial forces of Anteroseptal leads  ST now depressed in Anterior leads  T wave inversion no longer evident in  Anterior leads  Confirmed by SEE ED PROVIDER NOTE FOR, ECG INTERPRETATION ([id number]),  JOE STARKS ([id number]) on 4/17/2023 4:37:52 AM         RADIOLOGY:  Reviewed and independently interpreted by me. Please see official radiology report.  Recent Results (from the past 24 hour(s))   POC US CHEST B-SCAN    Impression    no pericardial effusion, hyperdynamic heart, no B lines to right lung, B lines to left lower lung, no free fluid in abdomen   CT Chest Abdomen Pelvis w/o Contrast    Narrative    EXAM: CT CHEST ABDOMEN PELVIS W/O CONTRAST  LOCATION: [facility]  DATE/TIME: 4/17/2023 3:37 AM CDT    INDICATION: abdominal pain, hypoxia, sepsis  COMPARISON: 12/20/2021  TECHNIQUE: CT scan of the chest, abdomen, and pelvis was performed without IV contrast. Multiplanar reformats were obtained. Dose reduction techniques were used.   CONTRAST: None.    FINDINGS:   LUNGS AND PLEURA: Apical scarring. Mild emphysema. Extensive bilateral nodular, patchy, and/or confluent airspace opacities with surrounding groundglass in all lobes. No pleural effusions.    MEDIASTINUM/AXILLAE: 3.6 cm ectasia ascending thoracic aorta. Heart size normal, no pericardial effusion. No adenopathy. Patulous debris-filled esophagus.    CORONARY ARTERY CALCIFICATION: Severe.    HEPATOBILIARY: Gallstones.    PANCREAS: Normal.    SPLEEN: Normal.    ADRENAL GLANDS: Normal.    KIDNEYS/BLADDER: Bilateral renal stones. No hydronephrosis. Bladder mild distention with air-fluid level and Marina catheter in place. Dependent bladder stones.    BOWEL: Gastric distention with air debris level. Small and large bowel loops normal caliber. Rectal distention with gas and debris 10 cm caliber without inflammatory changes.    LYMPH NODES: Normal.    VASCULATURE: Atherosclerosis. 2.6 cm ectasia distal abdominal aorta.    PELVIC ORGANS: Enlarged prostate.    MUSCULOSKELETAL: Degenerative changes. Muscle atrophy.      Impression    IMPRESSION:  1.   Multifocal pneumonia.  2.  Delayed gastric clearance suspicious for gastric paresis less likely obstruction.  3.  Nephrolithiasis.  4.  Marina catheter malfunction versus clamped Marina.  5.  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         PROCEDURES:   POC US CHEST B-SCAN    Date/Time: 4/17/2023 5:49 AM    Performed by: Sean Cardona MD  Authorized by: Sean Cardona MD    Procedure details:     Indications: hypotension and hypoxia    Comments:      E-FAST bedside ultrasound performed by me for hypoxia & low blood pressures with clinical picture of sepsis.    Cardiac: no pericardial effusion, hyperdynamic  Lungs:  Lung sliding present bilaterally. Right lung with no B lines. Left lower lower with B lines present.  Abdomen:  No free fluid, no AAA seen    Imaged saved.       --------------------------------------------------------------------------------   Cardiac telemetry monitoring ordered, reviewed, and independently interpreted by me while patient was in the Emergency Department. Revealed sinus tachycardia with otherwise no acute abnormalities.  --------------------------------------------------------------------------------         Critical Care     Performed by:   Sean Cardona MD   Authorized by:   Sean Cardona MD  Total critical care time: 180 minutes (Critical care time was exclusive of separately billable procedures and treating other patients.)    Critical care was necessary to treat or prevent imminent or life-threatening deterioration of the following conditions: Septic shock requiring IVF, IV antibiotics, vasopressors, ICU admission    Critical care was time spent personally by me on the following activities:  - obtaining history from patient or surrogate  - examination of patient  - development of treatment plan with patient or surrogate  - ordering and performing treatments and interventions  - ordering and review of laboratory studies  - ordering and review  of radiographic studies  - re-evaluation of patient's condition  - monitoring for potential decompensation  - discussion with consultants  ---------------------------------------------------------------------------------------------------------------------  ---------------------------------------------------------------------------------------------------------------------    The patient has signs of Septic Shock  The patient has signs of septic shock as evidenced by:  1. Presence of Sepsis, AND  2. Lactic Acidosis with value greater than or equal to 4    Time septic shock diagnosis confirmed = 3:25am  04/17/23   as this was the time when Lactate was resulted and the level was greater than or equal to 4    3 Hour Septic Shock Bundle Completion:  1. Initial Lactic Acid Result:   Recent Labs   Lab Test 04/17/23  0557 04/17/23  0255 01/03/22  1118   LACT 3.6* 8.1* 1.0     2. Blood Cultures before Antibiotics: Yes  3. Broad Spectrum Antibiotics Administered:  yes       Anti-infectives (From admission through now)    Start     Dose/Rate Route Frequency Ordered Stop    04/17/23 0300  piperacillin-tazobactam (ZOSYN) 3.375 g vial to attach to  mL bag         3.375 g  over 30 Minutes Intravenous ONCE 04/17/23 0231 04/17/23 0340    04/17/23 0300  vancomycin (VANCOCIN) 1,250 mg in sodium chloride 0.9 % 250 mL intermittent infusion         1,250 mg  over 90 Minutes Intravenous ONCE 04/17/23 0242 04/17/23 0445          4. IF 30 mL/kg bolus criteria met based on:  -Lactate > 4  OR  -Initial Hypotension:  Definition:  2 low BP readings (SBP <90, MAP <65, or decrease > 40 from baseline due to infection) within 3 hrs of each other during the time period of 6 hrs before and 3 hrs  after time zero  THEN: Fluid volume administered in ED:  Full 30mL/kg bolus given in increments given new O2 need & history of CHF.    BMI Readings from Last 1 Encounters:   04/17/23 16.95 kg/m      30 mL/kg fluids based on weight: 1,800 mL  30 mL/kg  fluids based on IBW (must be >= 60 inches tall): Patient ideal weight not available.    Septic Shock reassessment:  1. Repeat Lactic Acid Level: pending at time of ICU admission  2. Vasopressors started for Persistent Hypotension defined by the last 2 BP readings within the ONE HOUR following completion of the 30mL/kg bolus being low (SBP <90 or MAP <65).    I attest to having performed a repeat sepsis exam and assessment of perfusion at 6:09 AM and the results demonstrate improved perfusion.            --------------------------------------------------------------------------------           --------------- ADDITIONAL MDM ---------------  History:  - Supplemental history from:       -- see above charting, if applicable: patient, EMS  - External Record(s) reviewed:       -- see above charting, if applicable       -- Inpatient/outpatient record (paperwork from nursing home)    Workup:  - Chart documentation above includes differential considered and any EKGs or imaging independently interpreted by provider.  - In additional to work up documented, I considered the following work up:       -- see above charting, if applicable    External Consultation:  - Discussion of management with another provider:       -- see above charting, if applicable    Complicating Factors:  - Care impacted by chronic illness:       -- see above MDM, past medical history, & problem list  - Care affected by social determinants of health:       -- see above social history    Disposition Considerations:  - Admit         I, Brant Lamb, am serving as a scribe to document services personally performed by Dr. Sean Cardona based on my observation and the provider's statements to me. I, Sean Cardona MD attest that Brant Lamb is acting in a scribe capacity, has observed my performance of the services and has documented them in accordance with my direction.      Sean Cardona MD  04/17/23  Emergency Medicine  [facility]  [facility] [place] EMERGENCY DEPARTMENT  [address] [place] [id number]  [phone]  Dept: [phone]      Sean Cardona MD  04/17/23 0609       Sean Cardona MD  04/17/23 0621

## 2023-04-17 NOTE — PHARMACY-VANCOMYCIN DOSING SERVICE
Pharmacy Vancomycin Initial Note  Date of Service 2023  Patient's  1940  82 year old, male    Indication: Sepsis    Current estimated CrCl = Estimated Creatinine Clearance: 70.3 mL/min (based on SCr of 0.71 mg/dL).    Creatinine for last 3 days  2023:  2:55 AM Creatinine 0.71 mg/dL    Recent Vancomycin Level(s) for last 3 days  No results found for requested labs within last 3 days.      Vancomycin IV Administrations (past 72 hours)                   vancomycin (VANCOCIN) 1,250 mg in sodium chloride 0.9 % 250 mL intermittent infusion (mg) 1,250 mg New Bag 23 0309                Nephrotoxins and other renal medications (From now, onward)    Start     Dose/Rate Route Frequency Ordered Stop    23 0400  vancomycin (VANCOCIN) 1,250 mg in sodium chloride 0.9 % 250 mL intermittent infusion         1,250 mg  over 90 Minutes Intravenous EVERY 24 HOURS 23 1005      23 1000  piperacillin-tazobactam (ZOSYN) 3.375 g vial to attach to  mL bag         3.375 g  over 30 Minutes Intravenous EVERY 8 HOURS 23 0819      23 0800  norepinephrine (LEVOPHED) 4 mg in  mL infusion PREMIX         0.01-0.6 mcg/kg/min × 59.9 kg  2.2-134.8 mL/hr  Intravenous CONTINUOUS 23 0757      23 0630  vasopressin (VASOSTRICT) 20 Units in sodium chloride 0.9 % 100 mL standard conc infusion         2.4 Units/hr  12 mL/hr  Intravenous CONTINUOUS 23 0620            Contrast Orders - past 72 hours (72h ago, onward)    None          [employer] Prediction of Planned Initial Vancomycin Regimen  Regimen: 1250 mg IV every 24 hours.  Start time: 15:09 on 2023  Exposure target: AUC24 (range)400-600 mg/L.hr   AUC24,ss: 424 mg/L.hr  Probability of AUC24 > 400: 57 %  Ctrough,ss: 11.2 mg/L  Probability of Ctrough,ss > 20: 11 %  Probability of nephrotoxicity (Lodise QUINTIN ): 7 %          Plan:  1. Start vancomycin  1250 mg IV q24h.   2. Vancomycin monitoring method:  AUC  3. Vancomycin therapeutic monitoring goal: 400-600 mg*h/L  4. Pharmacy will check vancomycin levels as appropriate in 1-3 Days.    5. Serum creatinine levels will be ordered daily for the first week of therapy and at least twice weekly for subsequent weeks.      Jacquelyn Robertson RP

## 2023-04-17 NOTE — ED TRIAGE NOTES
Pt is from [facility] came to by [facility]. Per EMS pt is oriented to self and time which are new tonight. Pt  vomited x1 @ midnight and started shaking since he vomited. Pt received 4mg zofran enroute, , temp 101f, tachy 150's,Marina tubing is yellow and have sediments on tubing.      Triage Assessment     Row Name 04/17/23 0239       Triage Assessment (Adult)    Airway WDL X       Respiratory WDL    Respiratory WDL rhythm/pattern    Rhythm/Pattern, Respiratory shortness of breath       Skin Circulation/Temperature WDL    Skin Circulation/Temperature WDL circulation    Skin Circulation pallor       Cardiac WDL    Cardiac WDL chest pain;rhythm    Pulse Rate & Regularity tachycardic    Cardiac Rhythm SVT       Chest Pain Assessment    Chest Pain Location midsternal    Chest Pain Radiation abdomen    Associated Signs/Symptoms tachycardia    Chest Pain Intervention 12-lead ECG obtained;cardiac monitor placed       Peripheral/Neurovascular WDL    Peripheral Neurovascular WDL WDL    Capillary Refill, General greater than 3 secs       Cognitive/Neuro/Behavioral WDL    Cognitive/Neuro/Behavioral WDL level of consciousness    Level of Consciousness confused       [place] Coma Scale    Best Eye Response 4-->(E4) spontaneous    Best Motor Response 6-->(M6) obeys commands    Best Verbal Response 4-->(V4) confused    [place] Coma Scale Score 14

## 2023-04-17 NOTE — PHARMACY-VANCOMYCIN DOSING SERVICE
Pharmacy Vancomycin Initial Note  Date of Service 2023  Patient's  1940  82 year old, male    Indication: Sepsis    Current estimated CrCl = CrCl cannot be calculated (Patient's most recent lab result is older than the maximum 30 days allowed.).    Creatinine for last 3 days  No results found for requested labs within last 3 days.    Recent Vancomycin Level(s) for last 3 days  No results found for requested labs within last 3 days.      Vancomycin IV Administrations (past 72 hours)      No vancomycin orders with administrations in past 72 hours.                Nephrotoxins and other renal medications (From now, onward)    Start     Dose/Rate Route Frequency Ordered Stop    23 0300  piperacillin-tazobactam (ZOSYN) 3.375 g vial to attach to  mL bag         3.375 g  over 30 Minutes Intravenous ONCE 23 0231      23 0300  vancomycin (VANCOCIN) 1,250 mg in sodium chloride 0.9 % 250 mL intermittent infusion         1,250 mg  over 90 Minutes Intravenous ONCE 23 0242            Contrast Orders - past 72 hours (72h ago, onward)    None              Plan:  1. Start vancomycin  1250 mg IV x1 dose to ER.       [facility]

## 2023-04-17 NOTE — PLAN OF CARE
Problem: Gas Exchange Impaired  Goal: Optimal Gas Exchange  Outcome: Progressing   Goal Outcome Evaluation:    Janina Gomez, RT

## 2023-04-17 NOTE — PROGRESS NOTES
"Continues on BiPAP 14/8 12 .60. Pt parameters , RR 27, Ve 21.1, PIP 14. Tolerating well, asleep. Medium FFM, RT to follow, change to under the nose mask when available.     /68   Pulse 96   Temp 100  F (37.8  C) (Axillary)   Resp 25   Ht 1.88 m (6' 2.02\")   Wt 59.9 kg (132 lb)   SpO2 97%   BMI 16.94 kg/m        "

## 2023-04-18 NOTE — PLAN OF CARE
Patient was weaned from BiPAP to HFNC this am. Current HFNC Settings, 40L, 50% Sats mid 90's. No resp distress or increased WOB noted at this moment. RT to follow.      Tiffanie Sosa, RT

## 2023-04-18 NOTE — PROGRESS NOTES
"Critical Care progress note      04/18/2023    Name: Ezequiel Randhawa MRN#: [id number]   Age: 82 year old YOB: 1940                    Problem List:   Principal Problem:    Multifocal pneumonia  Active Problems:    DNR (do not resuscitate)    Anticoagulated    Septic shock (H)    Acute respiratory failure with hypoxia and hypercapnia (H)    Urinary tract infection associated with indwelling urethral catheter, initial encounter (H)    Clinically Significant Risk Factors        # Hypokalemia: Lowest K = 3.1 mmol/L in last 2 days, will replace as needed  # Hypernatremia: Highest Na = 148 mmol/L in last 2 days, will monitor as appropriate  # Hypocalcemia: Lowest Ca = 8 mg/dL in last 2 days, will monitor and replace as appropriate                # Cachexia: Estimated body mass index is 16.94 kg/m  as calculated from the following:    Height as of this encounter: 1.88 m (6' 2.02\").    Weight as of this encounter: 59.9 kg (132 lb)., PRESENT ON ADMISSION  # Severe Malnutrition: based on nutrition assessment, PRESENT ON ADMISSION      Code Status: No CPR- Do NOT Intubate             HPI:     82-year-old male with a history of COVID-pneumonia in 2021 complicated at that time by pulmonary embolism and requiring intubation and mechanical ventilation.  Also history of atrial fibrillation, emphysema and urinary retention.  He woke up in the middle of the night with chills.  Transferred to the ED where he was found to have a urinary tract infection and bilateral pneumonia.    Hypoxic initially requiring high flow nasal cannula and eventually transitioned to BiPAP for increased work of breathing.  Sepsis treated with broad-spectrum antibiotics and vasopressors  Lactic acid initially elevated but now improving          Assessment and plan :       I have personally reviewed the labs, imaging studies, cultures and discussed the case with referring physician and consulting physicians.     My assessment and plan by system " for this patient is as follows:    Neurology/Psychiatry:   More awake today      Cardiovascular:   Heart failure with reduced ejection fraction, last known EF of 25 to 30%  History of atrial fibrillation but currently in sinus rhythm  Septic shock requiring norepinephrine and vasopressin  Lactic acid improving      Pulmonary/Ventilator Management:   Acute hypoxic respiratory failure secondary to what appears to be aspiration pneumonia  Taken off BiPAP this morning and currently on high flow nasal cannula 50% / 40 L    Continue vancomycin and Zosyn    History of emphysema      GI and Nutrition :   N.p.o. for now  Does appear pretty malnourished.  Protein calorie malnutrition      Renal/Fluids/Electrolytes:   Chronic Marina  CT with nonobstructing renal stones  Treating for UTI     Hypernatremia.  DC normal saline and start D5W.  Now that he is off BiPAP he can also drink some water.    - monitor function and electrolytes as needed with replacement per ICU protocols. - generally avoid nephrotoxic agents such as NSAID, IV contrast unless specifically required  - adjust medications as needed for renal clearance  - follow I/O's as appropriate.    Infectious Disease:   Septic shock  UTI and aspiration pneumonia  Continue vancomycin and Zosyn    Cultures with gram-positive cocci in pairs and chains 2 out of 2 and 1 out of 2 Neisseria species.  Not gonorrhea or meningitis    Endocrine:   We will start sliding scale insulin at low intensity  Plan  - ICU insulin protocol, goal sugar <180      Hematology/Oncology:   Chronically anticoagulated with rivaroxaban for a PE that he sustained while he had COVID and for A-fib  Currently in sinus rhythm.  I wonder how long he needs to do be anticoagulated but currently n.p.o. so heparin subcutaneously and we will see how he does            ICU Prophylaxis:   DVT: Hep Subq  PPI                 Medical History:     History reviewed. No pertinent past medical history.  Past Surgical  History:   Procedure Laterality Date     PICC TRIPLE LUMEN PLACEMENT  12/20/2021          PICC TRIPLE LUMEN PLACEMENT  4/17/2023          Social History     Socioeconomic History     Marital status:      Spouse name: Not on file     Number of children: Not on file     Years of education: Not on file     Highest education level: Not on file   Occupational History     Not on file   Tobacco Use     Smoking status: Not on file     Smokeless tobacco: Not on file   Vaping Use     Vaping status: Not on file   Substance and Sexual Activity     Alcohol use: Not on file     Drug use: Not on file     Sexual activity: Not on file   Other Topics Concern     Not on file   Social History Narrative     Not on file     Social Determinants of Health     Financial Resource Strain: Not on file   Food Insecurity: Not on file   Transportation Needs: Not on file   Physical Activity: Not on file   Stress: Not on file   Social Connections: Not on file   Intimate Partner Violence: Not on file   Housing Stability: Not on file      No Known Allergies           Key Medications:       heparin ANTICOAGULANT  5,000 Units Subcutaneous Q8H     insulin aspart  1-4 Units Subcutaneous Q4H     methylPREDNISolone  62.5 mg Intravenous Q24H     pantoprazole  40 mg Intravenous Daily with breakfast     piperacillin-tazobactam  3.375 g Intravenous Q8H     sodium chloride (PF)  10-40 mL Intracatheter Q7 Days     vancomycin  1,250 mg Intravenous Q24H       D5W 75 mL/hr at 04/18/23 0838     norepinephrine 0.12 mcg/kg/min (04/18/23 1245)     vasopressin 2.4 Units/hr (04/18/23 1245)        Home Meds  No current facility-administered medications on file prior to encounter.  acetaminophen (TYLENOL) 500 MG tablet, Take 1,000 mg by mouth 3 times daily  bisacodyl (DULCOLAX) 10 MG suppository, Place 10 mg rectally daily as needed for constipation  ibuprofen (ADVIL/MOTRIN) 200 MG tablet, Take 200 mg by mouth 2 times daily as needed for pain  Lidocaine (LIDOCARE) 4  % Patch, Place onto the skin every 24 hours Both knees  To prevent lidocaine toxicity, patient should be patch free for 12 hrs daily.  melatonin 3 MG tablet, Take 3 mg by mouth At Bedtime  Miconazole Nitrate 2 % ointment, Apply topically 2 times daily Apply to groin  polyethylene glycol (MIRALAX) 17 g packet, Take 17 g by mouth 2 times daily as needed for constipation (Patient taking differently: Take 17 g by mouth daily)  pregabalin (LYRICA) 75 MG capsule, Take 75 mg by mouth 2 times daily  tamsulosin (FLOMAX) 0.4 MG capsule, Take 1 capsule (0.4 mg) by mouth every evening               Physical Examination:   Temp:  [99.2  F (37.3  C)-99.8  F (37.7  C)] 99.2  F (37.3  C)  Pulse:  [] 81  Resp:  [20-47] 27  BP: ()/(50-73) 110/58  FiO2 (%):  [50 %-60 %] 50 %  SpO2:  [88 %-100 %] 100 %    Intake/Output Summary (Last 24 hours) at 4/17/2023 1009  Last data filed at 4/17/2023 0642  Gross per 24 hour   Intake 3048 ml   Output --   Net 3048 ml     Wt Readings from Last 4 Encounters:   04/17/23 59.9 kg (132 lb)   01/04/22 60.2 kg (132 lb 11.5 oz)   12/21/21 59 kg (130 lb)     BP - Mean:  [64-92] 78  FiO2 (%): 50 %  Resp: 27    No lab results found in last 7 days.    GEN: On high flow nasal cannula.  Comfortable  HEENT: head ncat, sclera anicteric, OP patent, trachea midline   PULM: Bilateral crackles  CV/COR: RRR S1S2 no gallop,  No rub, no murmur  ABD: soft nontender, hypoactive bowel sounds, no mass  EXT: No edema.  Significant muscle wasting  NEURO: More awake today  SKIN: no obvious rash  LINES: clean, dry intact         Data:   All data and imaging reviewed     ROUTINE ICU LABS (Last four results)  CMP  Recent Labs   Lab 04/18/23  1148 04/18/23  0753 04/18/23  0340 04/18/23  0309 04/17/23  0756 04/17/23  0255   NA  --   --  148*  --   --  140   POTASSIUM  --   --  3.1*  --   --  3.8   CHLORIDE  --   --  117*  --   --  100   CO2  --   --  21*  --   --  22   ANIONGAP  --   --  10  --   --  18*   * 152*  166* 148*   < > 190*   BUN  --   --  34.5*  --   --  40.5*   CR  --   --  0.59*  --   --  0.71   GFRESTIMATED  --   --  >90  --   --  >90   GREGORY  --   --  8.0*  --   --  9.2   MAG  --   --   --   --   --  1.8   PROTTOTAL  --   --   --   --   --  7.1   ALBUMIN  --   --   --   --   --  3.8   BILITOTAL  --   --   --   --   --  0.5   ALKPHOS  --   --   --   --   --  108   AST  --   --   --   --   --  27   ALT  --   --   --   --   --  18    < > = values in this interval not displayed.     CBC  Recent Labs   Lab 04/18/23  0340 04/17/23  0255   WBC 23.4* 17.1*   RBC 3.30* 4.25*   HGB 11.2* 14.5   HCT 34.6* 45.7   * 108*   MCH 33.9* 34.1*   MCHC 32.4 31.7   RDW 13.2 12.7   * 251     INR  Recent Labs   Lab 04/17/23  0255   INR 1.16*     Arterial Blood GasNo lab results found in last 7 days.    All cultures:  No results for input(s): CULT in the last 168 hours.  Recent Results (from the past 24 hour(s))   POC US CHEST B-SCAN    Narrative    Sean Cardona MD     4/17/2023  6:09 AM  POC US CHEST B-SCAN    Date/Time: 4/17/2023 5:49 AM    Performed by: Sean Cardona MD  Authorized by: Sean Cardona MD    Procedure details:     Indications: hypotension and hypoxia    Comments:      E-FAST bedside ultrasound performed by me for hypoxia & low blood   pressures with clinical picture of sepsis.    Cardiac: no pericardial effusion, hyperdynamic  Lungs:  Lung sliding present bilaterally. Right lung with no B lines. Left   lower lower with B lines present.  Abdomen:  No free fluid, no AAA seen    Imaged saved.   CT Chest Abdomen Pelvis w/o Contrast    Narrative    EXAM: CT CHEST ABDOMEN PELVIS W/O CONTRAST  LOCATION: [facility]  DATE/TIME: 4/17/2023 3:37 AM CDT    INDICATION: abdominal pain, hypoxia, sepsis  COMPARISON: 12/20/2021  TECHNIQUE: CT scan of the chest, abdomen, and pelvis was performed without IV contrast. Multiplanar reformats were obtained. Dose reduction techniques were  used.   CONTRAST: None.    FINDINGS:   LUNGS AND PLEURA: Apical scarring. Mild emphysema. Extensive bilateral nodular, patchy, and/or confluent airspace opacities with surrounding groundglass in all lobes. No pleural effusions.    MEDIASTINUM/AXILLAE: 3.6 cm ectasia ascending thoracic aorta. Heart size normal, no pericardial effusion. No adenopathy. Patulous debris-filled esophagus.    CORONARY ARTERY CALCIFICATION: Severe.    HEPATOBILIARY: Gallstones.    PANCREAS: Normal.    SPLEEN: Normal.    ADRENAL GLANDS: Normal.    KIDNEYS/BLADDER: Bilateral renal stones. No hydronephrosis. Bladder mild distention with air-fluid level and Marina catheter in place. Dependent bladder stones.    BOWEL: Gastric distention with air debris level. Small and large bowel loops normal caliber. Rectal distention with gas and debris 10 cm caliber without inflammatory changes.    LYMPH NODES: Normal.    VASCULATURE: Atherosclerosis. 2.6 cm ectasia distal abdominal aorta.    PELVIC ORGANS: Enlarged prostate.    MUSCULOSKELETAL: Degenerative changes. Muscle atrophy.      Impression    IMPRESSION:  1.  Multifocal pneumonia.  2.  Delayed gastric clearance suspicious for gastric paresis less likely obstruction.  3.  Nephrolithiasis.  4.  Marina catheter malfunction versus clamped Marina.  5.  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   XR Chest Port 1 View    Narrative    EXAM: XR CHEST PORT 1 VIEW  LOCATION: [facility]  DATE/TIME: 4/17/2023 7:25 AM CDT    INDICATION: RN placed PICC   verify tip placement  COMPARISON: 04/17/2023 chest CT      Impression    IMPRESSION:     Right PICC tip over the proximal SVC.    No significant change of diffuse bilateral opacities with mid to lower lung predominant since recent chest CT.    No pleural effusion of significant size. No pneumothorax. Normal heart size.             Billing: This patient is critically ill: Yes. Total critical care time today 37  min  exclusive of procedures or teaching.  Managing Septic shock, vasopressors and acute hypoxic respiratory failure

## 2023-04-18 NOTE — PROGRESS NOTES
Progress Note  Restraint Application    I recognize that restraints are physical and/or chemical interventions intended to restrict a person's movements. Restraints are currently needed to ensure the safety of this patient and/or others. My clinical rationale appears below.    Category/Type of Restraint  Non Violent:  Soft limb restraint x 2  --  Behavior  Pulling at tubes/lines  --  Root Cause of the Behavior  Sedation/intubation  --  Less-Restrictive Measures that Failed  Non Violent Measures:  Close Observation  --  Response to the Restraint  Patient unable to pull at tubes/lines  --  Criteria for Release from the Restraint  Patient calm and off sedation   SITE: #2 [address] [place] [address] RT KNEE  JYR:[id number]  CBW#:[id number]  NVO:0/0483

## 2023-04-18 NOTE — PROGRESS NOTES
Pt remained on BiPAP overnight. Settings adjusted from 14/8, rate 12 and 55% to 10/6, rate 16 and 50% due to Vts consistently over 800. Sats mid 90's, rate mid to high 20's, BS diminished throughout.

## 2023-04-18 NOTE — PROGRESS NOTES
"Continues on BiPAP 14/8 12 .60. SpO2 95 %, decreased FiO2 TO .55. BS clear, diminished bases. FFM medium, blanchable redness bridge of nose, changed to under the nose \"B\" size. Pt is resting comfortably.     /68   Pulse 97   Temp 100  F (37.8  C) (Axillary)   Resp 29   Ht 1.88 m (6' 2.02\")   Wt 59.9 kg (132 lb)   SpO2 95%   BMI 16.94 kg/m      RT to monitor.   "

## 2023-04-18 NOTE — PROGRESS NOTES
Care Management Follow Up    Length of Stay (days): 1    Expected Discharge Date: 04/21/2023     Concerns to be Addressed:   Medical progression    Patient plan of care discussed at interdisciplinary rounds: Yes    Anticipated Discharge Disposition:  Back to LTC     Anticipated Discharge Services:    Anticipated Discharge DME:      Patient/family educated on Medicare website which has current facility and service quality ratings:    Education Provided on the Discharge Plan:    Patient/Family in Agreement with the Plan:      Referrals Placed by CM/SW:  None at this time  Private pay costs discussed: Not applicable    Additional Information:  Social History:  CM called pt son and pt son told CM that the pt lives with him but has been at [facility] [place] since 01/06/22.  CM then reached out to [facility] [place] and spoke to nursing and they told CM that pt does live there in the LTC and he is total cares will get to the  via lift sometimes but mostly refuses and stays in bed. When writer asked if there is a bed hold I was told no and that the facility will reach out to the pt son to get a bed hold.    Plan of care discussed in ICU rounds. Per discussion will transition from BiPAP to HFNC and start a diet.    RNCM to follow for medical progression, recommendations, and final discharge plan.        Ayleen Bai RN

## 2023-04-18 NOTE — PLAN OF CARE
Problem: Restraint, Nonviolent  Goal: Absence of Harm or Injury  Intervention: Protect Dignity, Rights and Personal Wellbeing  Recent Flowsheet Documentation  Taken 4/18/2023 0800 by Jani Vasquez RN  Trust Relationship/Rapport:   care explained   emotional support provided   choices provided   reassurance provided   thoughts/feelings acknowledged  Intervention: Protect Skin and Joint Integrity  Recent Flowsheet Documentation  Taken 4/18/2023 0800 by Jani Vasquez RN  Body Position: turned   Goal Outcome Evaluation:

## 2023-04-19 NOTE — PLAN OF CARE
Problem: Malnutrition  Goal: Improved Nutritional Intake  Outcome: Not Progressing   Goal Outcome Evaluation:    Diet advanced to Moderate Consistent Carb on 4/18    Eating 25% per flowsheets, poor appetite per rounds. MD does mention a possible swallow rosario    Called the [facility] and spoke with a RN familiar with his care. On a regular diet and receives 120 mL of Hi-Yuriy oral supplement 4x/day at 8 am, 12 pm, 4 pm, and 8 pm. (475 kcals & 20 gm protein/240 mL).    [facility] does not carry Hi-Yuriy-will send Ensure for a am and pm snack    Diarrhea today noted    Labs reviewed

## 2023-04-19 NOTE — PROGRESS NOTES
Patient only oriented to self. Pleasantly confused but intermittently pulling at lines. Mitts in place for safety and impulsivity. Patient is re-directable.    PICC replaced last evening. Patient tolerated well.     Levo weaned to 0.08.  Vaso at 2.4  On IVF and IV abx.    On HFNC all night at 40lpm, 50-60% O2. Intermittent congested, non-productive cough.    Patient's lower extremities are tender with movement. Hell protector boots in place. Patient reported knee and leg pain. Given PRN tylenol.     Multiple skin issues present. Especially in the groin, skin is red and swollen and tender. Marina patent and draining. Continuing to monitor.

## 2023-04-19 NOTE — PROGRESS NOTES
"Critical Care progress note      04/19/2023    Name: Ezequiel Randhawa MRN#: [id number]   Age: 82 year old YOB: 1940                    Problem List:   Principal Problem:    Multifocal pneumonia  Active Problems:    DNR (do not resuscitate)    Anticoagulated    Septic shock (H)    Acute respiratory failure with hypoxia and hypercapnia (H)    Urinary tract infection associated with indwelling urethral catheter, initial encounter (H)    Clinically Significant Risk Factors        # Hypokalemia: Lowest K = 3.1 mmol/L in last 2 days, will replace as needed  # Hypernatremia: Highest Na = 148 mmol/L in last 2 days, will monitor as appropriate                 # Cachexia: Estimated body mass index is 16.94 kg/m  as calculated from the following:    Height as of this encounter: 1.88 m (6' 2.02\").    Weight as of this encounter: 59.9 kg (132 lb)., PRESENT ON ADMISSION  # Severe Malnutrition: based on nutrition assessment, PRESENT ON ADMISSION      Code Status: No CPR- Do NOT Intubate             HPI:     82-year-old male with a history of COVID-pneumonia in 2021 complicated at that time by pulmonary embolism and requiring intubation and mechanical ventilation.  Also history of atrial fibrillation, emphysema and urinary retention.  He woke up in the middle of the night with chills.  Transferred to the ED where he was found to have a urinary tract infection and bilateral pneumonia.    Hypoxic initially requiring high flow nasal cannula and eventually transitioned to BiPAP for increased work of breathing.  Sepsis treated with broad-spectrum antibiotics and vasopressors  Lactic acid initially elevated but now improving          Assessment and plan :       I have personally reviewed the labs, imaging studies, cultures and discussed the case with referring physician and consulting physicians.     My assessment and plan by system for this patient is as follows:    Neurology/Psychiatry:   More awake " today      Cardiovascular:   Heart failure with reduced ejection fraction, last known EF of 25 to 30%  History of atrial fibrillation but currently in sinus rhythm  Septic shock requiring norepinephrine and vasopressin  Lactic acid improving      Pulmonary/Ventilator Management:   Acute hypoxic respiratory failure secondary to what appears to be aspiration pneumonia  High flow nasal cannula as tolerated.    Continue vancomycin and Zosyn    History of emphysema      GI and Nutrition :   Bedside swallow evaluation and advance diet as tolerated  Does appear pretty malnourished.  Protein calorie malnutrition      Renal/Fluids/Electrolytes:   Chronic Marina  CT with nonobstructing renal stones  Treating for UTI     Hypernatremia.  Improving with D5W    5.6 L positive but still on pressors.  Will need diuresis down the road    - monitor function and electrolytes as needed with replacement per ICU protocols. - generally avoid nephrotoxic agents such as NSAID, IV contrast unless specifically required  - adjust medications as needed for renal clearance  - follow I/O's as appropriate.    Infectious Disease:   Septic shock  UTI and aspiration pneumonia  Continue vancomycin and Zosyn    Cultures with gram-positive cocci in pairs and chains 2 out of 2 and 1 out of 2 Neisseria species.  Not gonorrhea or meningitis  Repeat blood cultures    Endocrine:   sliding scale insulin at low intensity  Plan  - ICU insulin protocol, goal sugar <180      Hematology/Oncology:   Chronically anticoagulated with rivaroxaban for a PE that he sustained while he had COVID and for A-fib  Currently in sinus rhythm.  I wonder how long he needs to do be anticoagulated but currently n.p.o. so heparin subcutaneously and we will see how he does            ICU Prophylaxis:   DVT: Hep Subq  PPI                 Medical History:     History reviewed. No pertinent past medical history.  Past Surgical History:   Procedure Laterality Date     PICC TRIPLE LUMEN  PLACEMENT  12/20/2021          PICC TRIPLE LUMEN PLACEMENT  4/17/2023          PICC TRIPLE LUMEN PLACEMENT  4/18/2023          Social History     Socioeconomic History     Marital status:      Spouse name: Not on file     Number of children: Not on file     Years of education: Not on file     Highest education level: Not on file   Occupational History     Not on file   Tobacco Use     Smoking status: Not on file     Smokeless tobacco: Not on file   Vaping Use     Vaping status: Not on file   Substance and Sexual Activity     Alcohol use: Not on file     Drug use: Not on file     Sexual activity: Not on file   Other Topics Concern     Not on file   Social History Narrative     Not on file     Social Determinants of Health     Financial Resource Strain: Not on file   Food Insecurity: Not on file   Transportation Needs: Not on file   Physical Activity: Not on file   Stress: Not on file   Social Connections: Not on file   Intimate Partner Violence: Not on file   Housing Stability: Not on file      No Known Allergies           Key Medications:       heparin ANTICOAGULANT  5,000 Units Subcutaneous Q8H     insulin aspart  1-4 Units Subcutaneous Q4H     methylPREDNISolone  62.5 mg Intravenous Q24H     pantoprazole  40 mg Intravenous Daily with breakfast     piperacillin-tazobactam  3.375 g Intravenous Q8H     sodium chloride (PF)  10-40 mL Intracatheter Q7 Days     vancomycin  1,250 mg Intravenous Q24H       D5W 75 mL/hr at 04/19/23 0600     norepinephrine       vasopressin 2.4 Units/hr (04/19/23 0800)        Home Meds  No current facility-administered medications on file prior to encounter.  acetaminophen (TYLENOL) 500 MG tablet, Take 1,000 mg by mouth 3 times daily  bisacodyl (DULCOLAX) 10 MG suppository, Place 10 mg rectally daily as needed for constipation  ibuprofen (ADVIL/MOTRIN) 200 MG tablet, Take 200 mg by mouth 2 times daily as needed for pain  Lidocaine (LIDOCARE) 4 % Patch, Place onto the skin every 24  hours Both knees  To prevent lidocaine toxicity, patient should be patch free for 12 hrs daily.  melatonin 3 MG tablet, Take 3 mg by mouth At Bedtime  Miconazole Nitrate 2 % ointment, Apply topically 2 times daily Apply to groin  polyethylene glycol (MIRALAX) 17 g packet, Take 17 g by mouth 2 times daily as needed for constipation (Patient taking differently: Take 17 g by mouth daily)  pregabalin (LYRICA) 75 MG capsule, Take 75 mg by mouth 2 times daily  tamsulosin (FLOMAX) 0.4 MG capsule, Take 1 capsule (0.4 mg) by mouth every evening               Physical Examination:   Temp:  [97.8  F (36.6  C)-98.9  F (37.2  C)] 98.6  F (37  C)  Pulse:  [] 78  Resp:  [10-49] 36  BP: ()/(49-64) 110/60  FiO2 (%):  [50 %-75 %] 75 %  SpO2:  [86 %-97 %] 88 %    Intake/Output Summary (Last 24 hours) at 4/17/2023 1009  Last data filed at 4/17/2023 0642  Gross per 24 hour   Intake 3048 ml   Output --   Net 3048 ml     Wt Readings from Last 4 Encounters:   04/17/23 59.9 kg (132 lb)   01/04/22 60.2 kg (132 lb 11.5 oz)   12/21/21 59 kg (130 lb)     BP - Mean:  [62-84] 79  FiO2 (%): 75 %  Resp: (!) 36    No lab results found in last 7 days.    GEN: On high flow nasal cannula.  Comfortable  HEENT: head ncat, sclera anicteric, OP patent, trachea midline   PULM: Bilateral crackles  CV/COR: RRR S1S2 no gallop,  No rub, no murmur  ABD: soft nontender, hypoactive bowel sounds, no mass  EXT: No edema.  Significant muscle wasting  NEURO: More awake today  SKIN: no obvious rash  LINES: clean, dry intact         Data:   All data and imaging reviewed     ROUTINE ICU LABS (Last four results)  CMP  Recent Labs   Lab 04/19/23  0742 04/19/23  0509 04/19/23  0459 04/19/23  0010 04/18/23  1735 04/18/23  1626 04/18/23  0753 04/18/23  0340 04/17/23  0756 04/17/23  0255   NA  --  144  --   --   --  144  --  148*  --  140   POTASSIUM  --  3.5  3.5  --   --   --  3.3*  --  3.1*  --  3.8   CHLORIDE  --  113*  --   --   --   --   --  117*  --  100    CO2  --  24  --   --   --   --   --  21*  --  22   ANIONGAP  --  7  --   --   --   --   --  10  --  18*   * 150* 163* 137*   < >  --    < > 166*   < > 190*   BUN  --  26.5*  --   --   --   --   --  34.5*  --  40.5*   CR  --  0.49*  --   --   --   --   --  0.59*  --  0.71   GFRESTIMATED  --  >90  --   --   --   --   --  >90  --  >90   GREGORY  --  8.4*  --   --   --   --   --  8.0*  --  9.2   MAG  --   --   --   --   --   --   --   --   --  1.8   PROTTOTAL  --   --   --   --   --   --   --   --   --  7.1   ALBUMIN  --   --   --   --   --   --   --   --   --  3.8   BILITOTAL  --   --   --   --   --   --   --   --   --  0.5   ALKPHOS  --   --   --   --   --   --   --   --   --  108   AST  --   --   --   --   --   --   --   --   --  27   ALT  --   --   --   --   --   --   --   --   --  18    < > = values in this interval not displayed.     CBC  Recent Labs   Lab 04/18/23  0340 04/17/23  0255   WBC 23.4* 17.1*   RBC 3.30* 4.25*   HGB 11.2* 14.5   HCT 34.6* 45.7   * 108*   MCH 33.9* 34.1*   MCHC 32.4 31.7   RDW 13.2 12.7   * 251     INR  Recent Labs   Lab 04/17/23  0255   INR 1.16*     Arterial Blood GasNo lab results found in last 7 days.    All cultures:  No results for input(s): CULT in the last 168 hours.  Recent Results (from the past 24 hour(s))   POC US CHEST B-SCAN    Sean Brito MD     4/17/2023  6:09 AM  POC US CHEST B-SCAN    Date/Time: 4/17/2023 5:49 AM    Performed by: Sean Cardona MD  Authorized by: Sean Cardona MD    Procedure details:     Indications: hypotension and hypoxia    Comments:      E-FAST bedside ultrasound performed by me for hypoxia & low blood   pressures with clinical picture of sepsis.    Cardiac: no pericardial effusion, hyperdynamic  Lungs:  Lung sliding present bilaterally. Right lung with no B lines. Left   lower lower with B lines present.  Abdomen:  No free fluid, no AAA seen    Imaged saved.   CT Chest Abdomen Pelvis w/o Contrast     Narrative    EXAM: CT CHEST ABDOMEN PELVIS W/O CONTRAST  LOCATION: [facility]  DATE/TIME: 4/17/2023 3:37 AM CDT    INDICATION: abdominal pain, hypoxia, sepsis  COMPARISON: 12/20/2021  TECHNIQUE: CT scan of the chest, abdomen, and pelvis was performed without IV contrast. Multiplanar reformats were obtained. Dose reduction techniques were used.   CONTRAST: None.    FINDINGS:   LUNGS AND PLEURA: Apical scarring. Mild emphysema. Extensive bilateral nodular, patchy, and/or confluent airspace opacities with surrounding groundglass in all lobes. No pleural effusions.    MEDIASTINUM/AXILLAE: 3.6 cm ectasia ascending thoracic aorta. Heart size normal, no pericardial effusion. No adenopathy. Patulous debris-filled esophagus.    CORONARY ARTERY CALCIFICATION: Severe.    HEPATOBILIARY: Gallstones.    PANCREAS: Normal.    SPLEEN: Normal.    ADRENAL GLANDS: Normal.    KIDNEYS/BLADDER: Bilateral renal stones. No hydronephrosis. Bladder mild distention with air-fluid level and Marina catheter in place. Dependent bladder stones.    BOWEL: Gastric distention with air debris level. Small and large bowel loops normal caliber. Rectal distention with gas and debris 10 cm caliber without inflammatory changes.    LYMPH NODES: Normal.    VASCULATURE: Atherosclerosis. 2.6 cm ectasia distal abdominal aorta.    PELVIC ORGANS: Enlarged prostate.    MUSCULOSKELETAL: Degenerative changes. Muscle atrophy.      Impression    IMPRESSION:  1.  Multifocal pneumonia.  2.  Delayed gastric clearance suspicious for gastric paresis less likely obstruction.  3.  Nephrolithiasis.  4.  Marina catheter malfunction versus clamped Marina.  5.  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   XR Chest Port 1 View    Narrative    EXAM: XR CHEST PORT 1 VIEW  LOCATION: [facility]  DATE/TIME: 4/17/2023 7:25 AM CDT    INDICATION: RN placed PICC   verify tip placement  COMPARISON: 04/17/2023 chest CT       Impression    IMPRESSION:     Right PICC tip over the proximal SVC.    No significant change of diffuse bilateral opacities with mid to lower lung predominant since recent chest CT.    No pleural effusion of significant size. No pneumothorax. Normal heart size.             Billing: This patient is critically ill: Yes. Total critical care time today 34  min exclusive of procedures or teaching.  Managing Septic shock, vasopressors and acute hypoxic respiratory failure    Yes - the patient is able to be screened

## 2023-04-19 NOTE — SIGNIFICANT EVENT
Significant Event Note    Patient pulled out his PICC line. New PICC requested.  Spoke with PICC nurse, Cecelia. I confirmed with her that a new PICC line was necessary despite the presence of bacteremia, for infusion of vasopressors. Cecelia agrees and will place a PICC line shortly. Appreciate assistance.    Mir Stokes MD (Avi)  [facility] ([facility])  Clinic [phone]  Fax [phone]

## 2023-04-19 NOTE — PROGRESS NOTES
"Patient tolerated HFNC throughout the night settings 40 lpm, 50 - 60%O2. Bipap on standby in room.     BP 92/54   Pulse 73   Temp 98.4  F (36.9  C) (Oral)   Resp (!) 40   Ht 1.88 m (6' 2.02\")   Wt 59.9 kg (132 lb)   SpO2 93%   BMI 16.94 kg/m      Ofe Montero, RT on 4/19/2023 at 4:43 AM     "

## 2023-04-19 NOTE — PROGRESS NOTES
Care Management Follow Up    Length of Stay (days): 2    Expected Discharge Date: 04/21/2023     Concerns to be Addressed:  Medical progression     Patient plan of care discussed at interdisciplinary rounds: Yes    Anticipated Discharge Disposition:  Back to LTC     Anticipated Discharge Services:    Anticipated Discharge DME:      Patient/family educated on Medicare website which has current facility and service quality ratings:    Education Provided on the Discharge Plan:    Patient/Family in Agreement with the Plan:      Referrals Placed by CM/SW:    Private pay costs discussed: Not applicable    Additional Information:  Social History:  CM called pt son and pt son told CM that the pt lives with him but has been at [facility] since 01/06/22.  CM then reached out to [facility] and spoke to nursing and they told CM that pt does live there in the LTC and he is total cares will get to the  via lift sometimes but mostly refuses and stays in bed. When writer asked if there is a bed hold I was told no and that the facility will reach out to the pt son to get a bed hold.     Plan of care discussed in ICU rounds. Per discussion pt remains on levo and vaso, and HFNC, cont vanco and Zosyn.    RNCM to follow for medical progression, recommendations, and final discharge plan.    3:38 PM  Palliative reached out to CM to see if we could get the HCD from the pt nursing home, also wanted CM to call pt's son as he has concerns with pt's current placement.  CM called pt's son no answer so LVM. CM also called [facility] [place] to see if they could fax us a copy of pt's HCD and they told CM they will fax it over.      Ayleen Bai RN

## 2023-04-19 NOTE — PLAN OF CARE
[facility] - ICU    RN Progress Note:            Pertinent Assessments:      Please refer to flowsheet rows for full assessment     OVSS, High flow nasal cannula FIO2 80% on 50LPM. Orientated to self. Pt continues to have poor appetite.            Key Events - This Shift:     Titrating vasopressins as able, to maintain MAP greater than 65.                   Barriers to Discharge / Downgrade:     High flow nasal cannula, vasopressors.

## 2023-04-19 NOTE — PROGRESS NOTES
HFNC 50L 80%. Intermittent tachypnea. Restlessness. Trialed BiPAP and pt did not tolerate - agitation increased with RR > 40 and continued pulling on mask/tubing. Plan is to monitor respiratory status and adjust support as needed.    Ladan Rajput, RT

## 2023-04-19 NOTE — PROCEDURES
"  Pt. Name: Ezequiel Randhawa  MRN:        [id number]    Procedure: Insertion of a  triple Lumen  5 fr  Bard SOLO (valved) Power PICC, Lot number [id number]    Indications: Vasopressors    Contraindications : None, Pt had Positive blood cx < 48hrs. Discussion and note put in by Dr. Stokes Intensivist for approval.     Procedure Details     Patient identified with 2 identifiers and \"Time Out\" conducted.  .     Central line insertion bundle followed: hand hygiene performed prior to procedure, site cleansed with cholraprep, hat, mask, sterile gloves,sterile gown worn, patient draped with maximum barrier head to toe drape, sterile field maintained.    The vein was assessed and found to be compressible and of adequate size. 0 ml 1% Lidocaine administered sq to the insertion site due to vessel being too close to the surface A 5 Fr PICC was inserted into the basilic vein of the right arm with ultrasound guidance. One attempt(s) required to access vein.   Catheter threaded without difficulty. Good blood return noted.    Modified Seldinger Technique used for insertion.    The 8 sharps that are included in the PICC insertion kit were accounted for and disposed of in the sharps container prior to breakdown of the sterile field.    Catheter secured with Statlock, biopatch and Tegaderm dressing applied.    Findings:  Total catheter length  38 cm, with 1 cm exposed. Mid upper arm circumference is 19 cm. Catheter was flushed with 30 cc NS. Patient  tolerated procedure well.    Tip placement verified by xray. Xray read by Dr. Raleigh Hanson  PICC with tip overlying the superior vena cava    CLABSI prevention brochure left at bedside.    Patient's primary RN notified PICC is ready for use.    Comments:        Cecelia Potter RN    Vascular Access - [facility]      "

## 2023-04-19 NOTE — CONSULTS
[facility]  Palliative Care Consultation Note    Patient: Ezequiel Randhawa  Date of Admission:  4/17/2023    Requesting Clinician / Team: Edilberto Collins MD/ICU attending  Reason for consult: Goals of care    Impression & Recommendations:  Palliative will plan to see Gregorio and Ezequiel tomorrow either 1 or 2pm; walter Perkins will update bedside RN tomorrow (he notes plan to arrive around 830 for ICU rounds to connect with ICU provider).      Symptom assessment: palliative was not consulted for symptom management, review reveals the following:    Pain: described as diffuse but also L knee (DJD visible on exam) pain and generalized stiffness/soreness.  - was on pregabalin 75 mg qday prior to admission, consider resume if able  - was on lidocaine patches prior to admission, consider resuming same.  - suggest scheduled APAP TID  - suggest topical voltaren to L, R  knees  - refrain from opioids unless clearly comfort focused GOC with chronic musculoskeletal pain    Delirium  - advanced age, frailty, concern for cognitive impairment at baseline, though walter Perkins notes Ezequiel is usually lucid.  - minimize anticholinergics, antihistamines.  On steroids which can contribute to delirium.  - decrease tethers as able.  - attention to sleep hygeine.    Dyspnea, due to hypoxic respiratory failure  - no history of chronic O2 at SNF.  - treat pneumonia, suppl O2, pulmonary toilet and nebs per ICU.  - NIPPV as needed.  - low threshold to add opioids for air hunger IF transition to comfort focused goals of care.    Severe protein calorie malnutrition  - Ezequiel tells me he is disinclined to consider artificial nutrition if it were deemed necessary.  - passed bedside swallow, diet per ICU.  - concern for ?cachexia related to pulmonary disease which would portend poor prognosis.    Goals of care: life-prolonging with limits (DNR/DNI)    Advanced care planning: No HCD on file.  DNR/DNI.  Surrogate:    Support:    Walter Perkins  (home: [phone]; cell [phone])  More spiritual than Islam.  Open to spiritual care and requested  Request palli SW for support to Gregorio.    These recommendations have been discussed with Dr Collins    Thank you for the opportunity to participate in the care of this patient and family. Our team: will continue to follow.   During regular M-F work hours -- if you are not sure who specifically to contact, may contact our service via the [facility] pager: [phone]  (Please use AMION for text paging if possible, use link under Palliative service)   [facility] voice mail (checked M-F 8a-4pm approx): [phone]        Assessments:  Ezequiel Randhawa is a 82 year old male with atrial fibrillation, HFrEF (EF 25-30%), memory impairment and presumed dementia, chronic protein calorie malnutrition, COPD (emphysema), urinary retention/BPH, and prior covid pneumonia 2021 requiring intubation and mechanical ventilation and complicated by PE, who presented 4/17/23 from his LTC facility with rigors and found to have UTI and bilateral pneumonia and sepsis requiring vasopressors.  He has required HFNC and at times BiPAP.  Ezequiel remains on vasopressors for adequate blood pressure support.      Today, the patient was seen for:  Introduction to palliative care, establish rapport, explore goals of care.    Prognosis, Goals, & Planning:      Functional Status just prior to hospitalization: 4 (Completely disabled and dependent on others for selfcare; bedbound)      Prognosis, Goals, and/or Advance Care Planning were addressed today: Yes               Patient's decision making preferences: unable to assess          Patient has decision-making capacity today for complex decisions: No            I have concerns about the patient/family's health literacy today: Yes for the patient; son Gregorio articulates fairly coherent awareness of medical issues for the patient presently.        "    Patient has a completed Health Care Directive: No.       Code status: No CPR / No Intubation    Coping, Meaning, & Spirituality:   Mood, coping, and/or meaning in the context of serious illness were addressed today: Yes.   Ezequiel notes feeling like he hasn't been well \"for a long time\".   He affirms struggling with pain/discomfort \"all over.\"  Tells me he is more spiritual than Evangelical, open to spiritual care support.  Recalls death of his wife but unsure what happened, affirms they were  a long time and had six children.    Summary/Comments:  Gregorio notes Ezequiel's wife/Gregorio's mother  of complications of COPD in  after a two week hospitalization at [facility]; no hospice involvement.    Social:     Living situation: has lived at [facility] since .  Reportedly other children were involved with selecting/approving SNF  --Lived with son Gregorio in the patient's home until 2021 COVID pneumonia.  -- .    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  No  service history.    Substance use history: longtime smoker.      History of Present Illness:  History gathered today from: patient, family/loved ones, medical chart, medical team members, unit team members    Ezequiel Randhawa is a 82 year old male with atrial fibrillation, HFrEF (EF 25-30%), ? memory impairment vs hospital acquired delirium, chronic protein calorie malnutrition, COPD (emphysema), urinary retention/BPH, and prior covid pneumonia  requiring intubation and mechanical ventilation and complicated by PE, who presented 23 from his LTC facility with rigors and found to have UTI and bilateral pneumonia and sepsis requiring vasopressors.  He has required HFNC and at times BiPAP.  Ezequiel remains on vasopressors for adequate blood pressure support.      I met with Ezequiel in his ICU " "room (343) and he was not aware that he is in the hospital, nor what medical issues are occurring to keep him in the ICU.  He endoresed pain \"all over\" but couldn't tell me specifically what qualities the pain has.  He described having many accidents when  which have contributed to pain.    ICU attending Dr Collins affirmed conversation with Ezequiel's son Gregorio and that DNR/DNI is desired, and that use of antibiotics and pressors were considered acceptable.    I called son Gregorio, and I introduced Palliative Care as a specialty that works to help patients with serious illness live as well as possible, and manage symptoms/side effects of the illness or its treatment.  Palliative medicine also works to provide an extra layer of support to patients experiencing serious illness and their families.  Our specialty also helps with advance care planning (making health care directives and helping a person receive care that is in keeping with their individual hopes and values).     Gregorio's understanding of present situation:  He is aware that his father has a urinary tract infection as well as bilateral pneumonia.  He is aware that his dad would not want to go through CPR or be intubated if severe respiratory failure.  His hope for his father would be to recover from this hospital stay, but also notes that his dad is looking more frail and weak and reminds him of his uncle (the patient's brother) before he  a year and a half ago.    There are complex family dynamics.  Tobi has 6 children, 2 were adopted from his wife's prior relationship, and 4 biological children.  One of the adopted children  in  and there are 5 living children.  Gregorio moved from [place] to help support his father and live with him in the home several years ago after his mother .  Initially Tobi had wanted to remain at home until end-of-life, but when he was ill with COVID Tobi was hospitalized and ultimately discharged to [facility].  Gregorio expresses concerns about quality of the care provided to his father skilled nursing facility or supported that facility skilled nursing facility and wonders whether a different facility might be better.  Apparently Tobi's siblings had been involved with selecting the skilled nursing facility at the time and had reportedly completed financial power of  forms.  Gregorio reports that he has been the only medical contact for his father, assisting his father with medical decision making for years.    We discussed goals of care including curative, restorative, life-prolonging and comfort focused.  Presently Gregorio believes his father would want to continue with life-prolonging goals of treatment for his dad unless it became clear that further clinical decline was occurring despite present treatments.      Gregorio notes with estrangement from siblings, he does not have robust social support for himself.    Key Palliative Symptom Data:  # Pain severity the last 12 hours: moderate  # Dyspnea severity the last 12 hours: moderate  # Nausea severity the last 12 hours: none  # Anxiety severity the last 12 hours: none         ROS:  Comprehensive ROS is significant for: L knee pain.  Denies dyspnea.     Past Medical History:  History reviewed. No pertinent past medical history.     Past Surgical History:  Past Surgical History:   Procedure Laterality Date     PICC TRIPLE LUMEN PLACEMENT  12/20/2021          PICC TRIPLE LUMEN PLACEMENT  4/17/2023          PICC TRIPLE LUMEN PLACEMENT  4/18/2023              Family History:  History reviewed. No pertinent family history.       Allergies:  No Known Allergies     Medications:  I have reviewed this patient's medication profile and medications from this hospitalization.     Noted:  Current Facility-Administered Medications   Medication     acetaminophen (TYLENOL) tablet 650 mg     dextrose 5% infusion     glucose gel 15-30 g    Or     dextrose 50 % injection 25-50 mL    Or      "glucagon injection 1 mg     heparin ANTICOAGULANT injection 5,000 Units     insulin aspart (NovoLOG) injection (RAPID ACTING)     lidocaine (LMX4) cream     lidocaine (LMX4) cream     lidocaine 1 % 0.1-5 mL     lidocaine 1 % 0.1-5 mL     methylPREDNISolone sodium succinate (solu-MEDROL) injection 62.5 mg     norepinephrine (LEVOPHED) 4 mg in  mL infusion PREMIX     pantoprazole (PROTONIX) IV push injection 40 mg     piperacillin-tazobactam (ZOSYN) 3.375 g vial to attach to  mL bag     sodium chloride (PF) 0.9% PF flush 10-20 mL     sodium chloride (PF) 0.9% PF flush 10-40 mL     sodium chloride (PF) 0.9% PF flush 10-40 mL     sodium chloride (PF) 0.9% PF flush 10-40 mL     sodium chloride (PF) 0.9% PF flush 10-40 mL     vancomycin (VANCOCIN) 1,250 mg in sodium chloride 0.9 % 250 mL intermittent infusion     vasopressin (VASOSTRICT) 20 Units in sodium chloride 0.9 % 100 mL standard conc infusion     zinc oxide (DESITIN) 20 % ointment         PERTINENT PHYSICAL EXAMINATION:  Vital Signs: Blood pressure 101/55, pulse 71, temperature 98.2  F (36.8  C), resp. rate (!) 37, height 1.88 m (6' 2.02\"), weight 59.9 kg (132 lb), SpO2 93 %.   GENERAL: thin disheveled appearing 83 yo male.  Oriented to self but not place.   SKIN: Warm and dry with dystrophic toenails.  HEENT: Normocephalic, anicteric sclera, moist mucous membranes and significant temporal muscle wasting.  Teeth in fair repair.  LUNGS: Clear to auscultation anterolaterally; non-labored   CARDIAC: RRR, normal s1/s2, w/o m/r/g   ABDOMINAL: BS (+), soft, non distended, non tender  : burris in place  MUSKL: sarcopenia.  Has clear arthritis/deformity of L knee (joint space thickening)   EXTREMITIES: No edema or cyanosis, pulses 2+ and symmetrical  NEUROLOGIC: speech is dysarthric. No tremor.  PSYCH: normal psychomotor activity.  Speech difficult to understand at times.  No pressured speech.    Data reviewed:  Recent imaging reviewed, my comments on " pertinents:   CXR Portable 4/18/23:     IMPRESSION: Patient is slightly rotated on today's exam. No significant change in right upper extremity PICC with tip overlying the superior vena cava. Stable cardiomediastinal silhouette. Slightly increased groundglass opacities throughout both lungs.   No significant pleural effusion or pneumothorax visualized. Bones are unchanged.     Portable CXR 4/17/23:  IMPRESSION:     Right PICC tip over the proximal SVC.     No significant change of diffuse bilateral opacities with mid to lower lung predominant since recent chest CT.     No pleural effusion of significant size. No pneumothorax. Normal heart size.     POC ultrasound chest: B Scan:  Procedure details:     Indications: hypotension and hypoxia     Comments:      E-FAST bedside ultrasound performed by me for hypoxia & low blood   pressures with clinical picture of sepsis.     Cardiac: no pericardial effusion, hyperdynamic   Lungs:  Lung sliding present bilaterally. Right lung with no B lines. Left   lower lower with B lines present.   Abdomen:  No free fluid, no AAA seen     Imaged saved.      CT Chest 4/17/23:  IMPRESSION:   1.  Multifocal pneumonia.   2.  Delayed gastric clearance suspicious for gastric paresis less likely obstruction.   3.  Nephrolithiasis.   4.  Marina catheter malfunction versus clamped Marina.   5.  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    Recent lab data reviewed, my comments on pertinents:   Last Comprehensive Metabolic Panel:  Lab Results   Component Value Date     04/19/2023    POTASSIUM 3.5 04/19/2023    POTASSIUM 3.5 04/19/2023    CHLORIDE 113 (H) 04/19/2023    CO2 24 04/19/2023    ANIONGAP 7 04/19/2023     (H) 04/19/2023    BUN 26.5 (H) 04/19/2023    CR 0.49 (L) 04/19/2023    GFRESTIMATED >90 04/19/2023    GREGORY 8.4 (L) 04/19/2023       CBC RESULTS: Recent Labs   Lab Test 04/18/23  0340   WBC 23.4*   RBC 3.30*   HGB 11.2*   HCT 34.6*   *   MCH 33.9*    MCHC 32.4   RDW 13.2   *     Lab Results   Component Value Date    AST 27 04/17/2023     Lab Results   Component Value Date    ALT 18 04/17/2023     No results found for: BILICONJ   Lab Results   Component Value Date    BILITOTAL 0.5 04/17/2023     Lab Results   Component Value Date    ALBUMIN 3.8 04/17/2023    ALBUMIN 2.8 03/28/2022     Lab Results   Component Value Date    PROTTOTAL 7.1 04/17/2023      Lab Results   Component Value Date    ALKPHOS 108 04/17/2023     Total time 80 minutes in chart review, discussion with ICU attending, bedside RNs Jorge A, meeting with patient and in phone call to Gregorio tinsley, formulating plan of care and documentation.    Berta Rankin MD  [facility], Palliative Care  Securely message with the Vocera Web Console (learn more here) or  Text page via [facility] Paging/Directory

## 2023-04-20 NOTE — PROGRESS NOTES
"Critical Care progress note      04/20/2023    Name: Ezequiel Randhawa MRN#: [id number]   Age: 82 year old YOB: 1940                    Problem List:   Principal Problem:    Multifocal pneumonia  Active Problems:    DNR (do not resuscitate)    Anticoagulated    Septic shock (H)    Acute respiratory failure with hypoxia and hypercapnia (H)    Urinary tract infection associated with indwelling urethral catheter, initial encounter (H)    Clinically Significant Risk Factors        # Hypokalemia: Lowest K = 2.5 mmol/L in last 2 days, will replace as needed     # Hypomagnesemia: Lowest Mg = 1.4 mg/dL in last 2 days, will replace as needed     # Thrombocytopenia: Lowest platelets = 100 in last 2 days, will monitor for bleeding          # Cachexia: Estimated body mass index is 12.62 kg/m  as calculated from the following:    Height as of this encounter: 1.88 m (6' 2.02\").    Weight as of this encounter: 44.6 kg (98 lb 5.2 oz)., PRESENT ON ADMISSION  # Severe Malnutrition: based on nutrition assessment, PRESENT ON ADMISSION      Code Status: No CPR- Do NOT Intubate             HPI:     82-year-old male with a history of COVID-pneumonia in 2021 complicated at that time by pulmonary embolism and requiring intubation and mechanical ventilation.  Also history of atrial fibrillation, emphysema and urinary retention.  He woke up in the middle of the night with chills.  Transferred to the ED where he was found to have a urinary tract infection and bilateral pneumonia.    Hypoxic initially requiring high flow nasal cannula and eventually transitioned to BiPAP for increased work of breathing.  Sepsis treated with broad-spectrum antibiotics and vasopressors  Lactic acid initially elevated but now improving          Assessment and plan :       I have personally reviewed the labs, imaging studies, cultures and discussed the case with referring physician and consulting physicians.     My assessment and plan by system for " this patient is as follows:    Neurology/Psychiatry:   Awake but confused      Cardiovascular:   Heart failure with reduced ejection fraction, last known EF of 25 to 30%  Fortunately overnight he went back into A-fib with RVR.  Started on amiodarone.  Septic shock requiring norepinephrine and vasopressin.  Currently off both    He is 5 L up so we will go ahead and give him a Lasix  Hypokalemic, hypomagnesemic and hypophosphatemic.  Replacing: Electrolytes.      Pulmonary/Ventilator Management:   Acute hypoxic respiratory failure secondary to what appears to be aspiration pneumonia  High flow nasal cannula as tolerated.    Bilateral pneumonia.  Continues on Zosyn.  Vancomycin stopped.    History of emphysema      GI and Nutrition :   Bedside swallow evaluation and advance diet as tolerated  Does appear pretty malnourished.  Protein calorie malnutrition      Renal/Fluids/Electrolytes:   Chronic Marina  CT with nonobstructing renal stones      Hypernatremia.  Hold with D5W now that is off.    Off pressors.  5 L up.  Schedule Lasix twice a day  Replace electrolytes aggressively.  He was low on potassium, magnesium and phosphorus overnight    - monitor function and electrolytes as needed with replacement per ICU protocols. - generally avoid nephrotoxic agents such as NSAID, IV contrast unless specifically required  - adjust medications as needed for renal clearance  - follow I/O's as appropriate.    Infectious Disease:   Septic shock resolved  Pneumonia which could be aspiration.  She will be treated with vancomycin and Zosyn.  Currently on Zosyn only.    Initial cultures growing Streptococcus salivarius, mitis and Neisseria subflava  Repeat cultures are negative  With the Streptococcus is probably a work-up for possible endocarditis if repeat cultures are positive but he would not tolerate a EJMMA.    Endocrine:   sliding scale insulin at low intensity  Plan  - ICU insulin protocol, goal sugar  <180      Hematology/Oncology:   A-fib with RVR.  On amiodarone.  There was a note that he was chronically on rivaroxaban but does not appear to be on his home medications.  It almost appears that he was treated for bilateral PE's early January with a recommendation to continue for 3 months.    Depending on the outcome of the meeting between family and palliative care if he remains in A-fib and we can consider putting him back on rivaroxaban          ICU Prophylaxis:   DVT: Hep Subq  PPI                 Medical History:     History reviewed. No pertinent past medical history.  Past Surgical History:   Procedure Laterality Date     PICC TRIPLE LUMEN PLACEMENT  12/20/2021          PICC TRIPLE LUMEN PLACEMENT  4/17/2023          PICC TRIPLE LUMEN PLACEMENT  4/18/2023          Social History     Socioeconomic History     Marital status:      Spouse name: Not on file     Number of children: Not on file     Years of education: Not on file     Highest education level: Not on file   Occupational History     Not on file   Tobacco Use     Smoking status: Not on file     Smokeless tobacco: Not on file   Vaping Use     Vaping status: Not on file   Substance and Sexual Activity     Alcohol use: Not on file     Drug use: Not on file     Sexual activity: Not on file   Other Topics Concern     Not on file   Social History Narrative     Not on file     Social Determinants of Health     Financial Resource Strain: Not on file   Food Insecurity: Not on file   Transportation Needs: Not on file   Physical Activity: Not on file   Stress: Not on file   Social Connections: Not on file   Intimate Partner Violence: Not on file   Housing Stability: Not on file      No Known Allergies           Key Medications:       acetaminophen  650 mg Oral TID     diclofenac  4 g Topical 4x Daily     heparin ANTICOAGULANT  5,000 Units Subcutaneous Q8H     insulin aspart  1-4 Units Subcutaneous Q4H     pantoprazole  40 mg Intravenous Daily with  breakfast     piperacillin-tazobactam  3.375 g Intravenous Q8H     potassium chloride  10 mEq Intravenous Q1H     sodium chloride (PF)  10-40 mL Intracatheter Q7 Days     sodium phosphate  15 mmol Intravenous Once       amiodarone 0.5 mg/min (04/20/23 1401)     [Held by provider] D5W 75 mL/hr at 04/19/23 2200     norepinephrine Stopped (04/20/23 1401)     vasopressin Stopped (04/20/23 0000)        Home Meds  No current facility-administered medications on file prior to encounter.  acetaminophen (TYLENOL) 500 MG tablet, Take 1,000 mg by mouth 3 times daily  bisacodyl (DULCOLAX) 10 MG suppository, Place 10 mg rectally daily as needed for constipation  ibuprofen (ADVIL/MOTRIN) 200 MG tablet, Take 200 mg by mouth 2 times daily as needed for pain  Lidocaine (LIDOCARE) 4 % Patch, Place onto the skin every 24 hours Both knees  To prevent lidocaine toxicity, patient should be patch free for 12 hrs daily.  melatonin 3 MG tablet, Take 3 mg by mouth At Bedtime  Miconazole Nitrate 2 % ointment, Apply topically 2 times daily Apply to groin  polyethylene glycol (MIRALAX) 17 g packet, Take 17 g by mouth 2 times daily as needed for constipation (Patient taking differently: Take 17 g by mouth daily)  pregabalin (LYRICA) 75 MG capsule, Take 75 mg by mouth 2 times daily  tamsulosin (FLOMAX) 0.4 MG capsule, Take 1 capsule (0.4 mg) by mouth every evening               Physical Examination:   Temp:  [96.9  F (36.1  C)-98  F (36.7  C)] 98  F (36.7  C)  Pulse:  [] 87  Resp:  [19-58] 36  BP: ()/(45-69) 110/56  FiO2 (%):  [70 %-95 %] 70 %  SpO2:  [63 %-100 %] 94 %    Intake/Output Summary (Last 24 hours) at 4/17/2023 1009  Last data filed at 4/17/2023 0642  Gross per 24 hour   Intake 3048 ml   Output --   Net 3048 ml     Wt Readings from Last 4 Encounters:   04/20/23 44.6 kg (98 lb 5.2 oz)   01/04/22 60.2 kg (132 lb 11.5 oz)   12/21/21 59 kg (130 lb)     BP - Mean:  [64-88] 76  FiO2 (%): (S) 70 %  Resp: (!) 36    Recent Labs    Lab 04/20/23 0229   PH 7.44       GEN: On high flow nasal cannula.  Comfortable  HEENT: head ncat, sclera anicteric, OP patent, trachea midline   PULM: Bilateral crackles  CV/COR: Irregularly irregular  ABD: soft nontender, hypoactive bowel sounds, no mass  EXT: No edema.  Significant muscle wasting  NEURO: More awake today  SKIN: no obvious rash  LINES: clean, dry intact         Data:   All data and imaging reviewed     ROUTINE ICU LABS (Last four results)  CMP  Recent Labs   Lab 04/20/23  1206 04/20/23  1152 04/20/23  0802 04/20/23  0540 04/20/23  0320 04/20/23  0229 04/19/23  0742 04/19/23  0509 04/18/23  1735 04/18/23  1626 04/18/23  0753 04/18/23  0340 04/17/23  0756 04/17/23  0255   NA  --   --   --   --  137  --   --  144  --  144  --  148*  --  140   POTASSIUM  --  3.1*  --   --  2.5*  --   --  3.5  3.5  --  3.3*  --  3.1*  --  3.8   CHLORIDE  --   --   --   --  103  --   --  113*  --   --   --  117*  --  100   CO2  --   --   --   --  25  --   --  24  --   --   --  21*  --  22   ANIONGAP  --   --   --   --  9  --   --  7  --   --   --  10  --  18*   *  --  156* 129* 146*  --    < > 150*   < >  --    < > 166*   < > 190*   BUN  --   --   --   --  19.7  --   --  26.5*  --   --   --  34.5*  --  40.5*   CR  --   --   --   --  0.44*  --   --  0.49*  --   --   --  0.59*  --  0.71   GFRESTIMATED  --   --   --   --  >90  --   --  >90  --   --   --  >90  --  >90   GREGORY  --   --   --   --  8.5*  --   --  8.4*  --   --   --  8.0*  --  9.2   MAG  --  2.4*  --   --   --  1.4*  --   --   --   --   --   --   --  1.8   PHOS  --  1.8*  --   --  0.6*  --   --   --   --   --   --   --   --   --    PROTTOTAL  --   --   --   --   --   --   --   --   --   --   --   --   --  7.1   ALBUMIN  --   --   --   --   --   --   --   --   --   --   --   --   --  3.8   BILITOTAL  --   --   --   --   --   --   --   --   --   --   --   --   --  0.5   ALKPHOS  --   --   --   --   --   --   --   --   --   --   --   --   --  108   AST   --   --   --   --   --   --   --   --   --   --   --   --   --  27   ALT  --   --   --   --   --   --   --   --   --   --   --   --   --  18    < > = values in this interval not displayed.     CBC  Recent Labs   Lab 04/20/23  0320 04/18/23  0340 04/17/23  0255   WBC  --  23.4* 17.1*   RBC  --  3.30* 4.25*   HGB  --  11.2* 14.5   HCT  --  34.6* 45.7   MCV  --  105* 108*   MCH  --  33.9* 34.1*   MCHC  --  32.4 31.7   RDW  --  13.2 12.7   * 146* 251     INR  Recent Labs   Lab 04/17/23  0255   INR 1.16*     Arterial Blood Gas  Recent Labs   Lab 04/20/23  0229   PH 7.44       All cultures:  No results for input(s): CULT in the last 168 hours.  Recent Results (from the past 24 hour(s))   POC US CHEST B-SCAN    Narrative    Sean Cardona MD     4/17/2023  6:09 AM  POC US CHEST B-SCAN    Date/Time: 4/17/2023 5:49 AM    Performed by: Sean Cardona MD  Authorized by: Sean Cardona MD    Procedure details:     Indications: hypotension and hypoxia    Comments:      E-FAST bedside ultrasound performed by me for hypoxia & low blood   pressures with clinical picture of sepsis.    Cardiac: no pericardial effusion, hyperdynamic  Lungs:  Lung sliding present bilaterally. Right lung with no B lines. Left   lower lower with B lines present.  Abdomen:  No free fluid, no AAA seen    Imaged saved.   CT Chest Abdomen Pelvis w/o Contrast    Narrative    EXAM: CT CHEST ABDOMEN PELVIS W/O CONTRAST  LOCATION: [facility]  DATE/TIME: 4/17/2023 3:37 AM CDT    INDICATION: abdominal pain, hypoxia, sepsis  COMPARISON: 12/20/2021  TECHNIQUE: CT scan of the chest, abdomen, and pelvis was performed without IV contrast. Multiplanar reformats were obtained. Dose reduction techniques were used.   CONTRAST: None.    FINDINGS:   LUNGS AND PLEURA: Apical scarring. Mild emphysema. Extensive bilateral nodular, patchy, and/or confluent airspace opacities with surrounding groundglass in all lobes. No pleural  effusions.    MEDIASTINUM/AXILLAE: 3.6 cm ectasia ascending thoracic aorta. Heart size normal, no pericardial effusion. No adenopathy. Patulous debris-filled esophagus.    CORONARY ARTERY CALCIFICATION: Severe.    HEPATOBILIARY: Gallstones.    PANCREAS: Normal.    SPLEEN: Normal.    ADRENAL GLANDS: Normal.    KIDNEYS/BLADDER: Bilateral renal stones. No hydronephrosis. Bladder mild distention with air-fluid level and Marina catheter in place. Dependent bladder stones.    BOWEL: Gastric distention with air debris level. Small and large bowel loops normal caliber. Rectal distention with gas and debris 10 cm caliber without inflammatory changes.    LYMPH NODES: Normal.    VASCULATURE: Atherosclerosis. 2.6 cm ectasia distal abdominal aorta.    PELVIC ORGANS: Enlarged prostate.    MUSCULOSKELETAL: Degenerative changes. Muscle atrophy.      Impression    IMPRESSION:  1.  Multifocal pneumonia.  2.  Delayed gastric clearance suspicious for gastric paresis less likely obstruction.  3.  Nephrolithiasis.  4.  Marina catheter malfunction versus clamped Marina.  5.  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   XR Chest Port 1 View    Narrative    EXAM: XR CHEST PORT 1 VIEW  LOCATION: [facility]  DATE/TIME: 4/17/2023 7:25 AM CDT    INDICATION: RN placed PICC   verify tip placement  COMPARISON: 04/17/2023 chest CT      Impression    IMPRESSION:     Right PICC tip over the proximal SVC.    No significant change of diffuse bilateral opacities with mid to lower lung predominant since recent chest CT.    No pleural effusion of significant size. No pneumothorax. Normal heart size.             Billing: This patient is critically ill: Yes. Total critical care time today 30 min exclusive of procedures or teaching.  Managing Septic shock, vasopressors and acute hypoxic respiratory failure

## 2023-04-20 NOTE — PLAN OF CARE
"[facility] - ICU    RN Progress Note:            Pertinent Assessments:      Please refer to flowsheet rows for full assessment     Was disoriented Xs 4 beginning of shift,refusing cares and treatment initially. Patient much more awake and oriented than before. Had A-fib with RVR around 1 am which made patient more confused and Short of breath.            Key Events - This Shift:     Patient had A-fib with RVR with HR upto 150's. Amiodarone bolus and drip started after the bolus patient converted to Sinus rhythm this morning. Patient tends to desats after turning and movement. Patient had some ST elevation alarm and after replacing electrolytes ST elevation alarm gone.Had critically low Potassium and phosphorus. Replacing Mg,KCL and Phos as per protocol.                 Barriers to Discharge / Downgrade:     HFNC, Pressors, electrolyte imbalances          Point of Contact Update YES-OR-NO: no  If No, reason: Noc-shift  Name:na  Phone Number:Na  Summary of Conversation: na         Problem: Plan of Care - These are the overarching goals to be used throughout the patient stay.    Goal: Patient-Specific Goal (Individualized)  Description: You can add care plan individualizations to a care plan. Examples of Individualization might be:  \"Parent requests to be called daily at 9am for status\", \"I have a hard time hearing out of my right ear\", or \"Do not touch me to wake me up as it startles me\".  Outcome: Progressing   Goal Outcome Evaluation:      Plan of Care Reviewed With: patient    Overall Patient Progress: improvingOverall Patient Progress: improving           "

## 2023-04-20 NOTE — PROGRESS NOTES
[facility]  Palliative Care Daily Progress Note       Recommendations & Counseling     Son Gregorio had agreed to come in today but has not visited.  Left messages on phone for son Gregorio and laureano Tuttle.  Recommend family conference Friday to discuss goals in light of DNR/comfort focused POLST, clinical plateau/decline and review whether NIPPV and pressors are in alignment with comfort focused goals of care.  --> Today I noticed the patient has mandibular movement with respiration and I am concerned that he may be entering active dying process  --> Gregorio had noted patient is lucid prior to this hospital stay; yesterday the patient denied having been in the ; today I see on face sheet his primary provider [facility] thus he has had  service and is service connected.    Symptom assessment: palliative was not consulted for symptom management, review reveals the following:     Pain: described as diffuse but also L knee (DJD visible on exam) pain and generalized stiffness/soreness.  - was on pregabalin 75 mg qday prior to admission, consider resume if able  - was on lidocaine patches prior to admission, consider resuming same.  - suggest scheduled APAP TID  - suggest topical voltaren to L, R  knees  - refrain from opioids unless clearly comfort focused GOC with chronic musculoskeletal pain     Delirium  - advanced age, frailty, concern for cognitive impairment at baseline, though son Gregorio notes Ezequiel is usually lucid.  - minimize anticholinergics, antihistamines.  On steroids which can contribute to delirium.  - decrease tethers as able.  - attention to sleep hygeine.     Dyspnea, due to hypoxic respiratory failure  - no history of chronic O2 at SNF.  - treat pneumonia, suppl O2, pulmonary toilet and nebs per ICU.  - NIPPV as needed.  - low threshold to add opioids for air hunger IF transition to comfort focused goals of care.     Severe protein calorie malnutrition  - Ezequiel  tells me he is disinclined to consider artificial nutrition if it were deemed necessary.  - passed bedside swallow, diet per ICU.  - concern for ?cachexia related to pulmonary disease which would portend poor prognosis.  - also not engaged in eating per RN today.     Goals of care: life-prolonging with limits (DNR/DNI) per walter Perkins, but POLST indicates DNR/comfort focused goals.     Advanced care planning: No HCD on file on admission, appreciate RNCM obtaining health care directive naming son Parag as HCA, and walter Perkins as first alternate HCA..  DNR/DNI     Support:    Walter Perkins (home: [phone]; cell [phone]), Trey Tuttle and Ezequiel Jr; complex family dynamics with estrangement of brothers over several years reported.  More spiritual than Anabaptism.  Open to spiritual care and requested  Requested palli SW for support to [place].           Assessments          Ezequiel Randhawa is a 82 year old male with atrial fibrillation, HFrEF (EF 25-30%), memory impairment and presumed dementia, chronic protein calorie malnutrition, COPD (emphysema), urinary retention/BPH, and prior covid pneumonia 2021 requiring intubation and mechanical ventilation and complicated by PE, who presented 4/17/23 from his LTC facility with rigors and found to have UTI and bilateral pneumonia and sepsis requiring vasopressors.  He has required HFNC and at times BiPAP.  Ezequiel remains on vasopressors for adequate blood pressure support.      Today, the patient was seen for:  Brief follow up on goals of care.    Prognosis, Goals, or Advance Care Planning was addressed today with: No.  Able to discuss is unable to reach family and the patient lacks capacity              Interval History:     Chart review/discussion with unit or clinical team members:   Chart reviewed, notes from ICU attending, respiratory therapy, nursing, and care management.    Discussed with nursing, Ezequiel has been taking just occasional bites or a sip but is not wanting to  "eat meals.  Has been somnolent and waking briefly with verbal stim, no family visitors today    Per patient or family/caregivers today:  Ezequiel is sleeping, briefly opens eyes and unable to answer questions.  I left messages for sons Gregorio and Parag who are listed as healthcare agents on newly obtained healthcare directive uploaded to electronic medical record.  Have not received a call back as of this writing.               Medications:     I have reviewed this patient's medication profile and medications during this hospitalization.    Noted meds:    Current Facility-Administered Medications   Medication     acetaminophen (TYLENOL) tablet 650 mg     acetaminophen (TYLENOL) tablet 650 mg     amiodarone (NEXTERONE) 360 mg in D5W 200 mL 1.8 mg/mL infusion     [Held by provider] dextrose 5% infusion     glucose gel 15-30 g    Or     dextrose 50 % injection 25-50 mL    Or     glucagon injection 1 mg     diclofenac (VOLTAREN) 1 % topical gel 4 g     heparin ANTICOAGULANT injection 5,000 Units     insulin aspart (NovoLOG) injection (RAPID ACTING)     lidocaine (LMX4) cream     lidocaine (XYLOCAINE) 5 % ointment     lidocaine 1 % 0.1-5 mL     norepinephrine (LEVOPHED) 4 mg in  mL infusion PREMIX     pantoprazole (PROTONIX) IV push injection 40 mg     piperacillin-tazobactam (ZOSYN) 3.375 g vial to attach to  mL bag     sodium chloride (PF) 0.9% PF flush 10-20 mL     sodium chloride (PF) 0.9% PF flush 10-40 mL     sodium chloride (PF) 0.9% PF flush 10-40 mL     sodium chloride (PF) 0.9% PF flush 10-40 mL     sodium phosphate 15 mmol in D5W 250 mL intermittent infusion     vasopressin (VASOSTRICT) 20 Units in sodium chloride 0.9 % 100 mL standard conc infusion     zinc oxide (DESITIN) 20 % ointment   24-hr MME: 0mg             Physical Exam:   Vital Signs: Blood pressure 95/57, pulse 72, temperature 97.7  F (36.5  C), temperature source Oral, resp. rate (!) 33, height 1.88 m (6' 2.02\"), weight 44.6 kg (98 lb 5.2 " oz), SpO2 93 %.   GENERAL: supine, cachectic 83 yo male, opens eyes to verbal stim and falls back asleep.  On HFNC.    SKIN: Warm and dry, pale, dystrophic toenails.   HEENT: Normocephalic, anicteric sclera, moist mucous membranes.  Impaired nutrition.  Mandibular movement with respiration and temporal muscle wasting.  LUNGS: on HFO2, tachypneic, mild accessory muscle use.  CARDIAC: RRR, normal s1/s2, w/o m/r/g   ABDOMINAL: BS (+), soft, non distended, non tender  : burris in place  MUSKL: OA changes of L>R knee.  Generalized sarcopenia.  EXTREMITIES: No edema or cyanosis, pulses 2+ and symmetrical  NEUROLOGIC: somnolent.  PSYCH: confused, unable to give history.             Data Reviewed:     Reviewed recent pertinent imaging:   CXR portable 4/20/23:  IMPRESSION: The right PICC is unchanged in position. Multifocal airspace disease is again seen throughout the lungs bilaterally, slightly worsened in the right midlung zone, consistent with multifocal pneumonia    Reviewed recent labs:   Last Comprehensive Metabolic Panel:  Lab Results   Component Value Date     04/20/2023    POTASSIUM 2.5 (LL) 04/20/2023    CHLORIDE 103 04/20/2023    CO2 25 04/20/2023    ANIONGAP 9 04/20/2023     (H) 04/20/2023    BUN 19.7 04/20/2023    CR 0.44 (L) 04/20/2023    GFRESTIMATED >90 04/20/2023    GREGORY 8.5 (L) 04/20/2023       CBC RESULTS: Recent Labs   Lab Test 04/20/23  0320 04/18/23  0340   WBC  --  23.4*   RBC  --  3.30*   HGB  --  11.2*   HCT  --  34.6*   MCV  --  105*   MCH  --  33.9*   MCHC  --  32.4   RDW  --  13.2   * 146*     Lab Results   Component Value Date    ALBUMIN 3.8 04/17/2023    ALBUMIN 2.8 03/28/2022           Total time in chart review, discussion with RN, visit with patient and assessment, and attempts to reach family members 30 minutes.    Berta Rankin MD  [facility], Palliative Care  Securely message with the Vocera Web Console (learn more here) or  Text page via [facility] Paging/Directory

## 2023-04-20 NOTE — PROGRESS NOTES
HFNC 50L 70-75%. Intermittent tachypnea with increased respiratory effort and shallow breathing. Congested cough. Plan is to continue HFNC, weaning as able.    Ladan Rajput, RT

## 2023-04-20 NOTE — PLAN OF CARE
Problem: Plan of Care - These are the overarching goals to be used throughout the patient stay.    Goal: Plan of Care Review  Description: The Plan of Care Review/Shift note should be completed every shift.  The Outcome Evaluation is a brief statement about your assessment that the patient is improving, declining, or no change.  This information will be displayed automatically on your shift note.  Outcome: Unable to Meet   Goal Outcome Evaluation:  [facility] - ICU    RN Progress Note:            Pertinent Assessments:      Please refer to flowsheet rows for full assessment     Afebrile. BP stable. Levo has been off since 1401. Scrotum and perineum reddened. Zince Oxide cream applied. No bowel movement this shift. Son was not here today           Key Events - This Shift:   Patient refused to eat breakfast and lunch. Patient was given multiple different food options and still refused. Would not drink Ensure. Patient did eat 100% of yogurt and 100% of pudding for dinner, but refused to eat anything else. Patient receiving Potassium and Sodium Phosphate replacement. Palliative stated they will reach out to patient's son. Will continue to monitor.               Barriers to Discharge / Downgrade:     On HiFlo Oxygen 50 Liters at 70%         Point of Contact Update YES-OR-NO: Yes  If No, reason:   Name:  Phone Number:  Summary of Conversation:

## 2023-04-20 NOTE — PROGRESS NOTES
Received a copy of patient's advanced directives which have been forwarded to ACP for validation.    Ladan Ledesma RN

## 2023-04-20 NOTE — PROGRESS NOTES
"SPIRITUAL HEALTH SERVICES Progress Note  [facility], ICU    Saw pt Ezequiel Randhawa per consult order.    Patient/Family Understanding of Illness and Goals of Care - Ezequiel was lying in bed and stated that he was trying to wake up, so he was tired at the moment. When asked to describe his medical condition or reason for being in the hospital, he stated \"I don't know.\" Visit was brief due to his tiredness.     Meaning, Beliefs, and Spirituality - Ezequiel did not report any spiritual/Adventism connections or practices that are meaningful to him at this time.     Plan of Care - A  will continue to visit as able or per request by patient/family/staff.      Margarito Mims MDiv, [facility]  /Manager Spiritual Health Services  [phone]    "

## 2023-04-20 NOTE — PROGRESS NOTES
Care Management Follow Up    Length of Stay (days): 3    Expected Discharge Date: 04/21/2023     Concerns to be Addressed:  ECHO, High flow O2  Patient plan of care discussed at interdisciplinary rounds: Yes    Anticipated Discharge Disposition:  LTC     Anticipated Discharge Services:   LTC  Anticipated Discharge DME:  MIKE    Patient/family educated on Medicare website which has current facility and service quality ratings:  NA  Education Provided on the Discharge Plan:  NA  Patient/Family in Agreement with the Plan:  yes    Referrals Placed by CM/SW:  LTC  Private pay costs discussed: MIKE    Additional Information:  Pt is a  82 year old  male who lives at the [facility]. He has a PMH of Pneumonia secondary to Covid, A fib, emphysema, and urinary retention. He was admitted on 04/17/23 with shaking, fever, nausea, vomiting and diarrhea, and altered mental state and found to be in septic shock with acute respiratory failure with hypoxia and hypercapnia, secondary to mulitfocal pneumonia and a UTI secondary to indwelling catheter    Pt does not have a health care directive on file, but one has been sent to [employer].. He does not have a designated health care agent.  His primary contact is his son Gregorio    Pt lives at the [facility] He has a bed hold until 5/4/23. Pt remains on high flow O2 at this time as well as IV pressors.  CM will continue to follow  Chaya Arellano RN

## 2023-04-20 NOTE — PROGRESS NOTES
Patient's orientation level fluctuates. Patient confused but able to verbalize needs. Patient cooperative and redirectable.    Encouraging PO intake. On IVF and IV abx.     Lungs are coarse upon auscultation. Has intermittent nonproductive cough. On HFNC, will attempt to wean as tolerated.    Titrating pressors as able. VSS. Continuing to monitor.  Levo @ 0.03  Vaso@ 2.4

## 2023-04-21 NOTE — PROGRESS NOTES
CRITICAL CARE PROGRESS NOTE:    Assessment/Plan:  82M w/ dementia, covid in 2021, PE in 2021, afib, emphysema, admitted with acute respiratory failure with hypoxemia, delirium, septic shock requiring pressors.     RESP:  Acute resp failure with hypoxemia, impressive pulm infiltrates on CXR, suspect pneumonia, possible aspiration. DNR/DNI on HFNC. Not requiring BiPAP as much    Titrate FiO2 for goal SpO2 92% or greater, avoid hyperoxia    Encourage OOB, PT/OT, push IS when able    CV:  Septic shock 2/2 pneumonia, resolved. Off NE. Afib/RVR requiring amio - resolved. Hx of HFrEF. Now volume up status 4L net positive and responding well to Lasix. Echo from 2021: EF 45-50%, normal LV size, RV normal.     MAP >65, currently off pressors    Tele monitoring    Lactate 3.4 on 4/17 - not rechecked. It was as high as 8.1 check prn.     Increase Lasix to 40mg IV bid to keep net negative.     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 Appears near baseline.    Tylenol prn pain    Avoid benzo's    Cautious use of narcotics    PTA pregabalin, melatonin on hold.     GI:  No issues. Appears severely malnourished, cachectic.     ADAT    Bowel regimen prn    Stop PPI - not a home med.     RENAL:  Normal renal function, CO2 slightly up likely due to Lasix and mild contraction alkalosis.     Lasix as above    Maintain burris    Avoid nephrotoxins    Follow BUN/SCr    ID:  Positive blood cx with multiple organisms; suspect some of them are contaminants. Likely aspiration pneumonia    Cont zosyn, day 5. Would plan for 7 or 8 day course    Follow up culture data    HEMATOLOGIC:  Leukocytosis, improving. Stable mild anemia. plt count down to 86, from 251 on admission (4/17), likely BM suppression from sepsis, malnutrition. 4HT score 3, low probability ( < 5%) of HIT. HIT test was ordered this morning.    hgb >7    Follow counts    Follow up HIT test from 4/21    ENDOCRINE:  No issues. A1c5.0 on 4/17    FSBG checks,  "insulin sliding scale/drip per ICU protocol     ICU PROPHYLAXIS:    HSQ    Stop PPI as above    CODE STATUS, DISPOSITION, FAMILY COMMUNICATION: DNR/DNI  Palliative care following.  Need ongoing GOC discussions. Comfort care would be appropriate if pt and family in agreement.     Lines/Drains/Tubes:  PIV x2  PICC 4/18  Marina day ? (chronic)    Restraints  Not needed    Mir (MD STACY [facility] Pulmonary & Critical Care  Pager [phone]  Clinic [phone]  Fax [phone]     Clinically Significant Risk Factors        # Hypokalemia: Lowest K = 2.5 mmol/L in last 2 days, will replace as needed     # Hypomagnesemia: Lowest Mg = 1.4 mg/dL in last 2 days, will replace as needed     # Thrombocytopenia: Lowest platelets = 86 in last 2 days, will monitor for bleeding         # Cachexia: Estimated body mass index is 15.45 kg/m  as calculated from the following:    Height as of this encounter: 1.88 m (6' 2.02\").    Weight as of this encounter: 54.6 kg (120 lb 5.9 oz).   # Severe Malnutrition: based on nutrition assessment       Code Status: No CPR- Do NOT Intubate          Overnight events:  No major events  HFNC on.8, 60Lpm, Spo2 low to mid 90s  He has no complaints other than feeling a bit warm    Subjective:  Denies pain    Objective:  Physical Exam:  Vent settings for last 24 hours:  FiO2 (%): 80 %  Resp: (!) 35      /56   Pulse 71   Temp 98.1  F (36.7  C) (Oral)   Resp (!) 35   Ht 1.88 m (6' 2.02\")   Wt 54.6 kg (120 lb 5.9 oz)   SpO2 93%   BMI 15.45 kg/m      Intake/Output last 3 shifts:  I/O last 3 completed shifts:  In: 2727.5 [P.O.:100; I.V.:2627.5]  Out: 3250 [Urine:3250]  Intake/Output this shift:  No intake/output data recorded.    Physical Exam  Gen: awake, alert, oriented, no distress  HEENT: no OP lesions, no BEATRICE  CV: RRR, no m/g/r  Resp: diminished ant no w/r/r   Abd: soft, nontender, BS+  Neuro: PERRL, nonfocal  Ext: no edema    LAB:  Recent Labs   Lab " 04/21/23  0309   WBC 18.0*   HGB 10.9*   HCT 32.4*   PLT 86*     Recent Labs   Lab 04/21/23  0309 04/20/23  0320 04/19/23  0509 04/18/23  0340 04/17/23  0255    137 144   < > 140   CO2 30* 25 24   < > 22   BUN 14.1 19.7 26.5*   < > 40.5*   ALKPHOS  --   --   --   --  108   ALT  --   --   --   --  18   AST  --   --   --   --  27    < > = values in this interval not displayed.     Micro  PCT 0.44 on 4/17  Strep mitis, neisseria subflava, strep salvarius on blood cx from 4/17 - strep mitis is pan-sensitive, no sens for the others  Urine 4/17 mixed kelli  Repeat blood cx 4/19 NGTD    No current outpatient medications on file.       Critical care attestation: 40 minutes spent managing the following issues: acute respiratory failure requiring HHFNC alt with BiPAP, circulatory shock requiring continuous vasopressor infusions, toxic/metabolic encephalopathy, severe pneumonia with aspiration with hypoxemic respiratory failure.  High risk for organ deterioration and death requiring ICU level care.

## 2023-04-21 NOTE — PROGRESS NOTES
Care Management Follow Up    Length of Stay (days): 4    Expected Discharge Date: 04/21/2023     Concerns to be Addressed:  High O2 needs  Patient plan of care discussed at interdisciplinary rounds: Yes    Anticipated Discharge Disposition:  LTC     Anticipated Discharge Services:   LTC  Anticipated Discharge DME:  TBD    Patient/family educated on Medicare website which has current facility and service quality ratings:  *NA  Education Provided on the Discharge Plan:  NA  Patient/Family in Agreement with the Plan:  yes    Referrals Placed by CM/SW:  NA  Private pay costs discussed: NA    Additional Information:  Pt is a  82 year old  male who lives at the [facility]. He has a PMH of Pneumonia secondary to Covid, A fib, emphysema, and urinary retention. He was admitted on 04/17/23 with shaking, fever, nausea, vomiting and diarrhea, and altered mental state and found to be in septic shock with acute respiratory failure with hypoxia and hypercapnia, secondary to mulitfocal pneumonia and a UTI secondary to indwelling catheter    Pt has an updated advanced directive on file as of 04/20/23.  He has elected to be a DNR/DNI and his son Parag is his designated health care agent with his son Trey being his first alternate and son Ezequiel his second alternate.  Parag is his primary contact.    Pt continues on HFNC 50L/70%. He also remains with a high WBC.  Plan remains that when able pt will return to his LTC facility and he does have a bed hold until 05/04.  CM management will continue to follow pt medical progression and make any referrals as needed    Chaya Arellano RN

## 2023-04-21 NOTE — PROGRESS NOTES
RESPIRATORY CARE NOTE     Patient was on remains on high flow nasal canula, 60lpm and fluctuates between 80%-90% for Fi02 needs. Respiratory rate is shallow and tachypneac in the 20s.  Breath sounds diminished and coarse.     Skin assessed Q4.  RT will continue to assess and monitor cardiopulmonary status.     Cassandra Toledo, RT

## 2023-04-21 NOTE — PLAN OF CARE
"Goal Outcome Evaluation:    Problem: Malnutrition  Goal: Improved Nutritional Intake  Outcome: Not Progressing     Continues to eat poorly on a moderate consistent carb diet, taking liquids per rounds.    Receiving Ensure BID-RN today was unsure if drinking    Per RN note from yesterday, \"Patient refused to eat breakfast and lunch. Patient was given multiple different food options and still refused. Would not drink Ensure. Patient did eat 100% of yogurt and 100% of pudding for dinner, but refused to eat anything else.\"    Will continue to Ensure for now and have RN offer (if Ensure not available will send magic cup)    Per palliative note, \"Ezequiel tells me he is disinclined to consider artificial nutrition if it were deemed necessary.\"    Labs:  K 3.1 (L)  Mg 2.0 (wnl)  Phos 1.8 (L)    On replacement protocols    Continue to follow for GOC  "

## 2023-04-21 NOTE — PROGRESS NOTES
[facility]  Palliative Care Daily Progress Note       Recommendations & Counseling     1. Need goals of care conversation with family; have attempted to arrange (six messages left with son Parag KUMAR, multiple messages with son Gregorio, no return calls received).  Finally success in reaching son Trey KUMAR and Parag.    -->Trey notes he and Parag have been concerned about Ezequiel's quality of life and declines medically; Trey is interested in learning more about hospice support.  Not wanting to change from life-prolonging GOC w limits to comfort presently.  --> Trey and Parag agree to come to hospital over weekend ideally to see Ezequiel and communicate with the ICU team about their preferences with goals of care; I affirmed that Ezequiel does not have full decisional capacity and needs support in making complex decisions.    -->placed SW Hospice consult order--will be for outpatient hospice support post discharge.  Recommend conversation w care management  -->pending goals of care conversation and Ezequiel's ability to wean from HFO2, he could be a candidate for GIP (if unable to wean easily from HFO2).       Symptom assessment: palliative was not consulted for symptom management, review reveals the following:     Pain: described as diffuse but also L knee (DJD visible on exam) pain and generalized stiffness/soreness, also pain in feet.  - consider resuming pregabalin 75 mg qday prior to admission, consider resume if able  - was on lidocaine patches prior to admission, consider resuming same.  - continue scheduled APAP TID  - continue scheduled topical voltaren to L, R  knees     History Major Depression, presently untreated. (could be affecting self-neglect, avoidance of medical interventions/dental interventions, reduced appetite/poor oral intake)  - was on medication x  Years per son Parag.  No antidepressant on admission med list, no antidepressant on discharge med list from 2021 (last hostpital  stay).  - requested Parag review his records to see if he can locate name of antidepressant Ezequiel was on previously--may be helpful for trial.    Delirium, underlying cognitive impairment.  - advanced age, frailty, concern for cognitive impairment at baseline  - walter Tuttle affirms    - minimize anticholinergics, antihistamines.  On steroids which can contribute to delirium.  - decrease tethers as able.  - attention to sleep hygeine.     Dyspnea, due to hypoxic respiratory failure  - not on chronic O2 at SNF.  - treat pneumonia, suppl O2, pulmonary toilet and nebs per ICU.  - NIPPV as needed for now.  - low threshold to add opioids for air hunger IF transition to comfort focused goals of care.  - no escalation of care, no intubation.  - agree with use of low dose morphine for air hunger.  Would have low threshold to advocate to comfort focused goals of care if decline clinically.     Severe protein calorie malnutrition  - Ezequiel tells me he is disinclined to consider artificial nutrition if it were deemed necessary.  - passed bedside swallow, diet per ICU.  - suggest VFSS as history of aspiration in past, and  Has multifocal pneumonia (? If recurrent aspiration)  - concern for ?cachexia related to pulmonary disease which would portend poor prognosis, combined with poor dentition and pain with eating, doesn't want to go to the dentist, and also cogntive impairment.  - tolerating sips/bites.     Goals of care: life-prolonging with limits (DNR/DNI) per walter Perkins, but POLST indicates DNR/comfort focused goals.     Advanced care planning: No HCD on file on admission, appreciate RNCM obtaining health care directive naming walter Tuttle as HCA, and walter Perkins as first alternate HCA..  DNR/DNI     Support:    Walter Perkins (home: [phone]; cell [phone]), Trey Tuttle and Ezequiel Moffett; complex family dynamics with estrangement of brothers over several years reported.  More spiritual than Amish.  Open to spiritual care and  "requested   .        Assessments          Ezequiel Randhawa is a 82 year old male with atrial fibrillation, HFrEF (EF 25-30%), memory impairment and presumed dementia, chronic protein calorie malnutrition, COPD (emphysema), urinary retention/BPH, and prior covid pneumonia 2021 requiring intubation and mechanical ventilation and complicated by PE, who presented 4/17/23 from his LTC facility with rigors and found to have UTI and bilateral pneumonia and sepsis requiring vasopressors.  He has required HFNC and at times BiPAP.  Ezequiel has been able to wean from vasopressors for adequate blood pressure support.    Today, the patient was seen for:  Goals of care, support     Prognosis, Goals, or Advance Care Planning was addressed today with: Yes.  Mood, coping, and/or meaning in the context of serious illness were addressed today: Yes.              Interval History:     Chart review/discussion with unit or clinical team members:   Discussed with Dr Stokes.  Some improvement (off pressors    Per patient or family/caregivers today:  Ezequiel notes knee pain is lessened with the topical voltaren, for a while.  He notes shortness of breath is tolerable.  Appetite remains decreased.  He tells me his teeth/gums are sensitive to cold and some foods, and prefers water with no ice (doesn't have to be warm).  Has been reluctant to go to the dentist.    I affirmed to Ezequiel that the team was able to locate a health care directive and that he has son Parag listed as HCA, and son Trey as first alternate.    -> Ezequiel notes he would not wish to have his son Gregorio as his health care agent because \"I don't know that he'd be able to make the right decisions for me, to have all the information.\"  Ezequiel affirmed he hopes to get better; I asked what that might look like given that he is bedbound at baseline; he was unable to describe other than being off high flow oxygen.    Was able to reach son Trey via telephone; he is first alternate " "HCA.  Trey was not aware Ezequiel is hospitalized; provided medical update.  He affirmed that family have been considering comfort focused goals of treatment and hospice.  Trey also affirmed that Ezequiel has never served in the  (face sheet from [facility] naming a [facility] primary MD appears INCORRECT)    Son Parag also called me back after conversation with Trey; he was also unaware that Ezequiel has been hospitalized.  Noted since admission to [facility], Ezequiel has not had any family meetings to address cncerns; he graduated from TCU due to plateau of progress and has been bedbound since refusing to walk/get out of bed or do exercises.  Parag notes pt has been depressed chronically though not suicidal.  Parag has seen occasional hallucinations and confusion/short term memory loss in his dad and other times Ezequiel can be more clear and lucid.    Key Palliative Symptoms:  # Pain severity the last 12 hours: moderate  # Dyspnea severity the last 12 hours: moderate  # Nausea severity the last 12 hours: none  # Anxiety severity the last 12 hours: low    Patient is on opioids: assessed and bowels ok/no needed changes to plan of care today.           Review of Systems:     Besides above, an additional 3 system ROS was reviewed and is unremarkable          Medications:     I have reviewed this patient's medication profile and medications during this hospitalization.    Noted meds:  Reviewed, significant for:  - scheduled APAP 650mg TID  Topical voltaren 4g to both knees four times daily  IV furosemide  Now PRN morphine IV (second line), PO (first line)             Physical Exam:   Vital Signs: Blood pressure 104/58, pulse 69, temperature 97.4  F (36.3  C), temperature source Oral, resp. rate (!) 34, height 1.88 m (6' 2.02\"), weight 54.6 kg (120 lb 5.9 oz), SpO2 96 %.     Intake/Output Summary (Last 24 hours) at 4/21/2023 1506  Last data filed at 4/21/2023 1200  Gross per 24 hour   Intake 2520.06 ml "   Output 4550 ml   Net -2029.94 ml     Vitals:    04/17/23 0236 04/20/23 0537 04/21/23 0400   Weight: 59.9 kg (132 lb) 44.6 kg (98 lb 5.2 oz) 54.6 kg (120 lb 5.9 oz)       GENERAL: cachectic 81 yo male, alert and sitting up in bed, engages readily in conversation.  SKIN: Warm and dry   HEENT: Normocephalic, anicteric sclera, moist mucous membranes, temporal muscle wasting.  Impaired dentition with gingival inflammatin.  LUNGS: Clear to auscultation anterolaterally; accessory  Muscle use noted while on HFNC.   CARDIAC:   ABDOMINAL: BS (+), soft, non distended, non tender  : burris in place w pale yellow urine.  MUSKL: no gross joint deformities   EXTREMITIES: No edema or cyanosis, pulses 2+ and symmetrical  NEUROLOGIC: mildly hard of hearing.  No myoclonus.  Facial movements are symmetric.  Pupils miotic.  PSYCH: affect full.             Data Reviewed:     Reviewed recent pertinent imaging:   No new imaging since last visit.    Reviewed recent labs:   Last Comprehensive Metabolic Panel:  Lab Results   Component Value Date     04/21/2023    POTASSIUM 3.3 (L) 04/21/2023    CHLORIDE 101 04/21/2023    CO2 30 (H) 04/21/2023    ANIONGAP 5 (L) 04/21/2023     (H) 04/21/2023    BUN 14.1 04/21/2023    CR 0.41 (L) 04/21/2023    GFRESTIMATED >90 04/21/2023    GREGORY 7.8 (L) 04/21/2023     Lab Results   Component Value Date    ALBUMIN 3.8 04/17/2023    ALBUMIN 2.8 03/28/2022     CBC RESULTS: Recent Labs   Lab Test 04/21/23  0309   WBC 18.0*   RBC 3.22*   HGB 10.9*   HCT 32.4*   *   MCH 33.9*   MCHC 33.6   RDW 13.1   PLT 86*               Total time today is 68 minutes inclusive of chart review, multiple phone calls to sons, discussion with ICU attending, patient visit, and documentation.    Berta Rankin MD  [facility], Palliative Care  Securely message with the Vocera Web Console (learn more here) or  Text page via [facility] Paging/Directory

## 2023-04-21 NOTE — PLAN OF CARE
Goal Outcome Evaluation:                 Problem: Mechanical Ventilation Invasive  Goal: Optimal Device Function  Intervention: Optimize Device Care and Function  Recent Flowsheet Documentation  Taken 4/21/2023 0715 by Cassandra Toledo, RT  Airway Safety Measures: all equipment/monitors on and audible          Cassandra Toledo, RT

## 2023-04-21 NOTE — PLAN OF CARE
[facility] - ICU    RN Progress Note:            Pertinent Assessments:      Please refer to flowsheet rows for full assessment     Much more awake and alert than usual. Cooperative with cares and treatments this shift. Patient 02 sats drops with activities. Remained bedrest. NSR on monitor.           Key Events - This Shift:       Good urine output, replacing electrolytes as per order. Turned and repositioned in bed.Patient off the pressors and drips since yesterday afternoon.                 Barriers to Discharge / Downgrade:     HFNC, IV ABX,          Point of Contact Update YES-OR-NO: No  If No, reason: no changes overnight  Name:na  Phone Number:na  Summary of Conversation: na         Problem: Plan of Care - These are the overarching goals to be used throughout the patient stay.    Goal: Plan of Care Review  Description: The Plan of Care Review/Shift note should be completed every shift.  The Outcome Evaluation is a brief statement about your assessment that the patient is improving, declining, or no change.  This information will be displayed automatically on your shift note.  Outcome: Progressing  Flowsheets (Taken 4/21/2023 0615)  Plan of Care Reviewed With: patient  Overall Patient Progress: improving   Goal Outcome Evaluation:      Plan of Care Reviewed With: patient    Overall Patient Progress: improvingOverall Patient Progress: improving

## 2023-04-21 NOTE — PROGRESS NOTES
Speech Pathology Clinical Swallow Evaluation       04/21/23 1500   Appointment Info   Signing Clinician's Name / Credentials (SLP) Evgeny Munoz MA [facility] SLP   General Information   Onset of Illness/Injury or Date of Surgery 04/17/23   Referring Physician Mir Stokes MD   Patient/Family Therapy Goal Statement (SLP) None stated, doesn't have an appetite   Pertinent History of Current Problem 82M w/ dementia, covid in 2021 requiring intubation, PE in 2021, afib, emphysema, admitted with acute respiratory failure with hypoxemia, delirium, septic shock requiring pressors. pulm infiltrates on CXR, suspect pneumonia, possible aspiration. DNR/DNI on HFNC. Not requiring BiPAP as much. Poor appetite, poor intake.   General Observations Pt is alert, cooperative but needs encouragement to eat/drink for assessment.   Type of Evaluation   Type of Evaluation Swallow Evaluation   Oral Motor   Oral Musculature generally intact   Structural Abnormalities none present   Dentition (Oral Motor)   Dentition (Oral Motor) significant number of missing teeth   Facial Symmetry (Oral Motor)   Facial Symmetry (Oral Motor) WNL   Lip Function (Oral Motor)   Comment, Lip Function (Oral Motor) WFL, general deconditioning   Tongue Function (Oral Motor)   Comment, Tongue Function (Oral Motor) WFL, general deconditioning   Jaw Function (Oral Motor)   Jaw Function (Oral Motor) WNL   Cough/Swallow/Gag Reflex (Oral Motor)   Soft Palate/Velum (Oral Motor) WNL   Volitional Swallow (Oral Motor) WNL   Vocal Quality/Secretion Management (Oral Motor)   Vocal Quality (Oral Motor) WNL   Secretion Management (Oral Motor) WNL   General Swallowing Observations   Past History of Dysphagia 2020 due to COVID, returned to reg diet/thin liquids   Comment, General Swallowing Observations Patient has very poor appetite and refuses intake, may only take a couple of bites/sips. Agreed to thin water and pudding.   Respiratory Support (General Swallowing Observations)  nasal cannula;other (see comments)  (HFNC)   Current Diet/Method of Nutritional Intake (General Swallowing Observations, NIS) thin liquids (level 0);regular diet   Swallowing Evaluation Clinical swallow evaluation   Clinical Swallow Evaluation   Feeding Assistance dependent   Clinical Swallow Evaluation Textures Trialed thin liquids;pureed   Clinical Swallow Eval: Thin Liquid Texture Trial   Mode of Presentation, Thin Liquids fed by clinician;straw   Volume of Liquid or Food Presented 3 oz   Oral Phase of Swallow WFL   Pharyngeal Phase of Swallow intact   Diagnostic Statement took single sips and consecutive swallows from a straw. No cough, throat clear, or change in vocal quality. No change in oxygen SATs.   Clinical Swallow Evaluation: Puree Solid Texture Trial   Mode of Presentation, Puree spoon;fed by clinician   Volume of Puree Presented 3 bites   Oral Phase, Puree effortful AP movement;WFL   Pharyngeal Phase, Puree intact   Diagnostic Statement Increased time and effort for AP transit due to generalized weakness/deconditioning, no overt s/s aspiration, no change in oxygen SATs.   Esophageal Phase of Swallow   Patient reports or presents with symptoms of esophageal dysphagia No   Swallowing Recommendations   Diet Consistency Recommendations thin liquids (level 0);regular diet   Supervision Level for Intake 1:1 supervision needed   Mode of Delivery Recommendations bolus size, small;slow rate of intake   Recommended Feeding/Eating Techniques (Swallow Eval) maintain upright sitting position for eating   Medication Administration Recommendations, Swallowing (SLP) patient preference   Comment, Swallowing Recommendations recommend continuing with current diet to maximize nutrition   General Therapy Interventions   Planned Therapy Interventions Dysphagia Treatment   Clinical Impression   Criteria for Skilled Therapeutic Interventions Met (SLP Eval) Yes, treatment indicated   SLP Diagnosis dysphagia   Functional  Limitations Related to Problem List (SLP) nutrition and aspiration risk   Risks & Benefits of therapy have been explained evaluation/treatment results reviewed;care plan/treatment goals reviewed;participants voiced agreement with care plan;participants included;patient   Clinical Impression Comments Patient has very little intake and refuses most PO offerings. When taking small bites of puree/pudding texture and sips of thin liquid, consecutive drinking of thin liquid, he has no overt s/s aspiration, no cough, throat clear, change in vocal quality, oxygen saturations maintained at 92% throughout the session. Overall patient is deconditioned and does not want to eat much. Remains on HFNC. Recommend continue with current diet to maximize nutrition and menu options. SLP will follow for additional suggestion or need for further assessment.   SLP Total Evaluation Time   Eval: oral/pharyngeal swallow function, clinical swallow Minutes (46726) 15   SLP Goals   Therapy Frequency (SLP Eval) 4 times/wk   SLP Predicted Duration/Target Date for Goal Attainment 04/28/23   SLP Goals Swallow   SLP: Safely tolerate diet without signs/symptoms of aspiration Thin liquids;With use of swallow precautions;Soft & bite sized diet   SLP Discharge Planning   SLP Plan 4x/wk,  try advanced textures if willing, monitor resp status, consider vfss vs goals of care   SLP Rationale for DC Rec defer to care team   SLP Brief overview of current status  regular diet w/thin liquids but refuses most intake, takes small bites of puree/pudding, liquids.   Total Session Time   Total Session Time (sum of timed and untimed services) 15

## 2023-04-22 NOTE — PROGRESS NOTES
CRITICAL CARE PROGRESS NOTE:    Assessment/Plan:  82M w/ dementia, covid in 2021, PE in 2021, afib, emphysema, admitted with acute respiratory failure with hypoxemia, delirium, septic shock requiring pressors.     RESP:  Acute resp failure with hypoxemia, impressive pulm infiltrates on CXR, suspect pneumonia, possible aspiration. DNR/DNI on HFNC. Off bipap for several days.     Titrate FiO2 for goal SpO2 92% or greater, avoid hyperoxia    Encourage OOB, PT/OT, push IS when able    CV:  Septic shock 2/2 pneumonia, resolved. Off NE. Afib/RVR requiring amio - resolved. Hx of HFrEF. Now volume up status 4L net positive and responding well to Lasix. Echo from 2021: EF 45-50%, normal LV size, RV normal. Bedside echo 4/21: EF 64% by EPSS, normal LV size/function, SV 51 mL/beat, E/A 1.05, normal lateral and septal E/e', IVC max D 0.61cm with ~00% collapse indicating est RAP 0-5 mm Hg, TR dasha max 255cm/sec, mild to mod TR. Unable to measure LA vol but probably normal diastolic function.    MAP >65, currently off pressors    Tele monitoring    Lactate 3.4 on 4/17 - not rechecked. It was as high as 8.1 check prn.     Back off on Lasix a bit, will do 20mg IV bid today to keep net negative - still net positive, though weight is way down since admission.     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 Appears near baseline.    Tylenol prn pain    Avoid benzo's    Cautious use of narcotics    Resume PTA pregabalin 75mg po bid    Resume PTA lidocaine patches for knee pain    PTA melatonin on hold    GI:  No issues. Appears severely malnourished, cachectic.     ADAT, ensure supplements    Bowel regimen prn    Stop PPI - not a home med.     RENAL:  Normal renal function, CO2 slightly up likely due to Lasix and mild contraction alkalosis.     Lasix as above - reduce dose today    Maintain burris    Avoid nephrotoxins    Follow BUN/SCr    ID:  Positive blood cx with multiple organisms; suspect some of them are  "contaminants. Likely aspiration pneumonia    Cont zosyn, day 6. Would plan for 7 or 8 day course    Follow up culture data    HEMATOLOGIC:  Leukocytosis, improving. Stable mild anemia. plt count stable at 85, from 251 on admission (4/17), likely BM suppression from sepsis, malnutrition. 4HT score 3, low probability ( < 5%) of HIT. HIT negative 4/21    hgb >7    Follow counts    ENDOCRINE:  No issues. A1c 5.0 on 4/17    FSBG checks, insulin sliding scale/drip per ICU protocol     ICU PROPHYLAXIS:    HSQ    CODE STATUS, DISPOSITION, FAMILY COMMUNICATION: DNR/DNI  Palliative care following.  Will update family later today.  Need ongoing GOC discussions. Comfort care would be appropriate if pt and family in agreement.     Lines/Drains/Tubes:  PIV x2  PICC 4/18  Marina day ? (chronic)    Restraints  Not needed    Mir (Jian) MD Divya   [facility]  Pulmonary & Critical Care  Pager [phone]  Clinic [phone]  Fax [phone]     Clinically Significant Risk Factors        # Hypokalemia: Lowest K = 2.8 mmol/L in last 2 days, will replace as needed         # Thrombocytopenia: Lowest platelets = 85 in last 2 days, will monitor for bleeding         # Cachexia: Estimated body mass index is 15.05 kg/m  as calculated from the following:    Height as of this encounter: 1.88 m (6' 2.02\").    Weight as of this encounter: 53.2 kg (117 lb 4.6 oz).   # Severe Malnutrition: based on nutrition assessment       Code Status: No CPR- Do NOT Intubate          Overnight events:  No major events  Same HFNC settings as yesterday; 0.8, 50Lpm with SpO2 mid to high 90s today.  RR 30s.     Subjective:  Denies pain    Objective:  Physical Exam:  Vent settings for last 24 hours:  FiO2 (%): 80 %  Resp: (!) 35      BP 94/54   Pulse 94   Temp 98.7  F (37.1  C) (Oral)   Resp (!) 35   Ht 1.88 m (6' 2.02\")   Wt 53.2 kg (117 lb 4.6 oz)   SpO2 97%   BMI 15.05 kg/m      Intake/Output last 3 shifts:  I/O last 3 completed " shifts:  In: 1240 [P.O.:440; I.V.:800]  Out: 4500 [Urine:4500]  Intake/Output this shift:  I/O this shift:  In: -   Out: 1600 [Urine:1600]    Physical Exam  Gen: awake, alert, oriented, no distress  HEENT: no OP lesions, no BEATRICE  CV: RRR, no m/g/r  Resp: diminished ant no w/r/r   Abd: soft, nontender, BS+  Neuro: PERRL, nonfocal  Ext: no edema    LAB:  Recent Labs   Lab 04/22/23  0608   WBC 11.9*   HGB 11.0*   HCT 33.4*   PLT 85*     Recent Labs   Lab 04/22/23  0608 04/21/23  0309 04/20/23  0320 04/18/23  0340 04/17/23  0255    136 137   < > 140   CO2 27 30* 25   < > 22   BUN 11.7 14.1 19.7   < > 40.5*   ALKPHOS  --   --   --   --  108   ALT  --   --   --   --  18   AST  --   --   --   --  27    < > = values in this interval not displayed.     Micro  PCT 0.44 on 4/17  Strep mitis, neisseria subflava, strep salvarius on blood cx from 4/17 - strep mitis is pan-sensitive, no sens for the others  Urine 4/17 mixed kelli  Repeat blood cx 4/19 NGTD    No current outpatient medications on file.       Critical care attestation: 40 minutes spent managing the following issues: acute respiratory failure requiring HHFNC alt with BiPAP, circulatory shock requiring continuous vasopressor infusions, toxic/metabolic encephalopathy, severe pneumonia with aspiration with hypoxemic respiratory failure.  High risk for organ deterioration and death requiring ICU level care.

## 2023-04-22 NOTE — PLAN OF CARE
[facility] - ICU    RN Progress Note:            Pertinent Assessments:      Please refer to flowsheet rows for full assessment     Coarse lung sounds with inspiratory wheezes.    Upon repositioning on right side O2 sats drop and require increase in FiO2 to 90% from 80.             Key Events - This Shift:     NSR on telemetry.  Dr. Luis changed order for Map.  Now Map>60 is sufficient.    Isis Leong, RN

## 2023-04-22 NOTE — PLAN OF CARE
[facility] - ICU    RN Progress Note:            Pertinent Assessments:      Please refer to flowsheet rows for full assessment     Confused and agitated, intermittently follows commands. BP stable, afebrile. No BM. PRN Morphine given once for pain. Patient received Potassium and Sodium Phosphate replacement. Poor appetite, refused to eat dinner, but drank water.          Mobility Level:     2. Bedrest with reposition Q 2 hours         Key Events - This Shift:     Desats with activities.        Barriers to Discharge / Downgrade:     Remained on HFNC 50 Liters 80% Oxygen.          Loan Lutz RN

## 2023-04-22 NOTE — PROVIDER NOTIFICATION
04/22/23 1559   Vital Signs   Resp (!) 34   Pulse 81   Pulse Rate Source Monitor   Oxygen Therapy   SpO2 96 %  (on 75%)   O2 Device High Flow Nasal Cannula (HFNC)   FiO2 (%) 65 %   Oxygen Delivery 50 LPM   Assessment   Rhythm/Pattern, Respiratory tachypneic   Breath Sounds   Breath Sounds All Fields   All Lung Fields Breath Sounds clear;diminished     Pt started on HFNC 50L/90% and was slowly titrated down. He's currently on HFNC 50L/65%. RT will cont to follow and titrate as able.

## 2023-04-22 NOTE — CONSULTS
Care Management Follow Up    Length of Stay (days): 5    Expected Discharge Date: 04/21/2023     Concerns to be Addressed:  hospice  Patient plan of care discussed at interdisciplinary rounds: Yes    Anticipated Discharge Disposition:  Possible [facility] hospice     Anticipated Discharge Services:   Possible [facility] hospice  Anticipated Discharge DME:  TBD    Patient/family educated on Medicare website which has current facility and service quality ratings:  NA  Education Provided on the Discharge Plan:  NA  Patient/Family in Agreement with the Plan:  TBD    Referrals Placed by CM/SW:  [facility] hospice  Private pay costs discussed: YND    Additional Information:  Pt is a  82 year old  male who lives at the [facility]. He has a PMH of Pneumonia secondary to Covid, A fib, emphysema, and urinary retention. He was admitted on 04/17/23 with shaking, fever, nausea, vomiting and diarrhea, and altered mental state and found to be in septic shock with acute respiratory failure with hypoxia and hypercapnia, secondary to mulitfocal pneumonia and a UTI secondary to indwelling catheter    Per palliative needs referral to hospice.  Based on the amount of O2 the patient is currently on (60L 90%) would not be eligible for hospice in an outpatient facility.  Made referral to [facility] hospice. They will contact family to give them more information about hospice and follow up.    Chaya Arellano RN

## 2023-04-23 NOTE — PROGRESS NOTES
CRITICAL CARE PROGRESS NOTE:    Assessment/Plan:  82M w/ dementia, covid in 2021, PE in 2021, afib, emphysema, admitted with acute respiratory failure with hypoxemia, delirium, septic shock requiring pressors.     RESP:  Acute resp failure with hypoxemia, impressive pulm infiltrates on CXR, suspect pneumonia, possible aspiration. DNR/DNI on HFNC. Off bipap for several days. Significant improvement last few days with diuresis, abx. Down to 0.45 FiO2 on HFNC (was up to 0.9)    Titrate FiO2 for goal SpO2 92% or greater, avoid hyperoxia    Encourage OOB, PT/OT, push IS when able    Should be able to get to low flow cannula soon.     CV:  Septic shock 2/2 pneumonia, resolved. Off NE. Afib/RVR requiring amio - resolved. Hx of HFrEF. Now volume up status 4L net positive and responding well to Lasix. Echo from 2021: EF 45-50%, normal LV size, RV normal. Bedside echo 4/21: EF 64% by EPSS, normal LV size/function, SV 51 mL/beat, E/A 1.05, normal lateral and septal E/e', IVC max D 0.61cm with ~00% collapse indicating est RAP 0-5 mm Hg, TR dasha max 255cm/sec, mild to mod TR. Unable to measure LA vol but probably normal diastolic function.    MAP >65, currently off pressors    Tele monitoring    Lactate 3.4 on 4/17 - not rechecked. It was as high as 8.1 check prn.     Hold Lasix for now given low BP, significant decrease in weight since admission.      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 Appears near baseline.    Tylenol prn pain    Avoid benzo's    Cautious use of narcotics    Continue PTA pregabalin 75mg po bid    Continue PTA lidocaine patches for knee pain    PTA melatonin on hold    GI:  No issues. Appears severely malnourished, cachectic. Seen by SLP - no signs of aspiration noted.     ADAT, continue ensure supplements    Bowel regimen prn    RENAL:  Normal renal function, CO2 slightly up likely due to Lasix and mild contraction alkalosis.     Holding Lasix as above    Maintain burris    Avoid  "nephrotoxins    Follow BUN/SCr    ID:  Positive blood cx with multiple organisms; suspect some of them are contaminants. Likely aspiration pneumonia    Cont zosyn, day 7. Would give 8 days then stop.     Follow up culture data    HEMATOLOGIC:  Leukocytosis, improving. Stable mild anemia. plt count stable at 87, from 251 on admission (4/17), likely BM suppression from sepsis, malnutrition. 4HT score 3, low probability ( < 5%) of HIT. HIT negative 4/21    hgb >7    Follow counts    ENDOCRINE:  No issues. A1c 5.0 on 4/17    FSBG checks, insulin sliding scale/drip per ICU protocol     ICU PROPHYLAXIS:    HSQ    CODE STATUS, DISPOSITION, FAMILY COMMUNICATION: DNR/DNI  Palliative care following.  OK to transfer out of ICU.  Signed out to [facility], who will assume care.  Would not escalate care back to ICU (no pressors, no BiPAP)  Comfort care if worsens.  Otherwise, awaiting [facility] hospice consult.     Our service will sign off, please call with questions.    Lines/Drains/Tubes:  PIV x2  PICC 4/18  Marina day ? (chronic)    Restraints  Not needed    Mir (Jian) MD Divya  [facility]  Pulmonary & Critical Care  Pager [phone]  Clinic [phone]  Fax [phone]     Clinically Significant Risk Factors        # Hypokalemia: Lowest K = 2.7 mmol/L in last 2 days, will replace as needed         # Thrombocytopenia: Lowest platelets = 85 in last 2 days, will monitor for bleeding         # Cachexia: Estimated body mass index is 14.16 kg/m  as calculated from the following:    Height as of this encounter: 1.88 m (6' 2.02\").    Weight as of this encounter: 50 kg (110 lb 5.4 oz).   # Severe Malnutrition: based on nutrition assessment       Code Status: No CPR- Do NOT Intubate          Overnight events:  No major events  No complaints today    On HFNC 0.45, 45Lpm  Not eating much  BP low but remains off pressors.    Subjective:  Denies pain    Objective:  Physical Exam:  Vent settings for last 24 hours:  FiO2 " "(%): 45 %  Resp: 25      BP (!) 87/51   Pulse 91   Temp 97.7  F (36.5  C) (Oral)   Resp 25   Ht 1.88 m (6' 2.02\")   Wt 50 kg (110 lb 5.4 oz)   SpO2 93%   BMI 14.16 kg/m      Intake/Output last 3 shifts:  I/O last 3 completed shifts:  In: 1760 [P.O.:1120; I.V.:640]  Out: 2550 [Urine:2550]  Intake/Output this shift:  No intake/output data recorded.    Physical Exam  Gen: awake, alert, oriented, no distress  HEENT: no OP lesions, no BEATRICE  CV: RRR, no m/g/r  Resp: diminished ant no w/r/r   Abd: soft, nontender, BS+  Neuro: PERRL, nonfocal  Ext: no edema    LAB:  Recent Labs   Lab 04/23/23  0454   WBC 14.2*   HGB 10.8*   HCT 31.7*   PLT 87*     Recent Labs   Lab 04/23/23  0454 04/22/23  0608 04/21/23  0309 04/18/23  0340 04/17/23  0255    136 136   < > 140   CO2 30* 27 30*   < > 22   BUN 15.9 11.7 14.1   < > 40.5*   ALKPHOS  --   --   --   --  108   ALT  --   --   --   --  18   AST  --   --   --   --  27    < > = values in this interval not displayed.     Micro  PCT 0.44 on 4/17  Strep mitis, neisseria subflava, strep salvarius on blood cx from 4/17 - strep mitis is pan-sensitive, no sens for the others  Urine 4/17 mixed kelli  Repeat blood cx 4/19 NGTD    No current outpatient medications on file.       Critical care attestation: not critically ill    "

## 2023-04-23 NOTE — PROGRESS NOTES
Patient remains on High Flow Nasal Canula 50 L 50% Satting 96%.RT will continue to monitor and wean as tolerated.

## 2023-04-23 NOTE — PROGRESS NOTES
Patient is eligible for GIP hospice per Dr. Velez, hospice MD.      Primary hospice DX:   End stage emphysema     GIP: Respiratory distress / weaning HFNC    Goals for discharge: Wean HFNC as tolerated with goal of 20 LPM     Update: 4/23 5:54 PM   Writer called primary contact, laureano Perkins, and left a voicemail requesting call back.  No call back received at this time.  Hospice will update care team once consult is completed and required signatures are obtained.      Linda Mays RN  [facility]

## 2023-04-23 NOTE — PROGRESS NOTES
Referral Received - [facility]       [facility] would like to thank you for the [facility] referral.    Referral received and initial insurance information sent to  Hospice intake for review. Writer spoke with RN RUSH Rivero, who reports that patient / family goal is discharge back to care facility with hospice services.  Unfortunately, patient is currently unable to discharge safely due to high oxygen demands.  Patient is currently requiring HFNC 45L 45% Sat 94%.  RT is working working on weaning as able.      Hospice will contact patient's son, Gregorio, to provide information on [facility] hospice.  We will connect with the primary care team following consultation.      Ilana Mays RN  [facility]  Contact information available via [facility] Paging/Directory     Listed as [facility] in [facility]

## 2023-04-23 NOTE — PLAN OF CARE
Goal Outcome Evaluation:        [facility] - ICU    RN Progress Note:            Pertinent Assessments:      Please refer to flowsheet rows for full assessment     Pt remains confused but pleasant and cooperative.   Afebrile  BP soft but able to achieve goal of MAP over 60.          Key Events - This Shift:     No pressors needed.  Applied lidocaine patches to knees per new order.  Bedrest with Q2Hr repositioning.               Barriers to Discharge / Downgrade:     High O2 needs

## 2023-04-24 NOTE — PROGRESS NOTES
Daily Progress Note        CODE STATUS:  No CPR- Do NOT Intubate    04/24/23  Assessment/Plan:  82M w/ dementia, covid in 2021, PE in 2021, afib, emphysema, admitted with acute respiratory failure with hypoxemia, delirium, septic shock requiring pressors. He was found to have bilateral pneumonia, likely aspiration. Needed BiPaP in ICU.     Dr Stokes discussed his poor prognosis with the family on Saturday. Per my discussion with Dr Stokes, family agreed with hospice. Vasopressor stopped and transferred out of ICU yesterday.     Acute on chronic respiratory failure, hypoxic  -- Chest xray with impressive pulm infiltrates. High suspicion for aspiration pneumonia  -- Off BiPaP for several days. Now on HFNC. Titrate down O2 as able . No escalation of care to ICU or BiPaP if condition worsens per Dr Stokes's discussion with family. I'm not able get hold of family today.   -- Still on IV zosyn. Will need to clarify this with family when they call back     Septic shock due to pneumonia  Rapid afib  -- Due to above  -- Treated with vasopressor fluid resuscitation in ICU. Now vol up. But unable to use diuretics due to low BP.     Dementia  Acute on chronic encephalopathy  -- Worsened by acute illnesses as above    Severe malnutrition  Cachexia    Addendum: Son, Parag called me back. I updated him about his critical condition. He wants his dad enrolled in hospice, but at the same time would like to continue the antibiotics and HFNC for now to see if he improves. He agrees with no escalation of care further. No ICU. No pressors. No BiPaP    Disposition; TBD  Barrier to discharge; HFNC, IV antibiotics     LOS: 7 days     Subjective:  Interval History: Patient seen and examined. Notes, labs, imaging reports personally reviewed. He is new to me today. He is confused. He offered no new complaints to me.     Review of Systems:   As mentioned in subjective.    Patient Active Problem List   Diagnosis     Hypernatremia     Hypoxia      Pneumonia due to 2019 novel coronavirus     Pneumonia due to COVID-19 virus     DNR (do not resuscitate)     Anticoagulated     Septic shock (H)     Acute respiratory failure with hypoxia and hypercapnia (H)     Multifocal pneumonia     Urinary tract infection associated with indwelling urethral catheter, initial encounter (H)       Scheduled Meds:    acetaminophen  650 mg Oral TID     diclofenac  4 g Topical 4x Daily     [Held by provider] furosemide  20 mg Intravenous Q12H     heparin ANTICOAGULANT  5,000 Units Subcutaneous Q8H     lidocaine  2 patch Transdermal Q24h    And     lidocaine   Transdermal Q8H ALIZE     piperacillin-tazobactam  3.375 g Intravenous Q8H     pregabalin  75 mg Oral BID     sodium chloride (PF)  10-40 mL Intracatheter Q7 Days     Continuous Infusions:  PRN Meds:.acetaminophen, glucose **OR** dextrose **OR** glucagon, lidocaine, morphine, morphine, naloxone **OR** naloxone **OR** naloxone **OR** naloxone, sodium chloride (PF), sodium chloride (PF), zinc oxide    Objective:  Vital signs in last 24 hours:  Temp:  [97.9  F (36.6  C)-99.7  F (37.6  C)] 98.4  F (36.9  C)  Pulse:  [80-93] 87  Resp:  [17-45] 22  BP: (86-97)/(50-55) 90/54  FiO2 (%):  [40 %-45 %] 40 %  SpO2:  [91 %-98 %] 91 %        Intake/Output Summary (Last 24 hours) at 4/24/2023 1704  Last data filed at 4/24/2023 1100  Gross per 24 hour   Intake 823 ml   Output 900 ml   Net -77 ml       Physical Exam:  General: Cachectic  HEENT: NC, AT.    Chest: Diminished breath sounds bilaterally. Crackles bilaterally  Heart: S1S2 normal, regular. No M/R/G  Abdomen: Soft. NT, ND. Bowel sounds- active.  Extremities: No legs swelling  Neuro: Awake, grossly non-focal. Oriented to self only.       Lab Results:(I have personally reviewed the results)    Recent Results (from the past 24 hour(s))   Basic metabolic panel    Collection Time: 04/24/23  4:03 AM   Result Value Ref Range    Sodium 138 136 - 145 mmol/L    Potassium 3.4 3.4 - 5.3 mmol/L     Chloride 100 98 - 107 mmol/L    Carbon Dioxide (CO2) 29 22 - 29 mmol/L    Anion Gap 9 7 - 15 mmol/L    Urea Nitrogen 19.2 8.0 - 23.0 mg/dL    Creatinine 0.44 (L) 0.67 - 1.17 mg/dL    Calcium 8.1 (L) 8.8 - 10.2 mg/dL    Glucose 83 70 - 99 mg/dL    GFR Estimate >90 >60 mL/min/1.73m2   Phosphorus    Collection Time: 04/24/23  4:03 AM   Result Value Ref Range    Phosphorus 2.2 (L) 2.5 - 4.5 mg/dL   Magnesium    Collection Time: 04/24/23  4:03 AM   Result Value Ref Range    Magnesium 1.9 1.7 - 2.3 mg/dL   Potassium    Collection Time: 04/24/23  2:07 PM   Result Value Ref Range    Potassium 3.8 3.4 - 5.3 mmol/L       All laboratory and imaging data in the past 24 hours reviewed  Serum Glucose range:   Recent Labs   Lab 04/24/23  0403 04/23/23  1325 04/23/23  0454 04/22/23  1127   GLC 83 111* 88 111*     ABG:   Recent Labs   Lab 04/20/23  0229   PH 7.44     CBC:   Recent Labs   Lab 04/23/23  0454 04/22/23  0608 04/21/23  0309   WBC 14.2* 11.9* 18.0*   HGB 10.8* 11.0* 10.9*   HCT 31.7* 33.4* 32.4*   MCV 99 100 101*   PLT 87* 85* 86*     Chemistry:   Recent Labs   Lab 04/24/23  1407 04/24/23  0403 04/23/23  1654 04/23/23  0612 04/23/23  0454 04/22/23  0608   NA  --  138  --   --  140 136   POTASSIUM 3.8 3.4 4.4   < > 2.7* 3.6   CHLORIDE  --  100  --   --  100 100   CO2  --  29  --   --  30* 27   BUN  --  19.2  --   --  15.9 11.7   CR  --  0.44*  --   --  0.45* 0.44*   GFRESTIMATED  --  >90  --   --  >90 >90   GREGORY  --  8.1*  --   --  7.7* 7.8*   MAG  --  1.9  --   --  1.8 1.9    < > = values in this interval not displayed.     Coags:  No results for input(s): INR, PROTIME, PTT in the last 168 hours.    Invalid input(s): APTT  Cardiac Markers:  No results for input(s): CKTOTAL, TROPONINI in the last 168 hours.       XR Chest Port 1 View    Result Date: 4/20/2023  EXAM: XR CHEST PORT 1 VIEW LOCATION: [facility] DATE/TIME: 4/20/2023 6:20 AM CDT INDICATION: Worsening respiratory failure COMPARISON:  04/18/2023     IMPRESSION: The right PICC is unchanged in position. Multifocal airspace disease is again seen throughout the lungs bilaterally, slightly worsened in the right midlung zone, consistent with multifocal pneumonia    XR Chest Port 1 View    Result Date: 4/18/2023  EXAM: XR CHEST PORT 1 VIEW LOCATION: [facility] DATE/TIME: 4/18/2023 8:57 PM CDT INDICATION: RN placed PICC   verify tip placement COMPARISON: Chest radiograph 04/17/2023.     IMPRESSION: Patient is slightly rotated on today's exam. No significant change in right upper extremity PICC with tip overlying the superior vena cava. Stable cardiomediastinal silhouette. Slightly increased groundglass opacities throughout both lungs. No significant pleural effusion or pneumothorax visualized. Bones are unchanged.    XR Chest Port 1 View    Result Date: 4/17/2023  EXAM: XR CHEST PORT 1 VIEW LOCATION: [facility] DATE/TIME: 4/17/2023 7:25 AM CDT INDICATION: RN placed PICC   verify tip placement COMPARISON: 04/17/2023 chest CT     IMPRESSION: Right PICC tip over the proximal SVC. No significant change of diffuse bilateral opacities with mid to lower lung predominant since recent chest CT. No pleural effusion of significant size. No pneumothorax. Normal heart size.     POC US CHEST B-SCAN    Result Date: 4/17/2023  Sean Cardona MD     4/17/2023  6:09 AM POC US CHEST B-SCAN Date/Time: 4/17/2023 5:49 AM Performed by: Sean Cardona MD Authorized by: Sean Cardona MD  Procedure details:   Indications: hypotension and hypoxia  Comments:    E-FAST bedside ultrasound performed by me for hypoxia & low blood pressures with clinical picture of sepsis. Cardiac: no pericardial effusion, hyperdynamic Lungs:  Lung sliding present bilaterally. Right lung with no B lines. Left lower lower with B lines present. Abdomen:  No free fluid, no AAA seen Imaged saved.    CT Chest Abdomen Pelvis w/o Contrast    Result  Date: 4/17/2023  EXAM: CT CHEST ABDOMEN PELVIS W/O CONTRAST LOCATION: [facility] DATE/TIME: 4/17/2023 3:37 AM CDT INDICATION: abdominal pain, hypoxia, sepsis COMPARISON: 12/20/2021 TECHNIQUE: CT scan of the chest, abdomen, and pelvis was performed without IV contrast. Multiplanar reformats were obtained. Dose reduction techniques were used. CONTRAST: None. FINDINGS: LUNGS AND PLEURA: Apical scarring. Mild emphysema. Extensive bilateral nodular, patchy, and/or confluent airspace opacities with surrounding groundglass in all lobes. No pleural effusions. MEDIASTINUM/AXILLAE: 3.6 cm ectasia ascending thoracic aorta. Heart size normal, no pericardial effusion. No adenopathy. Patulous debris-filled esophagus. CORONARY ARTERY CALCIFICATION: Severe. HEPATOBILIARY: Gallstones. PANCREAS: Normal. SPLEEN: Normal. ADRENAL GLANDS: Normal. KIDNEYS/BLADDER: Bilateral renal stones. No hydronephrosis. Bladder mild distention with air-fluid level and Marina catheter in place. Dependent bladder stones. BOWEL: Gastric distention with air debris level. Small and large bowel loops normal caliber. Rectal distention with gas and debris 10 cm caliber without inflammatory changes. LYMPH NODES: Normal. VASCULATURE: Atherosclerosis. 2.6 cm ectasia distal abdominal aorta. PELVIC ORGANS: Enlarged prostate. MUSCULOSKELETAL: Degenerative changes. Muscle atrophy.     IMPRESSION: 1.  Multifocal pneumonia. 2.  Delayed gastric clearance suspicious for gastric paresis less likely obstruction. 3.  Nephrolithiasis. 4.  Marina catheter malfunction versus clamped Marina. 5.  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      Latest radiology report personally reviewed.    Note created using dragon voice recognition software so sounds alike errors may have escaped editing.      04/24/2023   Kerwin Whittington MD  Hospitalist, [facility]  Pager: [phone]

## 2023-04-24 NOTE — PROGRESS NOTES
[facility] - ICU    RN Progress Note:            Pertinent Assessments:      Please refer to flowsheet rows for full assessment     Pt OOB to chair via shelia, pt immediately slipped down while in upright position r/t stiffness. Pt was able to be boosted in reclined position, but still slid down some.     Neurologically he answered questions appropriately and slept between cares.     Pt's daughter in law Cassandra called for her  Saroj, who is sick and not able to come to the hospital r/t this. They are requesting pt be hospice while in the hospital. I paged hospice with this information as well as Saroj being the primary contact, not Gregorio as indicated in the hospice note. Hospice will follow up with them today.            Key Events - This Shift:     Pt transferred to P2 via bed and on tele pack with Urvashi and RT. Report called to Curt.     Family updated of transfer.            Barriers to Discharge / Downgrade:     Plan is for hospice.          Point of Contact Update YES-OR-NO: Yes  Se above note r/t update with family

## 2023-04-24 NOTE — PLAN OF CARE
Speech Language Therapy Discharge Summary    Reason for therapy discharge:    No further expectations of functional progress.    Progress towards therapy goal(s). See goals on Care Plan in [facility] electronic health record for goal details.  Goals not met.  Barriers to achieving goals:   limited progress with medical condition, very poor intake. Patient takes only sips and occasional bites of soft food and does not show overt s/s aspiration but has high oxygen needs. Family is requesting hospice.    Therapy recommendation(s):    No further therapy is recommended.

## 2023-04-24 NOTE — CONSULTS
Referral Received - [facility]        [facility] would like to thank you for the [facility] referral.     Referral received and initial insurance information sent to  Hospice intake for review.     We are determining your patient's eligibility with a medical director at this time.     Our plan is to visit your patient as soon as possible. We will connect with the primary care team shortly to collect more information on the patient's progression. Thank you for your patience.    Update 1520: Visited patient at bedside. Alert to self, forgetful when answering questions, per chart Hx of dementia. Denies pain when asked. Currently on HFNC. ACP docs on file. [facility] called son, Saroj and LVM regarding [facility] hospice consult. Awaiting call back to determine goals of care and introduce [facility] hospice.      Michelle Mcgregor RN  Clinical Nurse Liaison  [facility]  Direct: [phone]  Contact information available via [facility] Paging/Directory

## 2023-04-24 NOTE — PROGRESS NOTES
RT PROGRESS NOTE  CURRENT HIGH FLOW SETTINGS:  LITER FLOW: 45 LPM        FIO2:   45%  PATIENT PARAMETERS:  SAT: 95%      NOTE / SHIFT SUMMARY:   Patient remains on HFNC 45 L 45%. Patient tolerating well. RT will follow.       CLARISSA CAMERON, RT

## 2023-04-24 NOTE — PLAN OF CARE
"Goal Outcome Evaluation:      Problem: Plan of Care - These are the overarching goals to be used throughout the patient stay.    Goal: Patient-Specific Goal (Individualized)  Description: You can add care plan individualizations to a care plan. Examples of Individualization might be:  \"Parent requests to be called daily at 9am for status\", \"I have a hard time hearing out of my right ear\", or \"Do not touch me to wake me up as it startles me\".  Outcome: Progressing     Problem: Plan of Care - These are the overarching goals to be used throughout the patient stay.    Goal: Optimal Comfort and Wellbeing  Outcome: Progressing     Problem: Risk for Delirium  Goal: Improved Attention and Thought Clarity  Outcome: Progressing     A/O x2. Able to make his needs known. VSS. Denied any significant pain. Turned and repositioned in bed. Poor oral intake.                         "

## 2023-04-24 NOTE — PROGRESS NOTES
SW placed call to patients son Saroj to discuss inpatient hospice, left  requesting a call back.     Janis Cruz, [facility]   [phone]

## 2023-04-24 NOTE — PROGRESS NOTES
"CLINICAL NUTRITION SERVICES - REASSESSMENT NOTE     Nutrition Prescription    RECOMMENDATIONS FOR MDs/PROVIDERS TO ORDER:  Consider adjusting diet texture to Mechanical/Dental soft.  Consider scheduling bowel meds due to constipation.   Consider 100 mg thiamine/day for malnutrition    Malnutrition Status:    Severe in chronic illness    Recommendations already ordered by Registered Dietitian (RD):  Continue supplements    Future/Additional Recommendations:       EVALUATION OF THE PROGRESS TOWARD GOALS   Diet: Moderate Consistent Carbohydrate  Ensure 2x/day.  Per chart pt eating 25-50% at meals.       NEW FINDINGS   Patient sitting up in chair this morning.  Patient states that he likes the Ensure and is able to drink that on his own.  Patient with some teeth missing and reports that he needs soft food or food cut up small.    ANTHROPOMETRICS  Height: 188 cm (6' 2.016\")  Most Recent Weight: 50 kg (110 lb 5.4 oz)    04/23/23 0530 50 kg (110 lb 5.4 oz)   04/22/23 0400 53.2 kg (117 lb 4.6 oz)   04/21/23 0400 54.6 kg (120 lb 5.9 oz)   04/20/23 0537 44.6 kg (98 lb 5.2 oz)   04/17/23 0236 59.9 kg (132 lb) suspect stated wt     01/04/22 60.2 kg (132 lb 11.5 oz)         PHYSICAL FINDINGS  See malnutrition section below.  Patient very thin, cachectic    GI CONCERNS  Constipation-Last BM 4/29/23    LABS  Phos 2.2 (L) replaced per protocol  Reviewed    MEDICATIONS  Reviewed    Malnutrition Diagnosis: Severe malnutrition  In Context of:  Chronic illness or disease    Nutrition Diagnosis: Malnutrition related to chronic illness as evidenced by severe loss of lean body mass     Goals   No wt loss-progressing.  Variable wts noted this admit.  Electrolytes WNL  Diet advancement vs nutrition support within 2-3 days.-met.  Diet start 4/18  Patient to consume % of nutritionally adequate meals three times per day, or the equivalent with supplements/snacks-not met      INTERVENTIONS  Implementation  Collaboration with other " providers, To optimize po intake recommend adjusting diet texture to Mechanical/Dental soft.  Consider scheduling bowel meds due to constipation.   Consider 100 mg thiamine/day for malnutrition  Continue supplements      Monitoring/Evaluation  Progress toward goals will be monitored and evaluated per protocol.

## 2023-04-24 NOTE — PROGRESS NOTES
[facility]  Palliative Care Daily Progress Note       Recommendations & Counseling     1. Need goals of care conversation with family; Ezequiel suffers from cognitive impairment and does not have decision-making capacity at this time.  [facility] identifies son Parag as primary surrogate, Trey as alternate surrogate.  The initial palliative care provider was able to reach sons Trey and Parag 4/20/2023 after multiple attempts and messages.  Unable to reach family again today, 4/24, and will continue to try. *Recommend goals of care discussion at earliest convenience.  --> Trey previously noted that he and Parag have been concerned about Ezequiel's quality of life and declines medically; Trey is interested in learning more about hospice support.  --> Trey and Parag had agreed to come to hospital over weekend of 4/21 to see Ezequiel and communicate with the ICU team about their preferences with goals of care; notes did not indicate whether family visited.  --> Ezequiel is certainly a candidate for hospice given his decline and poor prognosis.  Would be eligible for GIP if within patient/surrogate goals of care, otherwise community hospice program if able to successfully be weaned from HFNC.        Symptom assessment: palliative was not consulted for symptom management, review reveals the following:     Pain: described as diffuse but also L knee (DJD visible on exam) pain and generalized stiffness/soreness, also pain in feet.  - consider resuming PTA pregabalin 75 mg qday  - was on lidocaine patches PTA, consider resuming same  - continue scheduled APAP TID  - continue scheduled topical voltaren applied to knees bilaterally     History Major Depression, presently untreated. (could be affecting self-neglect, avoidance of medical interventions/dental interventions, reduced appetite/poor oral intake)  - was on medication x  Years per son Parag.  No antidepressant on admission med list, no antidepressant on  discharge med list from 2021 (last hostpital stay).  - requested Parag review his records to see if he can locate name of antidepressant Ezequiel was on previously--may be helpful for trial.     Dyspnea, due to hypoxic respiratory failure  - not on chronic O2 at SNF.  - treat pneumonia, suppl O2, pulmonary toilet and nebs per primary team.  - attempt to wean HFNC as tolerated.  May need to discuss compassionate weaning.  - low threshold to add opioids for air hunger IF transition to comfort focused goals of care.  - no escalation of care, no intubation.  - agree with use of low dose morphine for air hunger.  Would have low threshold to advocate to comfort focused goals of care if decline clinically.     Severe protein calorie malnutrition  - Ezequiel tells me he is disinclined to consider artificial nutrition if it were deemed necessary.  - passed bedside swallow, diet per ICU.  - suggest VFSS as history of aspiration in past, and  Has multifocal pneumonia (? If recurrent aspiration)  - concern for ?cachexia related to pulmonary disease which would portend poor prognosis, combined with poor dentition and pain with eating, doesn't want to go to the dentist, and also cogntive impairment.  - tolerating sips/bites.     Goals of care: Initially hospitalized with goals of life-prolonging with limits (DNR/DNI) per walter Perkins, but now considering comfort care with GIP.     Advanced care planning: No [facility] names walter Tuttle as primary, and walter Perkins as first alternate surrogate.  DNR/DNI     Support:    Walter Perkins (home: [phone]; cell [phone]), Trey Tuttle and Ezequiel Moffett; complex family dynamics with estrangement of brothers over several years reported.  More spiritual than Yazidi.  Open to spiritual care and requested      30 minutes spent on the date of the encounter doing chart review, conferring with primary team, history and exam, patient education & counseling, documentation and other activities as noted  above.    Barry Butler,   [facility], Palliative Care  Securely message with the [employer] Web Console (learn more here) or  Text page via [facility] Paging/Directory         Assessments          Ezequiel Randhawa is a 82 year old male with atrial fibrillation, HFrEF (EF 25-30%), memory impairment and presumed dementia, chronic protein calorie malnutrition, COPD (emphysema), urinary retention/BPH, and prior covid pneumonia 2021 requiring intubation and mechanical ventilation and complicated by PE, who presented 4/17/23 from his LTC facility with rigors and found to have UTI and bilateral pneumonia and sepsis requiring vasopressors.  He has required HFNC and at times BiPAP.  Ezequiel has been able to wean from vasopressors for adequate blood pressure support.     Today, the patient was seen for:  Symptom assessment and goals of care discussions.    Prognosis, Goals, or Advance Care Planning was addressed today with: No.  Mood, coping, and/or meaning in the context of serious illness were addressed today: No.              Interval History:     Chart review/discussion with unit or clinical team members:   Reviewed chart including notes from intensivist, respiratory therapy, and hospice RN.  Spoke directly with medicine attending.    Per patient or family/caregivers today:  Visited with patient today about active symptoms, unfortunately due to cognitive impairment he is an unreliable historian.  Patient complains of bilateral knee discomfort doing well and without significant dyspnea.  He is confused about why he has high flow nasal cannula attached.  Attempted to reach out to alternate historian son Parag but to no avail.    Key Palliative Symptoms:  # Pain severity the last 12 hours: low  # Dyspnea severity the last 12 hours: none           Review of Systems:     Besides above, an additional system ROS was reviewed and is unremarkable          Medications:     I have reviewed this patient's medication profile and medications  "during this hospitalization.           Physical Exam:     Physical Examination:   BP 90/54 (BP Location: Left arm)   Pulse 87   Temp 98.4  F (36.9  C) (Axillary)   Resp 22   Ht 1.88 m (6' 2.02\")   Wt 50 kg (110 lb 5.4 oz)   SpO2 91%   BMI 14.16 kg/m    General:  Cachectic and frail in appearance.  HENT:  Normocephalic and atraumatic.  Hearing intact.    Eyes:  Conjunctivae are clear.    Cardiovascular:  Without cyanosis or mottling.  Respiratory:  Breathing comfortably on HFNC, without increased work of breathing or signs of respiratory distress.    Skin:  Without diaphoresis.    Neuro:  Alert but not oriented to time or place.    Wt Readings from Last 8 Encounters:   04/23/23 50 kg (110 lb 5.4 oz)   01/04/22 60.2 kg (132 lb 11.5 oz)   12/21/21 59 kg (130 lb)                Data Reviewed:     Recent Labs   Lab Test 04/23/23  0454 04/22/23  0608 04/21/23  0309 04/18/23  0340 04/17/23  0255 03/02/23  0448 09/28/22  0514 05/17/22  0444   WBC 14.2* 11.9* 18.0*   < > 17.1* 6.9   < > 10.4   HGB 10.8* 11.0* 10.9*   < > 14.5 11.2*   < > 10.4*   MCV 99 100 101*   < > 108* 108*   < > 99   PLT 87* 85* 86*   < > 251 205   < > 298   NEUTROPHIL  --   --   --   --  92 60  --  76    < > = values in this interval not displayed.     Recent Labs   Lab Test 04/24/23  1407 04/24/23  0403 04/23/23  1654 04/23/23  0612 04/23/23  0454 04/22/23  0608   NA  --  138  --   --  140 136   POTASSIUM 3.8 3.4 4.4   < > 2.7* 3.6   CHLORIDE  --  100  --   --  100 100   CO2  --  29  --   --  30* 27   ANIONGAP  --  9  --   --  10 9   BUN  --  19.2  --   --  15.9 11.7   CR  --  0.44*  --   --  0.45* 0.44*   GREGORY  --  8.1*  --   --  7.7* 7.8*    < > = values in this interval not displayed.     Recent Labs   Lab Test 04/17/23  0255 09/28/22  0514 03/28/22  0455   AST 27 21 12   ALT 18 11 <9   ALKPHOS 108 95 74   BILITOTAL 0.5 0.4 0.3   ALBUMIN 3.8 3.6 2.8*   PROTTOTAL 7.1 6.4 5.9*       "

## 2023-04-25 NOTE — PLAN OF CARE
Goal Outcome Evaluation:             Pt alert to location and self only. [place]. Bed rest at baseline. Very thin and bony. Food encouraged and ensure but pt refused. Did drink some water. Skin checked with CHG bath, redness on scrotum and tailbone.Scotum cleaned of caked up barrier cream. Sacral mepilex in place. Blood pressure running low, O2 dropped to 80's RT contacted and oxygen flow adjusted to 50%.

## 2023-04-25 NOTE — CONSULTS
AC JONA and CNL to meet with patient and son Saroj at 1500 today for GIP consult.       Michelle Mcgregor RN  Clinical Nurse Liaison  [facility]  Direct: [phone]  Contact information available via [facility] Paging/Directory

## 2023-04-25 NOTE — CONSULTS
[facility]    Consult Note - [facility] Inpatient [facility]ospice    Hospice Diagnosis: Acute respiratory failure with hypoxemia related to pneumonia; septic shock    Indication for Inpatient Hospice: Dyspnea and medication management with HFNC weaning    Family signed consents today, SOC to be completed on 4/26 by CL. Family wants to wean on Thursday 4/27 at 9 am.     Michelle Mcgregor RN  Clinical Nurse Liaison  [facility]  Direct: [phone]  Contact information available via [facility] Paging/Directory

## 2023-04-25 NOTE — PLAN OF CARE
"  Problem: Plan of Care - These are the overarching goals to be used throughout the patient stay.    Goal: Plan of Care Review  Description: The Plan of Care Review/Shift note should be completed every shift.  The Outcome Evaluation is a brief statement about your assessment that the patient is improving, declining, or no change.  This information will be displayed automatically on your shift note.  Outcome: Progressing  Goal: Patient-Specific Goal (Individualized)  Description: You can add care plan individualizations to a care plan. Examples of Individualization might be:  \"Parent requests to be called daily at 9am for status\", \"I have a hard time hearing out of my right ear\", or \"Do not touch me to wake me up as it startles me\".  Outcome: Progressing  Goal: Absence of Hospital-Acquired Illness or Injury  Outcome: Progressing  Intervention: Identify and Manage Fall Risk  Recent Flowsheet Documentation  Taken 4/25/2023 0013 by Stacie Antonio RN  Safety Promotion/Fall Prevention: activity supervised  Intervention: Prevent Skin Injury  Recent Flowsheet Documentation  Taken 4/24/2023 2350 by Stacie Antonio RN  Body Position:   turned   right  Taken 4/24/2023 2100 by Stacie Antonio RN  Body Position:   turned   supine, head elevated  Goal: Optimal Comfort and Wellbeing  Outcome: Progressing  Goal: Readiness for Transition of Care  Outcome: Progressing     Problem: Risk for Delirium  Goal: Optimal Coping  Outcome: Progressing  Goal: Improved Behavioral Control  Outcome: Progressing  Goal: Improved Attention and Thought Clarity  Outcome: Progressing  Goal: Improved Sleep  Outcome: Progressing     Problem: Gas Exchange Impaired  Goal: Optimal Gas Exchange  Outcome: Progressing  Intervention: Optimize Oxygenation and Ventilation  Recent Flowsheet Documentation  Taken 4/24/2023 2350 by Stacie Antonio RN  Head of Bed (HOB) Positioning: HOB at 20-30 degrees  Taken 4/24/2023 2100 by Stacie Antonio RN  Head of Bed (HOB) " Positioning: HOB at 30-45 degrees     Problem: Malnutrition  Goal: Improved Nutritional Intake  Outcome: Progressing     Problem: Restraint, Nonviolent  Goal: Absence of Harm or Injury  Outcome: Progressing  Intervention: Protect Skin and Joint Integrity  Recent Flowsheet Documentation  Taken 4/24/2023 2350 by Stacie Antonio RN  Body Position:   turned   right  Taken 4/24/2023 2100 by Stacie Antonio RN  Body Position:   turned   supine, head elevated     Problem: Mechanical Ventilation Invasive  Goal: Effective Communication  Outcome: Progressing  Goal: Optimal Device Function  Outcome: Progressing  Intervention: Optimize Device Care and Function  Recent Flowsheet Documentation  Taken 4/25/2023 0013 by Stacie Antonio RN  Airway Safety Measures: all equipment/monitors on and audible  Goal: Mechanical Ventilation Liberation  Outcome: Progressing  Intervention: Promote Extubation and Mechanical Ventilation Liberation  Recent Flowsheet Documentation  Taken 4/25/2023 0013 by Stacie Antonio RN  Medication Review/Management: medications reviewed  Goal: Optimal Nutrition Delivery  Outcome: Progressing  Goal: Absence of Device-Related Skin and Tissue Injury  Outcome: Progressing  Goal: Absence of Ventilator-Induced Lung Injury  Outcome: Progressing  Intervention: Prevent Ventilator-Associated Pneumonia  Recent Flowsheet Documentation  Taken 4/24/2023 2350 by Stacie Antonio RN  Head of Bed (HOB) Positioning: HOB at 20-30 degrees  Taken 4/24/2023 2100 by Stacie Antonio RN  Head of Bed (HOB) Positioning: HOB at 30-45 degrees     Problem: Pain Acute  Goal: Optimal Pain Control and Function  Outcome: Progressing  Intervention: Prevent or Manage Pain  Recent Flowsheet Documentation  Taken 4/25/2023 0013 by Stacie Antonio RN  Medication Review/Management: medications reviewed   Goal Outcome Evaluation:       Soft BP s overnight, 500cc NS bolus given. HR in afib. Pt AO x self. RT attempted to decrease HHF, unsuccessfully. Cont  40%/40L.

## 2023-04-25 NOTE — PLAN OF CARE
Problem: Plan of Care - These are the overarching goals to be used throughout the patient stay.    Goal: Optimal Comfort and Wellbeing  Outcome: Not Progressing  Intervention: Provide Person-Centered Care  Recent Flowsheet Documentation  Taken 4/25/2023 1050 by Renetta Cr RN  Trust Relationship/Rapport: care explained   Goal Outcome Evaluation:  Turning and repositioning.  Pt states his bottom is sore and was at first resistant to turns.  Later was willing to be repositioned and pillows used for comfort.  Problem: Malnutrition  Goal: Improved Nutritional Intake  Outcome: Not Progressing  Poor oral intake.  Ordered cornflakes and milk per pt request.  He took one bite and told NA to throw it away.

## 2023-04-25 NOTE — PROGRESS NOTES
[facility] Hospitalist Progress Note  [facility]  Admission date: 4/17/2023    Summary:    82M w/dementia, PE in setting of COVID PNA, afib, emphysema, admitted with acute respiratory failure with hypoxemia, delirium, septic shock requiring pressors. He was found to have bilateral pneumonia, likely aspiration in etiology.  Required BiPAP/HFNC for respiratory support.  Per prior discussion with family have agreed to hospice but also wanted to continue abx and HFNC for now.  No escalation of care.    Assessment/Plan    #Severe multi-focal PNA with septic shock  #Acute hypoxic respiratory failure  #Suspected aspiration  #strep mitis, neisseria, strep salivarius bacteremia   -completed vanco x 4/zosyn x 8  -followed by SLP   -Unable to wean from HFNC yet.  Ability to wean may improve as inflammation subsides.  Family working with palliative care on goals of care (sounds like some difficulty in getting consistent communication with family).  Currently moving towards hospice but continuing current interventions.  Dispo remains a little unclear and somewhat hinges on HFNC need, along with family signing onto hospice or comfort measures.     #Rapid afib - rate improved with amiodarone infusion.  Not on anticoagulation.     #Dementia  #Acute on chronic encephalopathy  -- Worsened by acute illnesses as above     #acute on chronic HFrEF with EF recovery - diuresed to euvolemia. Not on diuretic , BB, ACE-I PTA.    #Severe malnutrition - RD       Checklist:  Code Status: No CPR- Do NOT Intubate    Diet: Snacks/Supplements Adult: Ensure Enlive; Between Meals  Combination Diet Moderate Consistent Carb (60 g CHO per Meal) Diet     DVT px:  Heparin SQ    Disposition and Discharge Planning  Auto-populated from discharge tab:     Expected Discharge Date: 04/26/2023    Discharge Delays: Social/Family Delay  Destination: long-term care facility  Discharge Comments: GIP hospice versus return to LTC with hospice (if able to  "wean oxygen enough)  Challenges with getting ahold of family         System Identified Risk Variables  Auto-populated based on system request - if relevant they will be addressed above:  Clinically Significant Risk Factors                        # Cachexia: Estimated body mass index is 14.16 kg/m  as calculated from the following:    Height as of this encounter: 1.88 m (6' 2.02\").    Weight as of this encounter: 50 kg (110 lb 5.4 oz).   # Severe Malnutrition: based on nutrition assessment      #thromocytopenia, anemia - due to sepsis      Interval Events/Subjective/Notable results:  Unable to wean from HFNC  Alert and interactive.  Oriented to place but really to ongoing events.  Reviewed CT and CXR, BMP, recent CBC  Received 500cc NS overnight for hypotension.    Objective    Vital signs in last 24 hours  Temp:  [97.1  F (36.2  C)-98.4  F (36.9  C)] 98.3  F (36.8  C)  Pulse:  [] 100  Resp:  [20-22] 20  BP: ()/(48-58) 100/57  FiO2 (%):  [30 %-50 %] 40 %  SpO2:  [83 %-100 %] 92 % O2 Device: High Flow Nasal Cannula (HFNC)    Weight:   110 lbs 5.44 oz  Body mass index is 14.16 kg/m .    Intake/Output last 3 shifts  I/O last 3 completed shifts:  In: 1323 [P.O.:300; I.V.:523; IV Piggyback:500]  Out: 1000 [Urine:1000]    Physical Exam  General:  Alert, cooperative, no distress.  Frail, cachexia  Neurologic:  Oriented to place but not to details of current illness, facial symmetry preserved, fluent speech.   Psych: calm  HEENT:  Anicteric, MMM, HFNC  CV: RRR no MRG, normal S1 and S2, no edema  Lungs:  Easy respirations on HFNC  Abd: soft, NT  Skin: no rashes or jaundice noted on exposed skin.    Marina Catheter: PRESENT, indication: Retention  Lines: PRESENT      PICC 04/18/23 Triple Lumen Right Basilic Multiple medications-Site Assessment: WDL           Medical Decision Making             Jose Witt MD, [facility]  Internal Medicine Hospitalist  "

## 2023-04-25 NOTE — PROGRESS NOTES
JONA and MICAH Martinez met with patient, son Saroj and BRITNI Chambersny to discuss inpatient hospice. Family consented to GIP and the SOC will be completed 4/26. Family would like to start weaning oxygen 4/27 at approximately 0900 so additional family can say goodbyes tomorrow. Family would like to be present when weaning starts.     Janis Cruz, [facility]  [phone]

## 2023-04-25 NOTE — PROGRESS NOTES
Have made multiple attempts to contact family, in particular son Parag who is listed as primary surrogate, and today Parag visited with his wife for GIP hospice consultation at 3 PM.  Will again attempt to contact patient tomorrow and offer support with ongoing goals of care discussions.    Barry Butler, DO  [facility], Palliative Care  Securely message with the [employer] Web Console (learn more here) or  Text page via [employer] Paging/Directory

## 2023-04-25 NOTE — PROGRESS NOTES
3:54 PM  SW updated by [facility] SW Janis that Pt signed [facility] paperwork and Pt's chart will switch to [facility] in the morning. No IP CM needs, IP CM will sign off.     KIM Bustillo

## 2023-04-25 NOTE — PROGRESS NOTES
Patient tolerating HFNC settings currently ( attempted to wean Fio2 to 30% but did not tolerate SpO2 <90%).     04/25/23 0050   Oxygen Therapy   SpO2 94 %   O2 Device High Flow Nasal Cannula (HFNC)   FiO2 (%) 40 %   Oxygen Delivery 40 LPM

## 2023-04-26 PROBLEM — Z51.5 HOSPICE CARE: Status: ACTIVE | Noted: 2023-01-01

## 2023-04-26 NOTE — PROGRESS NOTES
ACBERNARD TENORIO and [facility] RN Kraig met with patient and completed start of care. Hospitalist was paged, chart is ready for discharge/readmit.     Janis Cruz, [facility]  [phone]

## 2023-04-26 NOTE — PLAN OF CARE
Goal Outcome Evaluation:               Pt alert to self and place only, lethargic, bedrest baseline, lives in nursing home. Pt drank two orange juices this shift, otherwise refused food. Turned and repositioned, pillows in place. Marina draining. Mepilex on sacrum for redness. Reported pain at toes, and knees, voltaren gel and lido patches placed there. Phosphorous in range after replacement for protocol.

## 2023-04-26 NOTE — PROGRESS NOTES
"[facility]  Palliative Care Daily Progress Note       Recommendations & Counseling     Summary: Visited with patient today, remains on HFNC but states he is comfortable and specifically denies pain or dyspnea.  Palliative has had difficult time speaking directly with family, and in particular surrogate son Saroj, however able to connect this afternoon via telephone.  Son Saroj relays an understanding that his father has suffered a precipitous functional and nutritional decline over the past couple of months, and during this hospital stay has not tolerated weaning from pressure support via HFNC.  Yesterday they reportedly decided to transition to comfort care on 4/27/2023 with assistance from hospice GIP program.  Saroj says he and family have felt supported throughout this decision making process, I was able to answer some of his questions around Cristóbal deconditioned state and reassure that he has done a good job advocating for his father.  Please reconsult palliative care if requesting continued input after discharge/transition to GIP care.  If the patient were to unexpectedly stabilize on passive supplemental oxygen, hospice may need to make arrangements for outpatient community program.        Symptom Management:   Protocol for compassionate removal of high-flow nasal cannula (HFNC):    Review titration plan with bedside RN and RT.  o Bedside RN and RT should be prepared to remain at bedside for an estimated 1 hour, recommend planning ahead to ensure staffing.    Provide anticipatory guidance for family.    Coordinate timing of procedure. When family is ready, place order set for \"Comfort Measures for End of Life.\"    Order one time pre-medication bolus of opioid and benzodiazepine with additional doses q10 minutes prn for dyspnea, pain and anxiety.    Ensure at least 3 doses each of opioid and benzodiazepine medications ready at bedside.    Modify respiratory order comments to include titration " instructions below.    Decrease FiO2 and liter flow in a step-wise titration over 40 minutes (25% every 10 minutes then remove):  1) Pre-medicate with bolus opioid and benzodiazepine 10 minutes before starting wean. Wait 10 minutes after pre-medication for peak effect. Decrease FiO2 and liter flow by 25%. Assess for symptoms and give bolus dose opioids and/or benzodiazepines as needed.  2) Consider repeat pre-medication and wait 10 minutes for peak effect. Decrease FiO2 and liter flow by another 25%. Assess for symptoms and give bolus dose opioids and/or benzodiazepines as needed.  3) Consider repeat pre-medication and wait 10 minutes for peak effect. Decrease FiO2 and liter flow by another 25%. Assess for symptoms and give bolus dose opioids and/or benzodiazepines as needed.  4) Consider repeat pre-medication and wait 10 minutes for peak effect. Remove HFNC. Rebolus opioid and/or benzodiazepine every 10 minutes as needed for symptom control.    If symptoms are inadequately controlled at any point, increase opioid and/or benzodiazepine by %.    Adapted from: Rohit H, Chiki A, Shawn LA, Jamil CABALLERO. Compassionate Removal of Heated High-Flow Nasal Cannula for End of Life: Case Series and Protocol Development. J Hosp Palliat Nurs. 2021 Aug 1;23(4):360-366. doi: [email or web address]/[id number]. PMID: 38221695.       Advance Care Planning:     Decision Making Capacity: Significantly cognitively impaired    Goals of Care: Comfort care, no longer life-prolonging goals    Patient Identified Surrogate: Walter Kyle    Advance Directive on File: Yes, 2/2/2023    Code Status: DNAR/DNI    POLST: Yes      40 minutes spent on the date of the encounter doing chart review, conferring with primary team, history and exam, patient education & counseling, documentation and other activities as noted above.     Barry Butler, DO  [facility], Palliative Care  Securely message with the [employer] Web Console (learn more here) or  Text page via  "[facility] Paging/Directory           Assessments          Ezequiel Randhawa is a 82 year old male with atrial fibrillation, HFrEF (EF 25-30%), memory impairment and presumed dementia, chronic protein calorie malnutrition, COPD (emphysema), urinary retention/BPH, and prior covid pneumonia 2021 requiring intubation and mechanical ventilation and complicated by PE, who presented 4/17/23 from his LTC facility with rigors and found to have UTI and bilateral pneumonia and sepsis requiring vasopressors.  He has required positive pressure ventilation with BiPAP, eventually HFNC.  Ezequiel was successfully weaned from vasopressors but has not been able to discontinue support from HFNC.  Plan to transition to comfort care 4/27 with weaning off of pressure support.     Today, the patient was seen for:  Symptom assessment and and support with goals of care.    Prognosis, Goals, or Advance Care Planning was addressed today with: No.  Mood, coping, and/or meaning in the context of serious illness were addressed today: No.              Interval History:     Chart review/discussion with unit or clinical team members:   Reviewed chart including notes from primary medicine provider, RT [place] .  Communicated directly with medicine provider.    Per patient or family/caregivers today:  Ezequiel does not have decision-making capacity with regards to complex decisions, but is able to state that he has no active pain or dyspnea.  He is without nonverbal signs of distress during visit, but does not have an understanding of medical care or plan.  Also spoke with son Saroj today, he had some questions around his father's precipitous functional and nutritional decline as well as process of weaning from HFNC and symptom management.  Reassured him that he is advocating well for his father, and medical staff will work extremely hard to make sure \"he does not suffer\" in particular with regards to his breathing and potential dyspnea.     Key " "Palliative Symptoms:  # Pain severity the last 12 hours: low  # Dyspnea severity the last 12 hours: none           Review of Systems:     Besides above, an additional system ROS was reviewed and is unremarkable          Medications:     I have reviewed this patient's medication profile and medications during this hospitalization.           Physical Exam:     Physical Examination:   /55 (BP Location: Left arm)   Pulse 100   Temp 98.2  F (36.8  C) (Oral)   Resp 20   Ht 1.88 m (6' 2.02\")   Wt 50 kg (110 lb 5.4 oz)   SpO2 99%   BMI 14.16 kg/m    General:  Cachectic and frail in appearance.  HENT:  Normocephalic and atraumatic.  Hearing intact.    Eyes:  Conjunctivae are clear.    Cardiovascular:  Without cyanosis or mottling.  Respiratory:  Breathing comfortably on HFNC, without increased work of breathing or signs of respiratory distress.    Skin:  Without diaphoresis.    Neuro:  Alert but not oriented to time or place.    Wt Readings from Last 8 Encounters:   04/23/23 50 kg (110 lb 5.4 oz)   01/04/22 60.2 kg (132 lb 11.5 oz)   12/21/21 59 kg (130 lb)                Data Reviewed:     Recent Labs   Lab Test 04/26/23  0630 04/23/23  0454 04/22/23  0608 04/21/23  0309 04/18/23  0340 04/17/23  0255 03/02/23  0448 09/28/22  0514 05/17/22  0444   WBC  --  14.2* 11.9* 18.0*   < > 17.1* 6.9   < > 10.4   HGB  --  10.8* 11.0* 10.9*   < > 14.5 11.2*   < > 10.4*   MCV  --  99 100 101*   < > 108* 108*   < > 99    87* 85* 86*   < > 251 205   < > 298   NEUTROPHIL  --   --   --   --   --  92 60  --  76    < > = values in this interval not displayed.     Recent Labs   Lab Test 04/26/23  0630 04/25/23  0613 04/24/23  1407 04/24/23  0403    137  --  138   POTASSIUM 3.7 3.8 3.8 3.4   CHLORIDE 103 102  --  100   CO2 25 26  --  29   ANIONGAP 10 9  --  9   BUN 18.5 19.5  --  19.2   CR 0.39* 0.43*  --  0.44*   GREGORY 8.2* 8.2*  --  8.1*     Recent Labs   Lab Test 04/17/23  0255 09/28/22  0514 03/28/22  0455   AST 27 21 " 12   ALT 18 11 <9   ALKPHOS 108 95 74   BILITOTAL 0.5 0.4 0.3   ALBUMIN 3.8 3.6 2.8*   PROTTOTAL 7.1 6.4 5.9*

## 2023-04-26 NOTE — PROGRESS NOTES
RESPIRATORY CARE NOTE:    Pt transitioned to 5L NC.  HHFNC 40L40% on standby,  SpO2 at 94%. RT following.     Janina Gomez, RT  4/26/2023

## 2023-04-26 NOTE — PLAN OF CARE
Shift from 0700 to 1930-      Problem: Malnutrition  Goal: Improved Nutritional Intake  Outcome: Not Progressing       Goal Outcome Evaluation:  Pt continues to refuse food. Daughter at bedside this am and grieving; reassured and listened. Updated daughter in law over the phone.   Decreased oxygen to 5 liters NC per RT. Pt tolerating.   Refusing repositioning. Marina in place for retention.

## 2023-04-26 NOTE — DISCHARGE SUMMARY
[facility] MEDICINE  DISCHARGE SUMMARY     Primary Care Physician: Gaviota Mcgowan  Admission Date: 4/17/2023   Discharge Provider: Jose Witt MD Discharge Date: 4/26/2023   Diet:   Active Diet and Nourishment Order   Procedures     Snacks/Supplements Adult: Ensure Enlive; Between Meals     Combination Diet Moderate Consistent Carb (60 g CHO per Meal) Diet       Code Status: No CPR- Do NOT Intubate   Activity: DCACTIVITY: Activity as tolerated        Condition at Discharge: Serious     REASON FOR PRESENTATION(See Admission Note for Details)     SOB    PRINCIPAL & ACTIVE DISCHARGE DIAGNOSES     Principal Problem:    Multifocal pneumonia  Active Problems:    DNR (do not resuscitate)    Anticoagulated    Septic shock (H)    Acute respiratory failure with hypoxia and hypercapnia (H)    Urinary tract infection associated with indwelling urethral catheter, initial encounter (H)      PENDING LABS     Unresulted Labs Ordered in the Past 30 Days of this Admission     No orders found from 3/18/2023 to 4/18/2023.            PROCEDURES ( this hospitalization only)          RECOMMENDATIONS TO OUTPATIENT PROVIDER FOR F/U VISIT           DISPOSITION     General inpatient hospice    SUMMARY OF HOSPITAL COURSE:         82M w/dementia, PE in setting of COVID PNA, afib, emphysema, admitted with acute respiratory failure with hypoxemia, delirium, septic shock requiring pressors. He was found to have bilateral pneumonia, likely aspiration in etiology.  Required BiPAP/HFNC for respiratory support.  Per prior discussion with family have agreed to hospice but also wanted to continue abx and HFNC for now with escalation of care and ultimately opted to sign onto hospice.     Assessment/Plan     #Severe multi-focal PNA with septic shock  #Acute hypoxic respiratory failure  #Suspected aspiration  #strep mitis, neisseria, strep salivarius bacteremia   -completed vanco x 4/zosyn x 8  -followed by  SLP   -able to wean from HFNC and likely could discharge to hospice facility.     #Rapid afib - rate improved with amiodarone infusion.  Not on anticoagulation.     #Dementia  #Acute on chronic encephalopathy  -- Worsened by acute illnesses as above     #acute on chronic HFrEF with EF recovery - diuresed to euvolemia. Not on diuretic , BB, ACE-I PTA.     #Severe malnutrition - RD    Discharge Medications with Med changes:     Current Discharge Medication List      CONTINUE these medications which have NOT CHANGED    Details   acetaminophen (TYLENOL) 500 MG tablet Take 1,000 mg by mouth 3 times daily      bisacodyl (DULCOLAX) 10 MG suppository Place 10 mg rectally daily as needed for constipation      ibuprofen (ADVIL/MOTRIN) 200 MG tablet Take 200 mg by mouth 2 times daily as needed for pain      Lidocaine (LIDOCARE) 4 % Patch Place onto the skin every 24 hours Both knees  To prevent lidocaine toxicity, patient should be patch free for 12 hrs daily.      melatonin 3 MG tablet Take 3 mg by mouth At Bedtime      Miconazole Nitrate 2 % ointment Apply topically 2 times daily Apply to groin      polyethylene glycol (MIRALAX) 17 g packet Take 17 g by mouth 2 times daily as needed for constipation    Associated Diagnoses: Pneumonia due to COVID-19 virus      pregabalin (LYRICA) 75 MG capsule Take 75 mg by mouth 2 times daily      tamsulosin (FLOMAX) 0.4 MG capsule Take 1 capsule (0.4 mg) by mouth every evening    Associated Diagnoses: Pneumonia due to COVID-19 virus                   Rationale for medication changes:          Consults     PHARMACY TO DOSE VANCO  VASCULAR ACCESS ADULT IP CONSULT  PHARMACY TO DOSE VANCO  CARE MANAGEMENT / SOCIAL WORK IP CONSULT  VASCULAR ACCESS ADULT IP CONSULT  PALLIATIVE CARE ADULT IP CONSULT  SPIRITUAL HEALTH SERVICES IP CONSULT  SPEECH LANGUAGE PATH ADULT IP CONSULT  CARE MANAGEMENT / SOCIAL WORK IP CONSULT  [facility] INPATIENT HOSPICE ADULT CONSULT  HOSPITALIST IP CONSULT  HOSPITALIST IP  CONSULT  PHARMACY IP CONSULT    Immunizations given this encounter     Most Recent Immunizations   Administered Date(s) Administered     COVID-19 Vaccine (Jeremiah) 03/01/2022     COVID-19 Vaccine 18+ (Moderna) 05/27/2022           Anticoagulation Information            SIGNIFICANT IMAGING FINDINGS     Results for orders placed or performed during the hospital encounter of 04/17/23   CT Chest Abdomen Pelvis w/o Contrast    Impression    IMPRESSION:  1.  Multifocal pneumonia.  2.  Delayed gastric clearance suspicious for gastric paresis less likely obstruction.  3.  Nephrolithiasis.  4.  Marina catheter malfunction versus clamped Marina.  5.  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   XR Chest Port 1 View    Impression    IMPRESSION:     Right PICC tip over the proximal SVC.    No significant change of diffuse bilateral opacities with mid to lower lung predominant since recent chest CT.    No pleural effusion of significant size. No pneumothorax. Normal heart size.       XR Chest Port 1 View    Impression    IMPRESSION: Patient is slightly rotated on today's exam. No significant change in right upper extremity PICC with tip overlying the superior vena cava. Stable cardiomediastinal silhouette. Slightly increased groundglass opacities throughout both lungs.   No significant pleural effusion or pneumothorax visualized. Bones are unchanged.   XR Chest Port 1 View    Impression    IMPRESSION: The right PICC is unchanged in position. Multifocal airspace disease is again seen throughout the lungs bilaterally, slightly worsened in the right midlung zone, consistent with multifocal pneumonia         Discharge Orders     No discharge procedures on file.    Examination   Physical Exam   Temp:  [97.6  F (36.4  C)-98.3  F (36.8  C)] 97.6  F (36.4  C)  Pulse:  [] 104  Resp:  [18-22] 22  BP: ()/(51-57) 107/57  FiO2 (%):  [40 %] 40 %  SpO2:  [94 %-100 %] 97 %  Wt Readings from Last 1 Encounters:    04/23/23 50 kg (110 lb 5.4 oz)     No complaints    Please see EMR for more detailed significant labs, imaging, consultant notes etc.    I, Jose Witt MD, personally saw the patient today and spent greater than 30 minutes discharging this patient.    Jose Witt MD  [facility]    CC:Gaviota Mcgowan

## 2023-04-27 NOTE — PLAN OF CARE
Goal Outcome Evaluation:         Pt denies pain or discomfort overnight. Repsotioned as patient allowed. Pt is on 5L of oxygen sating 100%. Marina intact and patent.Perineum and scrotum reddened, pt refusing cares. Pt appears to be resting comfortably.  Guerda Culver RN

## 2023-04-27 NOTE — PROGRESS NOTES
"SW received phone call from patients son, Saroj requesting hospice to come up to patients room. SW and CNL arrived at patients room. Patient was thrashing in bed and family informed JONA that patient just received a PRN to help. Saroj was extremely upset that patient's high flow was removed yesterday with no family present. Saroj said in a raised voice, \"you don't care, this isn't your dad,\" \"how could you let this happen, \"we trusted you,\" \"when we came in this morning my dad was in pain, why does he not have proper medications, I thought he going to be pre medicated.\" Saroj's wife calmed Saroj down and asked what the next steps were, stating that they were told by medical staff to start discharge planning with outpatient hospice.\" SW explained that patient is still eligible for general inpatient hospice and his care will continued to be managed in the hospital until EOL or his symptoms are managed and he no longer requires GIP level of care. JONA left room with CNL Saroj Martinez Jenny and Luis present.    CMS guidelines below  https://www.cms.gov/Regulations-and-Guidance/Guidance/Manuals/downloads/tw815g47.pdf     [facility] JONA will continue to follow for discharge planning needs.    Janis Cruz JONA  [phone]  "

## 2023-04-27 NOTE — PHARMACY-ADMISSION MEDICATION HISTORY
Admission medication history completed at [facility]. Please see Pharmacy - Admission Medication History note from 4/17/2023.

## 2023-04-27 NOTE — PLAN OF CARE
Shift from 0700 to 1930-  Patient changed to inpatient hospice this eveinng.     Problem: Palliative Care  Goal: Enhanced Quality of Life  4/26/2023 1906 by Ellen Clifton, RN  Outcome: Unable to Meet  4/26/2023 1906 by Ellen Clifton, RN  Outcome: Progressing     Goal Outcome Evaluation:           Decreased oxygen to 5 liters NC per RT. Pt tolerating.   Refusing repositioning. Marina in place for retention; Dk urine.  Scrotum red. Calmoseptine ordered and applied.  Denies pain. Refuses repositioning.   Declines food.

## 2023-04-27 NOTE — H&P
Admission History and Physical   Ezequiel Randhawa,  1940, MRN [id number]    [facility]    PCP: Gaviota Mcgowan, [phone]          Extended Emergency Contact Information  Primary Emergency Contact: Parag Randhawa  Address: [address] [place] [address] [place]  Home Phone: [phone]  Mobile Phone: [phone]  Relation: Son  Secondary Emergency Contact: Trey Randhawa  Address: [address] [place] [address] [facility]  Mobile Phone: [phone]  Relation: Son       Assessment and Plan       82M w/dementia, PE in setting of COVID PNA, afib, emphysema, admitted with acute respiratory failure with hypoxemia, delirium, septic shock requiring pressors. He was found to have bilateral pneumonia, likely aspiration in etiology.  Required BiPAP/HFNC for respiratory support.  Per prior discussion with family have agreed to hospice but also wanted to continue abx and HFNC for now with escalation of care and ultimately opted to sign onto comfort measure & general inpatient hospice.        Assessment/Plan     #Comfort measures -   -Was on minimal HFNC setting and able to successfully wean from HFNC as part of normal care progression (not a compassionate removal) and will likely need hospice home vs. SNF hospice on discharge.  -low dose oxycodone started for mild baseline dyspnea.  Has morphine PRN.  Ativan PRN.      #Severe multi-focal PNA with septic shock  #Acute hypoxic respiratory failure  #Suspected aspiration  #strep mitis, neisseria, strep salivarius bacteremia   -completed vanco x 4/zosyn x 8  -followed by SLP        #Rapid afib - rate improved with amiodarone infusion.  Not on anticoagulation.     #Dementia  #Acute on chronic encephalopathy  -- Worsened by acute illnesses as above     #acute on chronic HFrEF with EF recovery - diuresed to euvolemia. Not on diuretic , BB, ACE-I PTA.     #Severe malnutrition      Checklist:  Code  "Status: No CPR- Do NOT Intubate    Diet: Snacks/Supplements Adult: Ensure Enlive; Between Meals  Combination Diet Moderate Consistent Carb (60 g CHO per Meal) Diet    Marina Catheter: Not present  Lines: PRESENT      PICC 04/18/23 Triple Lumen Right Basilic Multiple medications-Site Assessment: WDL            Auto-populated based on system request - if relevant they will be addressed above:  Clinically Significant Risk Factors Present on Admission                       # Cachexia: Estimated body mass index is 14.16 kg/m  as calculated from the following:    Height as of 4/17/23: 1.88 m (6' 2.02\").    Weight as of 4/23/23: 50 kg (110 lb 5.4 oz).             Advanced Care Planning  I anticipate the patient will be admitted to the hospital for at least 2 midnights for the evaluation and treatment of the conditions discussed above.     Chief Complaint: SOB     HPI:    Ezequiel Randhawa is an 82 year old male with PE in setting of COVID PNA, afib, emphysema, admitted with acute respiratory failure with hypoxemia, delirium, septic shock requiring pressors. He was found to have bilateral pneumonia, likely aspiration in etiology.  Required BiPAP/HFNC for respiratory support.  Per prior discussion with family have agreed to hospice but also wanted to continue abx and HFNC for now with escalation of care and ultimately opted to sign onto comfort measure & general inpatient hospice.            Physical Exam:  Temp:  [97.6  F (36.4  C)-98.9  F (37.2  C)] 98  F (36.7  C)  Pulse:  [104-113] 113  Resp:  [20-22] 20  BP: (107-131)/(57-60) 131/59  SpO2:  [97 %-100 %] 98 %  /59 (BP Location: Left arm)   Pulse 113   Temp 98  F (36.7  C) (Oral)   Resp 20   SpO2 98%      General:  Alert, frail, chronically ill appearing, cachexia  Neurologic: interactive, facial symmetry preserved, fluent speech. Moves all 4 spontaneously  Psych: calm, mood and affect appropriate to situation  HEENT:  Anicteric, MMM, unremarkable dentition  CV:  " no edema  Lungs: mild tachypnea and occasional use of accessory muscles.  Family reports better after morphine this AM.  overall appears comfortable.  Abd: soft, NT, normoactive BS  Skin: no rashes noted on exposed skin. Color and turgor normal  Central Lines and Tubes: None (no burris, CVC, feeding tubes)         Pertinent Test Findings  Radiology Results (results reviewed):   No results found for this visit on 04/26/23.        Medical History  No past medical history on file.     Surgical History  Past Surgical History:   Procedure Laterality Date     PICC TRIPLE LUMEN PLACEMENT  12/20/2021          PICC TRIPLE LUMEN PLACEMENT  4/17/2023          PICC TRIPLE LUMEN PLACEMENT  4/18/2023               Social History          Allergies  No Known Allergies Family History                  Prior to Admission Medications   Prior to Admission Medications   Prescriptions Last Dose Informant Patient Reported? Taking?   Lidocaine (LIDOCARE) 4 % Patch   Yes No   Sig: Place onto the skin every 24 hours Both knees  To prevent lidocaine toxicity, patient should be patch free for 12 hrs daily.   Miconazole Nitrate 2 % ointment   Yes No   Sig: Apply topically 2 times daily Apply to groin   acetaminophen (TYLENOL) 500 MG tablet   Yes No   Sig: Take 1,000 mg by mouth 3 times daily   bisacodyl (DULCOLAX) 10 MG suppository   Yes No   Sig: Place 10 mg rectally daily as needed for constipation   ibuprofen (ADVIL/MOTRIN) 200 MG tablet   Yes No   Sig: Take 200 mg by mouth 2 times daily as needed for pain   melatonin 3 MG tablet   Yes No   Sig: Take 3 mg by mouth At Bedtime   polyethylene glycol (MIRALAX) 17 g packet   No No   Sig: Take 17 g by mouth 2 times daily as needed for constipation   Patient taking differently: Take 17 g by mouth daily   pregabalin (LYRICA) 75 MG capsule   Yes No   Sig: Take 75 mg by mouth 2 times daily   tamsulosin (FLOMAX) 0.4 MG capsule   No No   Sig: Take 1 capsule (0.4 mg) by mouth every evening       Facility-Administered Medications: None              Pertinent Labs    Most Recent 3 CBC's:  Recent Labs   Lab Test 04/26/23  0630 04/23/23  0454 04/22/23  0608 04/21/23  0309   WBC  --  14.2* 11.9* 18.0*   HGB  --  10.8* 11.0* 10.9*   MCV  --  99 100 101*    87* 85* 86*     Most Recent 3 BMP's:  Recent Labs   Lab Test 04/26/23  0630 04/25/23  0613 04/24/23  1407 04/24/23  0403    137  --  138   POTASSIUM 3.7 3.8 3.8 3.4   CHLORIDE 103 102  --  100   CO2 25 26  --  29   BUN 18.5 19.5  --  19.2   CR 0.39* 0.43*  --  0.44*   ANIONGAP 10 9  --  9   GREGORY 8.2* 8.2*  --  8.1*   GLC 86 110*  --  83     Most Recent 2 LFT's:  Recent Labs   Lab Test 04/17/23  0255 09/28/22  0514   AST 27 21   ALT 18 11   ALKPHOS 108 95   BILITOTAL 0.5 0.4     Most Recent 3 INR's:  Recent Labs   Lab Test 04/17/23  0255 12/21/21  1519 12/20/21  2200   INR 1.16* 1.44* 1.64*     Most Recent 3 Troponin's:No lab results found.    Medical Decision Making        Medical Decision Making               Jose Witt MD, [facility]  Internal Medicine Hospitalist  4/27/2023

## 2023-04-27 NOTE — PROGRESS NOTES
"[facility]    Progress Note - [facility]  ______________________________________________________________________    [facility]  Contact Number: [phone]    - Providers: Please contact [facility] with changes in orders or clinical plan of care   - Nursing: Please contact [facility] with significant changes in patient condition  ______________________________________________________________________      Plan of Care Discussed with the Following:   - Nurse: SHAYNA Vicente  -Charge Nurse: Ladan  - Hospitalist/Rounding Provider:     - Patient's Family/Preferred Contact: Saroj Randhawa (son)  - Hospice Provider: Dr. Rick Bradshaw    [facility] Eligibility: Dyspnea and medication management    /cremation facility: Undetermined by family at this point    Summary: Met with \"Tobi\", Luis Kyle, and BRITNI Heredia at bedside. Extensive conversation on EOL symptoms and signs reviewed. Re-emphasized hospice philosophy of comfort vs curative. Active listening and therapeutic communication utilized as family was upset that HFNC weaning had begun prior to today at 9 am which was the original plan as of  when they signed on to [facility] hospice. Patient currently on 5 LPM NC Sp 02 high 90's. Family states they are encouraged to see he is maintaining oxygen for now, but still visibly emotional due to a change in plan they state they were not informed about. Dyspnea is present at rest, RR 22 with abdominal muscles being used for effort. Patient reports feeling short of breath, denies pain when asked. Ordered pancakes and oranges for breakfast, not able to feed self. Tolerated few bites only. Family and nurse assistance is needed for ordering and feeding assistance. Patient is still meeting criteria for [facility] due to dyspnea and medication management as of today. [facility] will continue to follow and reassess daily.      Hospice recommendations:    -Lorazepam 1 mg SL every 3 hours as " needed   - Create calming environment for patient and family.  -Soak fingernails and provide nail care as able  -Fan at bedside to promote comfort for shortness of breath  -Re-approach with refusals of care due to dementia        Michelle Mcgregor RN  Clinical Nurse Liaison  [facility]  Direct: [phone]  Contact information available via [facility] Paging/Directory

## 2023-04-27 NOTE — PLAN OF CARE
Problem: Plan of Care - These are the overarching goals to be used throughout the patient stay.    Goal: Absence of Hospital-Acquired Illness or Injury  Intervention: Prevent Skin Injury  Recent Flowsheet Documentation  Taken 4/27/2023 0943 by Jules Price RN  Body Position: turned     Problem: End-of-Life Care  Goal: Comfort, Peace and Preserved Dignity  Intervention: Promote Physical Comfort  Recent Flowsheet Documentation  Taken 4/27/2023 0943 by Jules Price RN  Sensory Stimulation Regulation: auditory stimulation provided  Intervention: Support the Grieving Process  Recent Flowsheet Documentation  Taken 4/27/2023 0943 by Jules Price RN  Family/Support System Care: involvement promoted   Goal Outcome Evaluation:  Pt aware of care and family was at bedside in the morning. Hospice care spoke to family about request and updated medication. Pt seam rested and no discomfort observed. Offered food and ate very little.

## 2023-04-28 NOTE — PROGRESS NOTES
"[facility]    Medicine Progress Note - Hospitalist Service    Date of Admission:  4/26/2023    Assessment & Plan   82 year old male with history of dementia, PE in setting of COVID PNA, afib, and emphysema who is admitted with acute hypoxemic respiratory failure and septic shock requiring pressors. He was found to have bilateral pneumonia, possible aspiration in etiology.  Required BiPAP/HFNC for respiratory support.  Per prior discussion with family, they have agreed to comfort measures with hospice consultation        Comfort measures:  --Continue supplemental oxygen and comfort medications as ordered  --Await hospice placement       Severe multi-focal PNA with septic shock  Acute hypoxic respiratory failure  Suspected aspiration  Strep mitis, neisseria, strep salivarius bacteremia   --completed vanco x 4/zosyn x 8  --Okay for liberalized diet        Rapid afib - rate improved with amiodarone infusion.  Not on anticoagulation.       Dementia with Acute on chronic encephalopathy  Worsened by acute illnesses   -- Continue supportive cares       Acute on chronic HFrEF with EF recovery - diuresed to euvolemia. Not on diuretics any longer       Severe malnutrition          Diet: Snacks/Supplements Adult: Ensure Enlive; Between Meals  Combination Diet Moderate Consistent Carb (60 g CHO per Meal) Diet    DVT Prophylaxis: none  Marina Catheter: PRESENT, indication: Retention  Lines: PRESENT      PICC 04/18/23 Triple Lumen Right Basilic Multiple medications-Site Assessment: WDL      Cardiac Monitoring: None  Code Status: No CPR- Do NOT Intubate      Clinically Significant Risk Factors                        # Cachexia: Estimated body mass index is 14.16 kg/m  as calculated from the following:    Height as of 4/17/23: 1.88 m (6' 2.02\").    Weight as of 4/23/23: 50 kg (110 lb 5.4 oz)., PRESENT ON ADMISSION         Disposition Plan      Expected Discharge Date: 04/29/2023                  Magdiel SAAVEDRA" DO Marc  Hospitalist Service  [facility]  Securely message with [employer] (more info)  Text page via [employer] Paging/Directory   ______________________________________________________________________    Interval History   NAD. Denies any complaints and states he is comfortable    Physical Exam   Vital Signs: Temp: 97.7  F (36.5  C) Temp src: Oral BP: 117/56 Pulse: 111   Resp: 20 SpO2: 100 % O2 Device: Nasal cannula Oxygen Delivery: 5 LPM  Weight: 0 lbs 0 oz  General: NAD  RESPIRATORY: Breathing nonlabored  CARDIOVASCULAR: No le edema bilat.   NEUROLOGIC: Somnolent, speech clear         Medical Decision Making       >35 MINUTES SPENT BY ME on the date of service doing chart review, history, exam, documentation & further activities per the note.      Data

## 2023-04-28 NOTE — SIGNIFICANT EVENT
Hospice RN was recommending a PRN for breathing, either morphine or ativan after assessment of pt, writer felt comfortable medicating pt based on Hospice recommendation who saw him at the bedside, writer gave ativan.  Ativan 1 mg dose was too strong for patient and now pt unable to take anything by mouth, pupils reactive but pinpoint, pt clammy, resp 26 pulse in 120's oxygen sats 98% on 6 L, son is at bedside.  Writer spoke with on call Dr. Rick Lee, who is on this weekend.  Writer left note with Hospitalist for am to potentially decrease ativan dosing as it oversedates patient. Writer will have to hold 1900 oxycodone scheduled dose.

## 2023-04-28 NOTE — PLAN OF CARE
"  Problem: Plan of Care - These are the overarching goals to be used throughout the patient stay.    Goal: Absence of Hospital-Acquired Illness or Injury  Intervention: Identify and Manage Fall Risk  Recent Flowsheet Documentation  Taken 4/27/2023 1620 by Ragini Crabtree RN  Safety Promotion/Fall Prevention: activity supervised  Intervention: Prevent Skin Injury  Recent Flowsheet Documentation  Taken 4/27/2023 1620 by Ragini Crabtree RN  Body Position: turned  Taken 4/27/2023 1600 by Ragini Crabtree RN  Body Position: turned     Problem: End-of-Life Care  Goal: Comfort, Peace and Preserved Dignity  Outcome: Progressing   Goal Outcome Evaluation:    Repositioned for comfort/maintain skin integrity.  Painful with repositioning.   Poor appetite.  Encouraged sips of fluids (water/juice).  Reluctant to eat or drink.  Sips only.   Speech mostly unclear but stated \"There is a limit to how much I can drink.\"  Also stated \" General pain.\"  Scheduled pain medications given.  Remains on 5L O2.  Shortness of breath with activities and at rest.  Continue to monitor.                           "

## 2023-04-28 NOTE — PROGRESS NOTES
"Rounded on patient with AC SW and   with patient's permission. Reassessed pain and dyspnea. Patient denies pain. Dyspnea has not improved to an acceptable rate for patient comfort. This nurse asked patient if he felt short of breath, \"all the time\" was his reply. Contacted bedside RN to medicate for dyspnea as patient is presenting with RR 22, Sp02 97%, pulse 107. Bedside RN agreed to medicate for symptoms.     We really appreciate the collaborative care being provided for this mutual patient on P2.      Michelle Mcgregor RN  Clinical Nurse Liaison  [facility]  Direct: [phone]  Contact information available via [facility] Paging/Directory    "

## 2023-04-28 NOTE — PLAN OF CARE
Goal Outcome Evaluation:         Problem: End-of-Life Care  Goal: Comfort, Peace and Preserved Dignity  Outcome: Progressing  Pt is alert to self and place. Pt is A-2 with turning/repositioning. Pt is on 5L oxygen, gets short of breath with activity. Pt denies any pain or discomfort overnight. Speech is garbled, hard to understand at times. Awaiting hospice placement.  Guerda Culver RN

## 2023-04-28 NOTE — PROGRESS NOTES
JONA and MICAH Martinez met with Saroj and patient at patients bedside. Saroj apologized for raising his voice yesterday at  and expressed frustration with the situation. Patient was showing signs of air hunger and continuously fell asleep and woke up throughout visit, patient was visibly uncomfortable and could not maintain a state of sleep. JONA discussed discharge planning with Saroj. Saroj does not want patient to return to [facility] stating they neglect his needs. JONA filed [facility] on facility (Reference # [id number]). Patients son Saroj requested a referral be sent to [facility]. JONA to complete referral.     [facility] JONA will continue to follow.    Janis Cruz, [facility]  [phone]

## 2023-04-28 NOTE — PROGRESS NOTES
"[facility]    Progress Note - [facility]  ____________________________________________________________________    [facility]  Contact Number: [phone]    - Providers: Please contact [facility] with changes in orders or clinical plan of care   - Nursing: Please contact [facility] with significant changes in patient condition  ______________________________________________________________________      Plan of Care Discussed with the Following:   - Nurse: SHAYNA Taylor  - Hospitalist/Rounding Provider: Dr. Tran    - Patient's Family/Preferred Contact: Saroj Randhawa (son)  - Hospice Provider: Dr. Rick Bradshaw    [facility] Eligibility: Dyspnea and pain    [facility] [phone]    Education provided: Encouraged bedside RN to give PRN medication as indicated. Educated laureano Kyle, on EOL signs and symptoms patient is experiencing and could experience during this transition.       Summary: Patient laying supine with eyes slightly open; cachectic. Tobi startled when this nurse and SW greeted him, eyes open and attempted to sit up in bed. Reassurance and calm environment provided, patient settled. When asked about pain, Tobi nods head and states \"yes\". He asked this nurse to remove glasses for comfort. Currently on 5 LPM NC SpO2 99%. Apical pulse 103; irregular. RR 22 labored. Encouraged bedside RN to administer PRN morphine for air hunger as indicated on MAR. Tobi able to mumble words during assessment, some words are hard to make out. Patient is hard of hearing. Hears better out of right ear per Saroj. Peripheral pulses are present. Heel boots are on for skin protection. Will reassess daily as patient still meets criteria for [facility] hospice. Son Saroj and his wife Giovanna reports they are leaving this weekend to return on . Would like any updates via phone call.      Hospice recommendations per Dr. Rick Bradshaw "   -Roxicodone 20 mg/mL (high concentration) 5 mg oral every 6 hours scheduled  -Discontinue scheduled Roxicodone 2.5 mg tablet  -Use PRN Ativan for anxiety/agitation   - Remove PICC line for comfort  - Utilize PRN's available to manage air hunger and under-reported pain        Michelle Mcgregor RN  Clinical Nurse Liaison  [facility]  Direct: [phone]  Contact information available via [facility] Paging/Directory

## 2023-04-28 NOTE — PLAN OF CARE
Goal Outcome Evaluation:       Pt denies pain unless turning and repositioning. Pt is oriented to self, quiet.  Family here in am and this afternoon.  Pt is drinking water 700 ml on day shift and drinking sips of chocolate ensure, poor appetite.  Pt taking pills whole.  Hospice team seeing patient as well and making recommendations for comfort and breathing.  Writer did add a bubbler today for oxygen as pt's nose appeared stuffy and dry.  Writer giving scheduled meds for pain, tylenol, oxycodone, now switched to liquid form, lyrica.  Writer giving morphine PRN or lorazepam PRN for respirations.  Pt was on a pulse ox this am, hospitalist dc'd order, pt's sats were % on 6L NC.

## 2023-04-29 NOTE — PROGRESS NOTES
Turned pt every 2-3 hours and completed oral cares with each interaction. Pt obtunded. Appeared comfortable and didn't require any prn pain medications. Will continue to monitor.

## 2023-04-29 NOTE — PLAN OF CARE
Goal Outcome Evaluation:    Overall Patient Progress: decliningOverall Patient Progress: declining    Outcome Evaluation: Pt obtunded and unable to stay alert. Marina draining cloudy devorah urine. Penis and scrotum reddened with flaking. Completed HS cares, however unable to complete oral cares due to pt biting down on sponge. Marina catheter cares completed. Pt repositioned every 2-3 hours from side to side. Interflex air pump attached to bed. Will continue to monitor.

## 2023-04-29 NOTE — PROGRESS NOTES
"[facility]    Medicine Progress Note - Hospitalist Service    Date of Admission:  4/26/2023    Assessment & Plan   82 year old male with history of dementia, PE in setting of COVID PNA, afib, and emphysema who is admitted with acute hypoxemic respiratory failure and septic shock requiring pressors. He was found to have bilateral pneumonia, possible aspiration in etiology.  Required BiPAP/HFNC for respiratory support.  Per prior discussion with family, they have agreed to comfort measures with hospice consultation        Comfort measures:  --Continue supplemental oxygen and comfort medications as ordered  --Awaiting hospice placement  --Would not recommend discharge 4/29/2023 given concern for patient beginning the active dying process       Severe multi-focal PNA with septic shock  Acute hypoxic respiratory failure  Suspected aspiration  Strep mitis, neisseria, strep salivarius bacteremia   --completed vanco x 4/zosyn x 8  -- liberalized diet        Rapid afib - rate improved with amiodarone infusion.  Not on anticoagulation.       Dementia with Acute on chronic encephalopathy  Worsened by acute illnesses   -- Continue supportive cares       Acute on chronic HFrEF with EF recovery - diuresed to euvolemia. Not on diuretics any longer       Severe malnutrition          Diet: Snacks/Supplements Adult: Ensure Enlive; Between Meals  Combination Diet Moderate Consistent Carb (60 g CHO per Meal) Diet    DVT Prophylaxis: none  Marina Catheter: PRESENT, indication: Retention, End of Life  Lines: None     Cardiac Monitoring: None  Code Status: No CPR- Do NOT Intubate      Clinically Significant Risk Factors                        # Cachexia: Estimated body mass index is 14.16 kg/m  as calculated from the following:    Height as of 4/17/23: 1.88 m (6' 2.02\").    Weight as of 4/23/23: 50 kg (110 lb 5.4 oz)., PRESENT ON ADMISSION         Disposition Plan      Expected Discharge Date: 04/30/2023           "        Magdile Tran, DO  Hospitalist Service  [facility]  Securely message with [employer] (more info)  Text page via [employer] Paging/Directory   ______________________________________________________________________    Interval History   NAD.  Obtunded    Physical Exam   Vital Signs: Temp: 98.1  F (36.7  C) Temp src: Oral BP: 91/52 Pulse: 116   Resp: 15 SpO2: 96 % O2 Device: Nasal cannula Oxygen Delivery: 5 LPM  Weight: 0 lbs 0 oz  General: NAD  RESPIRATORY: Breathing labored  CARDIOVASCULAR: No le edema bilat.   NEUROLOGIC: Obtunded, will not arouse to answer questions         Medical Decision Making       >35 MINUTES SPENT BY ME on the date of service doing chart review, history, exam, documentation & further activities per the note.      Data

## 2023-04-29 NOTE — DEATH PRONOUNCEMENT
MD DEATH PRONOUNCEMENT    Called to pronounce Ezequiel Randhawa dead.    Physical Exam: Spontaneous respirations absent, Breath sounds absent and Carotid pulse absent    Patient was pronounced dead at 1:55 PM, April 29, 2023.    Preliminary Cause of Death: Cardiopulmonary arrest    Principal Problem:    Hospice care     Magdiel Tran D.O.

## 2023-04-29 NOTE — PLAN OF CARE
Goal Outcome Evaluation:             Spoke with Hospice team, coming in shortly, pt declining, son Saroj updated.

## 2023-04-29 NOTE — DISCHARGE SUMMARY
[facility]    Death Summary - Hospitalist Service     Date of Admission:  4/26/2023  Date of Death: 4/29/2023  Discharging Provider: Magdiel Tran DO    Discharge Diagnoses   Acute hypoxemic respiratory failure  Septic shock  Pneumonia  Comfort cares  End-of-life care    Cause of death: Cardiopulmonary arrest     Hospital Course   82 year old male with history of dementia, prior PE in setting of prior COVID PNA, afib, and emphysema who is admitted with acute hypoxemic respiratory failure and septic shock requiring pressors. He was found to have bilateral pneumonia, possible aspiration in etiology.  Required BiPAP/HFNC for respiratory support.  Per prior discussion with family, they have agreed to comfort measures with hospice consultation.  Patient eventually passed away in the hospital while on comfort cares.        Magdiel Tran DO  [facility]  ______________________________________________________________________      Consultations This Hospital Stay   HOSPITALIST IP CONSULT  PHARMACY IP CONSULT    Primary Care Physician   Gaviota Mcgowan    Time Spent on this Encounter   I, Magdiel Tran DO, personally saw the patient today and spent greater than 30 minutes discharging this patient.

## 2023-04-29 NOTE — PROGRESS NOTES
[facility]  Progress Note - [facility]    ______________________________________________________________________    [facility]  Contact Number: [phone]    - Providers: Please contact [facility] with changes in orders or clinical plan of care   - Nursing: Please contact [facility] with significant changes in patient condition  ______________________________________________________________________        Plan of Care Discussed with the Following:   - Nurse: SHAYNA Taylor  - Hospitalist/Rounding Provider: Dr. Tran    - Patient's Family/Preferred Contact: Saroj  - Hospice Provider: Dr. Rick Bradshaw    [facility] Eligibility: Dyspnea    /cremation facility: [facility]     Pertinent assessment information:   RN came to bedside for daily GIP assessment. Patient comatose, eyes slightly open. Cheyne-Junior breathing, RR 10. Body cool , mottling noted bilateral hands and knees.Non-verbal signs of pain are not present. Arms relaxed and placed over stomach. Peripheral pulses diminished but palpable. Oxygen removed by bedside nurse with hospice RN present. Oxygen drying out nares and no longer indicated for comfort.     We appreciate the collaborative care provided for this mutual patient on P2.      Lois Mcgregor RN  [facility]  Contact information available via [facility] Paging/Directory

## 2024-09-05 NOTE — PROGRESS NOTES
RESPIRATORY CARE NOTE:    Pt currently on BiPAP 14/8 12 80%. Spo2 in the low 90s.  Pt tolerating well. RT to continue to follow.       Janina Gomez, RT  4/17/2023     No

## 2025-07-10 NOTE — PLAN OF CARE
Major Shift Events:  RASS -3/-4. Fent and prop. Very stiff. Moves all extremities. SB 40s-60s. Levo for MAP > 65. Afebrile. 7.5 ETT CMV 40%/18/500/7. No secretions. Poor dentation, oral cares completed. OG in place. Advanced to 70cm. Salas malloy low UOP, team aware. Continue to monitor. Skin intact, very dry/scaly feet. R TL PICC and L PIV. Hep at 1450, next Xa at 2245. Hypernatremic, 150mL FWF q2h started. Trending sodiums.  Plan: Continue to monitor. Continue current POC.  For vital signs and complete assessments, please see documentation flowsheets.      Single point cane fit to patient for post-discharge, home use.    Contact [employer] with any questions or concerns regarding the use of this DME.